# Patient Record
Sex: MALE | Race: BLACK OR AFRICAN AMERICAN | Employment: OTHER | URBAN - METROPOLITAN AREA
[De-identification: names, ages, dates, MRNs, and addresses within clinical notes are randomized per-mention and may not be internally consistent; named-entity substitution may affect disease eponyms.]

---

## 2018-09-27 ENCOUNTER — ED HISTORICAL/CONVERTED ENCOUNTER (OUTPATIENT)
Dept: OTHER | Age: 83
End: 2018-09-27

## 2019-04-15 ENCOUNTER — OP HISTORICAL/CONVERTED ENCOUNTER (OUTPATIENT)
Dept: OTHER | Age: 84
End: 2019-04-15

## 2019-06-17 ENCOUNTER — OP HISTORICAL/CONVERTED ENCOUNTER (OUTPATIENT)
Dept: OTHER | Age: 84
End: 2019-06-17

## 2019-06-17 ENCOUNTER — ED HISTORICAL/CONVERTED ENCOUNTER (OUTPATIENT)
Dept: OTHER | Age: 84
End: 2019-06-17

## 2021-04-28 ENCOUNTER — HOSPITAL ENCOUNTER (INPATIENT)
Age: 86
LOS: 5 days | Discharge: NURSING, REHAB OR LONG-TERM CARE | DRG: 435 | End: 2021-05-03
Attending: EMERGENCY MEDICINE | Admitting: INTERNAL MEDICINE
Payer: MEDICARE

## 2021-04-28 ENCOUNTER — APPOINTMENT (OUTPATIENT)
Dept: CT IMAGING | Age: 86
DRG: 435 | End: 2021-04-28
Attending: EMERGENCY MEDICINE
Payer: MEDICARE

## 2021-04-28 ENCOUNTER — APPOINTMENT (OUTPATIENT)
Dept: GENERAL RADIOLOGY | Age: 86
DRG: 435 | End: 2021-04-28
Attending: EMERGENCY MEDICINE
Payer: MEDICARE

## 2021-04-28 DIAGNOSIS — R29.898 WEAKNESS OF BOTH LOWER EXTREMITIES: Primary | ICD-10-CM

## 2021-04-28 DIAGNOSIS — D72.829 LEUKOCYTOSIS, UNSPECIFIED TYPE: ICD-10-CM

## 2021-04-28 DIAGNOSIS — R73.9 HYPERGLYCEMIA: ICD-10-CM
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
## 2021-04-28 PROBLEM — N17.9 AKI (ACUTE KIDNEY INJURY) (HCC): Status: ACTIVE | Noted: 2021-04-28

## 2021-04-28 PROBLEM — R53.1 WEAKNESS: Status: ACTIVE | Noted: 2021-04-28

## 2021-04-28 LAB
ALBUMIN SERPL-MCNC: 2.7 G/DL (ref 3.5–5)
ALBUMIN/GLOB SERPL: 0.5 {RATIO} (ref 1.1–2.2)
ALP SERPL-CCNC: 679 U/L (ref 45–117)
ALT SERPL-CCNC: 104 U/L (ref 12–78)
AMMONIA PLAS-SCNC: <10 UMOL/L
ANION GAP SERPL CALC-SCNC: 10 MMOL/L (ref 5–15)
APPEARANCE UR: ABNORMAL
AST SERPL W P-5'-P-CCNC: 287 U/L (ref 15–37)
BACTERIA URNS QL MICRO: NEGATIVE /HPF
BASOPHILS # BLD: 0 K/UL (ref 0–0.1)
BASOPHILS NFR BLD: 0 % (ref 0–1)
BILIRUB SERPL-MCNC: 8.6 MG/DL (ref 0.2–1)
BILIRUB UR QL: NEGATIVE
BUN SERPL-MCNC: 51 MG/DL (ref 6–20)
BUN/CREAT SERPL: 21 (ref 12–20)
CA-I BLD-MCNC: 9.4 MG/DL (ref 8.5–10.1)
CHLORIDE SERPL-SCNC: 98 MMOL/L (ref 97–108)
CK SERPL-CCNC: 1554 U/L (ref 39–308)
CO2 SERPL-SCNC: 22 MMOL/L (ref 21–32)
COLOR UR: ABNORMAL
CREAT SERPL-MCNC: 2.4 MG/DL (ref 0.7–1.3)
DIFFERENTIAL METHOD BLD: ABNORMAL
EOSINOPHIL # BLD: 0 K/UL (ref 0–0.4)
EOSINOPHIL NFR BLD: 0 % (ref 0–7)
ERYTHROCYTE [DISTWIDTH] IN BLOOD BY AUTOMATED COUNT: 18 % (ref 11.5–14.5)
GLOBULIN SER CALC-MCNC: 5.5 G/DL (ref 2–4)
GLUCOSE BLD STRIP.AUTO-MCNC: 233 MG/DL (ref 65–100)
GLUCOSE SERPL-MCNC: 321 MG/DL (ref 65–100)
GLUCOSE UR STRIP.AUTO-MCNC: NEGATIVE MG/DL
HCT VFR BLD AUTO: 27.5 % (ref 36.6–50.3)
HGB BLD-MCNC: 9.5 G/DL (ref 12.1–17)
HGB UR QL STRIP: ABNORMAL
HYALINE CASTS URNS QL MICRO: ABNORMAL /LPF (ref 0–5)
IMM GRANULOCYTES # BLD AUTO: 0.1 K/UL (ref 0–0.04)
IMM GRANULOCYTES NFR BLD AUTO: 1 % (ref 0–0.5)
KETONES UR QL STRIP.AUTO: NEGATIVE MG/DL
LACTATE SERPL-SCNC: 1.5 MMOL/L (ref 0.4–2)
LEUKOCYTE ESTERASE UR QL STRIP.AUTO: NEGATIVE
LYMPHOCYTES # BLD: 0.8 K/UL (ref 0.8–3.5)
LYMPHOCYTES NFR BLD: 4 % (ref 12–49)
MCH RBC QN AUTO: 30 PG (ref 26–34)
MCHC RBC AUTO-ENTMCNC: 34.5 G/DL (ref 30–36.5)
MCV RBC AUTO: 86.8 FL (ref 80–99)
MONOCYTES # BLD: 3.3 K/UL (ref 0–1)
MONOCYTES NFR BLD: 17 % (ref 5–13)
MUCOUS THREADS URNS QL MICRO: ABNORMAL /LPF
NEUTS SEG # BLD: 15.1 K/UL (ref 1.8–8)
NEUTS SEG NFR BLD: 78 % (ref 32–75)
NITRITE UR QL STRIP.AUTO: NEGATIVE
PERFORMED BY, TECHID: ABNORMAL
PH UR STRIP: 5 [PH] (ref 5–8)
PLATELET # BLD AUTO: 325 K/UL (ref 150–400)
PMV BLD AUTO: 10.2 FL (ref 8.9–12.9)
POTASSIUM SERPL-SCNC: 4.4 MMOL/L (ref 3.5–5.1)
PROCALCITONIN SERPL-MCNC: 5.98 NG/ML
PROT SERPL-MCNC: 8.2 G/DL (ref 6.4–8.2)
PROT UR STRIP-MCNC: 100 MG/DL
RBC # BLD AUTO: 3.17 M/UL (ref 4.1–5.7)
RBC #/AREA URNS HPF: ABNORMAL /HPF (ref 0–5)
SODIUM SERPL-SCNC: 130 MMOL/L (ref 136–145)
SP GR UR REFRACTOMETRY: 1.01 (ref 1–1.03)
TROPONIN I SERPL-MCNC: <0.05 NG/ML
UA: UC IF INDICATED,UAUC: ABNORMAL
UROBILINOGEN UR QL STRIP.AUTO: 4 EU/DL (ref 0.1–1)
WBC # BLD AUTO: 19.2 K/UL (ref 4.1–11.1)
WBC URNS QL MICRO: ABNORMAL /HPF (ref 0–4)

## 2021-04-28 PROCEDURE — 96374 THER/PROPH/DIAG INJ IV PUSH: CPT

## 2021-04-28 PROCEDURE — 83605 ASSAY OF LACTIC ACID: CPT

## 2021-04-28 PROCEDURE — 65270000029 HC RM PRIVATE

## 2021-04-28 PROCEDURE — 87040 BLOOD CULTURE FOR BACTERIA: CPT

## 2021-04-28 PROCEDURE — 84145 PROCALCITONIN (PCT): CPT

## 2021-04-28 PROCEDURE — 74011250637 HC RX REV CODE- 250/637: Performed by: INTERNAL MEDICINE

## 2021-04-28 PROCEDURE — 93005 ELECTROCARDIOGRAM TRACING: CPT

## 2021-04-28 PROCEDURE — 99218 HC RM OBSERVATION: CPT

## 2021-04-28 PROCEDURE — 84484 ASSAY OF TROPONIN QUANT: CPT

## 2021-04-28 PROCEDURE — 70450 CT HEAD/BRAIN W/O DYE: CPT

## 2021-04-28 PROCEDURE — 99284 EMERGENCY DEPT VISIT MOD MDM: CPT

## 2021-04-28 PROCEDURE — 82962 GLUCOSE BLOOD TEST: CPT

## 2021-04-28 PROCEDURE — 74011636637 HC RX REV CODE- 636/637: Performed by: INTERNAL MEDICINE

## 2021-04-28 PROCEDURE — 36415 COLL VENOUS BLD VENIPUNCTURE: CPT

## 2021-04-28 PROCEDURE — 71045 X-RAY EXAM CHEST 1 VIEW: CPT

## 2021-04-28 PROCEDURE — 81001 URINALYSIS AUTO W/SCOPE: CPT

## 2021-04-28 PROCEDURE — 74011250636 HC RX REV CODE- 250/636: Performed by: EMERGENCY MEDICINE

## 2021-04-28 PROCEDURE — 74011000250 HC RX REV CODE- 250: Performed by: INTERNAL MEDICINE

## 2021-04-28 PROCEDURE — 74011000258 HC RX REV CODE- 258: Performed by: EMERGENCY MEDICINE

## 2021-04-28 PROCEDURE — 80053 COMPREHEN METABOLIC PANEL: CPT

## 2021-04-28 PROCEDURE — 82550 ASSAY OF CK (CPK): CPT

## 2021-04-28 PROCEDURE — 82140 ASSAY OF AMMONIA: CPT

## 2021-04-28 PROCEDURE — 85025 COMPLETE CBC W/AUTO DIFF WBC: CPT

## 2021-04-28 RX ORDER — SIMVASTATIN 10 MG/1
10 TABLET, FILM COATED ORAL
COMMUNITY
End: 2021-05-03

## 2021-04-28 RX ORDER — LATANOPROST 50 UG/ML
1 SOLUTION/ DROPS OPHTHALMIC DAILY
COMMUNITY

## 2021-04-28 RX ORDER — DEXTROSE 50 % IN WATER (D50W) INTRAVENOUS SYRINGE
25-50 AS NEEDED
Status: DISCONTINUED | OUTPATIENT
Start: 2021-04-28 | End: 2021-05-04 | Stop reason: HOSPADM

## 2021-04-28 RX ORDER — INSULIN LISPRO 100 [IU]/ML
INJECTION, SOLUTION INTRAVENOUS; SUBCUTANEOUS
Status: DISCONTINUED | OUTPATIENT
Start: 2021-04-28 | End: 2021-05-04 | Stop reason: HOSPADM

## 2021-04-28 RX ORDER — ONDANSETRON 2 MG/ML
4 INJECTION INTRAMUSCULAR; INTRAVENOUS
Status: DISCONTINUED | OUTPATIENT
Start: 2021-04-28 | End: 2021-05-04 | Stop reason: HOSPADM

## 2021-04-28 RX ORDER — CYANOCOBALAMIN 1000 UG/ML
1000 INJECTION, SOLUTION INTRAMUSCULAR; SUBCUTANEOUS EVERY 2 WEEKS
COMMUNITY

## 2021-04-28 RX ORDER — ENOXAPARIN SODIUM 100 MG/ML
30 INJECTION SUBCUTANEOUS DAILY
Status: DISCONTINUED | OUTPATIENT
Start: 2021-04-29 | End: 2021-05-04 | Stop reason: HOSPADM

## 2021-04-28 RX ORDER — AMLODIPINE BESYLATE 5 MG/1
5 TABLET ORAL DAILY
COMMUNITY
End: 2021-05-03

## 2021-04-28 RX ORDER — ACETAMINOPHEN 650 MG/1
650 SUPPOSITORY RECTAL
Status: DISCONTINUED | OUTPATIENT
Start: 2021-04-28 | End: 2021-05-04 | Stop reason: HOSPADM

## 2021-04-28 RX ORDER — ACETAMINOPHEN 325 MG/1
650 TABLET ORAL
Status: DISCONTINUED | OUTPATIENT
Start: 2021-04-28 | End: 2021-05-04 | Stop reason: HOSPADM

## 2021-04-28 RX ORDER — SODIUM CHLORIDE 9 MG/ML
125 INJECTION, SOLUTION INTRAVENOUS CONTINUOUS
Status: DISCONTINUED | OUTPATIENT
Start: 2021-04-28 | End: 2021-04-28

## 2021-04-28 RX ORDER — MAGNESIUM SULFATE 100 %
4 CRYSTALS MISCELLANEOUS AS NEEDED
Status: DISCONTINUED | OUTPATIENT
Start: 2021-04-28 | End: 2021-05-04 | Stop reason: HOSPADM

## 2021-04-28 RX ORDER — PANTOPRAZOLE SODIUM 40 MG/1
40 TABLET, DELAYED RELEASE ORAL DAILY
COMMUNITY

## 2021-04-28 RX ORDER — PROMETHAZINE HYDROCHLORIDE 25 MG/1
12.5 TABLET ORAL
Status: DISCONTINUED | OUTPATIENT
Start: 2021-04-28 | End: 2021-05-04 | Stop reason: HOSPADM

## 2021-04-28 RX ORDER — LANOLIN ALCOHOL/MO/W.PET/CERES
325 CREAM (GRAM) TOPICAL
COMMUNITY

## 2021-04-28 RX ORDER — SODIUM CHLORIDE 0.9 % (FLUSH) 0.9 %
5-40 SYRINGE (ML) INJECTION EVERY 8 HOURS
Status: DISCONTINUED | OUTPATIENT
Start: 2021-04-28 | End: 2021-04-29

## 2021-04-28 RX ORDER — FUROSEMIDE 40 MG/1
40 TABLET ORAL DAILY
COMMUNITY
End: 2021-05-03

## 2021-04-28 RX ORDER — SODIUM CHLORIDE 0.9 % (FLUSH) 0.9 %
5-40 SYRINGE (ML) INJECTION AS NEEDED
Status: DISCONTINUED | OUTPATIENT
Start: 2021-04-28 | End: 2021-05-04 | Stop reason: HOSPADM

## 2021-04-28 RX ORDER — POLYETHYLENE GLYCOL 3350 17 G/17G
17 POWDER, FOR SOLUTION ORAL DAILY PRN
Status: DISCONTINUED | OUTPATIENT
Start: 2021-04-28 | End: 2021-05-04 | Stop reason: HOSPADM

## 2021-04-28 RX ADMIN — Medication 10 ML: at 22:27

## 2021-04-28 RX ADMIN — SODIUM CHLORIDE 1000 ML: 9 INJECTION, SOLUTION INTRAVENOUS at 17:02

## 2021-04-28 RX ADMIN — SODIUM BICARBONATE: 84 INJECTION, SOLUTION INTRAVENOUS at 19:51

## 2021-04-28 RX ADMIN — INSULIN LISPRO 2 UNITS: 100 INJECTION, SOLUTION INTRAVENOUS; SUBCUTANEOUS at 22:26

## 2021-04-28 RX ADMIN — PIPERACILLIN SODIUM AND TAZOBACTAM SODIUM 4.5 G: 4; .5 INJECTION, POWDER, LYOPHILIZED, FOR SOLUTION INTRAVENOUS at 18:18

## 2021-04-28 RX ADMIN — Medication 10 ML: at 18:00

## 2021-04-28 RX ADMIN — ACETAMINOPHEN 650 MG: 325 TABLET, FILM COATED ORAL at 22:25

## 2021-04-28 NOTE — ED NOTES
Bedside shift change report given to Mauricio Cummings RN (oncoming nurse) by Zack Garcia RN (offgoing nurse). Report included the following information SBAR, Kardex, ED Summary, [place] and Recent Results.

## 2021-04-28 NOTE — H&P
History & Physical    Primary Care Provider: Yakov Paulino MD  Source of Information: Patient/family     History of Presenting Illness:   Kim Oseguera is a 80 y.o. male who presents with generalized weakness and confusion. He was found at home on the bathroom floor this morning after he had fallen down and braced himself against the wall. Patient is normally quite independent and drives to the [facility] for breakfast each morning denies any focal or unilateral weakness. No fever, abdominal pain, cough, shortness of breath or urinary symptoms. When I first walked in the room he told me he fell a couple days ago but it sounds like it was more likely that he fell last night. His son was with him last night at 6 PM and stated he was at baseline. His labs show a creatinine of 2.4, BUN 55. No previous labs for comparison. AST is 287 with an alkaline phosphatase of 679. A CPK was not ordered. His urinalysis shows large blood but microscopic exam shows no significant red cells,  suggesting myoglobinuria    Patient's daughter does tell me that he has had an elevated creatinine in the past but does not see a nephrologist.  He follows with the cancer center for chronic anemia    Patient lives alone, has a woman that checks on him daily     Review of Systems:  A comprehensive review of systems was negative except for that written in the History of Present Illness. Past Medical History:   Diagnosis Date    Arthritis     AVM (arteriovenous malformation) of colon     B12 deficiency     Chronic anemia     Chronic kidney disease     [facility]     Heart failure (HCC)     Remotely in his 46s    Hypertension     PVD (peripheral vascular disease) ([facility]         Past Surgical History:   Procedure Laterality Date    HX HIP REPLACEMENT Bilateral        Prior to Admission medications    Not on File   UNKNOWN    No Known Allergies     History reviewed. No pertinent family history. Social History     Socioeconomic History    Marital status:      Spouse name: Not on file    Number of children: Not on file    Years of education: Not on file    Highest education level: Not on file   Tobacco Use    Smoking status: Never Smoker    Smokeless tobacco: Never Used   Substance and Sexual Activity    Alcohol use: Never     Frequency: Never   Other Topics Concern            CODE STATUS:  DNR    Full x   Other      Objective:     Physical Exam:     Visit Vitals  BP (!) 153/70 (BP 1 Location: Left upper arm, BP Patient Position: At rest)   Pulse 98   Temp 97.7 °F (36.5 °C)   Resp 22   SpO2 97%      O2 Device: None (Room air)    General:  Alert, cooperative, no distress, appears stated age. Head:  Normocephalic, without obvious abnormality, atraumatic. Eyes:  Conjunctivae/corneas clear. PERRL, EOMs intact. Nose: Nares normal. Septum midline. Mucosa normal. No drainage or sinus tenderness. Throat: Lips, mucosa, and tongue are dry. Teeth and gums normal.   Neck: Supple, symmetrical, trachea midline, no adenopathy, thyroid: no enlargement/tenderness/nodules, no carotid bruit and no JVD. Back:   Symmetric, no curvature. ROM normal. No CVA tenderness. Lungs:   Clear to auscultation bilaterally. Chest wall:  No tenderness or deformity. Heart:  Regular rate and rhythm, S1, S2 normal, no murmur, click, rub or gallop. Abdomen:   Soft, non-tender. Bowel sounds normal. No masses,  No organomegaly. Extremities: Extremities normal, atraumatic, no cyanosis both legs have compression wrappings in place   Pulses: 1+ and symmetric all extremities. Skin: Skin color, diminished turgor   Neurologic: CNII-XII intact. No motor or sensory deficits.         24 Hour Results:    Recent Results (from the past 24 hour(s))   CBC WITH AUTOMATED DIFF    Collection Time: 04/28/21  2:45 PM   Result Value Ref Range    WBC 19.2 (H) 4.1 - 11.1 K/uL    RBC 3.17 (L) 4.10 - 5.70 M/uL    HGB 9.5 (L) 12.1 - 17.0 g/dL    HCT 27.5 (L) 36.6 - 50.3 %    MCV 86.8 80.0 - 99.0 FL    MCH 30.0 26.0 - 34.0 PG    MCHC 34.5 30.0 - 36.5 g/dL    RDW 18.0 (H) 11.5 - 14.5 %    PLATELET 771 298 - 920 K/uL    MPV 10.2 8.9 - 12.9 FL    NEUTROPHILS 78 (H) 32 - 75 %    LYMPHOCYTES 4 (L) 12 - 49 %    MONOCYTES 17 (H) 5 - 13 %    EOSINOPHILS 0 0 - 7 %    BASOPHILS 0 0 - 1 %    IMMATURE GRANULOCYTES 1 (H) 0.0 - 0.5 %    ABS. NEUTROPHILS 15.1 (H) 1.8 - 8.0 K/UL    ABS. LYMPHOCYTES 0.8 0.8 - 3.5 K/UL    ABS. MONOCYTES 3.3 (H) 0.0 - 1.0 K/UL    ABS. EOSINOPHILS 0.0 0.0 - 0.4 K/UL    ABS. BASOPHILS 0.0 0.0 - 0.1 K/UL    ABS. IMM. GRANS. 0.1 (H) 0.00 - 0.04 K/UL    DF AUTOMATED     METABOLIC PANEL, COMPREHENSIVE    Collection Time: 04/28/21  2:45 PM   Result Value Ref Range    Sodium 130 (L) 136 - 145 mmol/L    Potassium 4.4 3.5 - 5.1 mmol/L    Chloride 98 97 - 108 mmol/L    CO2 22 21 - 32 mmol/L    Anion gap 10 5 - 15 mmol/L    Glucose 321 (H) 65 - 100 mg/dL    BUN 51 (H) 6 - 20 mg/dL    Creatinine 2.40 (H) 0.70 - 1.30 mg/dL    BUN/Creatinine ratio 21 (H) 12 - 20      GFR est AA 31 (L) >60 ml/min/1.73m2    GFR est non-AA 26 (L) >60 ml/min/1.73m2    Calcium 9.4 8.5 - 10.1 mg/dL    Bilirubin, total 8.6 (H) 0.2 - 1.0 mg/dL    AST (SGOT) 287 (H) 15 - 37 U/L    ALT (SGPT) 104 (H) 12 - 78 U/L    Alk.  phosphatase 679 (H) 45 - 117 U/L    Protein, total 8.2 6.4 - 8.2 g/dL    Albumin 2.7 (L) 3.5 - 5.0 g/dL    Globulin 5.5 (H) 2.0 - 4.0 g/dL    A-G Ratio 0.5 (L) 1.1 - 2.2     TROPONIN I    Collection Time: 04/28/21  2:45 PM   Result Value Ref Range    Troponin-I, Qt. <0.05 <0.05 ng/mL   URINALYSIS W/ REFLEX CULTURE    Collection Time: 04/28/21  3:00 PM    Specimen: Urine   Result Value Ref Range    Color Yaritza      Appearance Turbid (A) Clear      Specific gravity 1.014 1.003 - 1.030      pH (UA) 5.0 5.0 - 8.0      Protein 100 (A) Negative mg/dL    Glucose Negative Negative mg/dL    Ketone Negative Negative mg/dL    Bilirubin Negative Negative      Blood Large (A) Negative      Urobilinogen 4.0 (H) 0.1 - 1.0 EU/dL    Nitrites Negative Negative      Leukocyte Esterase Negative Negative      WBC 0-4 0 - 4 /hpf    RBC 0-5 0 - 5 /hpf    Bacteria Negative Negative /hpf    UA:UC IF INDICATED Culture not indicated by UA result Culture not indicated by UA result      Mucus Trace (A) Negative /lpf    Hyaline cast 2-5 0 - 5 /lpf         Imaging:   CT HEAD WO CONT   Final Result   1. No acute intracranial findings. 2. Atrophy and small vessel ischemic disease. 3. Facial sinus disease. XR CHEST SNGL V   Final Result      Underexpanded lungs, otherwise negative. Assessment:   Generalized weakness with fall and prolonged immobilization    Dehydration    Likely acute kidney injury, superimposed on chronic kidney disease.   Baseline renal function unknown    Probable acute myoglobinuria    Diabetes mellitus type 2    Hypertension    Peripheral vascular disease    Chronic anemia    Leukocytosis, probably leukemoid reaction, cannot exclude  sepsis    Chronic leg wounds    Elevated AST, likely muscle origin    Elevated alkaline phosphatase, etiology unclear      Plan:   Admit to medical telemetry as inpatient  We will hydrate with IV fluids with sodium bicarbonate added  Check CPK and procalcitonin  Continue Zosyn until blood cultures come back  Family to provide home medication list  Consult wound care nurse in a.m., will examine legs with them when wraps are removed  PT and OT evaluations     sliding-scale insulin    Family request full [employer] medications were not available for review    Signed By: Christopher Lowery MD     April 28, 2021

## 2021-04-28 NOTE — ED TRIAGE NOTES
Per EMS, pt coming from home w/ c/o acute confusion and bilateral LE weakness since this AM. Pt felt weak in bathroom at home and slid down to floor, bracing himself against wall, d/t weakness.  en route, no DM history. Pt A&Ox3 in triage - not oriented to year. GCS 15.

## 2021-04-28 NOTE — Clinical Note
Patient Class[de-identified] OBSERVATION [077]   Type of Bed: Medical [8]   Reason for Observation: Generalized weakness   Admitting Diagnosis: Weakness [[id number]]   Admitting Physician: Hira Hernandez   Attending Physician: Zbigniew Sagastume [[id number]]

## 2021-04-28 NOTE — PROGRESS NOTES
4/28/21. Pt informed of MOON notification, verbalized understanding, & signed. Son Venu Pulido @ [phone]) informed & consented. Copy to pt, copy in chart, & original to HIM for scanning into EMR.

## 2021-04-28 NOTE — ED PROVIDER NOTES
EMERGENCY DEPARTMENT HISTORY AND PHYSICAL EXAM      Date: 4/28/2021  Patient Name: Quoc Da Silva    History of Presenting Illness     Chief Complaint   Patient presents with    Extremity Weakness       History Provided By: Patient, Patient's Son and EMS    HPI: Quoc Da Silva, 80 y.o. male presents to the ED with cc of   Chief Complaint   Patient presents with    Extremity Weakness   Addendum     Per EMS, pt coming from home w/ c/o acute confusion and bilateral LE weakness since this AM. Pt felt weak in bathroom at home and slid down to floor, bracing himself against wall, d/t weakness.  en route, no DM history. Pt A&Ox3 in triage - not oriented to year. GCS 15. Patient's son states that the patient was found on the floor this morning as he was feeling weak and not able to walk, patient who lives by himself usually walks and go out to have breakfast every day, patient denies any chest pain fever cough or shortness of breath      There are no other complaints, changes, or physical findings at this time. PCP: Kael Shay MD    No current facility-administered medications on file prior to encounter. No current outpatient medications on file prior to encounter. Past History     Past Medical History:  History reviewed. No pertinent past medical history. Past Surgical History:  No past surgical history on file. Family History:  History reviewed. No pertinent family history. Social History:  Social History     Tobacco Use    Smoking status: Not on file   Substance Use Topics    Alcohol use: Not on file    Drug use: Not on file       Allergies:  No Known Allergies      Review of Systems   Review of Systems   Constitutional: Negative. HENT: Negative for congestion, facial swelling, rhinorrhea and sore throat. Eyes: Negative. Negative for photophobia and pain. Respiratory: Negative for cough, shortness of breath and wheezing. Cardiovascular: Negative.   Negative for chest pain.   Gastrointestinal: Negative for abdominal distention and abdominal pain. Genitourinary: Negative. Musculoskeletal: Negative. Allergic/Immunologic: Negative for immunocompromised state. Neurological: Positive for dizziness, syncope and weakness. Hematological: Negative. Psychiatric/Behavioral: The patient is nervous/anxious. Physical Exam   Physical Exam  Vitals signs and nursing note reviewed. Constitutional:       Appearance: Normal appearance. He is normal weight. HENT:      Head: Normocephalic and atraumatic. Nose: No congestion or rhinorrhea. Eyes:      Extraocular Movements: Extraocular movements intact. Pupils: Pupils are equal, round, and reactive to light. Neck:      Musculoskeletal: Normal range of motion and neck supple. Cardiovascular:      Rate and Rhythm: Normal rate and regular rhythm. Pulmonary:      Effort: Pulmonary effort is normal.      Breath sounds: Normal breath sounds. Abdominal:      General: Abdomen is flat. Bowel sounds are normal. There is no distension. Tenderness: There is no abdominal tenderness. There is no guarding. Musculoskeletal: Normal range of motion. Skin:     General: Skin is warm and dry. Neurological:      General: No focal deficit present. Mental Status: He is alert and oriented to person, place, and time. Motor: Weakness (Lateral lower extremity weakness gait was not tested) present.    Psychiatric:         Mood and Affect: Mood normal.         Diagnostic Study Results     Labs -     Recent Results (from the past 12 hour(s))   CBC WITH AUTOMATED DIFF    Collection Time: 04/28/21  2:45 PM   Result Value Ref Range    WBC 19.2 (H) 4.1 - 11.1 K/uL    RBC 3.17 (L) 4.10 - 5.70 M/uL    HGB 9.5 (L) 12.1 - 17.0 g/dL    HCT 27.5 (L) 36.6 - 50.3 %    MCV 86.8 80.0 - 99.0 FL    MCH 30.0 26.0 - 34.0 PG    MCHC 34.5 30.0 - 36.5 g/dL    RDW 18.0 (H) 11.5 - 14.5 %    PLATELET 994 903 - 730 K/uL    MPV 10.2 8.9 - 12.9 FL    NEUTROPHILS 78 (H) 32 - 75 %    LYMPHOCYTES 4 (L) 12 - 49 %    MONOCYTES 17 (H) 5 - 13 %    EOSINOPHILS 0 0 - 7 %    BASOPHILS 0 0 - 1 %    IMMATURE GRANULOCYTES 1 (H) 0.0 - 0.5 %    ABS. NEUTROPHILS 15.1 (H) 1.8 - 8.0 K/UL    ABS. LYMPHOCYTES 0.8 0.8 - 3.5 K/UL    ABS. MONOCYTES 3.3 (H) 0.0 - 1.0 K/UL    ABS. EOSINOPHILS 0.0 0.0 - 0.4 K/UL    ABS. BASOPHILS 0.0 0.0 - 0.1 K/UL    ABS. IMM. GRANS. 0.1 (H) 0.00 - 0.04 K/UL    DF AUTOMATED     METABOLIC PANEL, COMPREHENSIVE    Collection Time: 04/28/21  2:45 PM   Result Value Ref Range    Sodium 130 (L) 136 - 145 mmol/L    Potassium 4.4 3.5 - 5.1 mmol/L    Chloride 98 97 - 108 mmol/L    CO2 22 21 - 32 mmol/L    Anion gap 10 5 - 15 mmol/L    Glucose 321 (H) 65 - 100 mg/dL    BUN 51 (H) 6 - 20 mg/dL    Creatinine 2.40 (H) 0.70 - 1.30 mg/dL    BUN/Creatinine ratio 21 (H) 12 - 20      GFR est AA 31 (L) >60 ml/min/1.73m2    GFR est non-AA 26 (L) >60 ml/min/1.73m2    Calcium 9.4 8.5 - 10.1 mg/dL    Bilirubin, total 8.6 (H) 0.2 - 1.0 mg/dL    AST (SGOT) 287 (H) 15 - 37 U/L    ALT (SGPT) 104 (H) 12 - 78 U/L    Alk.  phosphatase 679 (H) 45 - 117 U/L    Protein, total 8.2 6.4 - 8.2 g/dL    Albumin 2.7 (L) 3.5 - 5.0 g/dL    Globulin 5.5 (H) 2.0 - 4.0 g/dL    A-G Ratio 0.5 (L) 1.1 - 2.2     TROPONIN I    Collection Time: 04/28/21  2:45 PM   Result Value Ref Range    Troponin-I, Qt. <0.05 <0.05 ng/mL   URINALYSIS W/ REFLEX CULTURE    Collection Time: 04/28/21  3:00 PM    Specimen: Urine   Result Value Ref Range    Color Yaritza      Appearance Turbid (A) Clear      Specific gravity 1.014 1.003 - 1.030      pH (UA) 5.0 5.0 - 8.0      Protein 100 (A) Negative mg/dL    Glucose Negative Negative mg/dL    Ketone Negative Negative mg/dL    Bilirubin Negative Negative      Blood Large (A) Negative      Urobilinogen 4.0 (H) 0.1 - 1.0 EU/dL    Nitrites Negative Negative      Leukocyte Esterase Negative Negative      WBC 0-4 0 - 4 /hpf    RBC 0-5 0 - 5 /hpf    Bacteria Negative Negative /hpf    UA:UC IF INDICATED Culture not indicated by UA result Culture not indicated by UA result      Mucus Trace (A) Negative /lpf    Hyaline cast 2-5 0 - 5 /lpf       Labs reviewed by me    Radiologic Studies -   CT HEAD WO CONT   Final Result   1. No acute intracranial findings. 2. Atrophy and small vessel ischemic disease. 3. Facial sinus disease. XR CHEST SNGL V    (Results Pending)     CT Results  (Last 48 hours)               04/28/21 1638  CT HEAD WO CONT Final result    Impression:  1. No acute intracranial findings. 2. Atrophy and small vessel ischemic disease. 3. Facial sinus disease. Narrative:  Generalized weakness. No comparison. Technique: Axial images head without IV contrast, with multiplanar reformatting. Multiplanar reformatting. Dose reduction: All CT scans at this facility are performed using dose reduction   optimization techniques as appropriate to a performed exam including the   following: Automated exposure control, adjustments of the mA and/or kV according   to patient's size, or use of iterative reconstruction technique. Findings: Bilateral periventricular white matter hypodensity. . No mass effect,   extra-axial fluid collection or hemorrhage. Normal position craniocervical   junction. Atrophy. Mucosal thickening and small air-fluid level left maxillary   sinus. Mucosal thickening and partial opacifications of the left ethmoid air   cells. Bilateral ostiomeatal mucosal thickening. Mastoid air cells are   unopacified. Calvarium intact. CXR Results  (Last 48 hours)    None            Medical Decision Making     I am the first provider for this patient. I reviewed the vital signs, available nursing notes, past medical history, past surgical history, family history and social history. RADIOLOGY report and LABS reviewed by me    Vital Signs-Reviewed the patient's vital signs.   Patient Vitals for the past 12 hrs:   Temp Pulse Resp BP SpO2   04/28/21 1524 -- -- -- -- 97 %   04/28/21 1422 97.7 °F (36.5 °C) 98 22 (!) 153/70 97 %       EKG interpretation: (Preliminary)  EKG done at 1446 shows atrial flutter with ventricular rate of 97 normal axis nonspecific ST not a STEMI          Records Reviewed: Nurse's note. Provider Notes (Medical Decision Making):    Patient presents with DIFF DX : All, CVA, generalized weakness, CVA, UTI, pneumonia        ED Course:   Initial assessment performed. The patients presenting problems have been discussed, and they are in agreement with the care plan formulated and outlined with them. I have encouraged them to ask questions as they arise throughout their visit. TREATMENT RESPONSE -Stable          Kentrell Smith MD      Disposition:  Admitted   Diagnostic tests were reviewed and questions answered. Diagnosis, care plan and treatment options were discussed. The patient understand instructions and will follow up as directed. Condition stable    Admitting Provider:  Avery Govea MD     Consulting Provider:  No ref. provider found       DISCHARGE PLAN:  1. There are no discharge medications for this patient. 2.   Follow-up Information     Follow up With Specialties Details Why Contact Info    Adiel Araujo MD Internal Medicine   [address] [place] [id number] [phone]          3. Return to ED if worse     Diagnosis     Clinical Impression:     ICD-10-CM ICD-9-CM    1. Weakness of both lower extremities  R29.898 729.89    2. Leukocytosis, unspecified type  D72.829 288.60    3. Fall, initial encounter  [address] XXXA E888.9    4. Hyperglycemia  R73.9 790.29         Attestations:    Kentrell Smith MD    Please note that this dictation was completed with [employer], the [employer] voice recognition software. Quite often unanticipated grammatical, syntax, homophones, and other interpretive errors are inadvertently transcribed by the computer software. Please disregard these errors. Please excuse any errors that have escaped final proofreading. Thank you.

## 2021-04-29 LAB
ALBUMIN SERPL-MCNC: 2.1 G/DL (ref 3.5–5)
ALBUMIN/GLOB SERPL: 0.4 {RATIO} (ref 1.1–2.2)
ALP SERPL-CCNC: 546 U/L (ref 45–117)
ALT SERPL-CCNC: 85 U/L (ref 12–78)
ANION GAP SERPL CALC-SCNC: 7 MMOL/L (ref 5–15)
AST SERPL W P-5'-P-CCNC: 260 U/L (ref 15–37)
BILIRUB DIRECT SERPL-MCNC: 7.3 MG/DL (ref 0–0.2)
BILIRUB SERPL-MCNC: 8.4 MG/DL (ref 0.2–1)
BUN SERPL-MCNC: 50 MG/DL (ref 6–20)
BUN/CREAT SERPL: 24 (ref 12–20)
CA-I BLD-MCNC: 9 MG/DL (ref 8.5–10.1)
CHLORIDE SERPL-SCNC: 104 MMOL/L (ref 97–108)
CK SERPL-CCNC: 1427 U/L (ref 39–308)
CO2 SERPL-SCNC: 22 MMOL/L (ref 21–32)
CREAT SERPL-MCNC: 2.1 MG/DL (ref 0.7–1.3)
ERYTHROCYTE [DISTWIDTH] IN BLOOD BY AUTOMATED COUNT: 18.5 % (ref 11.5–14.5)
EST. AVERAGE GLUCOSE BLD GHB EST-MCNC: 131 MG/DL
GLOBULIN SER CALC-MCNC: 4.9 G/DL (ref 2–4)
GLUCOSE BLD STRIP.AUTO-MCNC: 190 MG/DL (ref 65–100)
GLUCOSE BLD STRIP.AUTO-MCNC: 203 MG/DL (ref 65–100)
GLUCOSE BLD STRIP.AUTO-MCNC: 246 MG/DL (ref 65–100)
GLUCOSE BLD STRIP.AUTO-MCNC: 262 MG/DL (ref 65–100)
GLUCOSE BLD STRIP.AUTO-MCNC: 305 MG/DL (ref 65–100)
GLUCOSE SERPL-MCNC: 202 MG/DL (ref 65–100)
HBA1C MFR BLD: 6.2 % (ref 4–5.6)
HCT VFR BLD AUTO: 25.8 % (ref 36.6–50.3)
HGB BLD-MCNC: 8.9 G/DL (ref 12.1–17)
MCH RBC QN AUTO: 29.7 PG (ref 26–34)
MCHC RBC AUTO-ENTMCNC: 34.5 G/DL (ref 30–36.5)
MCV RBC AUTO: 86 FL (ref 80–99)
PERFORMED BY, TECHID: ABNORMAL
PLATELET # BLD AUTO: 280 K/UL (ref 150–400)
PMV BLD AUTO: 10 FL (ref 8.9–12.9)
POTASSIUM SERPL-SCNC: 4.1 MMOL/L (ref 3.5–5.1)
PROT SERPL-MCNC: 7 G/DL (ref 6.4–8.2)
RBC # BLD AUTO: 3 M/UL (ref 4.1–5.7)
SODIUM SERPL-SCNC: 133 MMOL/L (ref 136–145)
WBC # BLD AUTO: 19.1 K/UL (ref 4.1–11.1)

## 2021-04-29 PROCEDURE — 74011000250 HC RX REV CODE- 250: Performed by: INTERNAL MEDICINE

## 2021-04-29 PROCEDURE — 82962 GLUCOSE BLOOD TEST: CPT

## 2021-04-29 PROCEDURE — 85027 COMPLETE CBC AUTOMATED: CPT

## 2021-04-29 PROCEDURE — 74011250637 HC RX REV CODE- 250/637: Performed by: INTERNAL MEDICINE

## 2021-04-29 PROCEDURE — 97165 OT EVAL LOW COMPLEX 30 MIN: CPT

## 2021-04-29 PROCEDURE — 74011250636 HC RX REV CODE- 250/636: Performed by: INTERNAL MEDICINE

## 2021-04-29 PROCEDURE — 65270000029 HC RM PRIVATE

## 2021-04-29 PROCEDURE — 97530 THERAPEUTIC ACTIVITIES: CPT

## 2021-04-29 PROCEDURE — 97161 PT EVAL LOW COMPLEX 20 MIN: CPT

## 2021-04-29 PROCEDURE — 74011000258 HC RX REV CODE- 258: Performed by: INTERNAL MEDICINE

## 2021-04-29 PROCEDURE — 36415 COLL VENOUS BLD VENIPUNCTURE: CPT

## 2021-04-29 PROCEDURE — 80076 HEPATIC FUNCTION PANEL: CPT

## 2021-04-29 PROCEDURE — 74011636637 HC RX REV CODE- 636/637: Performed by: INTERNAL MEDICINE

## 2021-04-29 PROCEDURE — 83036 HEMOGLOBIN GLYCOSYLATED A1C: CPT

## 2021-04-29 PROCEDURE — 82550 ASSAY OF CK (CPK): CPT

## 2021-04-29 PROCEDURE — 80048 BASIC METABOLIC PNL TOTAL CA: CPT

## 2021-04-29 RX ADMIN — SODIUM BICARBONATE: 84 INJECTION, SOLUTION INTRAVENOUS at 07:08

## 2021-04-29 RX ADMIN — PIPERACILLIN AND TAZOBACTAM 3.38 G: 3; .375 INJECTION, POWDER, LYOPHILIZED, FOR SOLUTION INTRAVENOUS at 02:26

## 2021-04-29 RX ADMIN — INSULIN LISPRO 3 UNITS: 100 INJECTION, SOLUTION INTRAVENOUS; SUBCUTANEOUS at 16:30

## 2021-04-29 RX ADMIN — Medication 10 ML: at 15:31

## 2021-04-29 RX ADMIN — ENOXAPARIN SODIUM 30 MG: 30 INJECTION SUBCUTANEOUS at 09:53

## 2021-04-29 RX ADMIN — PIPERACILLIN AND TAZOBACTAM 3.38 G: 3; .375 INJECTION, POWDER, LYOPHILIZED, FOR SOLUTION INTRAVENOUS at 09:53

## 2021-04-29 RX ADMIN — Medication 10 ML: at 05:08

## 2021-04-29 RX ADMIN — INSULIN LISPRO 2 UNITS: 100 INJECTION, SOLUTION INTRAVENOUS; SUBCUTANEOUS at 21:18

## 2021-04-29 RX ADMIN — INSULIN LISPRO 3 UNITS: 100 INJECTION, SOLUTION INTRAVENOUS; SUBCUTANEOUS at 12:02

## 2021-04-29 RX ADMIN — ACETAMINOPHEN 650 MG: 325 TABLET, FILM COATED ORAL at 21:18

## 2021-04-29 RX ADMIN — PIPERACILLIN AND TAZOBACTAM 3.38 G: 3; .375 INJECTION, POWDER, LYOPHILIZED, FOR SOLUTION INTRAVENOUS at 18:51

## 2021-04-29 RX ADMIN — INSULIN LISPRO 3 UNITS: 100 INJECTION, SOLUTION INTRAVENOUS; SUBCUTANEOUS at 09:53

## 2021-04-29 NOTE — PROGRESS NOTES
PT eval order received and acknowledged. PT eval attempted at 0925 however pt attempting to finish breakfast after being set up by OT at 835, pt states he has been busy this am, requested session at later time. Will continue to follow patient and attempt PT eval at a later time. Thank you.

## 2021-04-29 NOTE — PROGRESS NOTES
Reason for Admission:  Weakness, CRISTIAN    RUR Score:  12%                   Plan for utilizing home health:  Plan is for IRF        PCP: First and Last name:  Janet Cotton MD   Name of Practice:    Are you a current patient: Yes/No: yes   Approximate date of last visit: more than a few months   Can you participate in a virtual visit with your PCP: no                    Current Advanced Directive/Advance Care Plan: Full Code      Healthcare Decision Maker:   Primary Healthcare Decision Maker: Avril Ayers (son) [phone]  Click here to complete [address] including selection of the Healthcare Decision Maker Relationship (ie \"Primary\")           Transition of Care Plan:                    CM met with the patient at the bedside to complete assessment. Patient reported he lives at home alone and has been caring for himself. He reported he only using a walker at home to maximize his independence. We discussed his discharge recommendations of IRF. Patient agreeable at this time and would like referral sent to [facility]. Choice obtained and referral has been sent.

## 2021-04-29 NOTE — PROGRESS NOTES
Hospitalist Progress Note               Daily Progress Note: 4/29/2021      Subjective: The patient is seen for follow up. He is doing better overall today. He is sitting up in a chair, feels stronger    Additional labs from yesterday showed a CK of 1500. Labs this morning are still pending. His procalcitonin was 5.98    His leg wraps are off and there is no evidence for any cellulitis or open wounds on his legs    Problem List:  Problem List as of 4/29/2021 Never Reviewed          Codes Class Noted - Resolved    Weakness ICD-10-CM: R53.1  ICD-9-CM: 780.79  4/28/2021 - Present        CRISTIAN (acute kidney injury) [facility]) ICD-10-CM: N17.9  ICD-9-CM: 584.9  4/28/2021 - Present              Medications reviewed  Current Facility-Administered Medications   Medication Dose Route Frequency    sodium chloride (NS) flush 5-40 mL  5-40 mL IntraVENous Q8H    sodium chloride (NS) flush 5-40 mL  5-40 mL IntraVENous PRN    acetaminophen (TYLENOL) tablet 650 mg  650 mg Oral Q6H PRN    Or    acetaminophen (TYLENOL) suppository 650 mg  650 mg Rectal Q6H PRN    polyethylene glycol (MIRALAX) packet 17 g  17 g Oral DAILY PRN    promethazine (PHENERGAN) tablet 12.5 mg  12.5 mg Oral Q6H PRN    Or    ondansetron (ZOFRAN) injection 4 mg  4 mg IntraVENous Q6H PRN    enoxaparin (LOVENOX) injection 30 mg  30 mg SubCUTAneous DAILY    glucose chewable tablet 16 g  4 Tab Oral PRN    dextrose (D50W) injection syrg 12.5-25 g  25-50 mL IntraVENous PRN    glucagon (GLUCAGEN) injection 1 mg  1 mg IntraMUSCular PRN    insulin lispro (HUMALOG) injection   SubCUTAneous AC&HS    piperacillin-tazobactam (ZOSYN) 3.375 g in 0.9% sodium chloride (MBP/ADV) 100 mL MBP  3.375 g IntraVENous Q8H    0.45% sodium chloride 1,000 mL with sodium bicarbonate (8.4%) 50 mEq infusion   IntraVENous CONTINUOUS       Review of Systems:   A comprehensive review of systems was negative except for that written in the HPI.     Objective:   Physical Exam: Visit Vitals  BP (!) 147/91   Pulse 98   Temp 98.2 °F (36.8 °C)   Resp 20   Ht 6' 2\" (1.88 m)   Wt 109.7 kg (241 lb 13.5 oz)   SpO2 96%   BMI 31.05 kg/m²      O2 Device: None (Room air)    Temp (24hrs), Av.9 °F (37.2 °C), Min:97.7 °F (36.5 °C), Max:101.5 °F (38.6 °C)    No intake/output data recorded. No intake/output data recorded. General:   Awake and alert   Lungs:   Clear to auscultation bilaterally. Chest wall:  No tenderness or deformity. Heart:  Regular rate and rhythm, S1, S2 normal, no murmur, click, rub or gallop. Abdomen:   Soft, non-tender. Bowel sounds normal. No masses,  No organomegaly. Extremities: Extremities normal, atraumatic, no cyanosis. In his legs show some hyperpigmented changes suggesting chronic venous insufficiency but no open wounds or erythema         Pulses: 2+ and symmetric all extremities. Skin: Skin color, texture, turgor normal. No rashes or lesions   Neurologic: CNII-XII intact. No gross focal deficits         Data Review:       Recent Days:  Recent Labs     21  1445   WBC 19.2*   HGB 9.5*   HCT 27.5*        Recent Labs     21  1445   *   K 4.4   CL 98   CO2 22   *   BUN 51*   CREA 2.40*   CA 9.4   ALB 2.7*   TBILI 8.6*   *     No results for input(s): PH, PCO2, PO2, HCO3, FIO2 in the last 72 hours.     24 Hour Results:  Recent Results (from the past 24 hour(s))   CBC WITH AUTOMATED DIFF    Collection Time: 21  2:45 PM   Result Value Ref Range    WBC 19.2 (H) 4.1 - 11.1 K/uL    RBC 3.17 (L) 4.10 - 5.70 M/uL    HGB 9.5 (L) 12.1 - 17.0 g/dL    HCT 27.5 (L) 36.6 - 50.3 %    MCV 86.8 80.0 - 99.0 FL    MCH 30.0 26.0 - 34.0 PG    MCHC 34.5 30.0 - 36.5 g/dL    RDW 18.0 (H) 11.5 - 14.5 %    PLATELET 020 144 - 766 K/uL    MPV 10.2 8.9 - 12.9 FL    NEUTROPHILS 78 (H) 32 - 75 %    LYMPHOCYTES 4 (L) 12 - 49 %    MONOCYTES 17 (H) 5 - 13 %    EOSINOPHILS 0 0 - 7 %    BASOPHILS 0 0 - 1 %    IMMATURE GRANULOCYTES 1 (H) 0.0 - 0.5 %    ABS. NEUTROPHILS 15.1 (H) 1.8 - 8.0 K/UL    ABS. LYMPHOCYTES 0.8 0.8 - 3.5 K/UL    ABS. MONOCYTES 3.3 (H) 0.0 - 1.0 K/UL    ABS. EOSINOPHILS 0.0 0.0 - 0.4 K/UL    ABS. BASOPHILS 0.0 0.0 - 0.1 K/UL    ABS. IMM. GRANS. 0.1 (H) 0.00 - 0.04 K/UL    DF AUTOMATED     METABOLIC PANEL, COMPREHENSIVE    Collection Time: 04/28/21  2:45 PM   Result Value Ref Range    Sodium 130 (L) 136 - 145 mmol/L    Potassium 4.4 3.5 - 5.1 mmol/L    Chloride 98 97 - 108 mmol/L    CO2 22 21 - 32 mmol/L    Anion gap 10 5 - 15 mmol/L    Glucose 321 (H) 65 - 100 mg/dL    BUN 51 (H) 6 - 20 mg/dL    Creatinine 2.40 (H) 0.70 - 1.30 mg/dL    BUN/Creatinine ratio 21 (H) 12 - 20      GFR est AA 31 (L) >60 ml/min/1.73m2    GFR est non-AA 26 (L) >60 ml/min/1.73m2    Calcium 9.4 8.5 - 10.1 mg/dL    Bilirubin, total 8.6 (H) 0.2 - 1.0 mg/dL    AST (SGOT) 287 (H) 15 - 37 U/L    ALT (SGPT) 104 (H) 12 - 78 U/L    Alk.  phosphatase 679 (H) 45 - 117 U/L    Protein, total 8.2 6.4 - 8.2 g/dL    Albumin 2.7 (L) 3.5 - 5.0 g/dL    Globulin 5.5 (H) 2.0 - 4.0 g/dL    A-G Ratio 0.5 (L) 1.1 - 2.2     TROPONIN I    Collection Time: 04/28/21  2:45 PM   Result Value Ref Range    Troponin-I, Qt. <0.05 <0.05 ng/mL   CK    Collection Time: 04/28/21  2:45 PM   Result Value Ref Range    CK 1,554 (H) 39 - 308 U/L   PROCALCITONIN    Collection Time: 04/28/21  2:45 PM   Result Value Ref Range    Procalcitonin 5.98 (H) 0 ng/mL   URINALYSIS W/ REFLEX CULTURE    Collection Time: 04/28/21  3:00 PM    Specimen: Urine   Result Value Ref Range    Color Yaritza      Appearance Turbid (A) Clear      Specific gravity 1.014 1.003 - 1.030      pH (UA) 5.0 5.0 - 8.0      Protein 100 (A) Negative mg/dL    Glucose Negative Negative mg/dL    Ketone Negative Negative mg/dL    Bilirubin Negative Negative      Blood Large (A) Negative      Urobilinogen 4.0 (H) 0.1 - 1.0 EU/dL    Nitrites Negative Negative      Leukocyte Esterase Negative Negative      WBC 0-4 0 - 4 /hpf    RBC 0-5 0 - 5 /hpf Bacteria Negative Negative /hpf    UA:UC IF INDICATED Culture not indicated by UA result Culture not indicated by UA result      Mucus Trace (A) Negative /lpf    Hyaline cast 2-5 0 - 5 /lpf   LACTIC ACID    Collection Time: 04/28/21  6:00 PM   Result Value Ref Range    Lactic acid 1.5 0.4 - 2.0 mmol/L   AMMONIA    Collection Time: 04/28/21  7:30 PM   Result Value Ref Range    Ammonia <10 <32 umol/L   GLUCOSE, POC    Collection Time: 04/28/21 10:09 PM   Result Value Ref Range    Glucose (POC) 233 (H) 65 - 100 mg/dL    Performed by Karina Treviño, POC    Collection Time: 04/29/21  5:05 AM   Result Value Ref Range    Glucose (POC) 305 (H) 65 - 100 mg/dL    Performed by Angel Vicente        CT HEAD WO CONT   Final Result   1. No acute intracranial findings. 2. Atrophy and small vessel ischemic disease. 3. Facial sinus disease. XR CHEST SNGL V   Final Result      Underexpanded lungs, otherwise negative. Assessment:    Generalized weakness with fall and prolonged immobilization     Dehydration     Likely acute kidney injury, superimposed on chronic kidney disease. 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 Continue Zosyn empirically for now    Care Plan discussed with: Patient/Family    Total time spent with patient: 30 minutes.     Ricarda Ibarra MD

## 2021-04-29 NOTE — PROGRESS NOTES
Problem: Falls - Risk of  Goal: *Absence of Falls  Description: Document Chava Mccallum Fall Risk and appropriate interventions in the flowsheet.   4/28/2021 2303 by Leroy Lanza  Outcome: Progressing Towards Goal  Note: Fall Risk Interventions:  Mobility Interventions: Bed/chair exit alarm, OT consult for ADLs, Patient to call before getting OOB, PT Consult for mobility concerns, PT Consult for assist device competence, Strengthening exercises (ROM-active/passive), Utilize walker, cane, or other assistive device, Utilize gait belt for transfers/ambulation         Medication Interventions: Bed/chair exit alarm, Assess postural VS orthostatic hypotension, Utilize gait belt for transfers/ambulation, Teach patient to arise slowly, Patient to call before getting OOB    Elimination Interventions: Bed/chair exit alarm, Call light in reach, Toileting schedule/hourly rounds, Patient to call for help with toileting needs           4/28/2021 2303 by Leroy Lanza  Note: Fall Risk Interventions:  Mobility Interventions: Bed/chair exit alarm, OT consult for ADLs, Patient to call before getting OOB, PT Consult for mobility concerns, PT Consult for assist device competence, Strengthening exercises (ROM-active/passive), Utilize walker, cane, or other assistive device, Utilize gait belt for transfers/ambulation         Medication Interventions: Bed/chair exit alarm, Assess postural VS orthostatic hypotension, Utilize gait belt for transfers/ambulation, Teach patient to arise slowly, Patient to call before getting OOB    Elimination Interventions: Bed/chair exit alarm, Call light in reach, Toileting schedule/hourly rounds, Patient to call for help with toileting needs

## 2021-04-29 NOTE — PROGRESS NOTES
PHYSICAL THERAPY EVALUATION  Patient: Nimco Bell (53 y.o. male)  Date: 4/29/2021  Primary Diagnosis: Weakness [R53.1]  [facility] [N17.9]        Precautions: falls       ASSESSMENT  Patient is an 80year old male, who came to the ED with extremity weakness, acute confusion, felt weak in bathroom at home and slid down to floor and admitted for weakness and CRISTIAN on 4/28/2021. PMH includes: arthritis, AVM of colon, B12 deficiency, chronic anemia, CKD, diabetes, heart failure remotely in his 46s, HTN, PVD, benjamin hip replacement.     Based on the objective data described below, the patient presents with generalized deconditioning, decreased strength, mild confusion, decreased standing balance, decreased activity tolerance, increased need for A with amb and functional mobility/transfers. Patient semi supine in bed upon PT arrival and agreeable to working with therapy. Patient A&O x3, disoriented to month and year and per pt report, pt lives alone in a two story home with all main living areas on first floor with 3 LISANDRO and benjamin handrails. Patient reports using a RW for mobility at all time, no other DME reported. Patient reports being independent for all ADLs/IADLs including driving. Patient required SBA with increased time bed mobility, sup -> sit, SBA scooting EOB, mod A sit <> stand and min A bed to chair transfer. Patient with good static standing balance, requiring increased time and cueing to reach more upright position but demonstrates extreme forward flexion, forward head, and rounded shoulders. Patient would benefit from continued skilled PT services to address above deficits and improve safety and independence with amb and functional mobility/transfers. Recommend discharge to IRF, as pt independent PTA, when medically appropriate.     Current Level of Function Impacting Discharge (mobility/balance): SBA to mod A    Other factors to consider for discharge: lives alone      PLAN :  Recommendations and Planned Interventions: bed mobility training, transfer training, gait training, therapeutic exercises, patient and family training/education and therapeutic activities      Frequency/Duration: Patient will be followed by physical therapy:  5 times a week to address goals. Recommendation for discharge: (in order for the patient to meet his/her long term goals)  IRF    This discharge recommendation:  Has been made in collaboration with the attending provider and/or case management    IF patient discharges home will need the following DME: none         SUBJECTIVE:   Patient stated Donna Rad still feel weak.     OBJECTIVE DATA SUMMARY:   HISTORY:    Past Medical History:   Diagnosis Date    Arthritis     AVM (arteriovenous malformation) of colon     B12 deficiency     Chronic anemia     Chronic kidney disease     [facility]     Heart failure (HCC)     Remotely in his 46s    Hypertension     PVD (peripheral vascular disease) ([facility]      Past Surgical History:   Procedure Laterality Date    HX HIP REPLACEMENT Bilateral        Home Situation  Home Environment: Private residence  # Steps to Enter: 3  Rails to Enter: Yes  Hand Rails : Bilateral  One/Two Story Residence: Two story, live on 1st floor  Living Alone: Yes  Support Systems: Child(debra), Friends \ neighbors  Patient Expects to be Discharged to[de-identified] Private residence  Current DME Used/Available at Home: Hassan Litten, rolling    EXAMINATION/PRESENTATION/DECISION MAKING:   Critical Behavior:  Neurologic State: Alert  Orientation Level: Oriented to person, Oriented to place, Oriented to situation, Disoriented to time  Cognition: Follows commands, Appropriate safety awareness, Appropriate for age attention/concentration, Appropriate decision making  Safety/Judgement: Fall prevention, Awareness of environment  Hearing:   Auditory  Auditory Impairment: Hard of hearing, bilateral  Skin:  Intact where visible, bandages on bilateral feet and LE  Edema: none noted   Range Of Motion:  AROM: Generally decreased, functional        Strength:    Strength: Generally decreased, functional           Tone & Sensation:   Tone: Normal                Functional Mobility:  Bed Mobility:  Rolling: Stand-by assistance; Additional time  Supine to Sit: Stand-by assistance; Additional time     Scooting: Stand-by assistance  Transfers:  Sit to Stand: Moderate assistance  Stand to Sit: Moderate assistance        Bed to Chair: Minimum assistance              Balance:   Sitting: Intact; With support  Standing: Impaired; With support  Standing - Static: Fair;Constant support  Standing - Dynamic : Fair;Constant support  Ambulation/Gait Training:  Distance (ft): 40 Feet (ft)  Assistive Device: Gait belt;Walker, rolling  Ambulation - Level of Assistance: Contact guard assistance;Minimal assistance; Additional time     Gait Description (WDL): Exceptions to [facility]           Base of Support: Widened     Speed/Ade: Slow;Shuffled           Therapeutic Exercises:   Not completed this session    Functional Measure:  [facility] AM-PAC 6 Clicks         Basic Mobility Inpatient Short Form  How much difficulty does the patient currently have. .. Unable A Lot A Little None   1. Turning over in bed (including adjusting bedclothes, sheets and blankets)? [] 1   [] 2   [x] 3   [] 4   2. Sitting down on and standing up from a chair with arms ( e.g., wheelchair, bedside commode, etc.)   [] 1   [x] 2   [] 3   [] 4   3. Moving from lying on back to sitting on the side of the bed? [] 1   [x] 2   [] 3   [] 4          How much help from another person does the patient currently need. .. Total A Lot A Little None   4. Moving to and from a bed to a chair (including a wheelchair)? [] 1   [x] 2   [] 3   [] 4   5. Need to walk in hospital room? [] 1   [x] 2   [] 3   [] 4   6. Climbing 3-5 steps with a railing?    [] 1   [x] 2   [] 3   [] 4   © 2007, Trustees of [facility], under license to [employer]. All rights reserved     Score:  Initial:  Most Recent: X (Date: 21 )   Interpretation of Tool:  Represents activities that are increasingly more difficult (i.e. Bed mobility, Transfers, Gait). Score 24 23 22-20 19-15 14-10 9-7 6   Modifier CH CI CJ CK CL CM CN         Physical Therapy Evaluation Charge Determination   History Examination Presentation Decision-Making   HIGH Complexity :3+ comorbidities / personal factors will impact the outcome/ POC  HIGH Complexity : 4+ Standardized tests and measures addressing body structure, function, activity limitation and / or participation in recreation  LOW Complexity : Stable, uncomplicated  Other outcome measures ampac 6  mod      Based on the above components, the patient evaluation is determined to be of the following complexity level: LOW     Pain Ratin/10 reported     Activity Tolerance:   Fair    After treatment patient left in no apparent distress:   Supine in bed, Call bell within reach, Bed / chair alarm activated and Side rails x 3 and nsg updated. GOALS:    Problem: Mobility Impaired (Adult and Pediatric)  Goal: *Acute Goals and Plan of Care (Insert Text)  Description: Pt will be I with LE HEP in 7 days. Pt will perform bed mobility with mod I in 7 days. Pt will perform transfers with mod I in 7 days. Pt will amb  feet with LRAD safely with mod I in 7 days. Outcome: Not Met       COMMUNICATION/EDUCATION:   The patients plan of care was discussed with: Occupational therapist and Registered nurse. Fall prevention education was provided and the patient/caregiver indicated understanding., Patient/family have participated as able in goal setting and plan of care. , and Patient/family agree to work toward stated goals and plan of care.      Thank you for this referral.  Jose Antonio Beach, PT, DPT   Time Calculation: 20 mins

## 2021-04-29 NOTE — ROUTINE PROCESS
Admission skin assessment performed with Aditi Yee RN. Skin is warm and dry. Bilateral upper extremities are dry/flakey. Abdominal fold has moisture associated skin breakdown. Groin and scrotum have moisture associated redness. Bilateral lower extremities are darkened with dry brittle skin. Bilateral feet are dry and peeling, and thick toe nails. 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 Freshly heeled skin located on right upper side of ankle.

## 2021-04-29 NOTE — WOUND CARE
IP WOUND CONSULT    [address] RECORD NUMBER:  [id number]  AGE: 80 y.o. GENDER: male  : 1932  TODAY'S DATE:  2021    GENERAL     [] Follow-up   [x] New Consult    Jak Hankins is a 80 y.o. male referred by:   [x] Physician  [] Nursing  [] Other:         PAST MEDICAL HISTORY    Past Medical History:   Diagnosis Date    Arthritis     AVM (arteriovenous malformation) of colon     B12 deficiency     Chronic anemia     Chronic kidney disease     [facility]     [facility] failure ([facility]     Remotely in his 46s    Hypertension     PVD (peripheral vascular disease) ([facility]         PAST SURGICAL HISTORY    Past Surgical History:   Procedure Laterality Date    HX HIP REPLACEMENT Bilateral        FAMILY HISTORY    History reviewed. No pertinent family history. ALLERGIES    No Known Allergies    MEDICATIONS    No current facility-administered medications on file prior to encounter. Current Outpatient Medications on File Prior to Encounter   Medication Sig Dispense Refill    cyanocobalamin (VITAMIN B12) 1,000 mcg/mL injection 1,000 mcg by IntraMUSCular route Once every 2 weeks.  ferrous sulfate 325 mg (65 mg iron) tablet Take 325 mg by mouth Daily (before breakfast).  furosemide (LASIX) 40 mg tablet Take 40 mg by mouth daily.  amLODIPine (NORVASC) 5 mg tablet Take 5 mg by mouth daily.  latanoprost (XALATAN) 0.005 % ophthalmic solution Administer 1 Drop to both eyes daily.  pantoprazole (PROTONIX) 40 mg tablet Take 40 mg by mouth daily.  simvastatin (ZOCOR) 10 mg tablet Take 10 mg by mouth nightly. [unfilled]  Visit Vitals  BP (!) 147/91   Pulse 98   Temp 98.2 °F (36.8 °C)   Resp 20   Ht 6' 2\" (1.88 m)   Wt 109.7 kg (241 lb 13.5 oz)   SpO2 96%   BMI 31.05 kg/m²       ASSESSMENT     Wound Identification & Type: Healed stasis ulcers to BLEs. No open or draining wounds.    Dressing change: will apply kerlix and ACE wraps for gentle compression  Verbal consent for picture: Yes    Contributing Factors: anticoagulation therapy, diabetes, poor glucose control and decreased mobility            PLAN     Skin Care & Pressure Relief Recommendations  Minimize layers of linen  Turn/reposition approximately every 2 hours  Pillow wedges  Offload heels pillows    [address] 15  Blood Glucose: 305 4/29/21                             Albumin: 2.7 on 4/29/21  WBCs: 19.2 4/29/21    Physician/Provider notified: Dr. Sonal Morrison  Recommendations: [facility] each week. Patient unable to tell me what kind of wraps are used. Will apply kerlix and ACE wraps for gentle compression. Will continue to follow.      Teaching completed with:   [] Patient           [] Family member       [] Caregiver          [] Nursing  [] Other    Patient/Caregiver Teaching:  Level of patient/caregiver understanding able to:   [] Indicates understanding       [] Needs reinforcement  [] Unsuccessful      [] Verbal Understanding  [] Demonstrated understanding       [] No evidence of learning  [] Refused teaching         [] N/A       Electronically signed by Genaro Montejo RN on 4/29/2021 at 9:20 AM

## 2021-04-29 NOTE — ED NOTES
TRANSFER - OUT REPORT:    Verbal report given to Cinthia (name) on Lucky Hammans  being transferred to (unit) for routine progression of care       Report consisted of patients Situation, Background, Assessment and   Recommendations(SBAR). Information from the following report(s) SBAR, ED Summary, MAR, Recent Results and Cardiac Rhythm Sinus Tach was reviewed with the receiving nurse. Lines:   Peripheral IV 04/28/21 Posterior;Proximal;Right Forearm (Active)        Opportunity for questions and clarification was provided.       Patient transported with:   Monitor  Tech

## 2021-04-29 NOTE — PROGRESS NOTES
OCCUPATIONAL THERAPY EVALUATION  Patient: Lucky Hammans (16 y.o. male)  Date: 4/29/2021  Primary Diagnosis: Weakness [R53.1]  [facility] [N17.9]        Precautions: falls    ASSESSMENT  Patient is an 80year old male, who came to the ED with extremity weakness, acute confusion, felt weak in bathroom at home and slid down to floor and admitted for weakness and CRISTIAN on 4/28/2021. PMH includes: arthritis, AVM of colon, B12 deficiency, chronic anemia, CKD, diabetes, heart failure remotely in his 46s, HTN, PVD, benjamin hip replacement. Based on the objective data described below, the patient presents with generalized deconditioning, decreased strength, mild confusion, decreased standing balance, decreased activity tolerance, increased need for A with self care and functional mobility/transfers. Patient semi supine in bed upon OT arrival and agreeable to working with therapy. Patient A&O x3, disoriented to month and year and per pt report, pt lives alone in a two story home with all main living areas on first floor with 3 LISANDRO and benjamin handrails. Patient reports using a RW for mobility at all time, no other DME reported. Patient reports being independent for all ADLs/IADLs including driving. Patient SBA with increased time bed mobility, sup -> sit, SBA scooting EOB, mod A sit <> stand and min A bed to chair transfer. Patient with fair standing balance, difficulty reaching standing requiring bed to be raised due to patient's height. Once sitting in chair patient mod A LE dressing, setup A grooming to wash face and independent feeding. Patient would benefit from continued skilled OT services to address above deficits and improve safety and independence with self care and functional mobility/transfers. Recommend discharge to IRF, as pt independent PTA, when medically appropriate.     Current Level of Function Impacting Discharge (ADLs/self-care): mod A LE dressing, independent feeding, setup A grooming. Other factors to consider for discharge: time since onset, lives alone, severity of deficits        PLAN :  Recommendations and Planned Interventions: self care training, functional mobility training, therapeutic exercise, balance training, therapeutic activities, endurance activities, patient education, home safety training and family training/education    Frequency/Duration: Patient will be followed by occupational therapy 5 times a week to address goals. Recommendation for discharge: (in order for the patient to meet his/her long term goals)  IRF    This discharge recommendation:  Has been made in collaboration with the attending provider and/or case management    IF patient discharges home will need the following DME: shower chair, wheelchair and 24/7 assist       SUBJECTIVE:   Patient stated I have one son that lives here and another that lives in [place].     OBJECTIVE DATA SUMMARY:   HISTORY:   Past Medical History:   Diagnosis Date    Arthritis     AVM (arteriovenous malformation) of colon     B12 deficiency     Chronic anemia     Chronic kidney disease     [facility]     Heart failure (HCC)     Remotely in his 46s    Hypertension     PVD (peripheral vascular disease) ([facility]      Past Surgical History:   Procedure Laterality Date    HX HIP REPLACEMENT Bilateral        Expanded or extensive additional review of patient history:     Home Situation  Home Environment: Private residence  # Steps to Enter: 3  Rails to Enter: Yes  Hand Rails : Bilateral  One/Two Story Residence: Two story, live on 1st floor  Living Alone: Yes  Support Systems: Child(debra), Friends \ neighbors  Patient Expects to be Discharged to[de-identified] Private residence  Current DME Used/Available at Home: Walker, rolling    PLOF: Pt I for ADLS/IADLS, mod I with mobility prior to admission.      EXAMINATION OF PERFORMANCE DEFICITS:  Cognitive/Behavioral Status:  Neurologic State: Alert  Orientation Level: Oriented to person;Oriented to place;Oriented to situation;Disoriented to time  Cognition: Follows commands  Safety/Judgement: Fall prevention; Awareness of environment    Skin: intact where visible    Edema: generalized swelling of BLEs    Hearing: Auditory  Auditory Impairment: Hard of hearing, bilateral    Vision/Perceptual:    No deficits noted at this time    Range of Motion:  AROM: Generally decreased, functional    Strength:  Strength: Generally decreased, functional     RUE Strength  Observation: grossly observed to be 3/5     LUE Strength  Observation: grossly observed to be 3/5    Coordination:  Generally intact    Tone & Sensation:  Tone: Normal    Balance:  Sitting: Intact; With support  Standing: Impaired; With support  Standing - Static: Fair;Constant support  Standing - Dynamic : Fair;Constant support    Functional Mobility and Transfers for ADLs:  Bed Mobility:  Rolling: Stand-by assistance; Additional time  Supine to Sit: Stand-by assistance; Additional time  Scooting: Stand-by assistance    Transfers:  Sit to Stand: Moderate assistance  Stand to Sit: Moderate assistance  Bed to Chair: Minimum assistance  Assistive Device : Gait Belt;Walker, rolling    ADL Assessment:  Feeding: Independent    Oral Facial Hygiene/Grooming: Setup    Lower Body Dressing: Moderate assistance    ADL Intervention and task modifications:  Feeding  Feeding Assistance: Independent  Container Management: Independent  Food to Mouth: Independent  Drink to Mouth: Independent    Grooming  Grooming Assistance: Set-up  Position Performed: Seated in chair  Washing Face: Set-up    Lower Body Dressing Assistance  Dressing Assistance: Moderate assistance  Socks: Moderate assistance  Leg Crossed Method Used: No  Position Performed: Seated in chair    Cognitive Retraining  Safety/Judgement: Fall prevention; Awareness of environment    Therapeutic Exercise:  Patient may benefit from UE HEP, initiate at next session as able     Functional Measure:    [address] [place] [address] AM-PACTM \"6 Clicks\"                                                       Daily Activity Inpatient Short Form  How much help from another person does the patient currently need. .. Total; A Lot A Little None   1. Putting on and taking off regular lower body clothing? []  1 [x]  2 []  3 []  4   2. Bathing (including washing, rinsing, drying)? []  1 []  2 [x]  3 []  4   3. Toileting, which includes using toilet, bedpan or urinal? [] 1 [x]  2 []  3 []  4   4. Putting on and taking off regular upper body clothing? []  1 []  2 [x]  3 []  4   5. Taking care of personal grooming such as brushing teeth? []  1 []  2 [x]  3 []  4   6. Eating meals? []  1 []  2 []  3 [x]  4   © , Trustees of [address] [place] [address], under license to [employer]. All rights reserved     Score:      Interpretation of Tool:  Represents clinically-significant functional categories (i.e. Activities of daily living).   Percentage of Impairment CH    0%   CI    1-19% CJ    20-39% CK    40-59% CL    60-79% CM    80-99% CN     100%   AMPA  Score 6-24 24 23 20-22 15-19 10-14 7-9 6        Occupational Therapy Evaluation Charge Determination   History Examination Decision-Making   LOW Complexity : Brief history review  MEDIUM Complexity : 3-5 performance deficits relating to physical, cognitive , or psychosocial skils that result in activity limitations and / or participation restrictions LOW Complexity : No comorbidities that affect functional and no verbal or physical assistance needed to complete eval tasks       Based on the above components, the patient evaluation is determined to be of the following complexity level: LOW     Pain Ratin/10    Activity Tolerance:   Fair, SpO2 stable on RA and requires rest breaks    After treatment patient left in no apparent distress:    Sitting in chair and Call bell within reach    COMMUNICATION/EDUCATION:   The patients plan of care was discussed with: Physical therapist and Registered nurse. Patient/family have participated as able in goal setting and plan of care. and Patient/family agree to work toward stated goals and plan of care. This patients plan of care is appropriate for delegation to SILVA.     Problem: Self Care Deficits Care Plan (Adult)  Goal: *Acute Goals and Plan of Care (Insert Text)  Description: Pt will be mod I sup <> sit in prep for EOB ADLs  Pt will be mod I grooming sitting EOB  Pt will be mod I LE dressing sitting EOB/long sit  Pt will be mod I sit <>  prep for toileting LRAD  Pt will be mod I toileting/toilet transfer/cloth mgmt LRAD  Pt will be I following UE HEP in prep for self care tasks     Outcome: Not Met     Thank you for this referral.  Bob Cisneros OTR/L  Time Calculation: 26 mins

## 2021-04-30 ENCOUNTER — APPOINTMENT (OUTPATIENT)
Dept: CT IMAGING | Age: 86
DRG: 435 | End: 2021-04-30
Attending: INTERNAL MEDICINE
Payer: MEDICARE

## 2021-04-30 ENCOUNTER — APPOINTMENT (OUTPATIENT)
Dept: ULTRASOUND IMAGING | Age: 86
DRG: 435 | End: 2021-04-30
Attending: INTERNAL MEDICINE
Payer: MEDICARE

## 2021-04-30 LAB
ALBUMIN SERPL-MCNC: 1.9 G/DL (ref 3.5–5)
ALBUMIN SERPL-MCNC: 2 G/DL (ref 3.5–5)
ALBUMIN/GLOB SERPL: 0.4 {RATIO} (ref 1.1–2.2)
ALP SERPL-CCNC: 469 U/L (ref 45–117)
ALT SERPL-CCNC: 78 U/L (ref 12–78)
ANION GAP SERPL CALC-SCNC: 8 MMOL/L (ref 5–15)
AST SERPL W P-5'-P-CCNC: 247 U/L (ref 15–37)
ATRIAL RATE: 100 BPM
BASOPHILS # BLD: 0 K/UL (ref 0–0.1)
BASOPHILS NFR BLD: 0 % (ref 0–1)
BILIRUB DIRECT SERPL-MCNC: 6.9 MG/DL (ref 0–0.2)
BILIRUB SERPL-MCNC: 7.9 MG/DL (ref 0.2–1)
BUN SERPL-MCNC: 57 MG/DL (ref 6–20)
BUN/CREAT SERPL: 26 (ref 12–20)
CA-I BLD-MCNC: 8.7 MG/DL (ref 8.5–10.1)
CALCULATED R AXIS, ECG10: 7 DEGREES
CALCULATED T AXIS, ECG11: 78 DEGREES
CHLORIDE SERPL-SCNC: 105 MMOL/L (ref 97–108)
CK SERPL-CCNC: 853 U/L (ref 39–308)
CO2 SERPL-SCNC: 23 MMOL/L (ref 21–32)
CREAT SERPL-MCNC: 2.16 MG/DL (ref 0.7–1.3)
CREAT UR-MCNC: 101 MG/DL
CREAT UR-MCNC: 103 MG/DL
DIAGNOSIS, 93000: NORMAL
DIFFERENTIAL METHOD BLD: ABNORMAL
EOSINOPHIL # BLD: 0.1 K/UL (ref 0–0.4)
EOSINOPHIL NFR BLD: 0 % (ref 0–7)
ERYTHROCYTE [DISTWIDTH] IN BLOOD BY AUTOMATED COUNT: 18.2 % (ref 11.5–14.5)
GLOBULIN SER CALC-MCNC: 4.6 G/DL (ref 2–4)
GLUCOSE BLD STRIP.AUTO-MCNC: 166 MG/DL (ref 65–100)
GLUCOSE BLD STRIP.AUTO-MCNC: 174 MG/DL (ref 65–100)
GLUCOSE BLD STRIP.AUTO-MCNC: 192 MG/DL (ref 65–100)
GLUCOSE SERPL-MCNC: 158 MG/DL (ref 65–100)
HCT VFR BLD AUTO: 25 % (ref 36.6–50.3)
HGB BLD-MCNC: 8.7 G/DL (ref 12.1–17)
IMM GRANULOCYTES # BLD AUTO: 0.1 K/UL (ref 0–0.04)
IMM GRANULOCYTES NFR BLD AUTO: 0 % (ref 0–0.5)
LYMPHOCYTES # BLD: 1.2 K/UL (ref 0.8–3.5)
LYMPHOCYTES NFR BLD: 7 % (ref 12–49)
MCH RBC QN AUTO: 29.8 PG (ref 26–34)
MCHC RBC AUTO-ENTMCNC: 34.8 G/DL (ref 30–36.5)
MCV RBC AUTO: 85.6 FL (ref 80–99)
MONOCYTES # BLD: 2.4 K/UL (ref 0–1)
MONOCYTES NFR BLD: 14 % (ref 5–13)
NEUTS SEG # BLD: 13.5 K/UL (ref 1.8–8)
NEUTS SEG NFR BLD: 79 % (ref 32–75)
PERFORMED BY, TECHID: ABNORMAL
PHOSPHATE SERPL-MCNC: 4.1 MG/DL (ref 2.6–4.7)
PLATELET # BLD AUTO: 298 K/UL (ref 150–400)
PMV BLD AUTO: 10.2 FL (ref 8.9–12.9)
POTASSIUM SERPL-SCNC: 4 MMOL/L (ref 3.5–5.1)
POTASSIUM UR-SCNC: 40 MMOL/L
PROCALCITONIN SERPL-MCNC: 7.31 NG/ML
PROT SERPL-MCNC: 6.6 G/DL (ref 6.4–8.2)
PROT UR-MCNC: 111 MG/DL (ref 0–11.9)
PROT UR-MCNC: 114 MG/DL (ref 0–11.9)
PROT/CREAT UR-RTO: 1.1
Q-T INTERVAL, ECG07: 336 MS
QRS DURATION, ECG06: 82 MS
QTC CALCULATION (BEZET), ECG08: 426 MS
RBC # BLD AUTO: 2.92 M/UL (ref 4.1–5.7)
SODIUM SERPL-SCNC: 136 MMOL/L (ref 136–145)
SODIUM UR-SCNC: 10 MMOL/L
VENTRICULAR RATE, ECG03: 97 BPM
WBC # BLD AUTO: 17.2 K/UL (ref 4.1–11.1)

## 2021-04-30 PROCEDURE — 97530 THERAPEUTIC ACTIVITIES: CPT

## 2021-04-30 PROCEDURE — 74011250636 HC RX REV CODE- 250/636: Performed by: INTERNAL MEDICINE

## 2021-04-30 PROCEDURE — 84156 ASSAY OF PROTEIN URINE: CPT

## 2021-04-30 PROCEDURE — 80069 RENAL FUNCTION PANEL: CPT

## 2021-04-30 PROCEDURE — 74011000258 HC RX REV CODE- 258: Performed by: INTERNAL MEDICINE

## 2021-04-30 PROCEDURE — 85025 COMPLETE CBC W/AUTO DIFF WBC: CPT

## 2021-04-30 PROCEDURE — 84133 ASSAY OF URINE POTASSIUM: CPT

## 2021-04-30 PROCEDURE — 74011250637 HC RX REV CODE- 250/637: Performed by: INTERNAL MEDICINE

## 2021-04-30 PROCEDURE — 74011000250 HC RX REV CODE- 250: Performed by: INTERNAL MEDICINE

## 2021-04-30 PROCEDURE — 84300 ASSAY OF URINE SODIUM: CPT

## 2021-04-30 PROCEDURE — 82550 ASSAY OF CK (CPK): CPT

## 2021-04-30 PROCEDURE — 76770 US EXAM ABDO BACK WALL COMP: CPT

## 2021-04-30 PROCEDURE — 74011636637 HC RX REV CODE- 636/637: Performed by: INTERNAL MEDICINE

## 2021-04-30 PROCEDURE — 82570 ASSAY OF URINE CREATININE: CPT

## 2021-04-30 PROCEDURE — 36415 COLL VENOUS BLD VENIPUNCTURE: CPT

## 2021-04-30 PROCEDURE — 80076 HEPATIC FUNCTION PANEL: CPT

## 2021-04-30 PROCEDURE — 84145 PROCALCITONIN (PCT): CPT

## 2021-04-30 PROCEDURE — 74176 CT ABD & PELVIS W/O CONTRAST: CPT

## 2021-04-30 PROCEDURE — 65270000029 HC RM PRIVATE

## 2021-04-30 PROCEDURE — 82962 GLUCOSE BLOOD TEST: CPT

## 2021-04-30 PROCEDURE — 76705 ECHO EXAM OF ABDOMEN: CPT

## 2021-04-30 RX ADMIN — ACETAMINOPHEN 650 MG: 325 TABLET, FILM COATED ORAL at 10:28

## 2021-04-30 RX ADMIN — SODIUM BICARBONATE: 84 INJECTION, SOLUTION INTRAVENOUS at 21:19

## 2021-04-30 RX ADMIN — INSULIN LISPRO 2 UNITS: 100 INJECTION, SOLUTION INTRAVENOUS; SUBCUTANEOUS at 16:51

## 2021-04-30 RX ADMIN — PIPERACILLIN AND TAZOBACTAM 3.38 G: 3; .375 INJECTION, POWDER, LYOPHILIZED, FOR SOLUTION INTRAVENOUS at 01:26

## 2021-04-30 RX ADMIN — ACETAMINOPHEN 650 MG: 325 TABLET, FILM COATED ORAL at 21:25

## 2021-04-30 RX ADMIN — ENOXAPARIN SODIUM 30 MG: 30 INJECTION SUBCUTANEOUS at 10:28

## 2021-04-30 RX ADMIN — PIPERACILLIN AND TAZOBACTAM 3.38 G: 3; .375 INJECTION, POWDER, LYOPHILIZED, FOR SOLUTION INTRAVENOUS at 10:28

## 2021-04-30 RX ADMIN — SODIUM BICARBONATE: 84 INJECTION, SOLUTION INTRAVENOUS at 10:28

## 2021-04-30 RX ADMIN — INSULIN LISPRO 2 UNITS: 100 INJECTION, SOLUTION INTRAVENOUS; SUBCUTANEOUS at 12:26

## 2021-04-30 RX ADMIN — PIPERACILLIN AND TAZOBACTAM 3.38 G: 3; .375 INJECTION, POWDER, LYOPHILIZED, FOR SOLUTION INTRAVENOUS at 17:03

## 2021-04-30 NOTE — CONSULTS
[address] [facility] Consult Note      NAME:  Mary Jane Cotton   :   1932   MRN:  [id number]     Requesting Physician Ashu Vivar MD   Reason for Consult:  Acute kidney injury     PCP:  Flower Rosales MD     Date/Time:  2021 8:16 PM          Subjective:     CHIEF COMPLAINT: found down     HISTORY OF PRESENT ILLNESS:     Mr. Emily Blankenship is a 80 y.o.  male who is admitted to the medical Service with change in mental status. We are asked to evaluate for elevated creatinine. Patient was apparently found down at his home. He was brought to the emergency room and noted to have increased serum creatinine. Current CPK is about 1400 but not done on admission. Nonetheless, he had large blood by urine dipstick without any significant red cells which is suggestive of myoglobinuria. Patient seen this morning. He was awake and alert but a bit confused. #8 forthcoming with answers. States he had not had any history of kidney disease but per hospitalist note, his daughter claims that he may have had an elevated serum creatinine in the past. He's not been seen by a nephrologist.  Past Medical History:   Diagnosis Date    Arthritis     AVM (arteriovenous malformation) of colon     B12 deficiency     Chronic anemia     Chronic kidney disease     Diabetes ([address]     Heart failure ([facility]     Remotely in his 46s    Hypertension     PVD (peripheral vascular disease) ([facility]         Past Surgical History:   Procedure Laterality Date    HX HIP REPLACEMENT Bilateral        Social History     Tobacco Use    Smoking status: Never Smoker    Smokeless tobacco: Never Used   Substance Use Topics    Alcohol use: Never     Frequency: Never        History reviewed. No pertinent family history. No Known Allergies     Prior to Admission medications    Medication Sig Start Date End Date Taking?  Authorizing Provider   cyanocobalamin (VITAMIN B12) 1,000 mcg/mL injection 1,000 mcg by IntraMUSCular route Once every 2 weeks. Yes Xiang, MD Fátima   ferrous sulfate 325 mg (65 mg iron) tablet Take 325 mg by mouth Daily (before breakfast). Yes Xiang, MD Fátima   furosemide (LASIX) 40 mg tablet Take 40 mg by mouth daily. Yes Xiang, MD Fátima   amLODIPine (NORVASC) 5 mg tablet Take 5 mg by mouth daily. Yes Xiang, MD Fátima   latanoprost (XALATAN) 0.005 % ophthalmic solution Administer 1 Drop to both eyes daily. Yes Xiang, MD Fátima   pantoprazole (PROTONIX) 40 mg tablet Take 40 mg by mouth daily. Yes Xiang, MD Fátima   simvastatin (ZOCOR) 10 mg tablet Take 10 mg by mouth nightly.    Yes Xiang, MD Fátima         Current Facility-Administered Medications:     sodium chloride (NS) flush 5-40 mL, 5-40 mL, IntraVENous, PRN, Seema Powell MD    acetaminophen (TYLENOL) tablet 650 mg, 650 mg, Oral, Q6H PRN, 650 mg at 04/28/21 2225 **OR** acetaminophen (TYLENOL) suppository 650 mg, 650 mg, Rectal, Q6H PRN, Seema Powell MD    polyethylene glycol (MIRALAX) packet 17 g, 17 g, Oral, DAILY PRN, Seema Powell MD    promethazine (PHENERGAN) tablet 12.5 mg, 12.5 mg, Oral, Q6H PRN **OR** ondansetron (ZOFRAN) injection 4 mg, 4 mg, IntraVENous, Q6H PRN, Seema Powell MD    enoxaparin (LOVENOX) injection 30 mg, 30 mg, SubCUTAneous, DAILY, Seema Powell MD, 30 mg at 04/29/21 0953    glucose chewable tablet 16 g, 4 Tab, Oral, PRN, Seema Powell MD    dextrose (D50W) injection syrg 12.5-25 g, 25-50 mL, IntraVENous, PRN, Seema Powell MD    glucagon (GLUCAGEN) injection 1 mg, 1 mg, IntraMUSCular, PRN, Seema Powell MD    insulin lispro (HUMALOG) injection, , SubCUTAneous, AC&HS, Nimco Nunn MD, 3 Units at 04/29/21 1630    piperacillin-tazobactam (ZOSYN) 3.375 g in 0.9% sodium chloride (MBP/ADV) 100 mL MBP, 3.375 g, IntraVENous, Q8H, Seema Lezama MD, Last Rate: 25 mL/hr at 04/29/21 1851, 3.375 g at 04/29/21 1851    0.45% sodium chloride 1,000 mL with sodium bicarbonate (8.4%) 50 mEq infusion, , IntraVENous, CONTINUOUS, Selwyn Mc MD, Last Rate: 125 mL/hr at 04/29/21 0708, New Bag at 04/29/21 0708      Review of Systems:  Constitutional: positive for weakness, negative for fevers, chills, fatigue, malaise and anorexia  Respiratory: negative  Cardiovascular: negative for chest pain, chest pressure/discomfort, palpitations, irregular heart beats, fatigue, lower extremity edema  Gastrointestinal: negative for nausea, vomiting, change in bowel habits, melena, diarrhea and abdominal pain  Genitourinary:negative for frequency, dysuria and hematuria  Musculoskeletal:negative for myalgias, back pain and muscle weakness  Neurological: negative       Objective:      VITALS:    Vital signs reviewed; most recent are:    Visit Vitals  /63 (BP 1 Location: Left upper arm, BP Patient Position: Sitting)   Pulse 86   Temp 98.1 °F (36.7 °C)   Resp 16   Ht 6' 2\" (1.88 m)   Wt 109.7 kg (241 lb 13.5 oz)   SpO2 91%   BMI 31.05 kg/m²     SpO2 Readings from Last 6 Encounters:   04/29/21 91%        No intake or output data in the 24 hours ending 04/29/21 2016         Exam:   Physical Exam:General appearance: alert, cooperative, no distress, appears stated age  Head: Normocephalic, without obvious abnormality, atraumatic  Neck: supple, symmetrical, trachea midline, no adenopathy, thyroid: not enlarged, symmetric, no tenderness/mass/nodules, no carotid bruit and no JVD  Lungs: clear to auscultation bilaterally  Heart: regular rate and rhythm, no S3 or S4  Abdomen: soft, non-tender.  Bowel sounds normal. No masses,  no organomegaly  Extremities: no edema  Skin: Skin color, texture, turgor normal. No rashes or lesions  Neurologic: Grossly normal      LABS:  Recent Labs     04/29/21  0838 04/28/21  1445   * 130*   K 4.1 4.4    98   CO2 22 22   BUN 50* 51*   CREA 2.10* 2.40*   CA 9.0 9.4   ALB 2.1* 2.7*     Recent Labs     04/29/21  0838 04/28/21  1445   WBC 19.1* 19.2*   HGB 8. 9* 9.5*   HCT 25.8* 27.5*    325     Lab Results   Component Value Date/Time    Glucose (POC) 262 (H) 04/29/2021 04:57 PM    Glucose (POC) 203 (H) 04/29/2021 11:36 AM     No results for input(s): PH, PCO2, PO2, HCO3, FIO2 in the last 72 hours. No results for input(s): INR, INREXT in the last 72 hours. No results for input(s): TONY, KU, CLU, CREAU in the last 72 hours. No lab exists for component: PROU  Chest x-ray April 28, 2021:  AP semiupright portable chest radiograph 1621 hours 4/28/2021. No comparison.     Underexpanded lungs, mildly elevated right hemidiaphragm. No pulmonary infiltrate, pleural effusion or pneumothorax. Normal heart size. TAVR.     IMPRESSION     Underexpanded lungs, otherwise negative  Assessment:   Renal Specific Problems  Probable underlying chronic kidney disease, stage not clear  Acute kidney injury resolving  ?  Resolving rhabdomyolysis        Plan:     Obtain/ Order: labs/cultures/radiology/procedures:  urine lytes and urine creatinine and spot urine protein and creatinine ratio, renal panel and renal ultrasound and CPK        Therapeutic:    IV fluids with bicarbonate    Risk of deterioration: low      Total time spent with patient: 30 Minutes                  Discussed:  Briefly with Truett Litten           ___________________________________________________    Attending Physician: Patricia Barrientos MD

## 2021-04-30 NOTE — PROGRESS NOTES
PHYSICAL THERAPY TREATMENT  Patient: Carmelo Smith (80 y.o. male)  Date: 4/30/2021  Diagnosis: Weakness [R53.1]  [facility] [N17.9] <principal problem not specified>       Precautions:    Chart, physical therapy assessment, plan of care and goals were reviewed. ASSESSMENT  Patient continues with skilled PT services and is progressing towards goals. Patient in bed upon arrival agreeable to PT session. Patient was able to perform bed mobility with min A and maintain static sitting balance well and also performed EOB seated exercises. Patient sat up increased time EOB prior to standing due to needing time to stretch legs out and prepare himself for transfers. Patient complained of aching in the BLEs and declined further gait training except to the chair. Patient stood with mod A from the bed and amb min A to the chair approx 5 ft with RW. Patient demos flexed posture with standing and ambulation. Fatigued post transfer to the chair and nursing provided ice to patient's to try to ease discomfort. Motivated for therapy and pt was IND PLOF so rec IRF at discharge. Will continue to further progress towards goals. Current Level of Function Impacting Discharge (mobility/balance): weakness, LE pain    Other factors to consider for discharge: lives alone, decline functional mobility         PLAN :  Patient continues to benefit from skilled intervention to address the above impairments. Continue treatment per established plan of care. to address goals. Recommendation for discharge: (in order for the patient to meet his/her long term goals)  IRF    This discharge recommendation:  Has been made in collaboration with the attending provider and/or case management    IF patient discharges home will need the following DME: to be determined (TBD)       SUBJECTIVE:   Patient stated I'm just getting old.     OBJECTIVE DATA SUMMARY:   Critical Behavior:  Neurologic State: Alert  Orientation Level: Oriented to person, Oriented to place, Oriented to situation  Cognition: Follows commands, Memory loss, Poor safety awareness  Safety/Judgement: Fall prevention, Awareness of environment  Functional Mobility Training:  Bed Mobility:  Rolling: Minimum assistance  Supine to Sit: Minimum assistance  Scooting: Minimum assistance  Transfers:  Sit to Stand: Moderate assistance  Stand to Sit: Minimum assistance  Balance:  Sitting: Intact; Without support  Standing: With support; Impaired  Standing - Static: Fair;Constant support  Standing - Dynamic : Fair;Constant support  Ambulation/Gait Training:  Distance (ft): 5 Feet (ft)  Assistive Device: Gait belt;Walker, rolling  Ambulation - Level of Assistance: Minimal assistance  Base of Support: Widened  Speed/Ade: Shuffled; Slow    Therapeutic Exercises:   10x LAQ and ankle pumps    Pain Rating: Moderate discomfort \"aches and pain\" in the legs    Activity Tolerance:   Fair and requires rest breaks  Please refer to the flowsheet for vital signs taken during this treatment. After treatment patient left in no apparent distress:   Sitting in chair and Call bell within reach    COMMUNICATION/COLLABORATION:   The patients plan of care was discussed with: Occupational therapy assistant and Registered nurse. Annette Saini   Time Calculation: 40 mins         Problem: Mobility Impaired (Adult and Pediatric)  Goal: *Acute Goals and Plan of Care (Insert Text)  Description: Pt will be I with LE HEP in 7 days. Pt will perform bed mobility with mod I in 7 days. Pt will perform transfers with mod I in 7 days. Pt will amb  feet with LRAD safely with mod I in 7 days.          Outcome: Progressing Towards Goal

## 2021-04-30 NOTE — PROGRESS NOTES
Tx session attempted this morning at 0936, however pt off the floor at this time. Will follow with pt at a later time as time allows. Thank you.

## 2021-04-30 NOTE — PROGRESS NOTES
Hospitalist Progress Note               Daily Progress Note: 4/30/2021      Subjective: The patient is seen for follow up. He is doing better overall. Creatinine has improved to 2.1. His alkaline phosphatase is down to 469, bilirubin 7.9. I have ordered a limited abdominal ultrasound for further evaluation. I had meant to order this on admission but it was overlooked. He will probably need a CT of the abdomen as well    CPK   trending down to 853      Problem List:  Problem List as of 4/30/2021 Never Reviewed          Codes Class Noted - Resolved    Weakness ICD-10-CM: R53.1  ICD-9-CM: 780.79  4/28/2021 - Present        CRISTIAN (acute kidney injury) [facility]) ICD-10-CM: N17.9  ICD-9-CM: 584.9  4/28/2021 - Present              Medications reviewed  Current Facility-Administered Medications   Medication Dose Route Frequency    sodium chloride (NS) flush 5-40 mL  5-40 mL IntraVENous PRN    acetaminophen (TYLENOL) tablet 650 mg  650 mg Oral Q6H PRN    Or    acetaminophen (TYLENOL) suppository 650 mg  650 mg Rectal Q6H PRN    polyethylene glycol (MIRALAX) packet 17 g  17 g Oral DAILY PRN    promethazine (PHENERGAN) tablet 12.5 mg  12.5 mg Oral Q6H PRN    Or    ondansetron (ZOFRAN) injection 4 mg  4 mg IntraVENous Q6H PRN    enoxaparin (LOVENOX) injection 30 mg  30 mg SubCUTAneous DAILY    glucose chewable tablet 16 g  4 Tab Oral PRN    dextrose (D50W) injection syrg 12.5-25 g  25-50 mL IntraVENous PRN    glucagon (GLUCAGEN) injection 1 mg  1 mg IntraMUSCular PRN    insulin lispro (HUMALOG) injection   SubCUTAneous AC&HS    piperacillin-tazobactam (ZOSYN) 3.375 g in 0.9% sodium chloride (MBP/ADV) 100 mL MBP  3.375 g IntraVENous Q8H    0.45% sodium chloride 1,000 mL with sodium bicarbonate (8.4%) 50 mEq infusion   IntraVENous CONTINUOUS       Review of Systems:   A comprehensive review of systems was negative except for that written in the HPI.     Objective:   Physical Exam:     Visit Vitals  BP 124/65 (BP 1 Location: Left upper arm, BP Patient Position: At rest)   Pulse 86   Temp 98.2 °F (36.8 °C)   Resp 16   Ht 6' 2\" (1.88 m)   Wt 109.7 kg (241 lb 13.5 oz)   SpO2 98%   BMI 31.05 kg/m²      O2 Device: None (Room air)    Temp (24hrs), Av.3 °F (36.8 °C), Min:98.1 °F (36.7 °C), Max:98.6 °F (37 °C)    No intake/output data recorded. No intake/output data recorded. General:   Awake and alert. He is jaundiced and was jaundiced on admission   Lungs:   Clear to auscultation bilaterally. Chest wall:  No tenderness or deformity. Heart:  Regular rate and rhythm, S1, S2 normal, no murmur, click, rub or gallop. Abdomen:   Soft, non-tender. Bowel sounds normal. No masses,  No organomegaly. Extremities: Extremities normal, atraumatic, no cyanosis. In his legs show some hyperpigmented changes suggesting chronic venous insufficiency but no open wounds or erythema         Pulses: 2+ and symmetric all extremities. Skin: Skin color, texture, turgor normal. No rashes or lesions   Neurologic: CNII-XII intact. No gross focal deficits         Data Review:       Recent Days:  Recent Labs     21  0615 21  0838 21  1445   WBC 17.2* 19.1* 19.2*   HGB 8.7* 8.9* 9.5*   HCT 25.0* 25.8* 27.5*    280 325     Recent Labs     21  0615 21  0838 21  1445    133* 130*   K 4.0 4.1 4.4    104 98   CO2 23 22 22   * 202* 321*   BUN 57* 50* 51*   CREA 2.16* 2.10* 2.40*   CA 8.7 9.0 9.4   PHOS 4.1  --   --    ALB 2.0*  1.9* 2.1* 2.7*   TBILI 7.9* 8.4* 8.6*   ALT 78 85* 104*     No results for input(s): PH, PCO2, PO2, HCO3, FIO2 in the last 72 hours.     24 Hour Results:  Recent Results (from the past 24 hour(s))   GLUCOSE, POC    Collection Time: 21  8:47 AM   Result Value Ref Range    Glucose (POC) 190 (H) 65 - 100 mg/dL    Performed by Rut Veronica    GLUCOSE, POC    Collection Time: 21 11:36 AM   Result Value Ref Range    Glucose (POC) 203 (H) 65 - 100 mg/dL    Performed by Lisa Multani    HEMOGLOBIN A1C WITH EAG    Collection Time: 04/29/21 12:00 PM   Result Value Ref Range    Hemoglobin A1c 6.2 (H) 4.0 - 5.6 %    Est. average glucose 131 mg/dL   GLUCOSE, POC    Collection Time: 04/29/21  4:57 PM   Result Value Ref Range    Glucose (POC) 262 (H) 65 - 100 mg/dL    Performed by Lisa Multani    GLUCOSE, POC    Collection Time: 04/29/21  8:26 PM   Result Value Ref Range    Glucose (POC) 246 (H) 65 - 100 mg/dL    Performed by Sahra Hensley, RANDOM    Collection Time: 04/30/21  5:30 AM   Result Value Ref Range    Protein, urine random 114 (H) 0.0 - 11.9 mg/dL   POTASSIUM, UR, RANDOM    Collection Time: 04/30/21  5:30 AM   Result Value Ref Range    Potassium urine, random 40 mmol/L   CREATININE, UR, RANDOM    Collection Time: 04/30/21  5:30 AM   Result Value Ref Range    Creatinine, urine 101.00 mg/dL   PROTEIN/CREATININE RATIO, URINE    Collection Time: 04/30/21  5:30 AM   Result Value Ref Range    Protein, urine random 111 (H) 0.0 - 11.9 mg/dL    Creatinine, urine 103.00 mg/dL    Protein/Creat. urine Ratio 1.1     SODIUM, UR, RANDOM    Collection Time: 04/30/21  5:30 AM   Result Value Ref Range    Sodium,urine random 10 mmol/L   CBC WITH AUTOMATED DIFF    Collection Time: 04/30/21  6:15 AM   Result Value Ref Range    WBC 17.2 (H) 4.1 - 11.1 K/uL    RBC 2.92 (L) 4.10 - 5.70 M/uL    HGB 8.7 (L) 12.1 - 17.0 g/dL    HCT 25.0 (L) 36.6 - 50.3 %    MCV 85.6 80.0 - 99.0 FL    MCH 29.8 26.0 - 34.0 PG    MCHC 34.8 30.0 - 36.5 g/dL    RDW 18.2 (H) 11.5 - 14.5 %    PLATELET 741 270 - 874 K/uL    MPV 10.2 8.9 - 12.9 FL    NEUTROPHILS 79 (H) 32 - 75 %    LYMPHOCYTES 7 (L) 12 - 49 %    MONOCYTES 14 (H) 5 - 13 %    EOSINOPHILS 0 0 - 7 %    BASOPHILS 0 0 - 1 %    IMMATURE GRANULOCYTES 0 0.0 - 0.5 %    ABS. NEUTROPHILS 13.5 (H) 1.8 - 8.0 K/UL    ABS. LYMPHOCYTES 1.2 0.8 - 3.5 K/UL    ABS. MONOCYTES 2.4 (H) 0.0 - 1.0 K/UL    ABS.  EOSINOPHILS 0.1 0.0 - 0.4 K/UL ABS. BASOPHILS 0.0 0.0 - 0.1 K/UL    ABS. IMM. GRANS. 0.1 (H) 0.00 - 0.04 K/UL    DF AUTOMATED     RENAL FUNCTION PANEL    Collection Time: 04/30/21  6:15 AM   Result Value Ref Range    Sodium 136 136 - 145 mmol/L    Potassium 4.0 3.5 - 5.1 mmol/L    Chloride 105 97 - 108 mmol/L    CO2 23 21 - 32 mmol/L    Anion gap 8 5 - 15 mmol/L    Glucose 158 (H) 65 - 100 mg/dL    BUN 57 (H) 6 - 20 mg/dL    Creatinine 2.16 (H) 0.70 - 1.30 mg/dL    BUN/Creatinine ratio 26 (H) 12 - 20      GFR est AA 35 (L) >60 ml/min/1.73m2    GFR est non-AA 29 (L) >60 ml/min/1.73m2    Calcium 8.7 8.5 - 10.1 mg/dL    Phosphorus 4.1 2.6 - 4.7 mg/dL    Albumin 1.9 (L) 3.5 - 5.0 g/dL   CK    Collection Time: 04/30/21  6:15 AM   Result Value Ref Range     (H) 39 - 308 U/L   HEPATIC FUNCTION PANEL    Collection Time: 04/30/21  6:15 AM   Result Value Ref Range    Protein, total 6.6 6.4 - 8.2 g/dL    Albumin 2.0 (L) 3.5 - 5.0 g/dL    Globulin 4.6 (H) 2.0 - 4.0 g/dL    A-G Ratio 0.4 (L) 1.1 - 2.2      Bilirubin, total 7.9 (H) 0.2 - 1.0 mg/dL    Bilirubin, direct 6.9 (H) 0.0 - 0.2 mg/dL    Alk. phosphatase 469 (H) 45 - 117 U/L    AST (SGOT) 247 (H) 15 - 37 U/L    ALT (SGPT) 78 12 - 78 U/L       US RETROPERITONEUM COMP   Final Result   1. Focally dilated bilateral kidney upper pole calyces. No hydronephrosis. 2. Bladder debris. CT HEAD WO CONT   Final Result   1. No acute intracranial findings. 2. Atrophy and small vessel ischemic disease. 3. Facial sinus disease. XR CHEST SNGL V   Final Result      Underexpanded lungs, otherwise negative. US LIVER    (Results Pending)        Assessment:    Generalized weakness with fall and prolonged immobilization     Dehydration     Likely acute kidney injury, improved. Likely underlying chronic kidney disease     Acute myoglobinuria. CPK improving, renal function improving    Cholestatic jaundice with elevated alkaline phosphatase/bilirubin, rule out biliary obstruction. 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     Bryn Coleman MD

## 2021-04-30 NOTE — PROGRESS NOTES
OCCUPATIONAL THERAPY TREATMENT  Patient: Leti Godwin (59 y.o. male)  Date: 4/30/2021  Diagnosis: Weakness [R53.1]  [facility] [N17.9] <principal problem not specified>       Precautions:    Chart, occupational therapy assessment, plan of care, and goals were reviewed. ASSESSMENT  Patient continues with skilled OT services and is progressing towards goals. Upon CHRISTENSEN arrival, pt sitting in chair with nursing in room. Pt stating that he needs to have a bowel movement and nursing stating that she could not find a bedside commode. Pt completed sit>stand with ModA on second attempt using RW. Pt able to transfer onto bed with Sergei. Pt completed sit>supine with Sergei. Pt able to roll with SBA and bedpan placed underneath pt. Pt left sitting on bedpan with nursing present. Pt tolerated session well and will continue to progress towards OT goals during stay. Current Level of Function Impacting Discharge (ADLs): ModA sit>stand, SBA rolling    Other factors to consider for discharge: time since onset, lives alone, severity of deficits         PLAN :  Patient continues to benefit from skilled intervention to address the above impairments. Continue treatment per established plan of care. to address goals. Recommend next OT session: self care training, functional mobility training, therapeutic exercise, balance training, therapeutic activities, endurance activities, patient education, home safety training and family training/education    Recommendation for discharge: (in order for the patient to meet his/her long term goals)  Therapy 3 hours per day 5-7 days per week    This discharge recommendation:  Has been made in collaboration with the attending provider and/or case management    IF patient discharges home will need the following DME: TBD       SUBJECTIVE:   Patient stated I need to go to the bathroom.     OBJECTIVE DATA SUMMARY:   Cognitive/Behavioral Status:  Neurologic State: Alert  Orientation Level: Oriented to person;Oriented to place;Oriented to situation  Cognition: Follows commands             Functional Mobility and Transfers for ADLs:  Bed Mobility:  Rolling: Stand-by assistance  Sit to Supine: Minimum assistance  Scooting: Minimum assistance    Transfers:  Sit to Stand: Moderate assistance          Balance:  Sitting: Intact; Without support  Standing: Impaired; With support  Standing - Static: Constant support; Fair  Standing - Dynamic : Constant support; Fair    Pain:  0/10    Activity Tolerance:   Fair and requires rest breaks  Please refer to the flowsheet for vital signs taken during this treatment. After treatment patient left in no apparent distress:   Supine in bed, Call bell within reach, and on bed pan    COMMUNICATION/COLLABORATION:   The patients plan of care was discussed with: Physical therapy assistant and Registered nurse.      AMPARO To  Time Calculation: 14 mins    Problem: Self Care Deficits Care Plan (Adult)  Goal: *Acute Goals and Plan of Care (Insert Text)  Description: Pt will be mod I sup <> sit in prep for EOB ADLs  Pt will be mod I grooming sitting EOB  Pt will be mod I LE dressing sitting EOB/long sit  Pt will be mod I sit <>  prep for toileting LRAD  Pt will be mod I toileting/toilet transfer/cloth mgmt LRAD  Pt will be I following UE HEP in prep for self care tasks     Outcome: Progressing Towards Goal

## 2021-04-30 NOTE — PROGRESS NOTES
CM reviewed chart, [facility] in following patient for admission when medically stable. CM team will continue to follow case.

## 2021-04-30 NOTE — PROGRESS NOTES
Physician Progress Note      PATIENT:               Jessica Montejo  CSN #:                  [id number]  :                       1932  ADMIT DATE:       2021 2:12 PM  [address] [place] DATE:  RESPONDING  PROVIDER #:        Renée Enriquez MD          QUERY TEXT:    Pt admitted with WEAKNESS. Pt noted to have ELEVATED CK, HEMATURIA, & PROLONGED IMMOBILIZATION FROM A FALL. If possible, please document in progress notes and discharge summary if you are evaluating and/or treating any of the following: The medical record reflects the following:  Risk Factors: DM, FALL, ANEMIA, HTN, ?CKD, CRISTIAN, SIRS, DEHYDRATION  Clinical Indicators: U/A; BLOOD-LARGE AMOUNT. CK: 1,554, ED DR: \"Patient's son states that the patient was found on the floor this morning as he was feeling weak and not able to walk. \"  Treatment: NS 1L BOLUS, CT SCAN OF HEAD, NEPHROLOGY CONSULT, PT/OT CONSULTS, LAB MONITORING. Thank you,  JOSEFINA PringleN, RN, CDI Specialist  [email or web address] or [phone]    Per [email or web address]  Traumatic rhabdomyolysis cause examples: crush syndrome, prolonged immobilization  Nontraumatic rhabdomyolysis cause examples:  marked exertion, hyperthermia, metabolic myopathy, drugs or toxins, infections, electrolyte disorders. Options provided:  -- Traumatic rhabdomyolysis  -- Nontraumatic rhabdomyolysis  -- Rhabdomyolysis ruled out  -- Other - I will add my own diagnosis  -- Disagree - Not applicable / Not valid  -- Disagree - Clinically unable to determine / Unknown  -- Refer to Clinical Documentation Reviewer    PROVIDER RESPONSE TEXT:    This patient has traumatic rhabdomyolysis.     Query created by: Efrem Wilcox on 2021 2:04 PM      Electronically signed by:  Renée Enriquez MD 2021 1:40 PM

## 2021-05-01 LAB
ALBUMIN SERPL-MCNC: 1.8 G/DL (ref 3.5–5)
ALBUMIN/GLOB SERPL: 0.4 {RATIO} (ref 1.1–2.2)
ALP SERPL-CCNC: 430 U/L (ref 45–117)
ALT SERPL-CCNC: 72 U/L (ref 12–78)
ANION GAP SERPL CALC-SCNC: 10 MMOL/L (ref 5–15)
AST SERPL W P-5'-P-CCNC: 202 U/L (ref 15–37)
BASOPHILS # BLD: 0 K/UL (ref 0–0.1)
BASOPHILS NFR BLD: 0 % (ref 0–1)
BILIRUB SERPL-MCNC: 7.9 MG/DL (ref 0.2–1)
BUN SERPL-MCNC: 63 MG/DL (ref 6–20)
BUN/CREAT SERPL: 25 (ref 12–20)
CA-I BLD-MCNC: 8.5 MG/DL (ref 8.5–10.1)
CHLORIDE SERPL-SCNC: 101 MMOL/L (ref 97–108)
CO2 SERPL-SCNC: 22 MMOL/L (ref 21–32)
CREAT SERPL-MCNC: 2.52 MG/DL (ref 0.7–1.3)
DIFFERENTIAL METHOD BLD: ABNORMAL
EOSINOPHIL # BLD: 0.2 K/UL (ref 0–0.4)
EOSINOPHIL NFR BLD: 1 % (ref 0–7)
ERYTHROCYTE [DISTWIDTH] IN BLOOD BY AUTOMATED COUNT: 18.4 % (ref 11.5–14.5)
GLOBULIN SER CALC-MCNC: 4.9 G/DL (ref 2–4)
GLUCOSE BLD STRIP.AUTO-MCNC: 177 MG/DL (ref 65–100)
GLUCOSE BLD STRIP.AUTO-MCNC: 245 MG/DL (ref 65–100)
GLUCOSE BLD STRIP.AUTO-MCNC: 251 MG/DL (ref 65–100)
GLUCOSE BLD STRIP.AUTO-MCNC: 258 MG/DL (ref 65–100)
GLUCOSE SERPL-MCNC: 234 MG/DL (ref 65–100)
HCT VFR BLD AUTO: 25.6 % (ref 36.6–50.3)
HGB BLD-MCNC: 8.7 G/DL (ref 12.1–17)
IMM GRANULOCYTES # BLD AUTO: 0.1 K/UL (ref 0–0.04)
IMM GRANULOCYTES NFR BLD AUTO: 1 % (ref 0–0.5)
LYMPHOCYTES # BLD: 1.2 K/UL (ref 0.8–3.5)
LYMPHOCYTES NFR BLD: 8 % (ref 12–49)
MCH RBC QN AUTO: 29.2 PG (ref 26–34)
MCHC RBC AUTO-ENTMCNC: 34 G/DL (ref 30–36.5)
MCV RBC AUTO: 85.9 FL (ref 80–99)
MONOCYTES # BLD: 1.9 K/UL (ref 0–1)
MONOCYTES NFR BLD: 13 % (ref 5–13)
NEUTS SEG # BLD: 11.2 K/UL (ref 1.8–8)
NEUTS SEG NFR BLD: 77 % (ref 32–75)
PERFORMED BY, TECHID: ABNORMAL
PLATELET # BLD AUTO: 304 K/UL (ref 150–400)
PMV BLD AUTO: 10.4 FL (ref 8.9–12.9)
POTASSIUM SERPL-SCNC: 4 MMOL/L (ref 3.5–5.1)
PROT SERPL-MCNC: 6.7 G/DL (ref 6.4–8.2)
RBC # BLD AUTO: 2.98 M/UL (ref 4.1–5.7)
SODIUM SERPL-SCNC: 133 MMOL/L (ref 136–145)
WBC # BLD AUTO: 14.5 K/UL (ref 4.1–11.1)

## 2021-05-01 PROCEDURE — 82962 GLUCOSE BLOOD TEST: CPT

## 2021-05-01 PROCEDURE — 80053 COMPREHEN METABOLIC PANEL: CPT

## 2021-05-01 PROCEDURE — 97530 THERAPEUTIC ACTIVITIES: CPT

## 2021-05-01 PROCEDURE — 36415 COLL VENOUS BLD VENIPUNCTURE: CPT

## 2021-05-01 PROCEDURE — 74011000258 HC RX REV CODE- 258: Performed by: INTERNAL MEDICINE

## 2021-05-01 PROCEDURE — 74011000250 HC RX REV CODE- 250: Performed by: INTERNAL MEDICINE

## 2021-05-01 PROCEDURE — 85025 COMPLETE CBC W/AUTO DIFF WBC: CPT

## 2021-05-01 PROCEDURE — 74011636637 HC RX REV CODE- 636/637: Performed by: INTERNAL MEDICINE

## 2021-05-01 PROCEDURE — 65270000029 HC RM PRIVATE

## 2021-05-01 PROCEDURE — 74011250637 HC RX REV CODE- 250/637: Performed by: INTERNAL MEDICINE

## 2021-05-01 PROCEDURE — 74011250636 HC RX REV CODE- 250/636: Performed by: INTERNAL MEDICINE

## 2021-05-01 RX ADMIN — PIPERACILLIN AND TAZOBACTAM 3.38 G: 3; .375 INJECTION, POWDER, LYOPHILIZED, FOR SOLUTION INTRAVENOUS at 03:26

## 2021-05-01 RX ADMIN — PIPERACILLIN AND TAZOBACTAM 3.38 G: 3; .375 INJECTION, POWDER, LYOPHILIZED, FOR SOLUTION INTRAVENOUS at 09:25

## 2021-05-01 RX ADMIN — PIPERACILLIN AND TAZOBACTAM 3.38 G: 3; .375 INJECTION, POWDER, LYOPHILIZED, FOR SOLUTION INTRAVENOUS at 17:08

## 2021-05-01 RX ADMIN — INSULIN LISPRO 5 UNITS: 100 INJECTION, SOLUTION INTRAVENOUS; SUBCUTANEOUS at 11:27

## 2021-05-01 RX ADMIN — INSULIN LISPRO 3 UNITS: 100 INJECTION, SOLUTION INTRAVENOUS; SUBCUTANEOUS at 09:25

## 2021-05-01 RX ADMIN — SODIUM BICARBONATE: 84 INJECTION, SOLUTION INTRAVENOUS at 13:31

## 2021-05-01 RX ADMIN — ACETAMINOPHEN 650 MG: 325 TABLET, FILM COATED ORAL at 22:58

## 2021-05-01 RX ADMIN — INSULIN LISPRO 5 UNITS: 100 INJECTION, SOLUTION INTRAVENOUS; SUBCUTANEOUS at 17:07

## 2021-05-01 RX ADMIN — ENOXAPARIN SODIUM 30 MG: 30 INJECTION SUBCUTANEOUS at 09:25

## 2021-05-01 RX ADMIN — Medication 20 ML: at 03:26

## 2021-05-01 RX ADMIN — ACETAMINOPHEN 650 MG: 325 TABLET, FILM COATED ORAL at 03:26

## 2021-05-01 NOTE — PROGRESS NOTES
Bedside shift change report given to ABDIAZIZ Humphreys RN (oncoming nurse) by ISAAC Reyes RN (offgoing nurse). Report included the following information SBAR, Kardex, Intake/Output, MAR and Recent Results.

## 2021-05-01 NOTE — PROGRESS NOTES
Problem: Mobility Impaired (Adult and Pediatric)  Goal: *Acute Goals and Plan of Care (Insert Text)  Description: Pt will be I with LE HEP in 7 days. Pt will perform bed mobility with mod I in 7 days. Pt will perform transfers with mod I in 7 days. Pt will amb  feet with LRAD safely with mod I in 7 days. Outcome: Progressing Towards Goal   PHYSICAL THERAPY TREATMENT  Patient: Stephanie Bellamy (11 y.o. male)  Date: 5/1/2021  Diagnosis: Weakness [R53.1]  CRISTIAN (acute kidney injury) ([facility] [N17.9] <principal problem not specified>       Precautions:    Chart, physical therapy assessment, plan of care and goals were reviewed. ASSESSMENT  Patient continues with skilled PT services and is progressing towards goals. Participated with supine and seated therex, reviewed HEP, pt verbalized understanding, rec continued review next tx. Decreased assistance required for bed mobility and transfers this tx. PLAN :  Patient continues to benefit from skilled intervention to address the above impairments. Continue treatment per established plan of care. to address goals. Recommendation for discharge: (in order for the patient to meet his/her long term goals)  Therapy 3 hours per day 5-7 days per week           SUBJECTIVE:   Patient stated i'm making it, I think I'm a little better than yesterday.     OBJECTIVE DATA SUMMARY:   Critical Behavior:  Neurologic State: Alert  Orientation Level: Oriented to person, Oriented to place, Disoriented to situation  Cognition: Appropriate for age attention/concentration  Safety/Judgement: Fall prevention, Awareness of environment  Functional Mobility Training:  Bed Mobility:     Supine to Sit: Stand-by assistance     Scooting: Stand-by assistance        Transfers:  Sit to Stand: Contact guard assistance  Stand to Sit: Contact guard assistance        Bed to Chair: Contact guard assistance                    Balance:  Sitting: Intact  Standing: Intact; With support  Standing - Static: Good  Standing - Dynamic : Fair  Ambulation/Gait Training:  Distance (ft): 5 Feet (ft)  Assistive Device: Gait belt;Walker, rolling  Ambulation - Level of Assistance: Contact guard assistance                 Base of Support: Widened    Therapeutic Exercises:   Supine:   AP x15   Heel slides x15   SLR x15  Seated:   LAQ x10  Pain Ratin/10    Activity Tolerance:   Fair      After treatment patient left in no apparent distress:   Sitting in chair, Call bell within reach, and Caregiver / family present        Inocencio Nissen   Time Calculation: 28 mins

## 2021-05-01 NOTE — PROGRESS NOTES
Hospitalist Progress Note               Daily Progress Note: 5/1/2021      Subjective: The patient is seen for follow up. Ultrasound yesterday showed diffusely heterogeneous hepatic parenchyma suggesting underlying masses. CT of abdomen without contrast yesterday demonstrated a large area of heterogeneous decreased density involving portions of the medial segment of the left hepatic lobe and the right hepatic lobe concerning for neoplasm. There were a few stones in the gallbladder fundus. There was a 12 x 11 mm lytic lesion in the left ilium.     This morning's labs are pending    Problem List:  Problem List as of 5/1/2021 Never Reviewed          Codes Class Noted - Resolved    Weakness ICD-10-CM: R53.1  ICD-9-CM: 780.79  4/28/2021 - Present        [facility] ICD-10-CM: N17.9  ICD-9-CM: 584.9  4/28/2021 - Present              Medications reviewed  Current Facility-Administered Medications   Medication Dose Route Frequency    sodium chloride (NS) flush 5-40 mL  5-40 mL IntraVENous PRN    acetaminophen (TYLENOL) tablet 650 mg  650 mg Oral Q6H PRN    Or    acetaminophen (TYLENOL) suppository 650 mg  650 mg Rectal Q6H PRN    polyethylene glycol (MIRALAX) packet 17 g  17 g Oral DAILY PRN    promethazine (PHENERGAN) tablet 12.5 mg  12.5 mg Oral Q6H PRN    Or    ondansetron (ZOFRAN) injection 4 mg  4 mg IntraVENous Q6H PRN    enoxaparin (LOVENOX) injection 30 mg  30 mg SubCUTAneous DAILY    glucose chewable tablet 16 g  4 Tab Oral PRN    dextrose (D50W) injection syrg 12.5-25 g  25-50 mL IntraVENous PRN    glucagon (GLUCAGEN) injection 1 mg  1 mg IntraMUSCular PRN    insulin lispro (HUMALOG) injection   SubCUTAneous AC&HS    piperacillin-tazobactam (ZOSYN) 3.375 g in 0.9% sodium chloride (MBP/ADV) 100 mL MBP  3.375 g IntraVENous Q8H    0.45% sodium chloride 1,000 mL with sodium bicarbonate (8.4%) 50 mEq infusion   IntraVENous CONTINUOUS       Review of Systems:   A comprehensive review of systems was negative except for that written in the HPI. Objective:   Physical Exam:     Visit Vitals  /63 (BP 1 Location: Right upper arm)   Pulse 89   Temp 98.1 °F (36.7 °C)   Resp 18   Ht 6' 2\" (1.88 m)   Wt 109.7 kg (241 lb 13.5 oz)   SpO2 98%   BMI 31.05 kg/m²      O2 Device: None (Room air)    Temp (24hrs), Av.1 °F (36.7 °C), Min:97.6 °F (36.4 °C), Max:98.6 °F (37 °C)    No intake/output data recorded.  1901 -  0700  In: 4758 [P.O.:200; I.V.:1187]  Out: 776 [Urine:775]    General:   Awake and alert. He is jaundiced and was jaundiced on admission   Lungs:   Clear to auscultation bilaterally. Chest wall:  No tenderness or deformity. Heart:  Regular rate and rhythm, S1, S2 normal, no murmur, click, rub or gallop. Abdomen:   Soft, non-tender. Bowel sounds normal. No masses,  No organomegaly. Extremities: Extremities normal, atraumatic, no cyanosis. In his legs show some hyperpigmented changes suggesting chronic venous insufficiency but no open wounds or erythema         Pulses: 2+ and symmetric all extremities. Skin: Skin color, texture, turgor normal. No rashes or lesions   Neurologic: CNII-XII intact. No gross focal deficits         Data Review:       Recent Days:  Recent Labs     21  0615 21  0838 21  1445   WBC 17.2* 19.1* 19.2*   HGB 8.7* 8.9* 9.5*   HCT 25.0* 25.8* 27.5*    280 325     Recent Labs     21  0615 21  0838 21  1445    133* 130*   K 4.0 4.1 4.4    104 98   CO2 23 22 22   * 202* 321*   BUN 57* 50* 51*   CREA 2.16* 2.10* 2.40*   CA 8.7 9.0 9.4   PHOS 4.1  --   --    ALB 2.0*  1.9* 2.1* 2.7*   TBILI 7.9* 8.4* 8.6*   ALT 78 85* 104*     No results for input(s): PH, PCO2, PO2, HCO3, FIO2 in the last 72 hours.     24 Hour Results:  Recent Results (from the past 24 hour(s))   GLUCOSE, POC    Collection Time: 21 12:06 PM   Result Value Ref Range    Glucose (POC) 166 (H) 65 - 100 mg/dL    Performed by [facility])    GLUCOSE, POC    Collection Time: 04/30/21  4:44 PM   Result Value Ref Range    Glucose (POC) 192 (H) 65 - 100 mg/dL    Performed by [facility])    GLUCOSE, POC    Collection Time: 04/30/21  9:17 PM   Result Value Ref Range    Glucose (POC) 174 (H) 65 - 100 mg/dL    [facility]        CT ABD PELV WO CONT   Final Result   1. Irregular heterogeneous low density mass relative to adjacent hepatic   parenchyma involving the medial segment left hepatic lobe and right hepatic lobe   concerning for neoplasm. Because contrast could not be utilized, an MRI may have   better soft tissue differentiation. . Alternatively, biopsy may be warranted. 2. Calcifications in the gallbladder fundus. 3. Other findings as described. US LIVER   Final Result   Diffusely heterogeneous hepatic parenchyma which could indicate   underlying masses; CT or MRI with and without contrast per hepatic protocol is   recommended to exclude underlying neoplasm. 2.5 cm complex structure within the   gallbladder could represent a ball of sludge or stone and could also be further   characterized via CT or MRI. US RETROPERITONEUM COMP   Final Result   1. Focally dilated bilateral kidney upper pole calyces. No hydronephrosis. 2. Bladder debris. CT HEAD WO CONT   Final Result   1. No acute intracranial findings. 2. Atrophy and small vessel ischemic disease. 3. Facial sinus disease. XR CHEST SNGL V   Final Result      Underexpanded lungs, otherwise negative. Assessment:    Generalized weakness with fall and prolonged immobilization     Dehydration     Likely acute kidney injury, improved. Likely underlying chronic kidney disease     Acute myoglobinuria. CPK improving, renal function improving    Cholestatic jaundice with elevated alkaline phosphatase/bilirubin, rule out biliary obstruction.   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 I did speak with patient's daughter-in-law and reviewed all of the above. 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     Diandra Craven MD

## 2021-05-01 NOTE — PROGRESS NOTES
[facility]     Renal Daily Progress Note:     Admission Date: 2021     Subjective:appears comfortable. No shortness of breath. No pain. Creatinine trending down. CPK trending down. No lower extremity edema. Review of Systems  Pertinent items are noted in HPI.     Objective:     Visit Vitals  BP (!) 149/69 (BP 1 Location: Right upper arm)   Pulse 82   Temp 98.1 °F (36.7 °C)   Resp 17   Ht 6' 2\" (1.88 m)   Wt 109.7 kg (241 lb 13.5 oz)   SpO2 97%   BMI 31.05 kg/m²     Temp (24hrs), Av.1 °F (36.7 °C), Min:97.6 °F (36.4 °C), Max:98.6 °F (37 °C)        Intake/Output Summary (Last 24 hours) at 2021 2588  Last data filed at 2021 1600  Gross per 24 hour   Intake --   Output 326 ml   Net -326 ml     Current Facility-Administered Medications   Medication Dose Route Frequency    sodium chloride (NS) flush 5-40 mL  5-40 mL IntraVENous PRN    acetaminophen (TYLENOL) tablet 650 mg  650 mg Oral Q6H PRN    Or    acetaminophen (TYLENOL) suppository 650 mg  650 mg Rectal Q6H PRN    polyethylene glycol (MIRALAX) packet 17 g  17 g Oral DAILY PRN    promethazine (PHENERGAN) tablet 12.5 mg  12.5 mg Oral Q6H PRN    Or    ondansetron (ZOFRAN) injection 4 mg  4 mg IntraVENous Q6H PRN    enoxaparin (LOVENOX) injection 30 mg  30 mg SubCUTAneous DAILY    glucose chewable tablet 16 g  4 Tab Oral PRN    dextrose (D50W) injection syrg 12.5-25 g  25-50 mL IntraVENous PRN    glucagon (GLUCAGEN) injection 1 mg  1 mg IntraMUSCular PRN    insulin lispro (HUMALOG) injection   SubCUTAneous AC&HS    piperacillin-tazobactam (ZOSYN) 3.375 g in 0.9% sodium chloride (MBP/ADV) 100 mL MBP  3.375 g IntraVENous Q8H    0.45% sodium chloride 1,000 mL with sodium bicarbonate (8.4%) 50 mEq infusion   IntraVENous CONTINUOUS       Physical Exam:General appearance: alert, cooperative, no distress, appears stated age  Lungs: clear to auscultation bilaterally  Heart: regular rate and rhythm, no S3 or S4  Abdomen: soft, non-tender. Bowel sounds normal. No masses,  no organomegaly  Extremities: no edema  Neurologic: Grossly normal    Data Review:     LABS:  Recent Labs     04/30/21  0615 04/29/21  0838 04/28/21  1445    133* 130*   K 4.0 4.1 4.4    104 98   CO2 23 22 22   BUN 57* 50* 51*   CREA 2.16* 2.10* 2.40*   CA 8.7 9.0 9.4   ALB 2.0*  1.9* 2.1* 2.7*   PHOS 4.1  --   --    CPK: 1427-->853  Liver function studies: Total bilirubin 7.9, direct bilirubin 6.9, albumin 2.0, ALT 78,  and alkaline phosphatase 469  Recent Labs     04/30/21  0615 04/29/21  0838 04/28/21  1445   WBC 17.2* 19.1* 19.2*   HGB 8.7* 8.9* 9.5*   HCT 25.0* 25.8* 27.5*    280 325     Recent Labs     04/30/21  0530   TONY 10   KU 40   CREAU 103.00  101.00     Retroperitoneal US 4-30-21  Chronic kidney disease.     FINDINGS: Transabdominal ultrasound.     Right kidney 10.3 x 4.3 x 4.5 cm. 2.0 cm right renal upper pole echopenic lesion  or focally dilated calyx. Left kidney 10.6 x 5.0 x 4.5 cm. Left kidney upper  pole dilated calyx. No hydronephrosis. Otherwise grossly normal renal  echotexture. No cortical thinning.     Proximal IVC, aorta obscured by bowel gas.     The bladder is incompletely distended, contains internal debris.     No ascites.     IMPRESSION  1. Focally dilated bilateral kidney upper pole calyces. No hydronephrosis. 2. Bladder debris. Assessment:   Renal Specific Problems  Probable underlying chronic kidney disease, stage not clear  Acute kidney injury resolving  ?  Resolving rhabdomyolysis  Abnormal liver function tests suggesting cholestatic picture   anemia        Plan:     Obtain/ Order: labs/cultures/radiology/procedures:  renal panel    Iron saturation    Therapeutic:    Decrease IV fluid rate and possibly discontinue in a.m. if creatinine continues to improve        Johnna Zuniga MD    [phone]

## 2021-05-01 NOTE — PROGRESS NOTES
[facility]     Renal Daily Progress Note:     Admission Date: 2021       [address] comfortable. No shortness of breath. No pain. Creatinine trending down. CPK trending down. No lower extremity edema. Fabby---> 2021    No new complaints were offered.         Objective:     Visit Vitals  /66 (BP 1 Location: Right upper arm, BP Patient Position: At rest)   Pulse 72   Temp 98.1 °F (36.7 °C)   Resp 16   Ht 6' 2\" (1.88 m)   Wt 109.7 kg (241 lb 13.5 oz)   SpO2 95%   BMI 31.05 kg/m²     Temp (24hrs), Av.1 °F (36.7 °C), Min:97.9 °F (36.6 °C), Max:98.1 °F (36.7 °C)        Intake/Output Summary (Last 24 hours) at 2021 1729  Last data filed at 2021 0636  Gross per 24 hour   Intake 1387 ml   Output 450 ml   Net 937 ml     Current Facility-Administered Medications   Medication Dose Route Frequency    sodium chloride (NS) flush 5-40 mL  5-40 mL IntraVENous PRN    acetaminophen (TYLENOL) tablet 650 mg  650 mg Oral Q6H PRN    Or    acetaminophen (TYLENOL) suppository 650 mg  650 mg Rectal Q6H PRN    polyethylene glycol (MIRALAX) packet 17 g  17 g Oral DAILY PRN    promethazine (PHENERGAN) tablet 12.5 mg  12.5 mg Oral Q6H PRN    Or    ondansetron (ZOFRAN) injection 4 mg  4 mg IntraVENous Q6H PRN    enoxaparin (LOVENOX) injection 30 mg  30 mg SubCUTAneous DAILY    glucose chewable tablet 16 g  4 Tab Oral PRN    dextrose (D50W) injection syrg 12.5-25 g  25-50 mL IntraVENous PRN    glucagon (GLUCAGEN) injection 1 mg  1 mg IntraMUSCular PRN    insulin lispro (HUMALOG) injection   SubCUTAneous AC&HS    piperacillin-tazobactam (ZOSYN) 3.375 g in 0.9% sodium chloride (MBP/ADV) 100 mL MBP  3.375 g IntraVENous Q8H    0.45% sodium chloride 1,000 mL with sodium bicarbonate (8.4%) 50 mEq infusion   IntraVENous CONTINUOUS       Physical Exam:      General appearance: alert, cooperative, no distress, appears stated age    Lungs: clear to auscultation , no wheezes, no rales    Heart:  No S3 gallop , No pericardial rub    Abdomen:  Not distended      Neurologic:  Responding appropriately      Data Review:     LABS:  Recent Labs     05/01/21  0655 04/30/21  0615 04/29/21  0838   * 136 133*   K 4.0 4.0 4.1    105 104   CO2 22 23 22   BUN 63* 57* 50*   CREA 2.52* 2.16* 2.10*   CA 8.5 8.7 9.0   ALB 1.8* 2.0*  1.9* 2.1*   PHOS  --  4.1  --    CPK: 1427-->853  Liver function studies: Total bilirubin 7.9, direct bilirubin 6.9, albumin 2.0, ALT 78,  and alkaline phosphatase 469  Recent Labs     05/01/21  0655 04/30/21  0615 04/29/21  0838   WBC 14.5* 17.2* 19.1*   HGB 8.7* 8.7* 8.9*   HCT 25.6* 25.0* 25.8*    298 280     Recent Labs     04/30/21  0530   TONY 10   KU 40   CREAU 103.00  101.00     Retroperitoneal US 4-30-21  Chronic kidney disease.     FINDINGS: Transabdominal ultrasound.     Right kidney 10.3 x 4.3 x 4.5 cm. 2.0 cm right renal upper pole echopenic lesion  or focally dilated calyx. Left kidney 10.6 x 5.0 x 4.5 cm. Left kidney upper  pole dilated calyx. No hydronephrosis. Otherwise grossly normal renal  echotexture. No cortical thinning.     Proximal IVC, aorta obscured by bowel gas.     The bladder is incompletely distended, contains internal debris.     No ascites.     IMPRESSION  1. Focally dilated bilateral kidney upper pole calyces. No hydronephrosis. 2. Bladder debris. Assessment:   Renal Specific Problems  Probable underlying chronic kidney disease, stage not clear  Acute kidney injury  - worsening --> may be a component of ATN  ? Resolving rhabdomyolysis  Abnormal liver function tests suggesting cholestatic picture   anemia        Plan:     Obtain/ Order: labs/cultures/radiology/procedures:  renal panel    Iron saturation    Therapeutic:        Continue IV fluids for now. There may be a component of ATN. Hopefully, he plateaus soon.     Avoid NSAIDs + IV contrast    Check lytes in the am (5/2)    Madelyne Holter , MD  [phone]

## 2021-05-02 LAB
ALBUMIN SERPL-MCNC: 1.7 G/DL (ref 3.5–5)
ANION GAP SERPL CALC-SCNC: 9 MMOL/L (ref 5–15)
BUN SERPL-MCNC: 69 MG/DL (ref 6–20)
BUN/CREAT SERPL: 25 (ref 12–20)
CA-I BLD-MCNC: 8.5 MG/DL (ref 8.5–10.1)
CEA SERPL-MCNC: 2.1 NG/ML
CHLORIDE SERPL-SCNC: 101 MMOL/L (ref 97–108)
CO2 SERPL-SCNC: 22 MMOL/L (ref 21–32)
CREAT SERPL-MCNC: 2.78 MG/DL (ref 0.7–1.3)
GLUCOSE BLD STRIP.AUTO-MCNC: 148 MG/DL (ref 65–100)
GLUCOSE BLD STRIP.AUTO-MCNC: 165 MG/DL (ref 65–100)
GLUCOSE BLD STRIP.AUTO-MCNC: 212 MG/DL (ref 65–100)
GLUCOSE BLD STRIP.AUTO-MCNC: 230 MG/DL (ref 65–100)
GLUCOSE SERPL-MCNC: 150 MG/DL (ref 65–100)
PERFORMED BY, TECHID: ABNORMAL
PHOSPHATE SERPL-MCNC: 4.9 MG/DL (ref 2.6–4.7)
POTASSIUM SERPL-SCNC: 4 MMOL/L (ref 3.5–5.1)
SODIUM SERPL-SCNC: 132 MMOL/L (ref 136–145)

## 2021-05-02 PROCEDURE — 86301 IMMUNOASSAY TUMOR CA 19-9: CPT

## 2021-05-02 PROCEDURE — 74011000258 HC RX REV CODE- 258: Performed by: INTERNAL MEDICINE

## 2021-05-02 PROCEDURE — 74011250636 HC RX REV CODE- 250/636: Performed by: INTERNAL MEDICINE

## 2021-05-02 PROCEDURE — 82378 CARCINOEMBRYONIC ANTIGEN: CPT

## 2021-05-02 PROCEDURE — 74011250637 HC RX REV CODE- 250/637: Performed by: INTERNAL MEDICINE

## 2021-05-02 PROCEDURE — 36415 COLL VENOUS BLD VENIPUNCTURE: CPT

## 2021-05-02 PROCEDURE — 74011636637 HC RX REV CODE- 636/637: Performed by: INTERNAL MEDICINE

## 2021-05-02 PROCEDURE — 65270000029 HC RM PRIVATE

## 2021-05-02 PROCEDURE — 80069 RENAL FUNCTION PANEL: CPT

## 2021-05-02 PROCEDURE — 74011000250 HC RX REV CODE- 250: Performed by: INTERNAL MEDICINE

## 2021-05-02 PROCEDURE — 84153 ASSAY OF PSA TOTAL: CPT

## 2021-05-02 PROCEDURE — 97530 THERAPEUTIC ACTIVITIES: CPT

## 2021-05-02 PROCEDURE — 82105 ALPHA-FETOPROTEIN SERUM: CPT

## 2021-05-02 PROCEDURE — 82962 GLUCOSE BLOOD TEST: CPT

## 2021-05-02 RX ORDER — LATANOPROST 50 UG/ML
1 SOLUTION/ DROPS OPHTHALMIC DAILY
Status: DISCONTINUED | OUTPATIENT
Start: 2021-05-03 | End: 2021-05-04 | Stop reason: HOSPADM

## 2021-05-02 RX ORDER — TRAMADOL HYDROCHLORIDE 50 MG/1
50 TABLET ORAL
Status: DISCONTINUED | OUTPATIENT
Start: 2021-05-02 | End: 2021-05-04 | Stop reason: HOSPADM

## 2021-05-02 RX ADMIN — ACETAMINOPHEN 650 MG: 325 TABLET, FILM COATED ORAL at 21:19

## 2021-05-02 RX ADMIN — PIPERACILLIN AND TAZOBACTAM 3.38 G: 3; .375 INJECTION, POWDER, LYOPHILIZED, FOR SOLUTION INTRAVENOUS at 10:35

## 2021-05-02 RX ADMIN — INSULIN LISPRO 2 UNITS: 100 INJECTION, SOLUTION INTRAVENOUS; SUBCUTANEOUS at 12:37

## 2021-05-02 RX ADMIN — PIPERACILLIN AND TAZOBACTAM 3.38 G: 3; .375 INJECTION, POWDER, LYOPHILIZED, FOR SOLUTION INTRAVENOUS at 02:21

## 2021-05-02 RX ADMIN — PIPERACILLIN AND TAZOBACTAM 3.38 G: 3; .375 INJECTION, POWDER, LYOPHILIZED, FOR SOLUTION INTRAVENOUS at 18:18

## 2021-05-02 RX ADMIN — INSULIN LISPRO 3 UNITS: 100 INJECTION, SOLUTION INTRAVENOUS; SUBCUTANEOUS at 16:42

## 2021-05-02 RX ADMIN — INSULIN LISPRO 2 UNITS: 100 INJECTION, SOLUTION INTRAVENOUS; SUBCUTANEOUS at 08:45

## 2021-05-02 RX ADMIN — ENOXAPARIN SODIUM 30 MG: 30 INJECTION SUBCUTANEOUS at 08:42

## 2021-05-02 RX ADMIN — SODIUM BICARBONATE: 84 INJECTION, SOLUTION INTRAVENOUS at 08:42

## 2021-05-02 NOTE — PROGRESS NOTES
[facility]     Renal Daily Progress Note:     Admission Date: 2021       [address] [facility]. No shortness of breath. No pain. Creatinine trending down. CPK trending down. No lower extremity edema. Fabby---> 2021    No new complaints were offered. Fabby -> 21  --> F/U CRISTIAN    No new complaints.       Objective:     Visit Vitals  /65 (BP 1 Location: Right upper arm, BP Patient Position: Sitting)   Pulse 86   Temp 98.5 °F (36.9 °C)   Resp 18   Ht 6' 2\" (1.88 m)   Wt 109.7 kg (241 lb 13.5 oz)   SpO2 96%   BMI 31.05 kg/m²     Temp (24hrs), Av.3 °F (36.8 °C), Min:98.1 °F (36.7 °C), Max:98.5 °F (36.9 °C)        Intake/Output Summary (Last 24 hours) at 2021 1747  Last data filed at 2021 1653  Gross per 24 hour   Intake --   Output 600 ml   Net -600 ml     Current Facility-Administered Medications   Medication Dose Route Frequency    traMADoL (ULTRAM) tablet 50 mg  50 mg Oral Q6H PRN    [START ON 5/3/2021] latanoprost (XALATAN) 0.005 % ophthalmic solution 1 Drop  1 Drop Both Eyes DAILY    pantothenic ac-min oil-pet,hyd (AQUAPHOR) 41 % ointment   Topical PRN    sodium chloride (NS) flush 5-40 mL  5-40 mL IntraVENous PRN    acetaminophen (TYLENOL) tablet 650 mg  650 mg Oral Q6H PRN    Or    acetaminophen (TYLENOL) suppository 650 mg  650 mg Rectal Q6H PRN    polyethylene glycol (MIRALAX) packet 17 g  17 g Oral DAILY PRN    promethazine (PHENERGAN) tablet 12.5 mg  12.5 mg Oral Q6H PRN    Or    ondansetron (ZOFRAN) injection 4 mg  4 mg IntraVENous Q6H PRN    enoxaparin (LOVENOX) injection 30 mg  30 mg SubCUTAneous DAILY    glucose chewable tablet 16 g  4 Tab Oral PRN    dextrose (D50W) injection syrg 12.5-25 g  25-50 mL IntraVENous PRN    glucagon (GLUCAGEN) injection 1 mg  1 mg IntraMUSCular PRN    insulin lispro (HUMALOG) injection   SubCUTAneous AC&HS    piperacillin-tazobactam (ZOSYN) 3.375 g in 0.9% sodium chloride (MBP/ADV) 100 mL MBP 3.375 g IntraVENous Q8H    0.45% sodium chloride 1,000 mL with sodium bicarbonate (8.4%) 50 mEq infusion   IntraVENous CONTINUOUS       Physical Exam:      Sitting in the chair comfortably. Mr. Deb Zaragoza was having a meal.    General appearance: alert, cooperative, no distress, appears stated age    Lungs: clear to auscultation , no wheezes, no rales    Heart:  No S3 gallop , No pericardial rub    Abdomen:  Not distended    Legs : Mild oedema    Neurologic:  Responding appropriately      Data Review:     LABS:  Recent Labs     05/02/21  0604 05/01/21  0655 04/30/21  0615   * 133* 136   K 4.0 4.0 4.0    101 105   CO2 22 22 23   BUN 69* 63* 57*   CREA 2.78* 2.52* 2.16*   CA 8.5 8.5 8.7   ALB 1.7* 1.8* 2.0*  1.9*   PHOS 4.9*  --  4.1   CPK: 1427-->853  Liver function studies: Total bilirubin 7.9, direct bilirubin 6.9, albumin 2.0, ALT 78,  and alkaline phosphatase 469  Recent Labs     05/01/21  0655 04/30/21  0615   WBC 14.5* 17.2*   HGB 8.7* 8.7*   HCT 25.6* 25.0*    298     Recent Labs     04/30/21  0530   TONY 10   KU 40   CREAU 103.00  101.00     Retroperitoneal US 4-30-21  Chronic kidney disease.     FINDINGS: Transabdominal ultrasound.     Right kidney 10.3 x 4.3 x 4.5 cm. 2.0 cm right renal upper pole echopenic lesion  or focally dilated calyx. Left kidney 10.6 x 5.0 x 4.5 cm. Left kidney upper  pole dilated calyx. No hydronephrosis. Otherwise grossly normal renal  echotexture. No cortical thinning.     Proximal IVC, aorta obscured by bowel gas.     The bladder is incompletely distended, contains internal debris.     No ascites.     IMPRESSION  1. Focally dilated bilateral kidney upper pole calyces. No hydronephrosis. 2. Bladder debris. Assessment:   Renal Specific Problems  Probable underlying chronic kidney disease, stage not clear  Acute kidney injury  - worsening --> may be a component of ATN  ?  Resolving rhabdomyolysis  Abnormal liver function tests suggesting cholestatic picture   anemia        Plan:     Obtain/ Order: labs/cultures/radiology/procedures:  renal panel    Iron saturation    Therapeutic:        Continue IV fluids for now. There may be a component of ATN. Hopefully, he plateaus soon. Avoid NSAIDs + IV contrast    Dr. Julia Marino will see tomorrow (5/3).     Thea Long MD  [phone]

## 2021-05-02 NOTE — CONSULTS
Consult    Subjective:     Usha Macias is a 80 y.o.  male who is being seen for liver mass    Past Medical History:   Diagnosis Date    Arthritis     AVM (arteriovenous malformation) of colon     B12 deficiency     Chronic anemia     Chronic kidney disease     Diabetes ([facility] [address]     Heart failure ([facility] [address]     Remotely in his 46s    Hypertension     PVD (peripheral vascular disease) ([facility] [address]       Past Surgical History:   Procedure Laterality Date    HX HIP REPLACEMENT Bilateral      History reviewed. No pertinent family history.    Social History     Tobacco Use    Smoking status: Never Smoker    Smokeless tobacco: Never Used   Substance Use Topics    Alcohol use: Never     Frequency: Never       Current Facility-Administered Medications   Medication Dose Route Frequency Provider Last Rate Last Admin    traMADoL (ULTRAM) tablet 50 mg  50 mg Oral Q6H PRN Excelsior MD Gino        sodium chloride (NS) flush 5-40 mL  5-40 mL IntraVENous PRN Ninoska Christian MD   20 mL at 05/01/21 0326    acetaminophen (TYLENOL) tablet 650 mg  650 mg Oral Q6H PRN Ninoska Christian MD   650 mg at 05/01/21 2258    Or    acetaminophen (TYLENOL) suppository 650 mg  650 mg Rectal Q6H PRN Excelsior MD Gino        polyethylene glycol (MIRALAX) packet 17 g  17 g Oral DAILY PRN Ninoska Christian MD        promethazine (PHENERGAN) tablet 12.5 mg  12.5 mg Oral Q6H PRN Ninoska Christian MD        Or    [facility] injection 4 mg  4 mg IntraVENous Q6H PRN Ninoska Christian MD        enoxaparin (LOVENOX) injection 30 mg  30 mg SubCUTAneous DAILY Ninoska Christian MD   30 mg at 05/02/21 0842    glucose chewable tablet 16 g  4 Tab Oral PRN Ninoska MD Gino        dextrose (D50W) injection syrg 12.5-25 g  25-50 mL IntraVENous PRN Ninoska Christian MD        glucagon (GLUCAGEN) injection 1 mg  1 mg IntraMUSCular PRN Ninoska Christian MD        insulin lispro (HUMALOG) injection   SubCUTAneous AC&HS Ninoska Christian MD   2 Units at 05/02/21 1237    piperacillin-tazobactam (ZOSYN) 3.375 g in 0.9% sodium chloride (MBP/ADV) 100 mL MBP  3.375 g IntraVENous Q8H Karley Huber MD 25 mL/hr at 05/02/21 1035 3.375 g at 05/02/21 1035    0.45% sodium chloride 1,000 mL with sodium bicarbonate (8.4%) 50 mEq infusion   IntraVENous CONTINUOUS Lorna Everett MD 75 mL/hr at 05/02/21 0842 New Bag at 05/02/21 3202        No Known Allergies     Review of Systems:       Objective: Intake and Output:    No intake/output data recorded. 04/30 1901 - 05/02 0700  In: 3424 [P.O.:200; I.V.:1187]  Out: 450 [Urine:450]  Blood pressure 118/62, pulse 87, temperature 98.3 °F (36.8 °C), resp. rate 16, height 6' 2\" (1.88 m), weight 109.7 kg (241 lb 13.5 oz), SpO2 97 %. Physical Exam:   General:  Alert, cooperative, no distress, appears stated age. Lungs:   Clear to auscultation bilaterally. Chest wall:  No tenderness or deformity. Heart:  Regular rate and rhythm, S1, S2 normal, no murmur, click, rub or gallop. Abdomen:   Soft, non-tender. Bowel sounds normal. No masses,  No organomegaly. Extremities: Extremities normal, atraumatic, no cyanosis or edema. Pulses: 2+ and symmetric all extremities. Skin: Skin color, texture, turgor normal. No rashes or lesions   Neurologic: CNII-XII intact. No gross sensory or motor deficits       Images:   CT ABD PELV WO CONT   Final Result   1. Irregular heterogeneous low density mass relative to adjacent hepatic   parenchyma involving the medial segment left hepatic lobe and right hepatic lobe   concerning for neoplasm. Because contrast could not be utilized, an MRI may have   better soft tissue differentiation. . Alternatively, biopsy may be warranted. 2. Calcifications in the gallbladder fundus. 3. Other findings as described.       US LIVER   Final Result   Diffusely heterogeneous hepatic parenchyma which could indicate   underlying masses; CT or MRI with and without contrast per hepatic protocol is   recommended to exclude underlying neoplasm. 2.5 cm complex structure within the   gallbladder could represent a ball of sludge or stone and could also be further   characterized via CT or MRI. US RETROPERITONEUM COMP   Final Result   1. Focally dilated bilateral kidney upper pole calyces. No hydronephrosis. 2. Bladder debris. CT HEAD WO CONT   Final Result   1. No acute intracranial findings. 2. Atrophy and small vessel ischemic disease. 3. Facial sinus disease. XR CHEST SNGL V   Final Result      Underexpanded lungs, otherwise negative.                Data Review:   Recent Results (from the past 24 hour(s))   GLUCOSE, POC    Collection Time: 05/01/21  5:02 PM   Result Value Ref Range    Glucose (POC) 258 (H) 65 - 100 mg/dL    Performed by [facility])    GLUCOSE, POC    Collection Time: 05/01/21 10:48 PM   Result Value Ref Range    Glucose (POC) 177 (H) 65 - 100 mg/dL    Performed by MELISSA YEIMI    RENAL FUNCTION PANEL    Collection Time: 05/02/21  6:04 AM   Result Value Ref Range    Sodium 132 (L) 136 - 145 mmol/L    Potassium 4.0 3.5 - 5.1 mmol/L    Chloride 101 97 - 108 mmol/L    CO2 22 21 - 32 mmol/L    Anion gap 9 5 - 15 mmol/L    Glucose 150 (H) 65 - 100 mg/dL    BUN 69 (H) 6 - 20 mg/dL    Creatinine 2.78 (H) 0.70 - 1.30 mg/dL    BUN/Creatinine ratio 25 (H) 12 - 20      GFR est AA 26 (L) >60 ml/min/1.73m2    GFR est non-AA 22 (L) >60 ml/min/1.73m2    Calcium 8.5 8.5 - 10.1 mg/dL    Phosphorus 4.9 (H) 2.6 - 4.7 mg/dL    Albumin 1.7 (L) 3.5 - 5.0 g/dL   GLUCOSE, POC    Collection Time: 05/02/21  8:41 AM   Result Value Ref Range    Glucose (POC) 148 (H) 65 - 100 mg/dL    Performed by [facility])    GLUCOSE, POC    Collection Time: 05/02/21 12:34 PM   Result Value Ref Range    Glucose (POC) 165 (H) 65 - 100 mg/dL    Performed by [facility])         Assessment:     Active Problems:    Weakness (4/28/2021)      CRISTIAN (acute kidney injury) ([facility]) (4/28/2021)        Plan:

## 2021-05-02 NOTE — CONSULTS
Consult    Subjective:     Josseline Tyler is a 80 y.o.  male who is being seen for liver mass. CT abdomen,pelvid showed  Irregular heterogeneous low density mass involving the medial segment left hepatic lobe and right hepatic lobe concerning for neoplasm. Patient seen by  for anemia due to b12 deff and getting B12 shots. Pt admitted with generalized weakness . S/p fall. Pt losing weight. Denies bleeding. Appetite low. Denies abdominal pain. Nausea better. last colonoscopy 4-5 yrs ago by  according to pt son. Past Medical History:   Diagnosis Date    Arthritis     AVM (arteriovenous malformation) of colon     B12 deficiency     Chronic anemia     Chronic kidney disease     Diabetes ([facility] [facility]     Heart failure (HCC)     Remotely in his 46s    Hypertension     PVD (peripheral vascular disease) ([facility] [facility]       Past Surgical History:   Procedure Laterality Date    HX HIP REPLACEMENT Bilateral      History reviewed. No pertinent family history.    Social History     Tobacco Use    Smoking status: Never Smoker    Smokeless tobacco: Never Used   Substance Use Topics    Alcohol use: Never     Frequency: Never       Current Facility-Administered Medications   Medication Dose Route Frequency Provider Last Rate Last Admin    traMADoL (ULTRAM) tablet 50 mg  50 mg Oral Q6H PRN Erwin Pérez MD        [START ON 5/3/2021] latanoprost (XALATAN) 0.005 % ophthalmic solution 1 Drop  1 Drop Both Eyes DAILY Shelly Lezama MD        pantothenic ac-min oil-pet,hyd (AQUAPHOR) 41 % ointment   Topical PRN Erwin Pérez MD        sodium chloride (NS) flush 5-40 mL  5-40 mL IntraVENous PRN Erwin Pérez MD   20 mL at 05/01/21 0326    acetaminophen (TYLENOL) tablet 650 mg  650 mg Oral Q6H PRN Erwin Pérez MD   650 mg at 05/01/21 8040    Or    acetaminophen (TYLENOL) suppository 650 mg  650 mg Rectal Q6H PRN Erwin Pérez MD        polyethylene glycol (MIRALAX) packet 17 g  17 g Oral DAILY PRN Ashu Vivar MD        promethazine [facility]) tablet 12.5 mg  12.5 mg Oral Q6H PRN Ashu Vivar MD        Or    ondansetron [facility]) injection 4 mg  4 mg IntraVENous Q6H PRN Ashu Vivar MD        enoxaparin (LOVENOX) injection 30 mg  30 mg SubCUTAneous DAILY Ashu Vivar MD   30 mg at 05/02/21 5833    glucose chewable tablet 16 g  4 Tab Oral PRN Ashu Vivar MD        dextrose (D50W) injection syrg 12.5-25 g  25-50 mL IntraVENous PRN Ashu Vivar MD        glucagon (GLUCAGEN) injection 1 mg  1 mg IntraMUSCular PRN Ashu Vivar MD        insulin lispro (HUMALOG) injection   SubCUTAneous AC&HS Ashu Vivar MD   3 Units at 05/02/21 1642    piperacillin-tazobactam (ZOSYN) 3.375 g in 0.9% sodium chloride (MBP/ADV) 100 mL MBP  3.375 g IntraVENous Q8H Ashu Vivar MD 25 mL/hr at 05/02/21 1818 3.375 g at 05/02/21 1818    0.45% sodium chloride 1,000 mL with sodium bicarbonate (8.4%) 50 mEq infusion   IntraVENous CONTINUOUS Bong Samson MD 75 mL/hr at 05/02/21 0842 New Bag at 05/02/21 5758        No Known Allergies     Review of Systems:  Other than mentioned in HPI 12 point review of systems negative. Objective: Intake and Output:    No intake/output data recorded. 05/01 0701 - 05/02 1900  In: -   Out: 600 [Urine:600]  Blood pressure 112/65, pulse 86, temperature 98.5 °F (36.9 °C), resp. rate 18, height 6' 2\" (1.88 m), weight 109.7 kg (241 lb 13.5 oz), SpO2 96 %. Physical Exam:   General:  Alert, cooperative, no distress, appears stated age. Lungs:   Clear to auscultation bilaterally. Chest wall:  No tenderness or deformity. Heart:  Regular rate and rhythm, S1, S2 normal.   Abdomen:   Soft, non-tender. Bowel sounds normal. No masses,  No organomegaly. Extremities: Extremities normal, atraumatic, no cyanosis or edema. Pulses: 2+ and symmetric all extremities. Skin: Jaundice noted. Neurologic: CNII-XII intact.  No gross sensory or motor deficits       Images:   CT ABD PELV WO CONT   Final Result   1. Irregular heterogeneous low density mass relative to adjacent hepatic   parenchyma involving the medial segment left hepatic lobe and right hepatic lobe   concerning for neoplasm. Because contrast could not be utilized, an MRI may have   better soft tissue differentiation. . Alternatively, biopsy may be warranted. 2. Calcifications in the gallbladder fundus. 3. Other findings as described. US LIVER   Final Result   Diffusely heterogeneous hepatic parenchyma which could indicate   underlying masses; CT or MRI with and without contrast per hepatic protocol is   recommended to exclude underlying neoplasm. 2.5 cm complex structure within the   gallbladder could represent a ball of sludge or stone and could also be further   characterized via CT or MRI. US RETROPERITONEUM COMP   Final Result   1. Focally dilated bilateral kidney upper pole calyces. No hydronephrosis. 2. Bladder debris. CT HEAD WO CONT   Final Result   1. No acute intracranial findings. 2. Atrophy and small vessel ischemic disease. 3. Facial sinus disease. XR CHEST SNGL V   Final Result      Underexpanded lungs, otherwise negative.                Data Review:   Recent Results (from the past 24 hour(s))   GLUCOSE, POC    Collection Time: 05/01/21 10:48 PM   Result Value Ref Range    Glucose (POC) 177 (H) 65 - 100 mg/dL    Performed by [facility]    RENAL FUNCTION PANEL    Collection Time: 05/02/21  6:04 AM   Result Value Ref Range    Sodium 132 (L) 136 - 145 mmol/L    Potassium 4.0 3.5 - 5.1 mmol/L    Chloride 101 97 - 108 mmol/L    CO2 22 21 - 32 mmol/L    Anion gap 9 5 - 15 mmol/L    Glucose 150 (H) 65 - 100 mg/dL    BUN 69 (H) 6 - 20 mg/dL    Creatinine 2.78 (H) 0.70 - 1.30 mg/dL    BUN/Creatinine ratio 25 (H) 12 - 20      GFR est AA 26 (L) >60 ml/min/1.73m2    GFR est non-AA 22 (L) >60 ml/min/1.73m2    Calcium 8.5 8.5 - 10.1 mg/dL    Phosphorus 4.9 (H) 2.6 - 4.7 mg/dL    Albumin 1.7 (L) 3.5 - 5.0 g/dL   CEA    Collection Time: 05/02/21  6:04 AM   Result Value Ref Range    CEA 2.1 ng/mL   GLUCOSE, POC    Collection Time: 05/02/21  8:41 AM   Result Value Ref Range    Glucose (POC) 148 (H) 65 - 100 mg/dL    Performed by [facility])    GLUCOSE, POC    Collection Time: 05/02/21 12:34 PM   Result Value Ref Range    Glucose (POC) 165 (H) 65 - 100 mg/dL    Performed by [facility])    GLUCOSE, POC    Collection Time: 05/02/21  4:08 PM   Result Value Ref Range    Glucose (POC) 230 (H) 65 - 100 mg/dL    Performed by [facility]         Assessment:     Liver mass involving left and right hepatic lobe: Concerning for malignancy. Obstructive jaundice  Normocytic hypochromic anemia:H/o B12 deff anemia  Leukocytosis with granulocytosis  CRISTIAN  Weight loss    Plan:   CT abdomen and pelvis findings suspicious for malignancy. Primary liver cancer versus metastatic disease. D/w pt,pt son Antelmo Spine and pt Daughter- in-law Ms. Harrison . They do not want biopsy or aggressive treatment. Due to pt general condition pt not a good candidate for chemotherapy. Pt had hip surgery. Pt not able to get MRI of abdomen. Will review CT abdomen,pelvis with radiologist.  Pt , pt son and daughter-in-law want to wait for GI consult or ERCP. Discussed about palliative care. Check tumor markers. Check iron studies,b12,folate. Thank you for the consult.

## 2021-05-02 NOTE — PROGRESS NOTES
Hospitalist Progress Note               Daily Progress Note: 5/2/2021      Subjective: The patient is seen for follow up. He voices no complaints today. Feels well overall. Denies abdominal pain. His creatinine has continued to worsen since admission. It was 2.5 yesterday, 2.78 today. Problem List:  Problem List as of 5/2/2021 Never Reviewed          Codes Class Noted - Resolved    Weakness ICD-10-CM: R53.1  ICD-9-CM: 780.79  4/28/2021 - Present        CRISTAIN (acute kidney injury) [facility]) ICD-10-CM: N17.9  ICD-9-CM: 584.9  4/28/2021 - Present              Medications reviewed  Current Facility-Administered Medications   Medication Dose Route Frequency    sodium chloride (NS) flush 5-40 mL  5-40 mL IntraVENous PRN    acetaminophen (TYLENOL) tablet 650 mg  650 mg Oral Q6H PRN    Or    acetaminophen (TYLENOL) suppository 650 mg  650 mg Rectal Q6H PRN    polyethylene glycol (MIRALAX) packet 17 g  17 g Oral DAILY PRN    promethazine (PHENERGAN) tablet 12.5 mg  12.5 mg Oral Q6H PRN    Or    ondansetron (ZOFRAN) injection 4 mg  4 mg IntraVENous Q6H PRN    enoxaparin (LOVENOX) injection 30 mg  30 mg SubCUTAneous DAILY    glucose chewable tablet 16 g  4 Tab Oral PRN    dextrose (D50W) injection syrg 12.5-25 g  25-50 mL IntraVENous PRN    glucagon (GLUCAGEN) injection 1 mg  1 mg IntraMUSCular PRN    insulin lispro (HUMALOG) injection   SubCUTAneous AC&HS    piperacillin-tazobactam (ZOSYN) 3.375 g in 0.9% sodium chloride (MBP/ADV) 100 mL MBP  3.375 g IntraVENous Q8H    0.45% sodium chloride 1,000 mL with sodium bicarbonate (8.4%) 50 mEq infusion   IntraVENous CONTINUOUS       Review of Systems:   A comprehensive review of systems was negative except for that written in the HPI.     Objective:   Physical Exam:     Visit Vitals  /68 (BP 1 Location: Right upper arm)   Pulse 85   Temp 98.1 °F (36.7 °C)   Resp 18   Ht 6' 2\" (1.88 m)   Wt 109.7 kg (241 lb 13.5 oz)   SpO2 97%   BMI 31.05 kg/m² O2 Device: None (Room air)    Temp (24hrs), Av °F (36.7 °C), Min:97.9 °F (36.6 °C), Max:98.1 °F (36.7 °C)    No intake/output data recorded.  190 -  0700  In: 5083 [P.O.:200; I.V.:1187]  Out: 450 [Urine:450]    General:   Awake and alert. He is jaundiced and was jaundiced on admission   Lungs:   Clear to auscultation bilaterally. Chest wall:  No tenderness or deformity. Heart:  Regular rate and rhythm, S1, S2 normal, no murmur, click, rub or gallop. Abdomen:   Soft, non-tender. Bowel sounds normal. No masses,  No organomegaly. Extremities: Extremities normal, atraumatic, no cyanosis. In his legs show some hyperpigmented changes suggesting chronic venous insufficiency but no open wounds or erythema         Pulses: 2+ and symmetric all extremities. Skin: Skin color, texture, turgor normal. No rashes or lesions   Neurologic: CNII-XII intact. No gross focal deficits         Data Review:       Recent Days:  Recent Labs     21  0655 21  0615 21  0838   WBC 14.5* 17.2* 19.1*   HGB 8.7* 8.7* 8.9*   HCT 25.6* 25.0* 25.8*    298 280     Recent Labs     21  0604 21  0655 21  0615 21  0838   * 133* 136 133*   K 4.0 4.0 4.0 4.1    101 105 104   CO2 22 22 23 22   * 234* 158* 202*   BUN 69* 63* 57* 50*   CREA 2.78* 2.52* 2.16* 2.10*   CA 8.5 8.5 8.7 9.0   PHOS 4.9*  --  4.1  --    ALB 1.7* 1.8* 2.0*  1.9* 2.1*   TBILI  --  7.9* 7.9* 8.4*   ALT  --  72 78 85*     No results for input(s): PH, PCO2, PO2, HCO3, FIO2 in the last 72 hours.     24 Hour Results:  Recent Results (from the past 24 hour(s))   GLUCOSE, POC    Collection Time: 21  9:18 AM   Result Value Ref Range    Glucose (POC) 245 (H) 65 - 100 mg/dL    Performed by [facility] [place])    GLUCOSE, POC    Collection Time: 21 11:24 AM   Result Value Ref Range    Glucose (POC) 251 (H) 65 - 100 mg/dL    Performed by Limmie Salts ([facility]    GLUCOSE, POC Collection Time: 05/01/21  5:02 PM   Result Value Ref Range    Glucose (POC) 258 (H) 65 - 100 mg/dL    Performed by Isabel [facility]IST[facility] [place])    GLUCOSE, POC    Collection Time: 05/01/21 10:48 PM   Result Value Ref Range    Glucose (POC) 177 (H) 65 - 100 mg/dL    Performed by [facility]    RENAL FUNCTION PANEL    Collection Time: 05/02/21  6:04 AM   Result Value Ref Range    Sodium 132 (L) 136 - 145 mmol/L    Potassium 4.0 3.5 - 5.1 mmol/L    Chloride 101 97 - 108 mmol/L    CO2 22 21 - 32 mmol/L    Anion gap 9 5 - 15 mmol/L    Glucose 150 (H) 65 - 100 mg/dL    BUN 69 (H) 6 - 20 mg/dL    Creatinine 2.78 (H) 0.70 - 1.30 mg/dL    BUN/Creatinine ratio 25 (H) 12 - 20      GFR est AA 26 (L) >60 ml/min/1.73m2    GFR est non-AA 22 (L) >60 ml/min/1.73m2    Calcium 8.5 8.5 - 10.1 mg/dL    Phosphorus 4.9 (H) 2.6 - 4.7 mg/dL    Albumin 1.7 (L) 3.5 - 5.0 g/dL       CT ABD PELV WO CONT   Final Result   1. Irregular heterogeneous low density mass relative to adjacent hepatic   parenchyma involving the medial segment left hepatic lobe and right hepatic lobe   concerning for neoplasm. Because contrast could not be utilized, an MRI may have   better soft tissue differentiation. . Alternatively, biopsy may be warranted. 2. Calcifications in the gallbladder fundus. 3. Other findings as described. US LIVER   Final Result   Diffusely heterogeneous hepatic parenchyma which could indicate   underlying masses; CT or MRI with and without contrast per hepatic protocol is   recommended to exclude underlying neoplasm. 2.5 cm complex structure within the   gallbladder could represent a ball of sludge or stone and could also be further   characterized via CT or MRI. US RETROPERITONEUM COMP   Final Result   1. Focally dilated bilateral kidney upper pole calyces. No hydronephrosis. 2. Bladder debris. CT HEAD WO CONT   Final Result   1. No acute intracranial findings. 2. Atrophy and small vessel ischemic disease.    3. Facial sinus disease. XR CHEST SNGL V   Final Result      Underexpanded lungs, otherwise negative. Assessment:    Generalized weakness with fall and prolonged immobilization     Dehydration     Acute kidney injury. Initially improving, now with worsening creatinine     Acute myoglobinuria. Cholestatic jaundice with elevated alkaline phosphatase/bilirubin. 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 per noncontrast CT     Cholelithiasis    Diabetes mellitus type 2     Hypertension     Peripheral vascular disease     Chronic anemia     Systemic inflammatory response syndrome with leukocytosis and elevated procalcitonin, no obvious source although potentially GI    Chronic leg wounds, stable         Plan:  Continue IV Zosyn and IV fluids    I tried to contact patient's son Rodrigo Witt again this morning but he did not answer. I did leave a voicemail    We are tentatively planning on hospice referral and likely discharge tomorrow      Care Plan discussed with: Patient/Family    Total time spent with patient: 30 minutes.     Bryn Coleman MD

## 2021-05-02 NOTE — PROGRESS NOTES
Problem: Mobility Impaired (Adult and Pediatric)  Goal: *Acute Goals and Plan of Care (Insert Text)  Description: Pt will be I with LE HEP in 7 days. Pt will perform bed mobility with mod I in 7 days. Pt will perform transfers with mod I in 7 days. Pt will amb  feet with LRAD safely with mod I in 7 days. Outcome: Progressing Towards Goal   PHYSICAL THERAPY TREATMENT  Patient: Ysabel Zheng (68 y.o. male)  Date: 5/2/2021  Diagnosis: Weakness [R53.1]  CRISTIAN (acute kidney injury) ([facility] [address] [N17.9] <principal problem not specified>       Precautions:    Chart, physical therapy assessment, plan of care and goals were reviewed. ASSESSMENT  Patient continues with skilled PT services and is progressing towards goals. Performed bed mobility with SBA. Increased assistance required to stand from EOB this tx as bed was in lowest position. Performed chair transfer with CGA. STS from recliner min A. Maintained static stand ~2min, while educating pt on posture. Pt also performed standing marches in place ~6steps before requesting to return to seated position. Pt participated with seated therex, reviewed HEP, verbalized understanding. Pt required increased time for all mobility and several seated rest breaks t/o tx. PLAN :  Patient continues to benefit from skilled intervention to address the above impairments. Continue treatment per established plan of care. to address goals. Recommendation for discharge: (in order for the patient to meet his/her long term goals)  Therapy 3 hours per day 5-7 days per week         SUBJECTIVE:   Patient stated I feel good today, I stayed in the chair for 4 hours yesterday, but needed a lot of help to get out of it.     OBJECTIVE DATA SUMMARY:   Critical Behavior:  Neurologic State: Alert  Orientation Level: Oriented to situation, Oriented to place, Oriented to person  Cognition: Appropriate for age attention/concentration  Safety/Judgement: Fall prevention, Awareness of environment  Functional Mobility Training:  Bed Mobility:     Supine to Sit: Stand-by assistance     Scooting: Stand-by assistance        Transfers:  Sit to Stand: Minimum assistance  Stand to Sit: Contact guard assistance        Bed to Chair: Contact guard assistance                    Balance:  Sitting: Intact  Sitting - Static: Good (unsupported)  Sitting - Dynamic: Good (unsupported)  Standing: Intact; With support  Standing - Static: Good  Standing - Dynamic : Fair  Ambulation/Gait Training:  Distance (ft): 6 Feet (ft)(6steps in place +5ft to chair)  Assistive Device: Gait belt;Walker, rolling  Ambulation - Level of Assistance: Contact guard assistance                 Base of Support: Widened     Speed/Ade: Slow      Therapeutic Exercises:   LAQx10  Seated marches x10  Hip abd/add x10  Pain Ratin/10    Activity Tolerance:   Fair      After treatment patient left in no apparent distress:   Sitting in chair, Call bell within reach, and Caregiver / family present    Theodore Imus   Time Calculation: 39 mins

## 2021-05-03 ENCOUNTER — HOSPICE ADMISSION (OUTPATIENT)
Dept: HOSPICE | Facility: HOSPICE | Age: 86
End: 2021-05-03

## 2021-05-03 VITALS
HEART RATE: 68 BPM | DIASTOLIC BLOOD PRESSURE: 68 MMHG | TEMPERATURE: 98.4 F | HEIGHT: 74 IN | SYSTOLIC BLOOD PRESSURE: 118 MMHG | OXYGEN SATURATION: 97 % | RESPIRATION RATE: 18 BRPM | BODY MASS INDEX: 31.04 KG/M2 | WEIGHT: 241.84 LBS

## 2021-05-03 LAB
ALBUMIN SERPL-MCNC: 1.6 G/DL (ref 3.5–5)
ANION GAP SERPL CALC-SCNC: 9 MMOL/L (ref 5–15)
BASOPHILS # BLD: 0 K/UL (ref 0–0.1)
BASOPHILS NFR BLD: 0 % (ref 0–1)
BUN SERPL-MCNC: 79 MG/DL (ref 6–20)
BUN/CREAT SERPL: 27 (ref 12–20)
CA-I BLD-MCNC: 8.3 MG/DL (ref 8.5–10.1)
CHLORIDE SERPL-SCNC: 99 MMOL/L (ref 97–108)
CO2 SERPL-SCNC: 23 MMOL/L (ref 21–32)
CREAT SERPL-MCNC: 2.97 MG/DL (ref 0.7–1.3)
DIFFERENTIAL METHOD BLD: ABNORMAL
EOSINOPHIL # BLD: 0.8 K/UL (ref 0–0.4)
EOSINOPHIL NFR BLD: 5 % (ref 0–7)
ERYTHROCYTE [DISTWIDTH] IN BLOOD BY AUTOMATED COUNT: 18.3 % (ref 11.5–14.5)
GLUCOSE BLD STRIP.AUTO-MCNC: 158 MG/DL (ref 65–100)
GLUCOSE BLD STRIP.AUTO-MCNC: 162 MG/DL (ref 65–100)
GLUCOSE BLD STRIP.AUTO-MCNC: 191 MG/DL (ref 65–100)
GLUCOSE SERPL-MCNC: 147 MG/DL (ref 65–100)
HCT VFR BLD AUTO: 23.7 % (ref 36.6–50.3)
HGB BLD-MCNC: 8.2 G/DL (ref 12.1–17)
IMM GRANULOCYTES # BLD AUTO: 0.3 K/UL (ref 0–0.04)
IMM GRANULOCYTES NFR BLD AUTO: 2 % (ref 0–0.5)
LYMPHOCYTES # BLD: 1.3 K/UL (ref 0.8–3.5)
LYMPHOCYTES NFR BLD: 8 % (ref 12–49)
MCH RBC QN AUTO: 29 PG (ref 26–34)
MCHC RBC AUTO-ENTMCNC: 34.6 G/DL (ref 30–36.5)
MCV RBC AUTO: 83.7 FL (ref 80–99)
MONOCYTES # BLD: 2.1 K/UL (ref 0–1)
MONOCYTES NFR BLD: 13 % (ref 5–13)
NEUTS SEG # BLD: 11.9 K/UL (ref 1.8–8)
NEUTS SEG NFR BLD: 72 % (ref 32–75)
NRBC # BLD: 0 K/UL (ref 0–0.01)
NRBC BLD-RTO: 0 PER 100 WBC
PERFORMED BY, TECHID: ABNORMAL
PHOSPHATE SERPL-MCNC: 5 MG/DL (ref 2.6–4.7)
PLATELET # BLD AUTO: 335 K/UL (ref 150–400)
PMV BLD AUTO: 10.4 FL (ref 8.9–12.9)
POTASSIUM SERPL-SCNC: 3.9 MMOL/L (ref 3.5–5.1)
PSA SERPL-MCNC: 5.9 NG/ML (ref 0.01–4)
RBC # BLD AUTO: 2.83 M/UL (ref 4.1–5.7)
SODIUM SERPL-SCNC: 131 MMOL/L (ref 136–145)
WBC # BLD AUTO: 16.2 K/UL (ref 4.1–11.1)

## 2021-05-03 PROCEDURE — 80069 RENAL FUNCTION PANEL: CPT

## 2021-05-03 PROCEDURE — 74011000250 HC RX REV CODE- 250: Performed by: INTERNAL MEDICINE

## 2021-05-03 PROCEDURE — 86301 IMMUNOASSAY TUMOR CA 19-9: CPT

## 2021-05-03 PROCEDURE — 36415 COLL VENOUS BLD VENIPUNCTURE: CPT

## 2021-05-03 PROCEDURE — 74011250637 HC RX REV CODE- 250/637: Performed by: INTERNAL MEDICINE

## 2021-05-03 PROCEDURE — 74011250636 HC RX REV CODE- 250/636: Performed by: INTERNAL MEDICINE

## 2021-05-03 PROCEDURE — 97530 THERAPEUTIC ACTIVITIES: CPT

## 2021-05-03 PROCEDURE — 74011636637 HC RX REV CODE- 636/637: Performed by: INTERNAL MEDICINE

## 2021-05-03 PROCEDURE — 85025 COMPLETE CBC W/AUTO DIFF WBC: CPT

## 2021-05-03 PROCEDURE — 82962 GLUCOSE BLOOD TEST: CPT

## 2021-05-03 PROCEDURE — 74011000258 HC RX REV CODE- 258: Performed by: INTERNAL MEDICINE

## 2021-05-03 RX ORDER — SODIUM CHLORIDE 9 MG/ML
100 INJECTION, SOLUTION INTRAVENOUS CONTINUOUS
Status: DISCONTINUED | OUTPATIENT
Start: 2021-05-03 | End: 2021-05-04 | Stop reason: HOSPADM

## 2021-05-03 RX ORDER — ACETAMINOPHEN 325 MG/1
650 TABLET ORAL
Qty: 30 TAB | Refills: 0 | Status: SHIPPED
Start: 2021-05-03

## 2021-05-03 RX ADMIN — ENOXAPARIN SODIUM 30 MG: 30 INJECTION SUBCUTANEOUS at 11:54

## 2021-05-03 RX ADMIN — PIPERACILLIN AND TAZOBACTAM 3.38 G: 3; .375 INJECTION, POWDER, LYOPHILIZED, FOR SOLUTION INTRAVENOUS at 02:57

## 2021-05-03 RX ADMIN — PIPERACILLIN AND TAZOBACTAM 3.38 G: 3; .375 INJECTION, POWDER, LYOPHILIZED, FOR SOLUTION INTRAVENOUS at 11:53

## 2021-05-03 RX ADMIN — TRAMADOL HYDROCHLORIDE 50 MG: 50 TABLET, FILM COATED ORAL at 11:53

## 2021-05-03 RX ADMIN — INSULIN LISPRO 2 UNITS: 100 INJECTION, SOLUTION INTRAVENOUS; SUBCUTANEOUS at 11:54

## 2021-05-03 RX ADMIN — SODIUM BICARBONATE: 84 INJECTION, SOLUTION INTRAVENOUS at 00:47

## 2021-05-03 RX ADMIN — SODIUM CHLORIDE 100 ML/HR: 9 INJECTION, SOLUTION INTRAVENOUS at 11:53

## 2021-05-03 RX ADMIN — LATANOPROST 1 DROP: 50 SOLUTION OPHTHALMIC at 09:00

## 2021-05-03 NOTE — PROGRESS NOTES
Hematology/Oncology   Progress Note    Patient: Magali Do MRN: [id number]     YOB: 1932  Age: 80 y.o. Sex: male      Admit Date: 4/28/2021    LOS: 5 days     Chief Complaint: Admitted with a fall    Subjective:     Feels well. Sitting out in a chair. Constitutional No fevers, chills, night sweats, excessive fatigue or weight loss. Allergic/Immunologic No recent allergic reactions   Eyes No significant visual difficulties. No diplopia. ENMT No problems with hearing, no sore throat, no sinus drainage. Endocrine No hot flashes or night sweats. No cold intolerance, polyuria, or polydipsia   Hematologic/Lymphatic No easy bruising or bleeding. The patient denies any tender or palpable lymph nodes   Breasts No abnormal masses of breast, nipple discharge or pain. Respiratory No dyspnea on exertion, orthopnea, chest pain, cough or hemoptysis. Cardiovascular No anginal chest pain, irregular heart beat, tachycardia, palpitations or orthopnea. Gastrointestinal No nausea, vomiting, diarrhea, constipation, cramping, dysphagia, reflux, heartburn, GI bleeding, or early satiety. No change in bowel habits. Genitourinary (M) No hematuria, dysuria, increased frequency, urgency, hesitancy or incontinence. Musculoskeletal No joint pain, swelling or redness. No decreased range of motion. Integumentary No chronic rashes, inflammation, ulcerations, pruritus, petechiae, purpura, ecchymoses, or skin changes. Neurologic No headache, blurred vision, and no areas of focal weakness or numbness. Normal gait. No sensory problems. Psychiatric No insomnia, depression, bryce or mood swings. No psychotropic drugs.         Current Facility-Administered Medications:     0.9% sodium chloride infusion, 100 mL/hr, IntraVENous, CONTINUOUS, Dinh Langley, Gerrianne Kayser, MD, Last Rate: 100 mL/hr at 05/03/21 1153, 100 mL/hr at 05/03/21 1153    traMADoL (ULTRAM) tablet 50 mg, 50 mg, Oral, Q6H PRN, Jennifer Marsh MD, 50 mg at 05/03/21 1153    latanoprost (XALATAN) 0.005 % ophthalmic solution 1 Drop, 1 Drop, Both Eyes, DAILY, FreParviz vu MD, 1 Drop at 05/03/21 0900    pantothenic ac-min oil-pet,hyd (AQUAPHOR) 41 % ointment, , Topical, PRN, Ania Birmingham, Parviz Barajas MD    sodium chloride (NS) flush 5-40 mL, 5-40 mL, IntraVENous, PRN, Pebbles Patel MD, 20 mL at 05/01/21 0326    acetaminophen (TYLENOL) tablet 650 mg, 650 mg, Oral, Q6H PRN, 650 mg at 05/02/21 2119 **OR** acetaminophen (TYLENOL) suppository 650 mg, 650 mg, Rectal, Q6H PRN, Parviz Arredondo MD    polyethylene glycol (MIRALAX) packet 17 g, 17 g, Oral, DAILY PRN, Parviz Arredondo MD    promethazine (PHENERGAN) tablet 12.5 mg, 12.5 mg, Oral, Q6H PRN **OR** ondansetron (ZOFRAN) injection 4 mg, 4 mg, IntraVENous, Q6H PRN, Parviz Arredondo MD    enoxaparin (LOVENOX) injection 30 mg, 30 mg, SubCUTAneous, DAILY, Ania Birmingham, Parviz Barajas MD, 30 mg at 05/03/21 1154    glucose chewable tablet 16 g, 4 Tab, Oral, PRN, Parviz Arredondo MD    dextrose (D50W) injection syrg 12.5-25 g, 25-50 mL, IntraVENous, PRN, Parviz Arredondo MD    glucagon [facility]) injection 1 mg, 1 mg, IntraMUSCular, PRN, Pebbles Patel MD    insulin lispro (HUMALOG) injection, , SubCUTAneous, AC&HS, Pebbles Patel MD, 2 Units at 05/03/21 1154    piperacillin-tazobactam (ZOSYN) 3.375 g in 0.9% sodium chloride (MBP/ADV) 100 mL MBP, 3.375 g, IntraVENous, Q8H, Parviz Lezama MD, Last Rate: 25 mL/hr at 05/03/21 1153, 3.375 g at 05/03/21 1153     Objective:     Vitals:    05/02/21 1648 05/02/21 2011 05/03/21 0759 05/03/21 1513   BP: 112/65 124/65 (!) 104/50 (!) 91/38   Pulse: 86 94 (!) 106 60   Resp: 18 18     Temp: 98.5 °F (36.9 °C) 97.5 °F (36.4 °C) 98.5 °F (36.9 °C) 98.2 °F (36.8 °C)   SpO2: 96% 94% 100% 95%   Weight:       Height:              Physical Exam:   Constitutional Alert, cooperative, oriented. Mood and affect appropriate. Appears close to chronological age.  Well nourished. Well developed. Head Normocephalic; no scars   Eyes Conjunctivae and sclerae are clear and without icterus. Pupils are reactive and equal.   ENMT Sinuses are nontender. No oral exudates, ulcers, masses, thrush or mucositis. Oropharynx clear. Tongue normal.   Neck Supple without masses or thyromegaly. No jugular venous distension. Hematologic/Lymphatic No petechiae or purpura. No tender or palpable lymph nodes in the cervical, supraclavicular, axillary or inguinal area. Respiratory Lungs are clear to auscultation without rhonchi or wheezing. Cardiovascular Regular rate and rhythm of heart without murmurs, gallops or rubs. Chest / Line Site Chest is symmetric with no chest wall deformities. Abdomen Non-tender, non-distended, no masses, ascites or hepatosplenomegaly. Good bowel sounds. No guarding or rebound tenderness. No pulsatile masses. Musculoskeletal No tenderness or swelling, normal range of motion without obvious weakness. Extremities No visible deformities, no cyanosis, clubbing or edema. Skin No rashes, scars, or lesions suggestive of malignancy. No petechiae, purpura, or ecchymoses. No excoriations. Neurologic No sensory or motor deficits, normal cerebellar function, normal gait, cranial nerves intact. Psychiatric Alert and oriented times three. Coherent speech. Verbalizes understanding of our discussions today. Lab/Data Review:  Recent Labs     05/03/21  0530 05/01/21  0655   WBC 16.2* 14.5*   HGB 8.2* 8.7*   HCT 23.7* 25.6*    304     Recent Labs     05/03/21  0530 05/02/21  0604 05/01/21  0655   * 132* 133*   K 3.9 4.0 4.0   CL 99 101 101   CO2 23 22 22   * 150* 234*   BUN 79* 69* 63*   CREA 2.97* 2.78* 2.52*   CA 8.3* 8.5 8.5   PHOS 5.0* 4.9*  --    ALB 1.6* 1.7* 1.8*   TBILI  --   --  7.9*   ALT  --   --  72     No results for input(s): PH, PCO2, PO2, HCO3, FIO2 in the last 72 hours.   Recent Results (from the past 24 hour(s))   GLUCOSE, POC Collection Time: 05/02/21  8:10 PM   Result Value Ref Range    Glucose (POC) 212 (H) 65 - 100 mg/dL    Performed by Dariusz Angeles    RENAL FUNCTION PANEL    Collection Time: 05/03/21  5:30 AM   Result Value Ref Range    Sodium 131 (L) 136 - 145 mmol/L    Potassium 3.9 3.5 - 5.1 mmol/L    Chloride 99 97 - 108 mmol/L    CO2 23 21 - 32 mmol/L    Anion gap 9 5 - 15 mmol/L    Glucose 147 (H) 65 - 100 mg/dL    BUN 79 (H) 6 - 20 mg/dL    Creatinine 2.97 (H) 0.70 - 1.30 mg/dL    BUN/Creatinine ratio 27 (H) 12 - 20      GFR est AA 24 (L) >60 ml/min/1.73m2    GFR est non-AA 20 (L) >60 ml/min/1.73m2    Calcium 8.3 (L) 8.5 - 10.1 mg/dL    Phosphorus 5.0 (H) 2.6 - 4.7 mg/dL    Albumin 1.6 (L) 3.5 - 5.0 g/dL   CBC WITH AUTOMATED DIFF    Collection Time: 05/03/21  5:30 AM   Result Value Ref Range    WBC 16.2 (H) 4.1 - 11.1 K/uL    RBC 2.83 (L) 4.10 - 5.70 M/uL    HGB 8.2 (L) 12.1 - 17.0 g/dL    HCT 23.7 (L) 36.6 - 50.3 %    MCV 83.7 80.0 - 99.0 FL    MCH 29.0 26.0 - 34.0 PG    MCHC 34.6 30.0 - 36.5 g/dL    RDW 18.3 (H) 11.5 - 14.5 %    PLATELET 406 625 - 989 K/uL    MPV 10.4 8.9 - 12.9 FL    NRBC 0.0 0  WBC    ABSOLUTE NRBC 0.00 0.00 - 0.01 K/uL    NEUTROPHILS 72 32 - 75 %    LYMPHOCYTES 8 (L) 12 - 49 %    MONOCYTES 13 5 - 13 %    EOSINOPHILS 5 0 - 7 %    BASOPHILS 0 0 - 1 %    IMMATURE GRANULOCYTES 2 (H) 0.0 - 0.5 %    ABS. NEUTROPHILS 11.9 (H) 1.8 - 8.0 K/UL    ABS. LYMPHOCYTES 1.3 0.8 - 3.5 K/UL    ABS. MONOCYTES 2.1 (H) 0.0 - 1.0 K/UL    ABS. EOSINOPHILS 0.8 (H) 0.0 - 0.4 K/UL    ABS. BASOPHILS 0.0 0.0 - 0.1 K/UL    ABS. IMM.  GRANS. 0.3 (H) 0.00 - 0.04 K/UL    DF AUTOMATED     GLUCOSE, POC    Collection Time: 05/03/21  9:28 AM   Result Value Ref Range    Glucose (POC) 158 (H) 65 - 100 mg/dL    Performed by Joshua Alves (PCT)    GLUCOSE, POC    Collection Time: 05/03/21 11:16 AM   Result Value Ref Range    Glucose (POC) 162 (H) 65 - 100 mg/dL    Performed by Joshua Alves (PCT)    GLUCOSE, POC Collection Time: 05/03/21  3:18 PM   Result Value Ref Range    Glucose (POC) 191 (H) 65 - 100 mg/dL    Performed by [facility] (PCT)         Radiology:   CT Results  (Last 48 hours)    None           Assessment and Plan: Active Problems:    Weakness (4/28/2021)      CRISTIAN (acute kidney injury) ([address] (4/28/2021)    Liver Mass:  - CT reports a heterogenous mass measuring 12.7 cm x 12 cm involving the left and right hepatic lobes. - Also noted is a lytic lesion in the left ilium.  - Given abnormal LFTs likely represents primary [facility] however metastatic disease to liver is not ruled out. - The patient's family reportedly declined biopsy. - Per Dr. Becky Caballero note, the family decided on hospice care. - However, due to lack of 24 hr care, he is being discharged to SNF and then transistion to hospice care.         Signed By: Dk Morgan MD     May 3, 2021

## 2021-05-03 NOTE — DISCHARGE SUMMARY
Physician Discharge Summary     Patient ID:    Ysabel Zheng  [id number]  23 y.o.  1/12/1932    Admit date: 4/28/2021    Discharge date : 5/3/2021    Chronic Diagnoses:    Problem List as of 5/3/2021 Never Reviewed          Codes Class Noted - Resolved    Weakness ICD-10-CM: R53.1  ICD-9-CM: 780.79  4/28/2021 - Present        CRISTIAN (acute kidney injury) [facility]) ICD-10-CM: N17.9  ICD-9-CM: 584.9  4/28/2021 - Present          22    Final Diagnoses:   Weakness [R53.1]  CRISTIAN (acute kidney injury) ([facility] [N17.9]  Generalized weakness with fall and prolonged immobilization     Dehydration     Acute kidney injury. Initially improving, now with worsening creatinine     Acute myoglobinuria.        Cholestatic jaundice with elevated alkaline phosphatase/bilirubin. Asymptomatic              -Likely advanced malignancy per noncontrast CT      Cholelithiasis     Diabetes mellitus type 2     Hypertension     Peripheral vascular disease     Chronic anemia     Systemic inflammatory response syndrome with leukocytosis and elevated procalcitonin, no obvious source although potentially GI     Chronic leg wounds, stable       Reason for Hospitalization:  Ysabel Zheng is a 80 y.o. male who presents with generalized weakness and confusion. He was found at home on the bathroom floor this morning after he had fallen down and braced himself against the wall. Patient is normally quite independent and drives to the [facility] for breakfast each morning denies any focal or unilateral weakness. No fever, abdominal pain, cough, shortness of breath or urinary symptoms. When I first walked in the room he told me he fell a couple days ago but it sounds like it was more likely that he fell last night. His son was with him last night at 6 PM and stated he was at baseline.     His labs show a creatinine of 2.4, BUN 55. No previous labs for comparison. AST is 287 with an alkaline phosphatase of 679. A CPK was not ordered.   His urinalysis shows large blood but microscopic exam shows no significant red cells,  suggesting myoglobinuria    His liver chemistry showed a bilirubin of around 7 and an alkaline phosphatase of greater than 600 consistent with cholestatic jaundice. Patient has denied any abdominal pain     Patient's daughter does tell me that he has had an elevated creatinine in the past but does not see a nephrologist.  He follows with the cancer center for chronic anemia     Patient lives alone, has a woman that checks on him daily      Hospital Course:   Patient was admitted to medical floor. He was started on Zosyn empirically    He was given IV fluids    His renal function initially improved but then started worsening    Limited abdominal ultrasound was obtained which showed diffuse heterogeneous masses consistent with malignancy. Noncontrast abdominal and pelvic CT was obtained which confirmed the above findings. Cholelithiasis was also noted    Suspicion was for likely primary hepatocellular carcinoma    Given patient's age and comorbidities, he was not felt to be a candidate for palliative chemotherapy. Discussion was held with family and patient and they did not wish to pursue diagnosis    Family is amenable to hospice but at this time they do not have a 24-hour caregiver in patient's house to do this    For the time being patient will be going to a skilled nursing facility to see if he can regain any strength and return home, with plans for hospice following that    At this time focus is primarily on palliative care    CODE STATUS is DNR          Discharge Medications:   Current Discharge Medication List      START taking these medications    Details   acetaminophen (TYLENOL) 325 mg tablet Take 2 Tabs by mouth every six (6) hours as needed for Pain.   Qty: 30 Tab, Refills: 0         CONTINUE these medications which have NOT CHANGED    Details   cyanocobalamin (VITAMIN B12) 1,000 mcg/mL injection 1,000 mcg by IntraMUSCular route Once every 2 weeks. ferrous sulfate 325 mg (65 mg iron) tablet Take 325 mg by mouth Daily (before breakfast). latanoprost (XALATAN) 0.005 % ophthalmic solution Administer 1 Drop to both eyes daily. pantoprazole (PROTONIX) 40 mg tablet Take 40 mg by mouth daily. STOP taking these medications       furosemide (LASIX) 40 mg tablet Comments:   Reason for Stopping:         amLODIPine (NORVASC) 5 mg tablet Comments:   Reason for Stopping:         simvastatin (ZOCOR) 10 mg tablet Comments:   Reason for Stopping: Follow up Care:    1. Lurdes Gomez MD in 1-2 weeks. Please call to set up an appointment shortly after discharge. Diet:  Regular Diet    Disposition:  Home and SNF. Advanced Directive:   FULL    DNR      Discharge Exam:  General:  Alert, cooperative, no distress, appears stated age. Lungs:   Clear to auscultation bilaterally. Chest wall:  No tenderness or deformity. Heart:  Regular rate and rhythm, S1, S2 normal, no murmur, click, rub or gallop. Abdomen:   Soft, non-tender. Bowel sounds normal. No masses,  No organomegaly. Extremities: Extremities normal, atraumatic, no cyanosis or edema. Pulses: 2+ and symmetric all extremities. Skin: Skin color, texture, turgor normal. No rashes or lesions   Neurologic: CNII-XII intact. No gross sensory or motor deficits        CONSULTATIONS: Hematology/Oncology    Significant Diagnostic Studies:   4/28/2021: BUN 51 mg/dL* (Ref range: 6 - 20 mg/dL); Calcium 9.4 mg/dL (Ref range: 8.5 - 10.1 mg/dL); CO2 22 mmol/L (Ref range: 21 - 32 mmol/L); Creatinine 2.40 mg/dL* (Ref range: 0.70 - 1.30 mg/dL); Glucose 321 mg/dL* (Ref range: 65 - 100 mg/dL); HCT 27.5 %* (Ref range: 36.6 - 50.3 %); HGB 9.5 g/dL* (Ref range: 12.1 - 17.0 g/dL); Potassium 4.4 mmol/L (Ref range: 3.5 - 5.1 mmol/L); Sodium 130 mmol/L* (Ref range: 136 - 145 mmol/L)  4/29/2021: BUN 50 mg/dL* (Ref range: 6 - 20 mg/dL);  Calcium 9.0 mg/dL (Ref range: 8.5 - 10.1 mg/dL); CO2 22 mmol/L (Ref range: 21 - 32 mmol/L); Creatinine 2.10 mg/dL* (Ref range: 0.70 - 1.30 mg/dL); Glucose 202 mg/dL* (Ref range: 65 - 100 mg/dL); HCT 25.8 %* (Ref range: 36.6 - 50.3 %); HGB 8.9 g/dL* (Ref range: 12.1 - 17.0 g/dL); Potassium 4.1 mmol/L (Ref range: 3.5 - 5.1 mmol/L); Sodium 133 mmol/L* (Ref range: 136 - 145 mmol/L)  Recent Labs     05/03/21 0530 05/01/21  0655   WBC 16.2* 14.5*   HGB 8.2* 8.7*   HCT 23.7* 25.6*    304     Recent Labs     05/03/21 0530 05/02/21 0604 05/01/21  0655   * 132* 133*   K 3.9 4.0 4.0   CL 99 101 101   CO2 23 22 22   BUN 79* 69* 63*   CREA 2.97* 2.78* 2.52*   * 150* 234*   CA 8.3* 8.5 8.5   PHOS 5.0* 4.9*  --      Recent Labs     05/03/21 0530 05/02/21 0604 05/01/21  0655   ALT  --   --  72   AP  --   --  430*   TBILI  --   --  7.9*   TP  --   --  6.7   ALB 1.6* 1.7* 1.8*   GLOB  --   --  4.9*     No results for input(s): INR, PTP, APTT, INREXT in the last 72 hours. No results for input(s): FE, TIBC, PSAT, FERR in the last 72 hours. No results for input(s): PH, PCO2, PO2 in the last 72 hours. No results for input(s): CPK, CKMB in the last 72 hours.     No lab exists for component: TROPONINI  Lab Results   Component Value Date/Time    Glucose (POC) 191 (H) 05/03/2021 03:18 PM    Glucose (POC) 162 (H) 05/03/2021 11:16 AM    Glucose (POC) 158 (H) 05/03/2021 09:28 AM    Glucose (POC) 212 (H) 05/02/2021 08:10 PM    Glucose (POC) 230 (H) 05/02/2021 04:08 PM       Discharge time spent 35 minutes    Signed:  Francoise Gilliam MD  5/3/2021  4:00 PM

## 2021-05-03 NOTE — PROGRESS NOTES
PHYSICAL THERAPY TREATMENT  Patient: Yun Hampton (27 y.o. male)  Date: 5/3/2021  Diagnosis: Weakness [R53.1]  CRISTIAN (acute [facility]) [facility] [N17.9] <principal problem not specified>       Precautions:    Chart, physical therapy assessment, plan of care and goals were reviewed. ASSESSMENT  Patient continues with skilled PT services and is progressing towards goals. Pt semi supine in bed upon arrival and agreeable to session. Required increased assistance for bed mobility this session, min A sup>sit. Pt sat EOB ~10 min post sup>sit transfer and performed therex, see details below. Pt continually stating he would be ready to  a few minutes and required max encouragement to perform. STS required min A and RW for balance upon standing. Able to side step to chair with RW and CGA, no LOB noted. Pt set up with breakfast while in recliner. Educated to perform therex throughout the day, pt verbalized understanding. Recommending d/c to IRF once medically appropriate. .     Current Level of Function Impacting Discharge (mobility/balance): assistance required for all mobility     Other factors to consider for discharge: PLOF, severity of deficits, time since onset, family assist.         PLAN :  Patient continues to benefit from skilled intervention to address the above impairments. Continue treatment per established plan of care. to address goals. Recommendation for discharge: (in order for the patient to meet his/her long term goals)  Therapy 3 hours per day 5-7 days per week    This discharge recommendation:  Has been made in collaboration with the attending provider and/or case management    IF patient discharges home will need the following DME: to be determined (TBD)       SUBJECTIVE:   Patient stated im not feeling worth a hoot.     OBJECTIVE DATA SUMMARY:   Critical Behavior:  Neurologic State: Alert  Orientation Level: Oriented to person, Oriented to situation, Oriented to place  Cognition: Appropriate decision making, Appropriate for age attention/concentration  Safety/Judgement: Fall prevention, Awareness of environment    Functional Mobility Training:  Bed Mobility:  Supine to Sit: Minimum assistance  Scooting: Stand-by assistance    Transfers:  Sit to Stand: Minimum assistance  Stand to Sit: Minimum assistance  Bed to Chair: Contact guard assistance    Balance:  Sitting: Intact  Sitting - Static: Good (unsupported)  Sitting - Dynamic: Good (unsupported)  Standing: Intact; With support  Standing - Static: Constant support;Good  Standing - Dynamic : Constant support;Good    Ambulation/Gait Training:  Distance (ft): 3 Feet (ft)  Assistive Device: Gait belt;Walker, rolling  Ambulation - Level of Assistance: Contact guard assistance  Base of Support: Widened  Speed/Ade: Slow      Therapeutic Exercises:   1 x 10 AP  1 x 10 LAQ    Pain Rating:  Reporting his whole body is hurting    Activity Tolerance:   Fair and requires rest breaks  Please refer to the flowsheet for vital signs taken during this treatment. After treatment patient left in no apparent distress:   Sitting in chair and Call bell within reach    COMMUNICATION/COLLABORATION:   The patients plan of care was discussed with: Registered nurse. Problem: Mobility Impaired (Adult and Pediatric)  Goal: *Acute Goals and Plan of Care (Insert Text)  Description: Pt will be I with LE HEP in 7 days. Pt will perform bed mobility with mod I in 7 days. Pt will perform transfers with mod I in 7 days. Pt will amb  feet with LRAD safely with mod I in 7 days.    Outcome: Progressing Towards Goal       Norm Leys   Time Calculation: 30 mins

## 2021-05-03 NOTE — PROGRESS NOTES
[phone] CM gave her the patients options, patient does not have a LTC payor source. Patient will need 24/7 care at home to go home per family. CM described all dispositions in detail to Ms. Trina Salmeron. Current transition plan will be SNF at this time. Choice obtained, referrals sent, pending acceptance. CM discussed this with attending.

## 2021-05-03 NOTE — PROGRESS NOTES
Hospitalist Progress Note               Daily Progress Note: 5/3/2021      Subjective: The patient is seen for follow up. He voices no complaints today. Denies any pain. Renal function continues to worsen with a creatinine of 2.9. Patient was evaluated by oncology yesterday. Tumor markers were ordered    I spoke with his daughter-in-law Angela Rossi today and she indicates family is agreeable to proceed with hospice.   The only problem is they do not have 24-hour caregivers in place to do hospice at home    Problem List:  Problem List as of 5/3/2021 Never Reviewed          Codes Class Noted - Resolved    Weakness ICD-10-CM: R53.1  ICD-9-CM: 780.79  4/28/2021 - Present        CRISTIAN (acute kidney injury) [facility]) ICD-10-CM: N17.9  ICD-9-CM: 584.9  4/28/2021 - Present              Medications reviewed  Current Facility-Administered Medications   Medication Dose Route Frequency    traMADoL (ULTRAM) tablet 50 mg  50 mg Oral Q6H PRN    latanoprost (XALATAN) 0.005 % ophthalmic solution 1 Drop  1 Drop Both Eyes DAILY    pantothenic ac-min oil-pet,hyd (AQUAPHOR) 41 % ointment   Topical PRN    sodium chloride (NS) flush 5-40 mL  5-40 mL IntraVENous PRN    acetaminophen (TYLENOL) tablet 650 mg  650 mg Oral Q6H PRN    Or    acetaminophen (TYLENOL) suppository 650 mg  650 mg Rectal Q6H PRN    polyethylene glycol (MIRALAX) packet 17 g  17 g Oral DAILY PRN    promethazine (PHENERGAN) tablet 12.5 mg  12.5 mg Oral Q6H PRN    Or    ondansetron (ZOFRAN) injection 4 mg  4 mg IntraVENous Q6H PRN    enoxaparin (LOVENOX) injection 30 mg  30 mg SubCUTAneous DAILY    glucose chewable tablet 16 g  4 Tab Oral PRN    dextrose (D50W) injection syrg 12.5-25 g  25-50 mL IntraVENous PRN    glucagon (GLUCAGEN) injection 1 mg  1 mg IntraMUSCular PRN    insulin lispro (HUMALOG) injection   SubCUTAneous AC&HS    piperacillin-tazobactam (ZOSYN) 3.375 g in 0.9% sodium chloride (MBP/ADV) 100 mL MBP  3.375 g IntraVENous Q8H    0.45% sodium chloride 1,000 mL with sodium bicarbonate (8.4%) 50 mEq infusion   IntraVENous CONTINUOUS       Review of Systems:   A comprehensive review of systems was negative except for that written in the HPI. Objective:   Physical Exam:     Visit Vitals  BP (!) 104/50 (BP 1 Location: Right upper arm, BP Patient Position: At rest)   Pulse (!) 106   Temp 98.5 °F (36.9 °C)   Resp 18   Ht 6' 2\" (1.88 m)   Wt 109.7 kg (241 lb 13.5 oz)   SpO2 100%   BMI 31.05 kg/m²      O2 Device: None (Room air)    Temp (24hrs), Av.2 °F (36.8 °C), Min:97.5 °F (36.4 °C), Max:98.5 °F (36.9 °C)    No intake/output data recorded.  1901 -  0700  In: 467 [P.O.:120; I.V.:347]  Out: 875 [Urine:875]    General:   Awake and alert. He is jaundiced and was jaundiced on admission   Lungs:   Clear to auscultation bilaterally. Chest wall:  No tenderness or deformity. Heart:  Regular rate and rhythm, S1, S2 normal, no murmur, click, rub or gallop. Abdomen:   Soft, non-tender. Bowel sounds normal. No masses,  No organomegaly. Extremities: Extremities normal, atraumatic, no cyanosis. In his legs show some hyperpigmented changes suggesting chronic venous insufficiency but no open wounds or erythema         Pulses: 2+ and symmetric all extremities. Skin: Skin color, texture, turgor normal. No rashes or lesions   Neurologic: CNII-XII intact. No gross focal deficits         Data Review:       Recent Days:  Recent Labs     21  0530 21  0655   WBC 16.2* 14.5*   HGB 8.2* 8.7*   HCT 23.7* 25.6*    304     Recent Labs     21  0530 21  0604 21  0655   * 132* 133*   K 3.9 4.0 4.0   CL 99 101 101   CO2 23 22 22   * 150* 234*   BUN 79* 69* 63*   CREA 2.97* 2.78* 2.52*   CA 8.3* 8.5 8.5   PHOS 5.0* 4.9*  --    ALB 1.6* 1.7* 1.8*   TBILI  --   --  7.9*   ALT  --   --  72     No results for input(s): PH, PCO2, PO2, HCO3, FIO2 in the last 72 hours.     24 Hour Results:  Recent Results (from the past 24 hour(s))   GLUCOSE, POC    Collection Time: 05/02/21 12:34 PM   Result Value Ref Range    Glucose (POC) 165 (H) 65 - 100 mg/dL    Performed by Brianna Bennett [facility])    GLUCOSE, POC    Collection Time: 05/02/21  4:08 PM   Result Value Ref Range    Glucose (POC) 230 (H) 65 - 100 mg/dL    Performed by Jarek Petty 61, POC    Collection Time: 05/02/21  8:10 PM   Result Value Ref Range    Glucose (POC) 212 (H) 65 - 100 mg/dL    Performed by Dariusz Angeles    RENAL FUNCTION PANEL    Collection Time: 05/03/21  5:30 AM   Result Value Ref Range    Sodium 131 (L) 136 - 145 mmol/L    Potassium 3.9 3.5 - 5.1 mmol/L    Chloride 99 97 - 108 mmol/L    CO2 23 21 - 32 mmol/L    Anion gap 9 5 - 15 mmol/L    Glucose 147 (H) 65 - 100 mg/dL    BUN 79 (H) 6 - 20 mg/dL    Creatinine 2.97 (H) 0.70 - 1.30 mg/dL    BUN/Creatinine ratio 27 (H) 12 - 20      GFR est AA 24 (L) >60 ml/min/1.73m2    GFR est non-AA 20 (L) >60 ml/min/1.73m2    Calcium 8.3 (L) 8.5 - 10.1 mg/dL    Phosphorus 5.0 (H) 2.6 - 4.7 mg/dL    Albumin 1.6 (L) 3.5 - 5.0 g/dL   CBC WITH AUTOMATED DIFF    Collection Time: 05/03/21  5:30 AM   Result Value Ref Range    WBC 16.2 (H) 4.1 - 11.1 K/uL    RBC 2.83 (L) 4.10 - 5.70 M/uL    HGB 8.2 (L) 12.1 - 17.0 g/dL    HCT 23.7 (L) 36.6 - 50.3 %    MCV 83.7 80.0 - 99.0 FL    MCH 29.0 26.0 - 34.0 PG    MCHC 34.6 30.0 - 36.5 g/dL    RDW 18.3 (H) 11.5 - 14.5 %    PLATELET 974 227 - 679 K/uL    MPV 10.4 8.9 - 12.9 FL    NRBC 0.0 0  WBC    ABSOLUTE NRBC 0.00 0.00 - 0.01 K/uL    NEUTROPHILS 72 32 - 75 %    LYMPHOCYTES 8 (L) 12 - 49 %    MONOCYTES 13 5 - 13 %    EOSINOPHILS 5 0 - 7 %    BASOPHILS 0 0 - 1 %    IMMATURE GRANULOCYTES 2 (H) 0.0 - 0.5 %    ABS. NEUTROPHILS 11.9 (H) 1.8 - 8.0 K/UL    ABS. LYMPHOCYTES 1.3 0.8 - 3.5 K/UL    ABS. MONOCYTES 2.1 (H) 0.0 - 1.0 K/UL    ABS. EOSINOPHILS 0.8 (H) 0.0 - 0.4 K/UL    ABS. BASOPHILS 0.0 0.0 - 0.1 K/UL    ABS. IMM.  GRANS. 0.3 (H) 0.00 - 0.04 K/UL DF AUTOMATED     GLUCOSE, POC    Collection Time: 05/03/21  9:28 AM   Result Value Ref Range    Glucose (POC) 158 (H) 65 - 100 mg/dL    Performed by Casey Briscoe (PCT)        CT ABD PELV WO CONT   Final Result   1. Irregular heterogeneous low density mass relative to adjacent hepatic   parenchyma involving the medial segment left hepatic lobe and right hepatic lobe   concerning for neoplasm. Because contrast could not be utilized, an MRI may have   better soft tissue differentiation. . Alternatively, biopsy may be warranted. 2. Calcifications in the gallbladder fundus. 3. Other findings as described. US LIVER   Final Result   Diffusely heterogeneous hepatic parenchyma which could indicate   underlying masses; CT or MRI with and without contrast per hepatic protocol is   recommended to exclude underlying neoplasm. 2.5 cm complex structure within the   gallbladder could represent a ball of sludge or stone and could also be further   characterized via CT or MRI. US RETROPERITONEUM COMP   Final Result   1. Focally dilated bilateral kidney upper pole calyces. No hydronephrosis. 2. Bladder debris. CT HEAD WO CONT   Final Result   1. No acute intracranial findings. 2. Atrophy and small vessel ischemic disease. 3. Facial sinus disease. XR CHEST SNGL V   Final Result      Underexpanded lungs, otherwise negative. Assessment:    Generalized weakness with fall and prolonged immobilization     Dehydration     Acute kidney injury. Initially improving, now with worsening creatinine     Acute myoglobinuria. Cholestatic jaundice with elevated alkaline phosphatase/bilirubin.   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 per noncontrast CT     Cholelithiasis    Diabetes mellitus type 2     Hypertension     Peripheral vascular disease     Chronic anemia     Systemic inflammatory response syndrome with leukocytosis and elevated procalcitonin, no obvious source although potentially GI    Chronic leg wounds, stable         Plan:  Continue IV Zosyn until patient is discharged  Continue IV fluids, stop bicarbonate    Case management to speak with family regarding hospice options/disposition    Patient can be discharged once arrangements are made      Care Plan discussed with: Patient/Family    Total time spent with patient: 30 minutes.     Christopher Lowery MD

## 2021-05-03 NOTE — PROGRESS NOTES
Bedside shift change report given to DAWIT Arzola RN (oncoming nurse) by ISAAC Reyes RN (offgoing nurse). Report included the following information SBAR, Kardex, Intake/Output, MAR and Recent Results.

## 2021-05-03 NOTE — HOSPICE
[facility]  [phone]     Patient Name: Magali Do  YOB: 1932  Age: 80 y.o. [facility] RN Note:  Hospice consult received, reviewing chart. Will follow up with Unit Nurse and Care Manager to discuss plan of care, patient status and discharge disposition. Please advise if I can assist with hospice care at SNF. Thank you for the opportunity to be of service to Mr. Rosa and his family.     Amy Ballard RN  Hospice Clinical Liaison  [phone]

## 2021-05-03 NOTE — PROGRESS NOTES
CM spoke with daughter in law Sada Evans [phone]. The family has decided on [facility]. Patient has been accepted and can admit to their facility into room 240. Nurse to call report [phone]. Discharge plan of care/case management plan validated with provider discharge order. CM and primary nurse have completed discharge checklist.   Transportation set up for 1930.

## 2021-05-03 NOTE — PROGRESS NOTES
Report given to receiving nurse Vern Merchant RN at facility, reached at [phone]. Report included SBAR and recent results.

## 2021-05-04 LAB
AFP-TM SERPL-MCNC: <0.9 NG/ML (ref 0–8.3)
CANCER AG19-9 SERPL-ACNC: 102 U/ML (ref 0–35)
CANCER AG19-9 SERPL-ACNC: 114 U/ML (ref 0–35)

## 2021-05-04 NOTE — PROGRESS NOTES
Renal    Transition to hospice care noted. He looked pretty well. Seems comfortable. Creatinine fairly stable. We'll sign off, available if situation changes.     MD Ismael

## 2021-05-04 NOTE — PROGRESS NOTES
[facility] here and transporting patient to [facility] and Rehab. Patient alert and denies pain,no distress noted. Patient's belongings sent with him. All IV removed by pervious nurse.

## 2021-05-05 LAB
BACTERIA SPEC CULT: NORMAL
SPECIAL REQUESTS,SREQ: NORMAL

## 2021-06-15 ENCOUNTER — HOSPITAL ENCOUNTER (INPATIENT)
Age: 86
LOS: 12 days | Discharge: NURSING, REHAB OR LONG-TERM CARE | DRG: 981 | End: 2021-06-27
Attending: EMERGENCY MEDICINE | Admitting: FAMILY MEDICINE
Payer: MEDICARE

## 2021-06-15 ENCOUNTER — APPOINTMENT (OUTPATIENT)
Dept: GENERAL RADIOLOGY | Age: 86
DRG: 981 | End: 2021-06-15
Attending: EMERGENCY MEDICINE
Payer: MEDICARE

## 2021-06-15 DIAGNOSIS — L98.429 SKIN ULCER OF SACRUM, UNSPECIFIED ULCER STAGE (HCC): ICD-10-CM

## 2021-06-15 DIAGNOSIS — R53.1 WEAKNESS: ICD-10-CM

## 2021-06-15 DIAGNOSIS — A49.02 MRSA INFECTION: ICD-10-CM

## 2021-06-15 DIAGNOSIS — N17.9 AKI (ACUTE KIDNEY INJURY) (HCC): ICD-10-CM

## 2021-06-15 DIAGNOSIS — L97.919 ULCERS OF BOTH LOWER LEGS (HCC): Primary | ICD-10-CM

## 2021-06-15 DIAGNOSIS — L97.429 ULCER OF LEFT HEEL AND MIDFOOT, UNSPECIFIED ULCER STAGE (HCC): ICD-10-CM

## 2021-06-15 DIAGNOSIS — N17.9 ACUTE KIDNEY INJURY (HCC): ICD-10-CM

## 2021-06-15 DIAGNOSIS — L97.929 ULCERS OF BOTH LOWER LEGS (HCC): Primary | ICD-10-CM

## 2021-06-15 LAB
ABO + RH BLD: NORMAL
ALBUMIN SERPL-MCNC: 1.6 G/DL (ref 3.5–5)
ALBUMIN/GLOB SERPL: 0.3 {RATIO} (ref 1.1–2.2)
ALP SERPL-CCNC: 579 U/L (ref 45–117)
ALT SERPL-CCNC: 24 U/L (ref 12–78)
ANION GAP SERPL CALC-SCNC: 11 MMOL/L (ref 5–15)
AST SERPL W P-5'-P-CCNC: 76 U/L (ref 15–37)
BASOPHILS # BLD: 0 K/UL (ref 0–0.1)
BASOPHILS NFR BLD: 0 % (ref 0–1)
BILIRUB SERPL-MCNC: 3.9 MG/DL (ref 0.2–1)
BLASTS NFR BLD MANUAL: 1 %
BLOOD BANK CMNT PATIENT-IMP: NORMAL
BLOOD GROUP ANTIBODIES SERPL: NEGATIVE
BUN SERPL-MCNC: 73 MG/DL (ref 6–20)
BUN/CREAT SERPL: 17 (ref 12–20)
CA-I BLD-MCNC: 7.8 MG/DL (ref 8.5–10.1)
CHLORIDE SERPL-SCNC: 100 MMOL/L (ref 97–108)
CO2 SERPL-SCNC: 23 MMOL/L (ref 21–32)
CREAT SERPL-MCNC: 4.32 MG/DL (ref 0.7–1.3)
DIFFERENTIAL METHOD BLD: ABNORMAL
EOSINOPHIL # BLD: 0.4 K/UL (ref 0–0.4)
EOSINOPHIL NFR BLD: 3 % (ref 0–7)
ERYTHROCYTE [DISTWIDTH] IN BLOOD BY AUTOMATED COUNT: 17.6 % (ref 11.5–14.5)
GLOBULIN SER CALC-MCNC: 5.8 G/DL (ref 2–4)
GLUCOSE SERPL-MCNC: 155 MG/DL (ref 65–100)
HCT VFR BLD AUTO: 25.7 % (ref 36.6–50.3)
HGB BLD-MCNC: 8.1 G/DL (ref 12.1–17)
IMM GRANULOCYTES # BLD AUTO: 0 K/UL (ref 0–0.04)
IMM GRANULOCYTES NFR BLD AUTO: 0 % (ref 0–0.5)
INR PPP: 1.2 (ref 0.9–1.1)
LYMPHOCYTES # BLD: 4.4 K/UL (ref 0.8–3.5)
LYMPHOCYTES NFR BLD: 31 % (ref 12–49)
MCH RBC QN AUTO: 31 PG (ref 26–34)
MCHC RBC AUTO-ENTMCNC: 31.5 G/DL (ref 30–36.5)
MCV RBC AUTO: 98.5 FL (ref 80–99)
METAMYELOCYTES NFR BLD MANUAL: 2 %
MONOCYTES # BLD: 1.3 K/UL (ref 0–1)
MONOCYTES NFR BLD: 9 % (ref 5–13)
MYELOCYTES NFR BLD MANUAL: 2 %
NEUTS SEG # BLD: 7.4 K/UL (ref 1.8–8)
NEUTS SEG NFR BLD: 52 % (ref 32–75)
NRBC # BLD: 0.03 K/UL (ref 0–0.01)
NRBC BLD-RTO: 0.2 PER 100 WBC
PLATELET # BLD AUTO: 209 K/UL (ref 150–400)
PMV BLD AUTO: 9.8 FL (ref 8.9–12.9)
POTASSIUM SERPL-SCNC: 3.6 MMOL/L (ref 3.5–5.1)
PROT SERPL-MCNC: 7.4 G/DL (ref 6.4–8.2)
PROTHROMBIN TIME: 14.7 SEC (ref 11.9–14.7)
RBC # BLD AUTO: 2.61 M/UL (ref 4.1–5.7)
RBC MORPH BLD: ABNORMAL
SODIUM SERPL-SCNC: 134 MMOL/L (ref 136–145)
SPECIMEN EXP DATE BLD: NORMAL
TROPONIN I SERPL-MCNC: <0.05 NG/ML
WBC # BLD AUTO: 14.3 K/UL (ref 4.1–11.1)

## 2021-06-15 PROCEDURE — 85610 PROTHROMBIN TIME: CPT

## 2021-06-15 PROCEDURE — 96361 HYDRATE IV INFUSION ADD-ON: CPT

## 2021-06-15 PROCEDURE — 36415 COLL VENOUS BLD VENIPUNCTURE: CPT

## 2021-06-15 PROCEDURE — 86901 BLOOD TYPING SEROLOGIC RH(D): CPT

## 2021-06-15 PROCEDURE — 65270000029 HC RM PRIVATE

## 2021-06-15 PROCEDURE — 71045 X-RAY EXAM CHEST 1 VIEW: CPT

## 2021-06-15 PROCEDURE — 80053 COMPREHEN METABOLIC PANEL: CPT

## 2021-06-15 PROCEDURE — 99285 EMERGENCY DEPT VISIT HI MDM: CPT

## 2021-06-15 PROCEDURE — 74011250636 HC RX REV CODE- 250/636: Performed by: EMERGENCY MEDICINE

## 2021-06-15 PROCEDURE — 84484 ASSAY OF TROPONIN QUANT: CPT

## 2021-06-15 PROCEDURE — 96360 HYDRATION IV INFUSION INIT: CPT

## 2021-06-15 PROCEDURE — 85025 COMPLETE CBC W/AUTO DIFF WBC: CPT

## 2021-06-15 PROCEDURE — 93005 ELECTROCARDIOGRAM TRACING: CPT

## 2021-06-15 RX ADMIN — SODIUM CHLORIDE 1000 ML: 9 INJECTION, SOLUTION INTRAVENOUS at 21:53

## 2021-06-16 ENCOUNTER — APPOINTMENT (OUTPATIENT)
Dept: ULTRASOUND IMAGING | Age: 86
DRG: 981 | End: 2021-06-16
Attending: FAMILY MEDICINE
Payer: MEDICARE

## 2021-06-16 LAB
ANION GAP SERPL CALC-SCNC: 11 MMOL/L (ref 5–15)
ATRIAL RATE: 85 BPM
BASOPHILS # BLD: 0 K/UL (ref 0–0.1)
BASOPHILS NFR BLD: 0 % (ref 0–1)
BUN SERPL-MCNC: 72 MG/DL (ref 6–20)
BUN/CREAT SERPL: 17 (ref 12–20)
CA-I BLD-MCNC: 7.9 MG/DL (ref 8.5–10.1)
CALCULATED P AXIS, ECG09: 100 DEGREES
CALCULATED R AXIS, ECG10: -3 DEGREES
CALCULATED T AXIS, ECG11: 102 DEGREES
CHLORIDE SERPL-SCNC: 102 MMOL/L (ref 97–108)
CO2 SERPL-SCNC: 24 MMOL/L (ref 21–32)
COVID-19 RAPID TEST, COVR: ABNORMAL
COVID-19 RAPID TEST, COVR: NOT DETECTED
CREAT SERPL-MCNC: 4.24 MG/DL (ref 0.7–1.3)
DIAGNOSIS, 93000: NORMAL
DIFFERENTIAL METHOD BLD: ABNORMAL
EOSINOPHIL # BLD: 0.3 K/UL (ref 0–0.4)
EOSINOPHIL NFR BLD: 2 % (ref 0–7)
ERYTHROCYTE [DISTWIDTH] IN BLOOD BY AUTOMATED COUNT: 17.5 % (ref 11.5–14.5)
GLUCOSE BLD STRIP.AUTO-MCNC: 114 MG/DL (ref 65–117)
GLUCOSE SERPL-MCNC: 114 MG/DL (ref 65–100)
HCT VFR BLD AUTO: 24.2 % (ref 36.6–50.3)
HGB BLD-MCNC: 7.4 G/DL (ref 12.1–17)
IMM GRANULOCYTES # BLD AUTO: 0 K/UL
IMM GRANULOCYTES NFR BLD AUTO: 0 %
LYMPHOCYTES # BLD: 2.4 K/UL (ref 0.8–3.5)
LYMPHOCYTES NFR BLD: 18 % (ref 12–49)
MCH RBC QN AUTO: 30.5 PG (ref 26–34)
MCHC RBC AUTO-ENTMCNC: 30.6 G/DL (ref 30–36.5)
MCV RBC AUTO: 99.6 FL (ref 80–99)
MONOCYTES # BLD: 1.1 K/UL (ref 0–1)
MONOCYTES NFR BLD: 8 % (ref 5–13)
NEUTS SEG # BLD: 9.4 K/UL (ref 1.8–8)
NEUTS SEG NFR BLD: 72 % (ref 32–75)
NRBC # BLD: 0.03 K/UL (ref 0–0.01)
NRBC BLD-RTO: 0.2 PER 100 WBC
P-R INTERVAL, ECG05: 208 MS
PERFORMED BY, TECHID: NORMAL
PLATELET # BLD AUTO: 200 K/UL (ref 150–400)
PMV BLD AUTO: 10.3 FL (ref 8.9–12.9)
POTASSIUM SERPL-SCNC: 3.5 MMOL/L (ref 3.5–5.1)
Q-T INTERVAL, ECG07: 394 MS
QRS DURATION, ECG06: 84 MS
QTC CALCULATION (BEZET), ECG08: 468 MS
RBC # BLD AUTO: 2.43 M/UL (ref 4.1–5.7)
RBC MORPH BLD: ABNORMAL
RBC MORPH BLD: ABNORMAL
SODIUM SERPL-SCNC: 137 MMOL/L (ref 136–145)
SPECIMEN SOURCE: ABNORMAL
SPECIMEN SOURCE: NORMAL
VENTRICULAR RATE, ECG03: 85 BPM
WBC # BLD AUTO: 13.2 K/UL (ref 4.1–11.1)

## 2021-06-16 PROCEDURE — 94640 AIRWAY INHALATION TREATMENT: CPT

## 2021-06-16 PROCEDURE — 76770 US EXAM ABDO BACK WALL COMP: CPT

## 2021-06-16 PROCEDURE — 87077 CULTURE AEROBIC IDENTIFY: CPT

## 2021-06-16 PROCEDURE — 87205 SMEAR GRAM STAIN: CPT

## 2021-06-16 PROCEDURE — 85025 COMPLETE CBC W/AUTO DIFF WBC: CPT

## 2021-06-16 PROCEDURE — 65270000032 HC RM SEMIPRIVATE

## 2021-06-16 PROCEDURE — 87147 CULTURE TYPE IMMUNOLOGIC: CPT

## 2021-06-16 PROCEDURE — 74011250636 HC RX REV CODE- 250/636: Performed by: INTERNAL MEDICINE

## 2021-06-16 PROCEDURE — 99222 1ST HOSP IP/OBS MODERATE 55: CPT | Performed by: INTERNAL MEDICINE

## 2021-06-16 PROCEDURE — 87186 SC STD MICRODIL/AGAR DIL: CPT

## 2021-06-16 PROCEDURE — 82962 GLUCOSE BLOOD TEST: CPT

## 2021-06-16 PROCEDURE — 87635 SARS-COV-2 COVID-19 AMP PRB: CPT

## 2021-06-16 PROCEDURE — 74011250637 HC RX REV CODE- 250/637: Performed by: FAMILY MEDICINE

## 2021-06-16 PROCEDURE — 74011250636 HC RX REV CODE- 250/636: Performed by: FAMILY MEDICINE

## 2021-06-16 PROCEDURE — 80048 BASIC METABOLIC PNL TOTAL CA: CPT

## 2021-06-16 PROCEDURE — 74011000258 HC RX REV CODE- 258: Performed by: INTERNAL MEDICINE

## 2021-06-16 RX ORDER — MAGNESIUM SULFATE 100 %
4 CRYSTALS MISCELLANEOUS AS NEEDED
Status: DISCONTINUED | OUTPATIENT
Start: 2021-06-16 | End: 2021-06-27 | Stop reason: HOSPADM

## 2021-06-16 RX ORDER — CYANOCOBALAMIN 1000 UG/ML
1000 INJECTION, SOLUTION INTRAMUSCULAR; SUBCUTANEOUS EVERY 2 WEEKS
Status: DISCONTINUED | OUTPATIENT
Start: 2021-06-16 | End: 2021-06-27 | Stop reason: HOSPADM

## 2021-06-16 RX ORDER — ACETAMINOPHEN 500 MG
1000 TABLET ORAL
Status: DISCONTINUED | OUTPATIENT
Start: 2021-06-16 | End: 2021-06-24 | Stop reason: SDUPTHER

## 2021-06-16 RX ORDER — LATANOPROST 50 UG/ML
1 SOLUTION/ DROPS OPHTHALMIC DAILY
Status: DISCONTINUED | OUTPATIENT
Start: 2021-06-17 | End: 2021-06-16 | Stop reason: SDUPTHER

## 2021-06-16 RX ORDER — HEPARIN SODIUM 5000 [USP'U]/ML
5000 INJECTION, SOLUTION INTRAVENOUS; SUBCUTANEOUS EVERY 8 HOURS
Status: DISCONTINUED | OUTPATIENT
Start: 2021-06-16 | End: 2021-06-27 | Stop reason: HOSPADM

## 2021-06-16 RX ORDER — LOPERAMIDE HYDROCHLORIDE 2 MG/1
2 CAPSULE ORAL
Status: DISCONTINUED | OUTPATIENT
Start: 2021-06-16 | End: 2021-06-27 | Stop reason: HOSPADM

## 2021-06-16 RX ORDER — PANTOPRAZOLE SODIUM 40 MG/1
40 TABLET, DELAYED RELEASE ORAL DAILY
Status: DISCONTINUED | OUTPATIENT
Start: 2021-06-16 | End: 2021-06-27 | Stop reason: HOSPADM

## 2021-06-16 RX ORDER — DEXTROSE 50 % IN WATER (D50W) INTRAVENOUS SYRINGE
25-50 AS NEEDED
Status: DISCONTINUED | OUTPATIENT
Start: 2021-06-16 | End: 2021-06-27 | Stop reason: HOSPADM

## 2021-06-16 RX ORDER — LANOLIN ALCOHOL/MO/W.PET/CERES
1 CREAM (GRAM) TOPICAL
Status: DISCONTINUED | OUTPATIENT
Start: 2021-06-16 | End: 2021-06-24 | Stop reason: SDUPTHER

## 2021-06-16 RX ORDER — ONDANSETRON 4 MG/1
4 TABLET, ORALLY DISINTEGRATING ORAL
Status: DISCONTINUED | OUTPATIENT
Start: 2021-06-16 | End: 2021-06-27 | Stop reason: HOSPADM

## 2021-06-16 RX ORDER — POLYETHYLENE GLYCOL 3350 17 G/17G
17 POWDER, FOR SOLUTION ORAL DAILY PRN
Status: DISCONTINUED | OUTPATIENT
Start: 2021-06-16 | End: 2021-06-27 | Stop reason: HOSPADM

## 2021-06-16 RX ORDER — GABAPENTIN 100 MG/1
100 CAPSULE ORAL 3 TIMES DAILY
Status: DISCONTINUED | OUTPATIENT
Start: 2021-06-16 | End: 2021-06-27 | Stop reason: HOSPADM

## 2021-06-16 RX ORDER — ONDANSETRON 2 MG/ML
4 INJECTION INTRAMUSCULAR; INTRAVENOUS
Status: DISCONTINUED | OUTPATIENT
Start: 2021-06-16 | End: 2021-06-27 | Stop reason: HOSPADM

## 2021-06-16 RX ORDER — SODIUM CHLORIDE 9 MG/ML
75 INJECTION, SOLUTION INTRAVENOUS CONTINUOUS
Status: DISCONTINUED | OUTPATIENT
Start: 2021-06-16 | End: 2021-06-25

## 2021-06-16 RX ORDER — TRAMADOL HYDROCHLORIDE 50 MG/1
50 TABLET ORAL
Status: DISCONTINUED | OUTPATIENT
Start: 2021-06-16 | End: 2021-06-27 | Stop reason: HOSPADM

## 2021-06-16 RX ORDER — ACETAMINOPHEN 325 MG/1
650 TABLET ORAL
Status: DISCONTINUED | OUTPATIENT
Start: 2021-06-16 | End: 2021-06-27 | Stop reason: HOSPADM

## 2021-06-16 RX ORDER — LANOLIN ALCOHOL/MO/W.PET/CERES
325 CREAM (GRAM) TOPICAL
Status: DISCONTINUED | OUTPATIENT
Start: 2021-06-17 | End: 2021-06-25

## 2021-06-16 RX ORDER — AMLODIPINE BESYLATE 5 MG/1
5 TABLET ORAL DAILY
Status: DISCONTINUED | OUTPATIENT
Start: 2021-06-16 | End: 2021-06-25

## 2021-06-16 RX ORDER — LATANOPROST 50 UG/ML
1 SOLUTION/ DROPS OPHTHALMIC EVERY EVENING
Status: DISCONTINUED | OUTPATIENT
Start: 2021-06-16 | End: 2021-06-27 | Stop reason: HOSPADM

## 2021-06-16 RX ORDER — ACETAMINOPHEN 500 MG
500 TABLET ORAL
Status: DISCONTINUED | OUTPATIENT
Start: 2021-06-16 | End: 2021-06-24 | Stop reason: SDUPTHER

## 2021-06-16 RX ORDER — SIMVASTATIN 10 MG/1
10 TABLET, FILM COATED ORAL
Status: DISCONTINUED | OUTPATIENT
Start: 2021-06-16 | End: 2021-06-27 | Stop reason: HOSPADM

## 2021-06-16 RX ORDER — ONDANSETRON 4 MG/1
4 TABLET, FILM COATED ORAL
Status: DISCONTINUED | OUTPATIENT
Start: 2021-06-16 | End: 2021-06-24 | Stop reason: SDUPTHER

## 2021-06-16 RX ORDER — ALBUTEROL SULFATE 90 UG/1
2 AEROSOL, METERED RESPIRATORY (INHALATION)
Status: DISCONTINUED | OUTPATIENT
Start: 2021-06-16 | End: 2021-06-22

## 2021-06-16 RX ORDER — INSULIN LISPRO 100 [IU]/ML
INJECTION, SOLUTION INTRAVENOUS; SUBCUTANEOUS
Status: DISCONTINUED | OUTPATIENT
Start: 2021-06-16 | End: 2021-06-27 | Stop reason: HOSPADM

## 2021-06-16 RX ORDER — ACETAMINOPHEN 650 MG/1
650 SUPPOSITORY RECTAL
Status: DISCONTINUED | OUTPATIENT
Start: 2021-06-16 | End: 2021-06-27 | Stop reason: HOSPADM

## 2021-06-16 RX ORDER — FUROSEMIDE 40 MG/1
40 TABLET ORAL DAILY
Status: DISCONTINUED | OUTPATIENT
Start: 2021-06-16 | End: 2021-06-27 | Stop reason: HOSPADM

## 2021-06-16 RX ORDER — PANTOPRAZOLE SODIUM 40 MG/1
40 TABLET, DELAYED RELEASE ORAL
Status: DISCONTINUED | OUTPATIENT
Start: 2021-06-16 | End: 2021-06-16

## 2021-06-16 RX ADMIN — ALBUTEROL SULFATE 2 PUFF: 108 AEROSOL, METERED RESPIRATORY (INHALATION) at 19:42

## 2021-06-16 RX ADMIN — CYANOCOBALAMIN 1000 MCG: 1000 INJECTION, SOLUTION INTRAMUSCULAR at 17:31

## 2021-06-16 RX ADMIN — FUROSEMIDE 40 MG: 40 TABLET ORAL at 11:07

## 2021-06-16 RX ADMIN — FERROUS SULFATE TAB 325 MG (65 MG ELEMENTAL FE) 325 MG: 325 (65 FE) TAB at 11:07

## 2021-06-16 RX ADMIN — PIPERACILLIN AND TAZOBACTAM 3.38 G: 3; .375 INJECTION, POWDER, LYOPHILIZED, FOR SOLUTION INTRAVENOUS at 19:34

## 2021-06-16 RX ADMIN — ALBUTEROL SULFATE 2 PUFF: 108 AEROSOL, METERED RESPIRATORY (INHALATION) at 13:34

## 2021-06-16 RX ADMIN — HEPARIN SODIUM 5000 UNITS: 5000 INJECTION INTRAVENOUS; SUBCUTANEOUS at 15:00

## 2021-06-16 RX ADMIN — HEPARIN SODIUM 5000 UNITS: 5000 INJECTION INTRAVENOUS; SUBCUTANEOUS at 22:31

## 2021-06-16 RX ADMIN — PANTOPRAZOLE SODIUM 40 MG: 40 TABLET, DELAYED RELEASE ORAL at 11:07

## 2021-06-16 RX ADMIN — WHITE PETROLATUM,ZINC OXIDE: 57; 17 PASTE TOPICAL at 22:00

## 2021-06-16 RX ADMIN — WHITE PETROLATUM,ZINC OXIDE: 57; 17 PASTE TOPICAL at 19:33

## 2021-06-16 RX ADMIN — SODIUM CHLORIDE 75 ML/HR: 9 INJECTION, SOLUTION INTRAVENOUS at 15:00

## 2021-06-16 RX ADMIN — GABAPENTIN 100 MG: 100 CAPSULE ORAL at 11:07

## 2021-06-16 RX ADMIN — FERROUS SULFATE TAB 325 MG (65 MG ELEMENTAL FE) 325 MG: 325 (65 FE) TAB at 16:55

## 2021-06-16 RX ADMIN — GABAPENTIN 100 MG: 100 CAPSULE ORAL at 16:55

## 2021-06-16 RX ADMIN — SIMVASTATIN 10 MG: 10 TABLET, FILM COATED ORAL at 22:31

## 2021-06-16 RX ADMIN — PANTOPRAZOLE SODIUM 40 MG: 40 TABLET, DELAYED RELEASE ORAL at 17:26

## 2021-06-16 RX ADMIN — GABAPENTIN 100 MG: 100 CAPSULE ORAL at 22:31

## 2021-06-16 RX ADMIN — AMLODIPINE BESYLATE 5 MG: 5 TABLET ORAL at 11:07

## 2021-06-16 NOTE — CONSULTS
Infectious Disease Consult Note    Reason for Consult:  B/l leg ulcers   Date of Consultation: June 16, 2021  Date of Admission: 6/15/2021  Referring Physician: Dr Mary Almaraz     HPI: Pleasant 89-y.o BM long term SNF pt, admitted today w c/o abnormal labs and CRISTIAN, ID consulted for mgt of his chronic leg ulcers. His medical history is significant for CAD, CKD, liver mass, GERD, CHF, chronic b/l leg ulcers, unstageable sacral decubitus ulcer, chronic bedbound status, left heel ulcer, PAD, and HTN. He is a limited historian, unsure why he is in the hospital. Pt has been afebrile and hemodynamically stable since admission. Today's labs show a leukocytosis of 13.2, down from 14.3, and Cr 4.24. GPC and GNR was isolated from Cx of his leg. His admission CXR is neg for focal infiltrates, COVID Ag test was neg on 06/15. He does not appear to be on any antibiotics. Staff deny acute events today. Review of Systems: Non-contributory, confused     Past Medical History:  Past Medical History:   Diagnosis Date    Arthritis     AVM (arteriovenous malformation) of colon     B12 deficiency     CAD (coronary artery disease)     Chronic anemia     Chronic kidney disease     Diabetes ([facility] [address]     GERD (gastroesophageal reflux disease)     Heart failure (HCC)     Remotely in his 46s    Hypertension     Ill-defined condition     chronic pressure and statis ulcers     PVD [facility]peripheral vascular disease) ([facility])        Past surgical history   Past Surgical History:   Procedure Laterality Date    HX HIP REPLACEMENT Bilateral         Social History   Social History     Tobacco Use    Smoking status: Never Smoker    Smokeless tobacco: Never Used   Substance Use Topics    Alcohol use: Never    Drug use: Not on file        Family history   History reviewed. No pertinent family history. Allergies:  No Known Allergies      Medications:  No current facility-administered medications on file prior to encounter.      Current Outpatient Medications on File Prior to Encounter   Medication Sig Dispense Refill    acetaminophen (TYLENOL) 325 mg tablet Take 2 Tabs by mouth every six (6) hours as needed for Pain. 30 Tab 0    cyanocobalamin (VITAMIN B12) 1,000 mcg/mL injection 1,000 mcg by IntraMUSCular route Once every 2 weeks.  ferrous sulfate 325 mg (65 mg iron) tablet Take 325 mg by mouth Daily (before breakfast).  latanoprost (XALATAN) 0.005 % ophthalmic solution Administer 1 Drop to both eyes daily.  pantoprazole (PROTONIX) 40 mg tablet Take 40 mg by mouth daily. Physical Exam:    Vitals:   Patient Vitals for the past 24 hrs:   Temp Pulse Resp BP SpO2   06/16/21 1521 97.7 °F (36.5 °C) 84 16 97/65 92 %   06/16/21 1336     93 %   06/16/21 0828 98.2 °F (36.8 °C) 66 16 (!) 114/46 93 %   06/16/21 0643 97.8 °F (36.6 °C) 86 18 114/62 92 %   06/16/21 0400  68 14 113/64 94 %   06/16/21 0215  73 14 (!) 116/52 98 %   06/16/21 0053     96 %   06/16/21 0030  82 18 (!) 111/52 96 %   06/15/21 2138  86 20 131/62 96 %   ·   · GEN: NAD, Confused, alert   · HEENT: NCAT, PERRLA  · HEART: S1, S2+, RRR, No murmur   · Lungs: CTA B/l, no wheeze/rhonchi   · Abdomen: soft, ND, NT, +BS   · Genitourinary: no genital discharge, no skaggs   · Extremities: B/l leg dressing in place, not examined, images taken by [facility] reviewed   · Skin: Sacral ulcer, unstageable but appears superficial, no exposed bone     Labs:   Recent Labs     06/16/21  0949 06/15/21  2145   WBC 13.2* 14.3*   HGB 7.4* 8.1*   HCT 24.2* 25.7*    209     Recent Labs     06/16/21  0949 06/15/21  2145   BUN 72* 73*   CREA 4.24* 4.32*     Microbiology Data:  - Right leg ulcer 06/15: GNR and GPC   - Left leg ulcer 06/15: GNR     Imaging:   CXR 06/15: No evidence of an acute cardiopulmonary process. Assessment / Plan:     1.  Chronic b/l anterior leg ulcers, suspect underlying PVD (mixed arterial and venous disease)       Superficial ulcerations w malodorous drainage and necrotic tissue      GNR and GPC isolated from wound Cxs      Afebrile, WBC trending down on todays labs       Start on renally dosed Zosyn, continue routine wound care      CBC w AM labs. Will likely benefit from debridement of ulcers     2. Sacral ulcer, unstageable, no exposed bone, sig skin maceration on exam       Frequent turns to prevent worsening      3. Left heel ulcer: Gangrenous skin changes, likely from underlying PAD     4. Acute on chronic renal failure: Nephrology consulted     5.  Acute on chronic anemia: Stable Hgb         Katie Sandoval MD     6/16/2021

## 2021-06-16 NOTE — ED NOTES
Bedside and Verbal shift change report given to Wojciech Douglass RN (oncoming nurse) by Clarisa Paredes RN (offgoing nurse). Report included the following information SBAR, ED Summary, MAR and Recent Results.

## 2021-06-16 NOTE — WOUND CARE
IP WOUND CONSULT Chauncey Epperson MEDICAL RECORD NUMBER:  [id number] AGE: 80 y.o. GENDER: male  : 1932 TODAY'S DATE:  2021 GENERAL  
 
[] Follow-up [x] New Consult Chauncey Epperson is a 80 y.o. male referred by:  
[] Physician 
[x] Nursing 
[] Other: PAST MEDICAL HISTORY Past Medical History:  
Diagnosis Date  Arthritis  AVM (arteriovenous malformation) of colon  B12 deficiency  CAD (coronary artery disease)  Chronic anemia  Chronic kidney disease  Diabetes ([facility] [address]  GERD (gastroesophageal reflux disease)  Heart failure ([facility] [address] Remotely in his 46s  Hypertension  Ill-defined condition   
 chronic pressure and statis ulcers  PVD (peripheral vascular disease) ([facility] [address] PAST SURGICAL HISTORY Past Surgical History:  
Procedure Laterality Date  HX HIP REPLACEMENT Bilateral   
 
 
FAMILY HISTORY History reviewed. No pertinent family history. SOCIAL HISTORY Social History Tobacco Use  Smoking status: Never Smoker  Smokeless tobacco: Never Used Substance Use Topics  Alcohol use: Never  Drug use: Not on file ALLERGIES No Known Allergies MEDICATIONS No current facility-administered medications on file prior to encounter. Current Outpatient Medications on File Prior to Encounter Medication Sig Dispense Refill  acetaminophen (TYLENOL) 325 mg tablet Take 2 Tabs by mouth every six (6) hours as needed for Pain. 30 Tab 0  
 cyanocobalamin (VITAMIN B12) 1,000 mcg/mL injection 1,000 mcg by IntraMUSCular route Once every 2 weeks.  ferrous sulfate 325 mg (65 mg iron) tablet Take 325 mg by mouth Daily (before breakfast).  latanoprost (XALATAN) 0.005 % ophthalmic solution Administer 1 Drop to both eyes daily.  pantoprazole (PROTONIX) 40 mg tablet Take 40 mg by mouth daily. Wt Readings from Last 3 Encounters:  
21 110.2 kg (243 lb) 21 109.7 kg (241 lb 13.5 oz) [unfilled] Visit Vitals BP 97/65 (BP 1 Location: Left upper arm, BP Patient Position: At rest) Pulse 84 Temp 97.7 °F (36.5 °C) Resp 16 Ht 6' 2\" (1.88 m) Wt 110.2 kg (243 lb) SpO2 92% BMI 31.20 kg/m² ASSESSMENT Patient awake and receptive to evaluation. Attempts to assist nurse with lifting legs and turning side to side. See flow sheet for wound evaluation and treatment. Pt has primo fit attached to suction, slight leaking noted. On gel bed. BLE with multiple open ulcers, draining thick purulent yellow, gray drainage. Putrid odor noted. Wounds have been cultured. Media Information Document Information Mobile Media Capture  
left lower leg  
06/17/2021 09:25 Attached To: Hospital Encounter on 6/15/21 Source Information Garry Santamaria RN  [facility] Media Information Document Information Mobile Media Capture  
right lower leg  
06/17/2021 09:24 Attached To: Hospital Encounter on 6/15/21 Source Information Garry Santamaria RN  [facility] Media Information Document Information Mobile Media Capture  
right posterior lateral lower leg  
06/17/2021 09:24 Attached To: Hospital Encounter on 6/15/21 Source Information Garry Santamaria RN  [facility] Chente Score: 12 
 
Current Preventative Measures: Activity Level/PT: 
 
Ulcer Identification: 
Right buttock stage 3 present on admission Left buttock/Sacrum with severe MASD with openings, anticipate one large wound when skin sloughs. Will monitor. Left heel unstagable, present on admission Multiple BLE ulcerations. See photos Contributing Factors: venous stasis, chronic pressure, shear force, incontinence of stool and incontinence of urine, decreased mobility. Wound Pretibial Right yellow, green, red, pale. multi wound bed (Active) Number of days: 0 Wound Calf Right (Active) Wound Etiology Venous 06/16/21 1631 Dressing Status New dressing applied 06/16/21 1631 Cleansed Irrigated with saline 06/16/21 1631 Dressing/Treatment Alginate with Ag 06/16/21 1631 Wound Length (cm) 3 cm 06/16/21 1631 Wound Width (cm) 2 cm 06/16/21 1631 Wound Surface Area (cm^2) 6 cm^2 06/16/21 1631 Wound Assessment Devitalized tissue;Pale granulation tissue 06/16/21 1631 Drainage Amount Moderate 06/16/21 1631 Drainage Description Yellow 06/16/21 1631 Wound Odor Moderate 06/16/21 1631 Paula-Wound/Incision Assessment Fragile; Maceration; Hyperpigmented 06/16/21 1631 Edges Flat/open edges; Undefined edges 06/16/21 1631 Wound Thickness Description Full thickness 06/16/21 1631 Number of days: 0 Wound Pretibial Left red, yellow, rust color, pale very pale pink (Active) Number of days: 0 Wound Heel Left (Active) Wound Image   06/16/21 1633 Wound Etiology Pressure Unstageable 06/16/21 1633 Dressing/Treatment ABD pad;Roll gauze 06/16/21 1633 Wound Length (cm) 6 cm 06/16/21 1633 Wound Width (cm) 6 cm 06/16/21 1633 Wound Surface Area (cm^2) 36 cm^2 06/16/21 1633 Wound Assessment Eschar dry 06/16/21 1633 Drainage Amount None 06/16/21 1633 Wound Odor None 06/16/21 1633 Edges Unattached edges; Attached edges 06/16/21 1633 Number of days: 0 Wound Buttocks Left Stage II (Active) Number of days: 0 Wound Buttocks Right StageII (Active) Wound Image   06/16/21 1638 Wound Etiology Pressure Stage 3 06/16/21 1638 Cleansed Irrigated with saline 06/16/21 1638 Dressing/Treatment Zinc paste 06/16/21 1638 Wound Length (cm) 2 cm 06/16/21 1638 Wound Width (cm) 3 cm 06/16/21 1638 Wound Surface Area (cm^2) 6 cm^2 06/16/21 1638 Wound Assessment Devitalized tissue; Subcutaneous 06/16/21 1638 Drainage Amount Scant 06/16/21 1638 Drainage Description Serosanguinous 06/16/21 1638 Wound Odor None 06/16/21 1638 Paula-Wound/Incision Assessment Fragile; Maceration 06/16/21 1638 Edges Attached edges 06/16/21 1638 Wound Thickness Description Full thickness 06/16/21 1638 Number of days: 0 Wound Gluteal fold/cleft Stage II (Active) Wound Image   06/16/21 1641 Cleansed Irrigated with saline 06/16/21 1641 Dressing/Treatment Zinc paste 06/16/21 1641 Wound Assessment Devitalized tissue 06/16/21 1641 Drainage Amount Scant 06/16/21 1641 Drainage Description Serosanguinous 06/16/21 1641 Wound Odor None 06/16/21 1641 Paula-Wound/Incision Assessment Maceration 06/16/21 1641 Edges Undefined edges 06/16/21 1641 Wound Thickness Description Full thickness 06/16/21 1641 Number of days: 0 Permission to photo obtained:  Yes 
 
Care/Treatment given/Plan: Wounds evaluated and care orders obtained. Discussed wound and prevention care with Tracie Johnson RN. Recommended adding quick change to assist with catching leaking Primo fit. Zinc for sacrum and buttocks until area can be dried up and then can focus on healing open areas. Moisture management is key. WCN will reevaluate patient Friday. TC Bariatric with air confirmation [id number] PLAN Skin Care & Pressure Relief Recommendations Speciality bed Total Care Bariatric ordered by Wound care Minimize layers of linen Pads under patient to optimize support surface Turn/reposition approximately every 2 hours Pillow wedges Manage incontinence Please use in bed position system Promote continence; Skin Protective lotion/cream to buttocks and sacrum daily and as needed with incontinence care Offload heels pillows Other: Keep legs elevated Wedge for side turning. Nutrition Consult: Nutrition following Discharge Plan/Equipment needs: 
 
Physician/Provider notified:  
Dr. Vazquez Diss order for Infectious Disease and Vascular obtained. Dr. Cy Hudson and [address] [place] [address] notified. Teaching completed with:  
[] Patient          
[] Family member      
[] Caregiver [x] Nursing 
[] Other Patient/Caregiver Teaching: 
Level of patient/caregiver understanding able to:  
[] Indicates understanding       [] Needs reinforcement 
[] Unsuccessful      [] Verbal Understanding 
[] Demonstrated understanding       [] No evidence of learning 
[] Refused teaching         [x] N/A Electronically signed by Shelby Wagoner RN on 6/16/2021 at 4:52 PM

## 2021-06-16 NOTE — ED PROVIDER NOTES
EMERGENCY DEPARTMENT HISTORY AND PHYSICAL EXAM      Date: 6/15/2021  Patient Name: Chiquita Cheung      History of Presenting Illness     Chief Complaint   Patient presents with    Abnormal Lab Results       History Provided By: EMS and Nursing Home/SNF/Rehab Center    HPI: Chiquita Cheung, 80 y.o. male with a past medical history significant diabetes, hypertension, hyperlipidemia, renal insufficiency and Anemia presents to the ED with cc of abnormal labs. Blood test drawn at the nursing home yesterday. Patient was found to have worsening of his kidney function. And also found to have possible anemia. Patient did have history of renal failure with dialysis. The dialysis is stopped couple years ago. Patient denies any chest pain or shortness of breath. Patient has any headache. Patient has any other complaints at this time. There are no other complaints, changes, or physical findings at this time. PCP: Vickie Heredia MD    Current Outpatient Medications   Medication Sig Dispense Refill    acetaminophen (TYLENOL) 325 mg tablet Take 2 Tabs by mouth every six (6) hours as needed for Pain. 30 Tab 0    cyanocobalamin (VITAMIN B12) 1,000 mcg/mL injection 1,000 mcg by IntraMUSCular route Once every 2 weeks.  ferrous sulfate 325 mg (65 mg iron) tablet Take 325 mg by mouth Daily (before breakfast).  latanoprost (XALATAN) 0.005 % ophthalmic solution Administer 1 Drop to both eyes daily.  pantoprazole (PROTONIX) 40 mg tablet Take 40 mg by mouth daily.          Past History     Past Medical History:  Past Medical History:   Diagnosis Date    Arthritis     AVM (arteriovenous malformation) of colon     B12 deficiency     Chronic anemia     Chronic kidney disease     [facility] [address]     Heart failure (HCC)     Remotely in his 46s    Hypertension     PVD (peripheral vascular disease) ([facility] [address]        Past Surgical History:  Past Surgical History:   Procedure Laterality Date    HX HIP REPLACEMENT Bilateral        Family History:  No family history on file. Social History:  Social History     Tobacco Use    Smoking status: Never Smoker    Smokeless tobacco: Never Used   Substance Use Topics    Alcohol use: Never    Drug use: Not on file       Allergies:  No Known Allergies      Review of Systems     Review of Systems   Constitutional: Negative. HENT: Negative. Eyes: Negative. Respiratory: Negative. Cardiovascular: Negative. Gastrointestinal: Negative. Endocrine: Negative. Genitourinary: Negative. Musculoskeletal: Negative. Skin: Negative. Allergic/Immunologic: Negative. Neurological: Positive for weakness. Hematological: Negative. Psychiatric/Behavioral: Negative. All other systems reviewed and are negative. Physical Exam     Physical Exam  Vitals and nursing note reviewed. Constitutional:       General: He is not in acute distress. Appearance: Normal appearance. He is normal weight. He is not ill-appearing, toxic-appearing or diaphoretic. HENT:      Head: Normocephalic and atraumatic. Nose: Nose normal. No congestion. Mouth/Throat:      Mouth: Mucous membranes are dry. Pharynx: Oropharynx is clear. No posterior oropharyngeal erythema. Eyes:      General: No scleral icterus. Right eye: No discharge. Left eye: No discharge. Extraocular Movements: Extraocular movements intact. Conjunctiva/sclera: Conjunctivae normal.      Pupils: Pupils are equal, round, and reactive to light. Cardiovascular:      Rate and Rhythm: Normal rate and regular rhythm. Pulses: Normal pulses. Heart sounds: Normal heart sounds. Pulmonary:      Effort: Pulmonary effort is normal. No respiratory distress. Breath sounds: Normal breath sounds. No stridor. No wheezing, rhonchi or rales. Chest:      Chest wall: No tenderness. Abdominal:      General: Abdomen is flat. Bowel sounds are normal. There is no distension. Palpations: Abdomen is soft. Tenderness: There is no abdominal tenderness. There is no right CVA tenderness, left CVA tenderness, guarding or rebound. Musculoskeletal:         General: No swelling, tenderness, deformity or signs of injury. Normal range of motion. Cervical back: Normal range of motion and neck supple. No rigidity or tenderness. Right lower leg: No edema. Left lower leg: No edema. Skin:     General: Skin is warm and dry. Coloration: Skin is not jaundiced or pale. Findings: No bruising, erythema, lesion or rash. Neurological:      General: No focal deficit present. Mental Status: He is alert. Mental status is at baseline. Cranial Nerves: No cranial nerve deficit. Sensory: No sensory deficit. Comments: Patient is confused due to his chronic dementia. No acute neuro deficit. Psychiatric:         Mood and Affect: Mood normal.         Behavior: Behavior normal.         Lab and Diagnostic Study Results     Labs -     Recent Results (from the past 12 hour(s))   CBC WITH AUTOMATED DIFF    Collection Time: 06/15/21  9:45 PM   Result Value Ref Range    WBC 14.3 (H) 4.1 - 11.1 K/uL    RBC 2.61 (L) 4.10 - 5.70 M/uL    HGB 8.1 (L) 12.1 - 17.0 g/dL    HCT 25.7 (L) 36.6 - 50.3 %    MCV 98.5 80.0 - 99.0 FL    MCH 31.0 26.0 - 34.0 PG    MCHC 31.5 30.0 - 36.5 g/dL    RDW 17.6 (H) 11.5 - 14.5 %    PLATELET 475 044 - 066 K/uL    MPV 9.8 8.9 - 12.9 FL    NRBC 0.2 (H) 0.0  WBC    ABSOLUTE NRBC 0.03 (H) 0.00 - 0.01 K/uL    NEUTROPHILS 52 32 - 75 %    LYMPHOCYTES 31 12 - 49 %    MONOCYTES 9 5 - 13 %    EOSINOPHILS 3 0 - 7 %    BASOPHILS 0 0 - 1 %    IMMATURE GRANULOCYTES 0 0 - 0.5 %    ABS. NEUTROPHILS 7.4 1.8 - 8.0 K/UL    ABS. LYMPHOCYTES 4.4 (H) 0.8 - 3.5 K/UL    ABS. MONOCYTES 1.3 (H) 0.0 - 1.0 K/UL    ABS. EOSINOPHILS 0.4 0.0 - 0.4 K/UL    ABS. BASOPHILS 0.0 0.0 - 0.1 K/UL    ABS. IMM.  GRANS. 0.0 0.00 - 0.04 K/UL    DF AUTOMATED      METAMYELOCYTES 2 % MYELOCYTES 2 %    BLASTS 1 %    RBC COMMENTS Hypochromia  1+       METABOLIC PANEL, COMPREHENSIVE    Collection Time: 06/15/21  9:45 PM   Result Value Ref Range    Sodium 134 (L) 136 - 145 mmol/L    Potassium 3.6 3.5 - 5.1 mmol/L    Chloride 100 97 - 108 mmol/L    CO2 23 21 - 32 mmol/L    Anion gap 11 5 - 15 mmol/L    Glucose 155 (H) 65 - 100 mg/dL    BUN 73 (H) 6 - 20 mg/dL    Creatinine 4.32 (H) 0.70 - 1.30 mg/dL    BUN/Creatinine ratio 17 12 - 20      GFR est AA 16 (L) >60 ml/min/1.73m2    GFR est non-AA 13 (L) >60 ml/min/1.73m2    Calcium 7.8 (L) 8.5 - 10.1 mg/dL    Bilirubin, total 3.9 (H) 0.2 - 1.0 mg/dL    AST (SGOT) 76 (H) 15 - 37 U/L    ALT (SGPT) 24 12 - 78 U/L    Alk. phosphatase 579 (H) 45 - 117 U/L    Protein, total 7.4 6.4 - 8.2 g/dL    Albumin 1.6 (L) 3.5 - 5.0 g/dL    Globulin 5.8 (H) 2.0 - 4.0 g/dL    A-G Ratio 0.3 (L) 1.1 - 2.2     PROTHROMBIN TIME + INR    Collection Time: 06/15/21  9:45 PM   Result Value Ref Range    Prothrombin time 14.7 11.9 - 14.7 sec    INR 1.2 (H) 0.9 - 1.1     TROPONIN I    Collection Time: 06/15/21  9:45 PM   Result Value Ref Range    Troponin-I, Qt. <0.05 <0.05 ng/mL   TYPE & SCREEN    Collection Time: 06/15/21  9:50 PM   Result Value Ref Range    Crossmatch Expiration 06/18/2021,2359     ABO/Rh(D) Valetta Borne Positive     Antibody screen Negative     Comment CERNER HX:  O POS        Radiologic Studies -   [unfilled]  CT Results  (Last 48 hours)    None        CXR Results  (Last 48 hours)               06/15/21 2153  XR CHEST PORT Final result    Impression:  No evidence of an acute cardiopulmonary process. Narrative:  XR CHEST PORT       Comparison:  4/28/2021. Single view:  Stable right diaphragmatic elevation. Negative for focal   consolidation. No pneumothorax or pleural effusion apparent. Transcatheter   aortic valve placement noted. The heart size is stable. There is no evidence of   acute cardiac decompensation.                  Medical Decision Making and ED Course   - I am the first and primary provider for this patient AND AM THE PRIMARY PROVIDER OF RECORD. - I reviewed the vital signs, available nursing notes, past medical history, past surgical history, family history and social history. - Initial assessment performed. The patients presenting problems have been discussed, and the staff are in agreement with the care plan formulated and outlined with them. I have encouraged them to ask questions as they arise throughout their visit. Vital Signs-Reviewed the patient's vital signs. Patient Vitals for the past 12 hrs:   Pulse Resp BP SpO2   06/15/21 2138 86 20 131/62 96 %       EKG interpretation: (Preliminary): EKG was done at 10:08 PM.  Normal sinus rhythm. Rate of 85. Normal axis. No acute ST-T elevation. No STEMI. No old EKG available for comparison at this point. Records Reviewed: Nursing Notes and Ambulance Run Sheet        ED Course:              Provider Notes (Medical Decision Making):     MDM  Number of Diagnoses or Management Options  Acute kidney injury [facility]): new, needed workup  Diagnosis management comments: Differential diagnosis include acute kidney injury, renal failure, electrolyte abnormality, anemia,       Amount and/or Complexity of Data Reviewed  Clinical lab tests: ordered and reviewed  Tests in the radiology section of CPT®: ordered and reviewed  Tests in the medicine section of CPT®: reviewed and ordered  Discuss the patient with other providers: yes (Case discussed with . Will admit to his service.)  Independent visualization of images, tracings, or specimens: yes (EKG was done at 10:08 PM.  Normal sinus rhythm. Rate of 85. Normal axis. No acute ST-T elevation. No STEMI.   No old EKG available for comparison at this point.)    Risk of Complications, Morbidity, and/or Mortality  Presenting problems: high  Diagnostic procedures: high  Management options: high  General comments: Patient remained stable throughout the course of treatment. Labs were essentially unremarkable and it was compared to the previous lab except for elevation of BUN/creatinine. I believe patient has acute kidney injury. Will give patient small amount of fluid and will admit patient to hospital.  Case discussed with admitting physician. Will admit to service. Consultations:       Consultations:         Procedures and Critical Care         NOTES   :  I have spent critical care time involved in lab review, consultations with specialist, family decision- making, bedside attention and documentation. This time excludes time spent in any separate billed procedures. During this entire length of time I was immediately available to the patient . Francis Mattson DO        Disposition     Disposition: Condition stable    Admitted        Diagnosis     Clinical Impression:   1. Acute kidney injury [facility])        Attestations:    Francis Mattson DO    Please note that this dictation was completed with [email or web address]@, the computer voice recognition software. Quite often unanticipated grammatical, syntax, homophones, and other interpretive errors are inadvertently transcribed by the computer software. Please disregard these errors. Please excuse any errors that have escaped final proofreading. Thank you.

## 2021-06-16 NOTE — H&P
History and Physical    NAME: Garen Olszewski   :  1932   MRN:  [id number]     Date/Time:  2021 11:40 AM    Patient PCP: Marie John MD  ______________________________________________________________________             Subjective:     CHIEF COMPLAINT:   Abnormal labs       HISTORY OF PRESENT ILLNESS:       Patient is a 80y.o. year old male with a past medical history of diabetes, hypertension, hyperlipidemia, renal insufficiency, and anemia who presented to the ER 6/15 due to abnormal labs drawn at the nursing home yesterday. He was found to have worsening kidney function. He was on dialysis previously, but had reportedly stopped dialysis several years ago. He denied chest pain and SOB. CXR showed no acute cardiopulmonary processes. He was admitted for further evaluation and treatment. Today, the patient is alert, oriented, and in no acute distress. He complains of pain in his legs, but reports that this pain has been present for several years. He denies chest pain or SOB. Past Medical History:   Diagnosis Date    Arthritis     AVM (arteriovenous malformation) of colon     B12 deficiency     CAD (coronary artery disease)     Chronic anemia     Chronic kidney disease     Diabetes ([address]     GERD (gastroesophageal reflux disease)     Heart failure (HCC)     Remotely in his 46s    Hypertension     Ill-defined condition     chronic pressure and statis ulcers     PVD [facility]peripheral vascular disease) ([facility]         Past Surgical History:   Procedure Laterality Date    HX HIP REPLACEMENT Bilateral        Social History     Tobacco Use    Smoking status: Never Smoker    Smokeless tobacco: Never Used   Substance Use Topics    Alcohol use: Never        History reviewed. No pertinent family history. No Known Allergies     Prior to Admission medications    Medication Sig Start Date End Date Taking?  Authorizing Provider   acetaminophen (TYLENOL) 325 mg tablet Take 2 Tabs by mouth every six (6) hours as needed for Pain. 5/3/21   Preston Lezama MD   cyanocobalamin (VITAMIN B12) 1,000 mcg/mL injection 1,000 mcg by IntraMUSCular route Once every 2 weeks. Fátima Otoole MD   ferrous sulfate 325 mg (65 mg iron) tablet Take 325 mg by mouth Daily (before breakfast). Fátima Otoole MD   latanoprost (XALATAN) 0.005 % ophthalmic solution Administer 1 Drop to both eyes daily. Fátima Otoole MD   pantoprazole (PROTONIX) 40 mg tablet Take 40 mg by mouth daily.     Fátima Otoole MD         Current Facility-Administered Medications:     acetaminophen (TYLENOL) tablet 500 mg, 500 mg, Oral, Q4H PRN, Dinora Ramirez MD    acetaminophen (TYLENOL) tablet 1,000 mg, 1,000 mg, Oral, Q6H PRN, Dinora Ramirez MD    albuterol (PROVENTIL HFA, VENTOLIN HFA, PROAIR HFA) inhaler 2 Puff, 2 Puff, Inhalation, Q6H RT, Dinora Ramirez MD    amLODIPine (NORVASC) tablet 5 mg, 5 mg, Oral, DAILY, Dinora Ramirez MD, 5 mg at 06/16/21 1107    ferrous sulfate tablet 325 mg, 1 Tablet, Oral, TID WITH MEALS, Dinora Ramirez MD, 325 mg at 06/16/21 1107    gabapentin (NEURONTIN) capsule 100 mg, 100 mg, Oral, TID, Dinora Ramirez MD, 100 mg at 06/16/21 1107    furosemide (LASIX) tablet 40 mg, 40 mg, Oral, DAILY, Dinora Ramirez MD, 40 mg at 06/16/21 1107    latanoprost (XALATAN) 0.005 % ophthalmic solution 1 Drop, 1 Drop, Both Eyes, QPM, Dinora Ramirez MD    loperamide (IMODIUM) capsule 2 mg, 2 mg, Oral, Q4H PRN, Dinoar Ramirez MD    pantoprazole (PROTONIX) tablet 40 mg, 40 mg, Oral, ACB, Dinora Ramirez MD, 40 mg at 06/16/21 1107    simvastatin (ZOCOR) tablet 10 mg, 10 mg, Oral, QHS, Dinora Ramirez MD    traMADoL (ULTRAM) tablet 50 mg, 50 mg, Oral, Q6H PRN, Dinora Ramirez MD    ondansetron hcl (ZOFRAN) tablet 4 mg, 4 mg, Oral, Q4H PRN, Dinora Ramirez MD    LAB DATA REVIEWED:    Recent Results (from the past 24 hour(s))   CBC WITH AUTOMATED DIFF    Collection Time: 06/15/21  9:45 PM   Result Value Ref Range    WBC 14.3 (H) 4.1 - 11.1 K/uL    RBC 2.61 (L) 4.10 - 5.70 M/uL    HGB 8.1 (L) 12.1 - 17.0 g/dL    HCT 25.7 (L) 36.6 - 50.3 %    MCV 98.5 80.0 - 99.0 FL    MCH 31.0 26.0 - 34.0 PG    MCHC 31.5 30.0 - 36.5 g/dL    RDW 17.6 (H) 11.5 - 14.5 %    PLATELET 906 007 - 857 K/uL    MPV 9.8 8.9 - 12.9 FL    NRBC 0.2 (H) 0.0  WBC    ABSOLUTE NRBC 0.03 (H) 0.00 - 0.01 K/uL    NEUTROPHILS 52 32 - 75 %    LYMPHOCYTES 31 12 - 49 %    MONOCYTES 9 5 - 13 %    EOSINOPHILS 3 0 - 7 %    BASOPHILS 0 0 - 1 %    IMMATURE GRANULOCYTES 0 0 - 0.5 %    ABS. NEUTROPHILS 7.4 1.8 - 8.0 K/UL    ABS. LYMPHOCYTES 4.4 (H) 0.8 - 3.5 K/UL    ABS. MONOCYTES 1.3 (H) 0.0 - 1.0 K/UL    ABS. EOSINOPHILS 0.4 0.0 - 0.4 K/UL    ABS. BASOPHILS 0.0 0.0 - 0.1 K/UL    ABS. IMM. GRANS. 0.0 0.00 - 0.04 K/UL    DF AUTOMATED      METAMYELOCYTES 2 %    MYELOCYTES 2 %    BLASTS 1 %    RBC COMMENTS Hypochromia  1+       METABOLIC PANEL, COMPREHENSIVE    Collection Time: 06/15/21  9:45 PM   Result Value Ref Range    Sodium 134 (L) 136 - 145 mmol/L    Potassium 3.6 3.5 - 5.1 mmol/L    Chloride 100 97 - 108 mmol/L    CO2 23 21 - 32 mmol/L    Anion gap 11 5 - 15 mmol/L    Glucose 155 (H) 65 - 100 mg/dL    BUN 73 (H) 6 - 20 mg/dL    Creatinine 4.32 (H) 0.70 - 1.30 mg/dL    BUN/Creatinine ratio 17 12 - 20      GFR est AA 16 (L) >60 ml/min/1.73m2    GFR est non-AA 13 (L) >60 ml/min/1.73m2    Calcium 7.8 (L) 8.5 - 10.1 mg/dL    Bilirubin, total 3.9 (H) 0.2 - 1.0 mg/dL    AST (SGOT) 76 (H) 15 - 37 U/L    ALT (SGPT) 24 12 - 78 U/L    Alk.  phosphatase 579 (H) 45 - 117 U/L    Protein, total 7.4 6.4 - 8.2 g/dL    Albumin 1.6 (L) 3.5 - 5.0 g/dL    Globulin 5.8 (H) 2.0 - 4.0 g/dL    A-G Ratio 0.3 (L) 1.1 - 2.2     PROTHROMBIN TIME + INR    Collection Time: 06/15/21  9:45 PM   Result Value Ref Range    Prothrombin time 14.7 11.9 - 14.7 sec    INR 1.2 (H) 0.9 - 1.1     TROPONIN I    Collection Time: 06/15/21  9:45 PM   Result Value Ref Range    Troponin-I, Qt. <0.05 <0.05 ng/mL   TYPE & SCREEN    Collection Time: 06/15/21  9:50 PM   Result Value Ref Range    Crossmatch Expiration 06/18/2021,2359     ABO/Rh(D) O Positive     Antibody screen Negative     Comment JENNI HX:  O POS    EKG, 12 LEAD, INITIAL    Collection Time: 06/15/21 10:08 PM   Result Value Ref Range    Ventricular Rate 85 BPM    Atrial Rate 85 BPM    P-R Interval 208 ms    QRS Duration 84 ms    Q-T Interval 394 ms    QTC Calculation (Bezet) 468 ms    Calculated P Axis 100 degrees    Calculated R Axis -3 degrees    Calculated T Axis 102 degrees    Diagnosis       Sinus rhythm with Premature atrial complexes  Anterior infarct /poor R wave progression  Abnormal ECG  When compared with ECG of 15-APR-2019 00:33,  CA interval has decreased  QT has lengthened  Confirmed by Maddy Dumont MD, Daryl Cisse ([id number]) on 6/16/2021 6:59:00 AM     COVID-19 RAPID TEST    Collection Time: 06/16/21 12:23 AM   Result Value Ref Range    Specimen source Nasopharyngeal      COVID-19 rapid test Indeterminate (A) Not Detected     COVID-19 RAPID TEST    Collection Time: 06/16/21  2:16 AM   Result Value Ref Range    Specimen source Nasopharyngeal      COVID-19 rapid test Not Detected Not Detected     CBC WITH AUTOMATED DIFF    Collection Time: 06/16/21  9:49 AM   Result Value Ref Range    WBC 13.2 (H) 4.1 - 11.1 K/uL    RBC 2.43 (L) 4.10 - 5.70 M/uL    HGB 7.4 (L) 12.1 - 17.0 g/dL    HCT 24.2 (L) 36.6 - 50.3 %    MCV 99.6 (H) 80.0 - 99.0 FL    MCH 30.5 26.0 - 34.0 PG    MCHC 30.6 30.0 - 36.5 g/dL    RDW 17.5 (H) 11.5 - 14.5 %    PLATELET 093 594 - 586 K/uL    MPV 10.3 8.9 - 12.9 FL    NRBC 0.2 (H) 0.0  WBC    ABSOLUTE NRBC 0.03 (H) 0.00 - 0.01 K/uL    NEUTROPHILS PENDING %    LYMPHOCYTES PENDING %    MONOCYTES PENDING %    EOSINOPHILS PENDING %    BASOPHILS PENDING %    IMMATURE GRANULOCYTES PENDING %    ABS. NEUTROPHILS PENDING K/UL    ABS. LYMPHOCYTES PENDING K/UL    ABS. MONOCYTES PENDING K/UL    ABS. EOSINOPHILS PENDING K/UL    ABS.  BASOPHILS PENDING K/UL    ABS. IMM. GRANS. PENDING K/UL    DF PENDING    METABOLIC PANEL, BASIC    Collection Time: 06/16/21  9:49 AM   Result Value Ref Range    Sodium 137 136 - 145 mmol/L    Potassium 3.5 3.5 - 5.1 mmol/L    Chloride 102 97 - 108 mmol/L    CO2 24 21 - 32 mmol/L    Anion gap 11 5 - 15 mmol/L    Glucose 114 (H) 65 - 100 mg/dL    BUN 72 (H) 6 - 20 mg/dL    Creatinine 4.24 (H) 0.70 - 1.30 mg/dL    BUN/Creatinine ratio 17 12 - 20      GFR est AA 16 (L) >60 ml/min/1.73m2    GFR est non-AA 13 (L) >60 ml/min/1.73m2    Calcium 7.9 (L) 8.5 - 10.1 mg/dL       XR Results (most recent):  Results from Hospital Encounter encounter on 06/15/21    XR CHEST PORT    Narrative  XR CHEST PORT    Comparison:  4/28/2021. Single view:  Stable right diaphragmatic elevation. Negative for focal  consolidation. No pneumothorax or pleural effusion apparent. Transcatheter  aortic valve placement noted. The heart size is stable. There is no evidence of  acute cardiac decompensation. Impression  No evidence of an acute cardiopulmonary process. XR CHEST PORT   Final Result   No evidence of an acute cardiopulmonary process. Review of Systems:  Constitutional: Negative for chills and fever. HENT: Negative. Eyes: Negative. Respiratory: Negative. Cardiovascular: Negative. Gastrointestinal: Negative for abdominal pain and nausea. Skin: Negative. Neurological: Negative. Extremities: leg pain    Objective:   VITALS:    Visit Vitals  BP (!) 114/46 (BP 1 Location: Right lower arm, BP Patient Position: At rest)   Pulse 66   Temp 98.2 °F (36.8 °C)   Resp 16   Ht 6' 2\" (1.88 m)   Wt 110.2 kg (243 lb)   SpO2 93%   BMI 31.20 kg/m²       Physical Exam:   Constitutional: pt is oriented to person, place, and time. HENT:   Head: Normocephalic and atraumatic. Eyes: Pupils are equal, round, and reactive to light. EOM are normal.   Cardiovascular: Normal rate, regular rhythm and normal heart sounds. Pulmonary/Chest: Breath sounds normal. No wheezes. No rales. Exhibits no tenderness. Abdominal: Soft. Bowel sounds are normal. There is no abdominal tenderness. There is no rebound and no guarding. Musculoskeletal: Normal range of motion. Neurological: pt is alert and oriented to person, place, and time. Alert. Normal strength. No cranial nerve deficit or sensory deficit. Displays a negative Romberg sign.     Extremities: bilateral legs wrapped in dressings      ASSESSMENT & PLAN:    Acute on chronic kidney failure-creatinine 4.24 and GFR 16  Diabetes  Hypertension   Hyperlipidemia  Anemia-7.4  Leukocytosis  Leg wounds     Recheck labs in AM   Consult nephrology for worsening kidney function   SSI   Leg wound cultures pending         Current Facility-Administered Medications:     acetaminophen (TYLENOL) tablet 500 mg, 500 mg, Oral, Q4H PRN, Dinora Ramirez MD    acetaminophen (TYLENOL) tablet 1,000 mg, 1,000 mg, Oral, Q6H PRN, Dinora Ramirez MD    albuterol (PROVENTIL HFA, VENTOLIN HFA, PROAIR HFA) inhaler 2 Puff, 2 Puff, Inhalation, Q6H RT, Dinora Ramirez MD    amLODIPine (NORVASC) tablet 5 mg, 5 mg, Oral, DAILY, Dinora Ramirez MD, 5 mg at 06/16/21 1107    ferrous sulfate tablet 325 mg, 1 Tablet, Oral, TID WITH MEALS, Dinora Ramirez MD, 325 mg at 06/16/21 1107    gabapentin (NEURONTIN) capsule 100 mg, 100 mg, Oral, TID, Dinora Ramirez MD, 100 mg at 06/16/21 1107    furosemide (LASIX) tablet 40 mg, 40 mg, Oral, DAILY, Dinora Ramirez MD, 40 mg at 06/16/21 1107    latanoprost (XALATAN) 0.005 % ophthalmic solution 1 Drop, 1 Drop, Both Eyes, QPM, Dinora Ramirez MD    loperamide (IMODIUM) capsule 2 mg, 2 mg, Oral, Q4H PRN, Dinora Ramirez MD    pantoprazole (PROTONIX) tablet 40 mg, 40 mg, Oral, ACB, Dinora Ramirez MD, 40 mg at 06/16/21 1107    simvastatin (ZOCOR) tablet 10 mg, 10 mg, Oral, QHS, Dinora Ramirez MD    traMADoL (ULTRAM) tablet 50 mg, 50 mg, Oral, Q6H PRN, Denver Macadam, MD    ondansetron hcl [facility] tablet 4 mg, 4 mg, Oral, Q4H PRN, Shira Ramirez MD      ________________________________________________________________________    Signed: Jeni Dixon

## 2021-06-16 NOTE — ED NOTES
Assumed care of patient. Patient awake and alert; confused. No acute distress; no respiratory distress. Patient states his legs are in pain; otherwise, denies any other complaints at this time.

## 2021-06-16 NOTE — H&P
History and Physical    NAME: Brynn Bergeron   :  1932   MRN:  [id number]     Date/Time:  2021 11:40 AM    Patient PCP: Saravanan Hayden MD  ______________________________________________________________________             Subjective:     CHIEF COMPLAINT:   Abnormal labs   Acute kidney injury      HISTORY OF PRESENT ILLNESS:       Patient is a 80y.o. year old male with a past medical history of diabetes, hypertension, hyperlipidemia, renal insufficiency, and anemia history of liver mass who presented to the ER 6/15 due to abnormal labs drawn at the nursing home yesterday. He was found to have worsening kidney function. He was on dialysis previously, but had reportedly stopped dialysis several years ago. He denied chest pain and SOB. CXR showed no acute cardiopulmonary processes. He was admitted for further evaluation and treatment. Patient have a liver mass diagnosis and last admission as per note no further diagnosis procedure treatment was planned with the family by the attending    Today, the patient is alert, oriented, and in no acute distress. He complains of pain in his legs, but reports that this pain has been present for several years. He denies chest pain or SOB. Past Medical History:   Diagnosis Date    Arthritis     AVM (arteriovenous malformation) of colon     B12 deficiency     CAD (coronary artery disease)     Chronic anemia     Chronic kidney disease     Diabetes ([address]     GERD (gastroesophageal reflux disease)     Heart failure (HCC)     Remotely in his 46s    Hypertension     Ill-defined condition     chronic pressure and statis ulcers     PVD (peripheral vascular disease) ([facility])         Past Surgical History:   Procedure Laterality Date    HX HIP REPLACEMENT Bilateral        Social History     Tobacco Use    Smoking status: Never Smoker    Smokeless tobacco: Never Used   Substance Use Topics    Alcohol use: Never        History reviewed.  No pertinent family history. No Known Allergies     Prior to Admission medications    Medication Sig Start Date End Date Taking? Authorizing Provider   acetaminophen (TYLENOL) 325 mg tablet Take 2 Tabs by mouth every six (6) hours as needed for Pain. 5/3/21   Fartun Lezama MD   cyanocobalamin (VITAMIN B12) 1,000 mcg/mL injection 1,000 mcg by IntraMUSCular route Once every 2 weeks. Fátima Otoole MD   ferrous sulfate 325 mg (65 mg iron) tablet Take 325 mg by mouth Daily (before breakfast). Fátima Otoole MD   latanoprost (XALATAN) 0.005 % ophthalmic solution Administer 1 Drop to both eyes daily. Fátima Otoole MD   pantoprazole (PROTONIX) 40 mg tablet Take 40 mg by mouth daily.     Fátima Otoole MD         Current Facility-Administered Medications:     acetaminophen (TYLENOL) tablet 500 mg, 500 mg, Oral, Q4H PRN, Dinora Ramirez MD    acetaminophen (TYLENOL) tablet 1,000 mg, 1,000 mg, Oral, Q6H PRN, Dinora Ramirez MD    albuterol (PROVENTIL HFA, VENTOLIN HFA, PROAIR HFA) inhaler 2 Puff, 2 Puff, Inhalation, Q6H RT, Dinora Ramirez MD    amLODIPine (NORVASC) tablet 5 mg, 5 mg, Oral, DAILY, Dinora Ramirez MD, 5 mg at 06/16/21 1107    ferrous sulfate tablet 325 mg, 1 Tablet, Oral, TID WITH MEALS, Dinora Ramirez MD, 325 mg at 06/16/21 1107    gabapentin (NEURONTIN) capsule 100 mg, 100 mg, Oral, TID, Dinora Ramirez MD, 100 mg at 06/16/21 1107    [Held by provider] furosemide (LASIX) tablet 40 mg, 40 mg, Oral, DAILY, Dinora Ramirez MD, 40 mg at 06/16/21 1107    latanoprost (XALATAN) 0.005 % ophthalmic solution 1 Drop, 1 Drop, Both Eyes, QPM, Dinora Ramirez MD    loperamide (IMODIUM) capsule 2 mg, 2 mg, Oral, Q4H PRN, Dinora Ramirez MD    pantoprazole (PROTONIX) tablet 40 mg, 40 mg, Oral, ACB, Dinora Ramirez MD, 40 mg at 06/16/21 1107    simvastatin (ZOCOR) tablet 10 mg, 10 mg, Oral, QHS, Dinora Ramirez MD    traMADoL (ULTRAM) tablet 50 mg, 50 mg, Oral, Q6H PRN, MD Lorena Zhang Sit ondansetron hcl (ZOFRAN) tablet 4 mg, 4 mg, Oral, Q4H PRN, Christy Ramirez MD    cyanocobalamin (VITAMIN B12) injection 1,000 mcg, 1,000 mcg, IntraMUSCular, EVERY 2 WEEKS, Christy Ramirez MD    [START ON 6/17/2021] ferrous sulfate tablet 325 mg, 325 mg, Oral, ACB, Christy Ramirez MD  Monty Chris  [START ON 6/17/2021] latanoprost (XALATAN) 0.005 % ophthalmic solution 1 Drop, 1 Drop, Both Eyes, DAILY, Christy Ramirez MD    [START ON 6/17/2021] pantoprazole (PROTONIX) tablet 40 mg, 40 mg, Oral, DAILY, Christy Ramirez MD    insulin lispro (HUMALOG) injection, , SubCUTAneous, AC&HS, Christy Ramirez MD    glucose chewable tablet 16 g, 4 Tablet, Oral, PRN, Dinora Ramirez MD    dextrose (D50W) injection syrg 12.5-25 g, 25-50 mL, IntraVENous, PRN, Christy Ramirez MD    glucagon (GLUCAGEN) injection 1 mg, 1 mg, IntraMUSCular, PRN, Christy Ramirez MD    0.9% sodium chloride infusion, 75 mL/hr, IntraVENous, CONTINUOUS, Christy Ramirez MD    acetaminophen (TYLENOL) tablet 650 mg, 650 mg, Oral, Q6H PRN **OR** acetaminophen (TYLENOL) suppository 650 mg, 650 mg, Rectal, Q6H PRN, Christy Ramirez MD    polyethylene glycol (MIRALAX) packet 17 g, 17 g, Oral, DAILY PRN, Christy Ramirez MD    ondansetron (ZOFRAN ODT) tablet 4 mg, 4 mg, Oral, Q8H PRN **OR** ondansetron (ZOFRAN) injection 4 mg, 4 mg, IntraVENous, Q6H PRN, Dinora Ramirez MD    heparin (porcine) injection 5,000 Units, 5,000 Units, SubCUTAneous, Q8H, Dinora Ramirez MD    LAB DATA REVIEWED:    Recent Results (from the past 24 hour(s))   CBC WITH AUTOMATED DIFF    Collection Time: 06/15/21  9:45 PM   Result Value Ref Range    WBC 14.3 (H) 4.1 - 11.1 K/uL    RBC 2.61 (L) 4.10 - 5.70 M/uL    HGB 8.1 (L) 12.1 - 17.0 g/dL    HCT 25.7 (L) 36.6 - 50.3 %    MCV 98.5 80.0 - 99.0 FL    MCH 31.0 26.0 - 34.0 PG    MCHC 31.5 30.0 - 36.5 g/dL    RDW 17.6 (H) 11.5 - 14.5 %    PLATELET 837 140 - 099 K/uL    MPV 9.8 8.9 - 12.9 FL    NRBC 0.2 (H) 0.0  WBC ABSOLUTE NRBC 0.03 (H) 0.00 - 0.01 K/uL    NEUTROPHILS 52 32 - 75 %    LYMPHOCYTES 31 12 - 49 %    MONOCYTES 9 5 - 13 %    EOSINOPHILS 3 0 - 7 %    BASOPHILS 0 0 - 1 %    IMMATURE GRANULOCYTES 0 0 - 0.5 %    ABS. NEUTROPHILS 7.4 1.8 - 8.0 K/UL    ABS. LYMPHOCYTES 4.4 (H) 0.8 - 3.5 K/UL    ABS. MONOCYTES 1.3 (H) 0.0 - 1.0 K/UL    ABS. EOSINOPHILS 0.4 0.0 - 0.4 K/UL    ABS. BASOPHILS 0.0 0.0 - 0.1 K/UL    ABS. IMM. GRANS. 0.0 0.00 - 0.04 K/UL    DF AUTOMATED      METAMYELOCYTES 2 %    MYELOCYTES 2 %    BLASTS 1 %    RBC COMMENTS Hypochromia  1+       METABOLIC PANEL, COMPREHENSIVE    Collection Time: 06/15/21  9:45 PM   Result Value Ref Range    Sodium 134 (L) 136 - 145 mmol/L    Potassium 3.6 3.5 - 5.1 mmol/L    Chloride 100 97 - 108 mmol/L    CO2 23 21 - 32 mmol/L    Anion gap 11 5 - 15 mmol/L    Glucose 155 (H) 65 - 100 mg/dL    BUN 73 (H) 6 - 20 mg/dL    Creatinine 4.32 (H) 0.70 - 1.30 mg/dL    BUN/Creatinine ratio 17 12 - 20      GFR est AA 16 (L) >60 ml/min/1.73m2    GFR est non-AA 13 (L) >60 ml/min/1.73m2    Calcium 7.8 (L) 8.5 - 10.1 mg/dL    Bilirubin, total 3.9 (H) 0.2 - 1.0 mg/dL    AST (SGOT) 76 (H) 15 - 37 U/L    ALT (SGPT) 24 12 - 78 U/L    Alk.  phosphatase 579 (H) 45 - 117 U/L    Protein, total 7.4 6.4 - 8.2 g/dL    Albumin 1.6 (L) 3.5 - 5.0 g/dL    Globulin 5.8 (H) 2.0 - 4.0 g/dL    A-G Ratio 0.3 (L) 1.1 - 2.2     PROTHROMBIN TIME + INR    Collection Time: 06/15/21  9:45 PM   Result Value Ref Range    Prothrombin time 14.7 11.9 - 14.7 sec    INR 1.2 (H) 0.9 - 1.1     TROPONIN I    Collection Time: 06/15/21  9:45 PM   Result Value Ref Range    Troponin-I, Qt. <0.05 <0.05 ng/mL   TYPE & SCREEN    Collection Time: 06/15/21  9:50 PM   Result Value Ref Range    Crossmatch Expiration 06/18/2021,2359     ABO/Rh(D) O Positive     Antibody screen Negative     Comment JENNI HX:  O POS    EKG, 12 LEAD, INITIAL    Collection Time: 06/15/21 10:08 PM   Result Value Ref Range    Ventricular Rate 85 BPM    Atrial Rate 85 BPM    P-R Interval 208 ms    QRS Duration 84 ms    Q-T Interval 394 ms    QTC Calculation (Bezet) 468 ms    Calculated P Axis 100 degrees    Calculated R Axis -3 degrees    Calculated T Axis 102 degrees    Diagnosis       Sinus rhythm with Premature atrial complexes  Anterior infarct /poor R wave progression  Abnormal ECG  When compared with ECG of 15-APR-2019 00:33,  MO interval has decreased  QT has lengthened  Confirmed by Kylah Aguilar MD, Kwabena Novoa ([id number]) on 6/16/2021 6:59:00 AM     COVID-19 RAPID TEST    Collection Time: 06/16/21 12:23 AM   Result Value Ref Range    Specimen source Nasopharyngeal      COVID-19 rapid test Indeterminate (A) Not Detected     COVID-19 RAPID TEST    Collection Time: 06/16/21  2:16 AM   Result Value Ref Range    Specimen source Nasopharyngeal      COVID-19 rapid test Not Detected Not Detected     CBC WITH AUTOMATED DIFF    Collection Time: 06/16/21  9:49 AM   Result Value Ref Range    WBC 13.2 (H) 4.1 - 11.1 K/uL    RBC 2.43 (L) 4.10 - 5.70 M/uL    HGB 7.4 (L) 12.1 - 17.0 g/dL    HCT 24.2 (L) 36.6 - 50.3 %    MCV 99.6 (H) 80.0 - 99.0 FL    MCH 30.5 26.0 - 34.0 PG    MCHC 30.6 30.0 - 36.5 g/dL    RDW 17.5 (H) 11.5 - 14.5 %    PLATELET 518 473 - 257 K/uL    MPV 10.3 8.9 - 12.9 FL    NRBC 0.2 (H) 0.0  WBC    ABSOLUTE NRBC 0.03 (H) 0.00 - 0.01 K/uL    NEUTROPHILS PENDING %    LYMPHOCYTES PENDING %    MONOCYTES PENDING %    EOSINOPHILS PENDING %    BASOPHILS PENDING %    IMMATURE GRANULOCYTES PENDING %    ABS. NEUTROPHILS PENDING K/UL    ABS. LYMPHOCYTES PENDING K/UL    ABS. MONOCYTES PENDING K/UL    ABS. EOSINOPHILS PENDING K/UL    ABS. BASOPHILS PENDING K/UL    ABS. IMM. GRANS.  PENDING K/UL    DF PENDING    METABOLIC PANEL, BASIC    Collection Time: 06/16/21  9:49 AM   Result Value Ref Range    Sodium 137 136 - 145 mmol/L    Potassium 3.5 3.5 - 5.1 mmol/L    Chloride 102 97 - 108 mmol/L    CO2 24 21 - 32 mmol/L    Anion gap 11 5 - 15 mmol/L    Glucose 114 (H) 65 - 100 mg/dL BUN 72 (H) 6 - 20 mg/dL    Creatinine 4.24 (H) 0.70 - 1.30 mg/dL    BUN/Creatinine ratio 17 12 - 20      GFR est AA 16 (L) >60 ml/min/1.73m2    GFR est non-AA 13 (L) >60 ml/min/1.73m2    Calcium 7.9 (L) 8.5 - 10.1 mg/dL       XR Results (most recent):  Results from Hospital Encounter encounter on 06/15/21    XR CHEST PORT    Narrative  XR CHEST PORT    Comparison:  4/28/2021. Single view:  Stable right diaphragmatic elevation. Negative for focal  consolidation. No pneumothorax or pleural effusion apparent. Transcatheter  aortic valve placement noted. The heart size is stable. There is no evidence of  acute cardiac decompensation. Impression  No evidence of an acute cardiopulmonary process. XR CHEST PORT   Final Result   No evidence of an acute cardiopulmonary process. Review of Systems:  Constitutional: Negative for chills and fever. HENT: Negative. Eyes: Negative. Respiratory: Negative. Cardiovascular: Negative. Gastrointestinal: Negative for abdominal pain and nausea. Skin: Negative. Neurological: Negative. Extremities: leg pain    Objective:   VITALS:    Visit Vitals  BP (!) 114/46 (BP 1 Location: Right lower arm, BP Patient Position: At rest)   Pulse 66   Temp 98.2 °F (36.8 °C)   Resp 16   Ht 6' 2\" (1.88 m)   Wt 110.2 kg (243 lb)   SpO2 93%   BMI 31.20 kg/m²       Physical Exam:   Constitutional: Alert awake. HENT:   Head: Normocephalic and atraumatic. Eyes: Pupils are equal, round, and reactive to light. EOM are normal.   Cardiovascular: Normal rate, regular rhythm and normal heart sounds. Pulmonary/Chest: Breath sounds normal. No wheezes. No rales. Exhibits no tenderness. Abdominal: Soft. Bowel sounds are normal. There is no abdominal tenderness. There is no rebound and no guarding. Musculoskeletal: Normal range of motion. Neurological: Alert awake x1 t. Normal strength. No cranial nerve deficit or sensory deficit. Displays a negative Romberg sign. Extremities: bilateral legs wrapped in dressings      ASSESSMENT & PLAN:    Acute on chronic kidney failure-creatinine 4.24 and GFR 16  Diabetes  Hypertension   Hyperlipidemia  Anemia-7.4  Leukocytosis  Leg wounds   Liver mass    Recheck labs in AM   Consult nephrology for worsening kidney function   SSI   Wound care nurse consult      Discontinue Lasix and continue IV fluids  Continue other home medications  And repeat the labs in the morning    Current Facility-Administered Medications:     acetaminophen (TYLENOL) tablet 500 mg, 500 mg, Oral, Q4H PRN, Dinora Ramirez MD    acetaminophen (TYLENOL) tablet 1,000 mg, 1,000 mg, Oral, Q6H PRN, Dinora Ramirez MD    albuterol (PROVENTIL HFA, VENTOLIN HFA, PROAIR HFA) inhaler 2 Puff, 2 Puff, Inhalation, Q6H RT, Dinora Ramirez MD    amLODIPine (NORVASC) tablet 5 mg, 5 mg, Oral, DAILY, Dinora Ramirez MD, 5 mg at 06/16/21 1107    ferrous sulfate tablet 325 mg, 1 Tablet, Oral, TID WITH MEALS, Dinora Ramirez MD, 325 mg at 06/16/21 1107    gabapentin (NEURONTIN) capsule 100 mg, 100 mg, Oral, TID, Dinora Ramirez MD, 100 mg at 06/16/21 1107    [Held by provider] furosemide (LASIX) tablet 40 mg, 40 mg, Oral, DAILY, Dinora Ramirez MD, 40 mg at 06/16/21 1107    latanoprost (XALATAN) 0.005 % ophthalmic solution 1 Drop, 1 Drop, Both Eyes, QPM, Dinora Ramirez MD    loperamide (IMODIUM) capsule 2 mg, 2 mg, Oral, Q4H PRN, Dinora Ramirez MD    pantoprazole (PROTONIX) tablet 40 mg, 40 mg, Oral, ACB, Dinora Ramirez MD, 40 mg at 06/16/21 1107    simvastatin (ZOCOR) tablet 10 mg, 10 mg, Oral, QHS, Dinora Ramirez MD    traMADoL (ULTRAM) tablet 50 mg, 50 mg, Oral, Q6H PRN, Dinora Ramirez MD    ondansetron hcl (ZOFRAN) tablet 4 mg, 4 mg, Oral, Q4H PRN, Dinora Ramirez MD    cyanocobalamin (VITAMIN B12) injection 1,000 mcg, 1,000 mcg, IntraMUSCular, EVERY 2 WEEKS, Dinora Ramirez MD    [START ON 6/17/2021] ferrous sulfate tablet 325 mg, 325 mg, Oral, ACB, Merlene Ramirez MD    [START ON 6/17/2021] latanoprost (XALATAN) 0.005 % ophthalmic solution 1 Drop, 1 Drop, Both Eyes, DAILY, Merlene Ramirez MD    [START ON 6/17/2021] pantoprazole (PROTONIX) tablet 40 mg, 40 mg, Oral, DAILY, Merlene Ramirez MD    insulin lispro (HUMALOG) injection, , SubCUTAneous, AC&HS, Merlene Ramirez MD    glucose chewable tablet 16 g, 4 Tablet, Oral, PRN, Merlene Ramirez MD    dextrose (D50W) injection syrg 12.5-25 g, 25-50 mL, IntraVENous, PRN, Merlene Ramirez MD    glucagon (GLUCAGEN) injection 1 mg, 1 mg, IntraMUSCular, PRN, Merlene Ramirez MD    0.9% sodium chloride infusion, 75 mL/hr, IntraVENous, CONTINUOUS, Merlene Ramirez MD    acetaminophen (TYLENOL) tablet 650 mg, 650 mg, Oral, Q6H PRN **OR** acetaminophen (TYLENOL) suppository 650 mg, 650 mg, Rectal, Q6H PRN, Merlene Ramirez MD    polyethylene glycol (MIRALAX) packet 17 g, 17 g, Oral, DAILY PRN, Dinora Ramirez MD    ondansetron (ZOFRAN ODT) tablet 4 mg, 4 mg, Oral, Q8H PRN **OR** ondansetron (ZOFRAN) injection 4 mg, 4 mg, IntraVENous, Q6H PRN, Dinora Ramirez MD    heparin (porcine) injection 5,000 Units, 5,000 Units, SubCUTAneous, Q8H, Dinora Ramirez MD      ________________________________________________________________________    Signed: Dedra Pearl MD

## 2021-06-16 NOTE — ED NOTES
...TRANSFER - OUT REPORT:    Verbal report given to Shelly Tony RN on Kellie Mullen  being transferred to [facility] for routine progression of care       Report consisted of patients Situation, Background, Assessment and   Recommendations(SBAR). Information from the following report(s) SBAR, ED Summary and MAR was reviewed with the receiving nurse. Lines:   Peripheral IV 06/15/21 Distal;Left Cephalic (Active)        Opportunity for questions and clarification was provided.       Patient transported with:   Kopo Kopo

## 2021-06-16 NOTE — CONSULTS
Comprehensive Nutrition Assessment    Type and Reason for Visit: Consult, Wound    Nutrition Recommendations/Plan:   Add ensure HP TIB (320kcal, 32g pro)  Add Jesus Alberto TIB    Nephrology to assess need for HD    Nutrition Assessment:  Admitted for abnormal lab results. Pt with worsening kidney function- stopped HD years ago. Pt provided diet hx, but questions his reliability. Pt was eating 3 meals/day PTA but unable to say how much of those meals he was eating. RD say B at bedside- ~25% consumed. RD will order ONS for wounds. Labs: H/H 7.4/24.2, Na 134, BUN 73, Creat 4.32,Glu 155, Ca 7.8, AST 76, Alk Phos 579, Bili 3.9. Meds: amlodipine, FeSu, furosemide, gabapentin, PPI. Malnutrition Assessment:  Malnutrition Status:  No malnutrition    Context:  Acute illness     Findings of the 6 clinical characteristics of malnutrition:   Energy Intake:  Mild decrease in energy intake (specify) (<50% of EEN x1 day)  Weight Loss:  No significant weight loss     Body Fat Loss:  No significant body fat loss,     Muscle Mass Loss:  No significant muscle mass loss,    Fluid Accumulation:  No significant fluid accumulation,      Estimated Daily Nutrient Needs:  Energy (kcal): 2300kcal (25kcal/kg); Weight Used for Energy Requirements: Adjusted (92.1kg)  Protein (g): 120g (1.3g/kg); Weight Used for Protein Requirements: Adjusted (92.1kg)  Fluid (ml/day): 1500ml; Method Used for Fluid Requirements: Other (comment) (adult minimum)    Nutrition Related Findings:  NFPE not performed- appears nourished. No BM since admit. Denies c/s issues, good dentition. No N/V/D/C. No edema.       Wounds:    Stage II (buttocks, gluteal cleft)       Current Nutrition Therapies:  ADULT DIET Regular    Anthropometric Measures:  · Height:  6' 2\" (188 cm)  · Current Body Wt:  110.2 kg (243 lb)   · Usual Body Wt:   (TREVOR)     · Ideal Body Wt:  190 lbs:  127.9 %   · Adjusted Body Weight:   ; Weight Adjustment for:  (92.1kg)   · BMI Category:  Obese class 1 (BMI 30.0-34. 9)       Nutrition Diagnosis:   No nutrition diagnosis at this time   *too early to assess cause of decreased intake    Nutrition Interventions:   Food and/or Nutrient Delivery: Continue current diet, Start oral nutrition supplement  Nutrition Education and Counseling: No recommendations at this time  Coordination of Nutrition Care: Continue to monitor while inpatient    Goals:  Pt to meet >75% of EEN within 7 days. Nutrition Monitoring and Evaluation:   Behavioral-Environmental Outcomes: None identified  Food/Nutrient Intake Outcomes: Food and nutrient intake, Supplement intake  Physical Signs/Symptoms Outcomes: Biochemical data, Meal time behavior, Fluid status or edema    Discharge Planning:     Too soon to determine     Electronically signed by Aniya Antony RD on 6/16/2021 at 11:21 AM    Contact: [phone]

## 2021-06-16 NOTE — PROGRESS NOTES
[de-identified]SKIN ASSESSMENT:::    Patient skin assessment completed with Natalya Yousif RN. Left shin covered in venous stasis ulcers, left heel pressure ulcer, RIGHT shin and right calf venous stasis ulcers, Right buttocks and left buttocks stage 2 PU, and gluteal cleft is stage 2 vs shear/tear     Cultures taken. Wounds washed and redressed. Wound care consulted.

## 2021-06-17 LAB
ALBUMIN SERPL-MCNC: 1.4 G/DL (ref 3.5–5)
ALBUMIN/GLOB SERPL: 0.3 {RATIO} (ref 1.1–2.2)
ALP SERPL-CCNC: 539 U/L (ref 45–117)
ALT SERPL-CCNC: 19 U/L (ref 12–78)
ANION GAP SERPL CALC-SCNC: 11 MMOL/L (ref 5–15)
AST SERPL W P-5'-P-CCNC: 81 U/L (ref 15–37)
BASOPHILS # BLD: 0 K/UL (ref 0–0.1)
BASOPHILS NFR BLD: 0 % (ref 0–1)
BILIRUB SERPL-MCNC: 3.3 MG/DL (ref 0.2–1)
BUN SERPL-MCNC: 75 MG/DL (ref 6–20)
BUN/CREAT SERPL: 18 (ref 12–20)
CA-I BLD-MCNC: 7.6 MG/DL (ref 8.5–10.1)
CHLORIDE SERPL-SCNC: 105 MMOL/L (ref 97–108)
CO2 SERPL-SCNC: 23 MMOL/L (ref 21–32)
CREAT SERPL-MCNC: 4.14 MG/DL (ref 0.7–1.3)
CREAT UR-MCNC: 111 MG/DL
CREAT UR-MCNC: 114 MG/DL
DIFFERENTIAL METHOD BLD: ABNORMAL
EOSINOPHIL # BLD: 0.6 K/UL (ref 0–0.4)
EOSINOPHIL NFR BLD: 4 % (ref 0–7)
ERYTHROCYTE [DISTWIDTH] IN BLOOD BY AUTOMATED COUNT: 17.8 % (ref 11.5–14.5)
GLOBULIN SER CALC-MCNC: 4.9 G/DL (ref 2–4)
GLUCOSE BLD STRIP.AUTO-MCNC: 81 MG/DL (ref 65–117)
GLUCOSE BLD STRIP.AUTO-MCNC: 92 MG/DL (ref 65–117)
GLUCOSE BLD STRIP.AUTO-MCNC: 97 MG/DL (ref 65–117)
GLUCOSE BLD STRIP.AUTO-MCNC: 99 MG/DL (ref 65–117)
GLUCOSE SERPL-MCNC: 74 MG/DL (ref 65–100)
HCT VFR BLD AUTO: 22.7 % (ref 36.6–50.3)
HGB BLD-MCNC: 7 G/DL (ref 12.1–17)
IMM GRANULOCYTES # BLD AUTO: 0 K/UL
IMM GRANULOCYTES NFR BLD AUTO: 0 %
LYMPHOCYTES # BLD: 2.5 K/UL (ref 0.8–3.5)
LYMPHOCYTES NFR BLD: 17 % (ref 12–49)
MCH RBC QN AUTO: 30.2 PG (ref 26–34)
MCHC RBC AUTO-ENTMCNC: 30.8 G/DL (ref 30–36.5)
MCV RBC AUTO: 97.8 FL (ref 80–99)
MONOCYTES # BLD: 0.7 K/UL (ref 0–1)
MONOCYTES NFR BLD: 5 % (ref 5–13)
MYELOCYTES NFR BLD MANUAL: 1 %
NEUTS SEG # BLD: 10.6 K/UL (ref 1.8–8)
NEUTS SEG NFR BLD: 73 % (ref 32–75)
NRBC # BLD: 0.06 K/UL (ref 0–0.01)
NRBC BLD-RTO: 0.4 PER 100 WBC
PERFORMED BY, TECHID: NORMAL
PLATELET # BLD AUTO: 197 K/UL (ref 150–400)
PMV BLD AUTO: 9.9 FL (ref 8.9–12.9)
POTASSIUM SERPL-SCNC: 3.4 MMOL/L (ref 3.5–5.1)
POTASSIUM UR-SCNC: 34 MMOL/L
PROT SERPL-MCNC: 6.3 G/DL (ref 6.4–8.2)
PROT UR-MCNC: 41 MG/DL (ref 0–11.9)
PROT UR-MCNC: 42 MG/DL (ref 0–11.9)
PROT/CREAT UR-RTO: 0.4
RBC # BLD AUTO: 2.32 M/UL (ref 4.1–5.7)
RBC MORPH BLD: ABNORMAL
SODIUM SERPL-SCNC: 139 MMOL/L (ref 136–145)
SODIUM UR-SCNC: 22 MMOL/L
WBC # BLD AUTO: 14.5 K/UL (ref 4.1–11.1)

## 2021-06-17 PROCEDURE — 74011250637 HC RX REV CODE- 250/637: Performed by: FAMILY MEDICINE

## 2021-06-17 PROCEDURE — 74011250636 HC RX REV CODE- 250/636: Performed by: INTERNAL MEDICINE

## 2021-06-17 PROCEDURE — 84156 ASSAY OF PROTEIN URINE: CPT

## 2021-06-17 PROCEDURE — 94640 AIRWAY INHALATION TREATMENT: CPT

## 2021-06-17 PROCEDURE — 74011000250 HC RX REV CODE- 250: Performed by: FAMILY MEDICINE

## 2021-06-17 PROCEDURE — 80053 COMPREHEN METABOLIC PANEL: CPT

## 2021-06-17 PROCEDURE — 74011250636 HC RX REV CODE- 250/636: Performed by: FAMILY MEDICINE

## 2021-06-17 PROCEDURE — 74011000258 HC RX REV CODE- 258: Performed by: INTERNAL MEDICINE

## 2021-06-17 PROCEDURE — 36415 COLL VENOUS BLD VENIPUNCTURE: CPT

## 2021-06-17 PROCEDURE — 84133 ASSAY OF URINE POTASSIUM: CPT

## 2021-06-17 PROCEDURE — 82570 ASSAY OF URINE CREATININE: CPT

## 2021-06-17 PROCEDURE — 84300 ASSAY OF URINE SODIUM: CPT

## 2021-06-17 PROCEDURE — 82962 GLUCOSE BLOOD TEST: CPT

## 2021-06-17 PROCEDURE — 94760 N-INVAS EAR/PLS OXIMETRY 1: CPT

## 2021-06-17 PROCEDURE — 99232 SBSQ HOSP IP/OBS MODERATE 35: CPT | Performed by: INTERNAL MEDICINE

## 2021-06-17 PROCEDURE — 65270000032 HC RM SEMIPRIVATE

## 2021-06-17 PROCEDURE — 85025 COMPLETE CBC W/AUTO DIFF WBC: CPT

## 2021-06-17 RX ADMIN — HEPARIN SODIUM 5000 UNITS: 5000 INJECTION INTRAVENOUS; SUBCUTANEOUS at 22:27

## 2021-06-17 RX ADMIN — TRAMADOL HYDROCHLORIDE 50 MG: 50 TABLET, FILM COATED ORAL at 11:35

## 2021-06-17 RX ADMIN — HEPARIN SODIUM 5000 UNITS: 5000 INJECTION INTRAVENOUS; SUBCUTANEOUS at 11:22

## 2021-06-17 RX ADMIN — ALBUTEROL SULFATE 2 PUFF: 108 AEROSOL, METERED RESPIRATORY (INHALATION) at 22:41

## 2021-06-17 RX ADMIN — WHITE PETROLATUM,ZINC OXIDE: 57; 17 PASTE TOPICAL at 11:31

## 2021-06-17 RX ADMIN — ALBUTEROL SULFATE 2 PUFF: 108 AEROSOL, METERED RESPIRATORY (INHALATION) at 01:36

## 2021-06-17 RX ADMIN — PIPERACILLIN AND TAZOBACTAM 3.38 G: 3; .375 INJECTION, POWDER, LYOPHILIZED, FOR SOLUTION INTRAVENOUS at 06:30

## 2021-06-17 RX ADMIN — GABAPENTIN 100 MG: 100 CAPSULE ORAL at 22:27

## 2021-06-17 RX ADMIN — GABAPENTIN 100 MG: 100 CAPSULE ORAL at 11:23

## 2021-06-17 RX ADMIN — LATANOPROST 1 DROP: 50 SOLUTION OPHTHALMIC at 22:26

## 2021-06-17 RX ADMIN — AMLODIPINE BESYLATE 5 MG: 5 TABLET ORAL at 11:23

## 2021-06-17 RX ADMIN — FERROUS SULFATE TAB 325 MG (65 MG ELEMENTAL FE) 325 MG: 325 (65 FE) TAB at 18:05

## 2021-06-17 RX ADMIN — GABAPENTIN 100 MG: 100 CAPSULE ORAL at 18:05

## 2021-06-17 RX ADMIN — FERROUS SULFATE TAB 325 MG (65 MG ELEMENTAL FE) 325 MG: 325 (65 FE) TAB at 11:23

## 2021-06-17 RX ADMIN — ALBUTEROL SULFATE 2 PUFF: 108 AEROSOL, METERED RESPIRATORY (INHALATION) at 14:41

## 2021-06-17 RX ADMIN — ALBUTEROL SULFATE 2 PUFF: 108 AEROSOL, METERED RESPIRATORY (INHALATION) at 08:44

## 2021-06-17 RX ADMIN — WHITE PETROLATUM,ZINC OXIDE: 57; 17 PASTE TOPICAL at 18:08

## 2021-06-17 RX ADMIN — WHITE PETROLATUM,ZINC OXIDE: 57; 17 PASTE TOPICAL at 22:27

## 2021-06-17 RX ADMIN — PIPERACILLIN AND TAZOBACTAM 3.38 G: 3; .375 INJECTION, POWDER, LYOPHILIZED, FOR SOLUTION INTRAVENOUS at 18:06

## 2021-06-17 RX ADMIN — SIMVASTATIN 10 MG: 10 TABLET, FILM COATED ORAL at 22:26

## 2021-06-17 RX ADMIN — PANTOPRAZOLE SODIUM 40 MG: 40 TABLET, DELAYED RELEASE ORAL at 11:23

## 2021-06-17 NOTE — PROGRESS NOTES
General Daily Progress Note          Patient Name:   Deni Lui       YOB: 1932       Age:  80 y.o. Admit Date: 6/15/2021      Subjective:     Patient is a 80y.o. year old male with a past medical history of diabetes, hypertension, hyperlipidemia, renal insufficiency, and anemia history of liver mass who presented to the ER 6/15 due to abnormal labs drawn at the nursing home yesterday. He was found to have worsening kidney function. He was on dialysis previously, but had reportedly stopped dialysis several years ago. He denied chest pain and SOB. CXR showed no acute cardiopulmonary processes. He was admitted for further evaluation and treatment.      Patient have a liver mass diagnosis and last admission as per note no further diagnosis procedure treatment was planned with the family by the attending     Today, the patient awake and in no acute distress. He complains of pain in his legs, but reports that this pain has been present for several years. He denies chest pain or SOB. He is not eating his food.  /May need feeding             Objective:     Visit Vitals  BP (!) 100/44   Pulse (!) 104   Temp 98.2 °F (36.8 °C)   Resp 18   Ht 6' 2\" (1.88 m)   Wt 110.2 kg (243 lb)   SpO2 94%   BMI 31.20 kg/m²        Recent Results (from the past 24 hour(s))   GLUCOSE, POC    Collection Time: 06/16/21  3:19 PM   Result Value Ref Range    Glucose (POC) 114 65 - 117 mg/dL    Performed by Ashley Christian([facility])    METABOLIC PANEL, COMPREHENSIVE    Collection Time: 06/17/21 10:15 AM   Result Value Ref Range    Sodium 139 136 - 145 mmol/L    Potassium 3.4 (L) 3.5 - 5.1 mmol/L    Chloride 105 97 - 108 mmol/L    CO2 23 21 - 32 mmol/L    Anion gap 11 5 - 15 mmol/L    Glucose 74 65 - 100 mg/dL    BUN 75 (H) 6 - 20 mg/dL    Creatinine 4.14 (H) 0.70 - 1.30 mg/dL    BUN/Creatinine ratio 18 12 - 20      GFR est AA 17 (L) >60 ml/min/1.73m2    GFR est non-AA 14 (L) >60 ml/min/1.73m2    Calcium 7.6 (L) 8.5 - 10.1 mg/dL Bilirubin, total 3.3 (H) 0.2 - 1.0 mg/dL    AST (SGOT) 81 (H) 15 - 37 U/L    ALT (SGPT) 19 12 - 78 U/L    Alk. phosphatase 539 (H) 45 - 117 U/L    Protein, total 6.3 (L) 6.4 - 8.2 g/dL    Albumin 1.4 (L) 3.5 - 5.0 g/dL    Globulin 4.9 (H) 2.0 - 4.0 g/dL    A-G Ratio 0.3 (L) 1.1 - 2.2     CBC WITH AUTOMATED DIFF    Collection Time: 06/17/21 10:15 AM   Result Value Ref Range    WBC 14.5 (H) 4.1 - 11.1 K/uL    RBC 2.32 (L) 4.10 - 5.70 M/uL    HGB 7.0 (L) 12.1 - 17.0 g/dL    HCT 22.7 (L) 36.6 - 50.3 %    MCV 97.8 80.0 - 99.0 FL    MCH 30.2 26.0 - 34.0 PG    MCHC 30.8 30.0 - 36.5 g/dL    RDW 17.8 (H) 11.5 - 14.5 %    PLATELET 776 398 - 689 K/uL    MPV 9.9 8.9 - 12.9 FL    NRBC 0.4 (H) 0.0  WBC    ABSOLUTE NRBC 0.06 (H) 0.00 - 0.01 K/uL    NEUTROPHILS PENDING %    LYMPHOCYTES PENDING %    MONOCYTES PENDING %    EOSINOPHILS PENDING %    BASOPHILS PENDING %    IMMATURE GRANULOCYTES PENDING %    ABS. NEUTROPHILS PENDING K/UL    ABS. LYMPHOCYTES PENDING K/UL    ABS. MONOCYTES PENDING K/UL    ABS. EOSINOPHILS PENDING K/UL    ABS. BASOPHILS PENDING K/UL    ABS. IMM. GRANS. PENDING K/UL    DF PENDING    GLUCOSE, POC    Collection Time: 06/17/21 11:20 AM   Result Value Ref Range    Glucose (POC) 81 65 - 117 mg/dL    Performed by Jacques Christian([facility])      [unfilled]      Review of Systems    Constitutional: Negative for chills and fever. HENT: Negative. Eyes: Negative. Respiratory: Negative. Cardiovascular: Negative. Gastrointestinal: Negative for abdominal pain and nausea. Skin: Negative. Neurological: Negative. Physical Exam:      Constitutional: alert, awake  HENT:   Head: Normocephalic and atraumatic. Eyes: Pupils are equal, round, and reactive to light. EOM are normal.   Cardiovascular: Normal rate, regular rhythm and normal heart sounds. Pulmonary/Chest: Breath sounds normal. No wheezes. No rales. Exhibits no tenderness. Abdominal: Soft.  Bowel sounds are normal. There is no abdominal tenderness. There is no rebound and no guarding. Musculoskeletal: Normal range of motion. Neurological:alert, awake  Extremities: bilateral legs wrapped in dressings      US RETROPERITONEUM COMP   Final Result   Borderline right-sided hydronephrosis. Ascites. XR CHEST PORT   Final Result   No evidence of an acute cardiopulmonary process. Recent Results (from the past 24 hour(s))   GLUCOSE, POC    Collection Time: 06/16/21  3:19 PM   Result Value Ref Range    Glucose (POC) 114 65 - 117 mg/dL    Performed by Angel Christian([facility])    METABOLIC PANEL, COMPREHENSIVE    Collection Time: 06/17/21 10:15 AM   Result Value Ref Range    Sodium 139 136 - 145 mmol/L    Potassium 3.4 (L) 3.5 - 5.1 mmol/L    Chloride 105 97 - 108 mmol/L    CO2 23 21 - 32 mmol/L    Anion gap 11 5 - 15 mmol/L    Glucose 74 65 - 100 mg/dL    BUN 75 (H) 6 - 20 mg/dL    Creatinine 4.14 (H) 0.70 - 1.30 mg/dL    BUN/Creatinine ratio 18 12 - 20      GFR est AA 17 (L) >60 ml/min/1.73m2    GFR est non-AA 14 (L) >60 ml/min/1.73m2    Calcium 7.6 (L) 8.5 - 10.1 mg/dL    Bilirubin, total 3.3 (H) 0.2 - 1.0 mg/dL    AST (SGOT) 81 (H) 15 - 37 U/L    ALT (SGPT) 19 12 - 78 U/L    Alk.  phosphatase 539 (H) 45 - 117 U/L    Protein, total 6.3 (L) 6.4 - 8.2 g/dL    Albumin 1.4 (L) 3.5 - 5.0 g/dL    Globulin 4.9 (H) 2.0 - 4.0 g/dL    A-G Ratio 0.3 (L) 1.1 - 2.2     CBC WITH AUTOMATED DIFF    Collection Time: 06/17/21 10:15 AM   Result Value Ref Range    WBC 14.5 (H) 4.1 - 11.1 K/uL    RBC 2.32 (L) 4.10 - 5.70 M/uL    HGB 7.0 (L) 12.1 - 17.0 g/dL    HCT 22.7 (L) 36.6 - 50.3 %    MCV 97.8 80.0 - 99.0 FL    MCH 30.2 26.0 - 34.0 PG    MCHC 30.8 30.0 - 36.5 g/dL    RDW 17.8 (H) 11.5 - 14.5 %    PLATELET 089 203 - 035 K/uL    MPV 9.9 8.9 - 12.9 FL    NRBC 0.4 (H) 0.0  WBC    ABSOLUTE NRBC 0.06 (H) 0.00 - 0.01 K/uL    NEUTROPHILS PENDING %    LYMPHOCYTES PENDING %    MONOCYTES PENDING %    EOSINOPHILS PENDING %    BASOPHILS PENDING %    IMMATURE GRANULOCYTES PENDING %    ABS. NEUTROPHILS PENDING K/UL    ABS. LYMPHOCYTES PENDING K/UL    ABS. MONOCYTES PENDING K/UL    ABS. EOSINOPHILS PENDING K/UL    ABS. BASOPHILS PENDING K/UL    ABS. IMM. GRANS. PENDING K/UL    DF PENDING    GLUCOSE, POC    Collection Time: 06/17/21 11:20 AM   Result Value Ref Range    Glucose (POC) 81 65 - 117 mg/dL    Performed by Zhao Christian([facility])        Results     Procedure Component Value Units Date/Time    CULTURE, Adry Jamir STAIN [[id number]] Collected: 06/16/21 0615    Order Status: Completed Specimen: Wound Updated: 06/17/21 1052     Special Requests: No Special Requests        GRAM STAIN No wbc's seen         2+ Gram Positive Cocci         2+ Gram Positive Rods         Few Gram Negative Rods        Culture result: Heavy Gram Negative Rods               Heavy Probable Staphylococcus aureus          CULTURE, Adry Jamir STAIN [[id number]] Collected: 06/16/21 0615    Order Status: Completed Specimen: Wound Updated: 06/17/21 1043     Special Requests: No Special Requests        GRAM STAIN 1+ WBCs seen         4+ Gram Negative Rods               4+ Gram Positive Cocci in pairs                  4+ apparent Gram Positive Rods           Culture result: Heavy Gram Negative Rods               Heavy Probable Staphylococcus aureus          COVID-19 RAPID TEST [[id number]] Collected: 06/16/21 0216    Order Status: Completed Specimen: Nasopharyngeal Updated: 06/16/21 0408     Specimen source Nasopharyngeal        COVID-19 rapid test Not Detected        Comment: Rapid Abbott ID Now   Rapid NAAT:  The specimen is NEGATIVE for SARS-CoV-2, the novel coronavirus associated with COVID-19. Negative results should be treated as presumptive and, if inconsistent with clinical signs and symptoms or necessary for patient management, should be tested with an alternative molecular assay.  Negative results do not preclude SARS-CoV-2 infection and should not be used as the sole basis for patient management decisions. This test has been authorized by the FDA under   an Emergency Use Authorization (EUA) for use by authorized laboratories. Fact sheet for Healthcare Providers: [email or web address] Fact sheet for Patients: [employer]   Methodology: Isothermal Nucleic Acid Amplification         COVID-19 RAPID TEST [[id number]]  (Abnormal) Collected: 06/16/21 0023    Order Status: Completed Specimen: Nasopharyngeal Updated: 06/16/21 0208     Specimen source Nasopharyngeal        COVID-19 rapid test Indeterminate        Comment: Rapid Abbott ID Now   The presence or absence of COVID-19 Viral RNAs cannot be determined. If clinically indicated, please collect a new specimen. This test has been authorized by the FDA under an Emergency Use Authorization (EUA) for use by authorized laboratories. Fact sheet for Healthcare Providers: [email or web address] Fact sheet for Patients: [employer]   Methodology: Isothermal Nucleic Acid Amplification SPOKE TO KEVYN MENDOZA,   SUGGESTED RECOLLECT                 Labs:     Recent Labs     06/17/21  1015 06/16/21  0949   WBC 14.5* 13.2*   HGB 7.0* 7.4*   HCT 22.7* 24.2*    200     Recent Labs     06/17/21  1015 06/16/21  0949 06/15/21  2145    137 134*   K 3.4* 3.5 3.6    102 100   CO2 23 24 23   BUN 75* 72* 73*   CREA 4.14* 4.24* 4.32*   GLU 74 114* 155*   CA 7.6* 7.9* 7.8*     Recent Labs     06/17/21  1015 06/15/21  2145   ALT 19 24   * 579*   TBILI 3.3* 3.9*   TP 6.3* 7.4   ALB 1.4* 1.6*   GLOB 4.9* 5.8*     Recent Labs     06/15/21  2145   INR 1.2*   PTP 14.7      No results for input(s): FE, TIBC, PSAT, FERR in the last 72 hours. No results found for: FOL, RBCF   No results for input(s): PH, PCO2, PO2 in the last 72 hours.   Recent Labs     06/15/21  2145   TROIQ <0.05     No results found for: CHOL, CHOLX, CHLST, CHOLV, HDL, HDLP, LDL, LDLC, DLDLP, TGLX, [facility], [facility]  Lab Results   Component Value Date/Time    Glucose (POC) 81 06/17/2021 11:20 AM    Glucose (POC) 114 06/16/2021 03:19 PM    Glucose (POC) 191 (H) 05/03/2021 03:18 PM    Glucose (POC) 162 (H) 05/03/2021 11:16 AM    Glucose (POC) 158 (H) 05/03/2021 09:28 AM     Lab Results   Component Value Date/Time    Color Yaritza 04/28/2021 03:00 PM    Appearance Turbid (A) 04/28/2021 03:00 PM    Specific gravity 1.014 04/28/2021 03:00 PM    pH (UA) 5.0 04/28/2021 03:00 PM    Protein 100 (A) 04/28/2021 03:00 PM    Glucose Negative 04/28/2021 03:00 PM    Ketone Negative 04/28/2021 03:00 PM    Bilirubin Negative 04/28/2021 03:00 PM    Urobilinogen 4.0 (H) 04/28/2021 03:00 PM    Nitrites Negative 04/28/2021 03:00 PM    Leukocyte Esterase Negative 04/28/2021 03:00 PM    Bacteria Negative 04/28/2021 03:00 PM    WBC 0-4 04/28/2021 03:00 PM    RBC 0-5 04/28/2021 03:00 PM         Assessment:     Acute on chronic kidney failure-creatinine 4.24 and GFR 16  Diabetes  Hypertension   Hyperlipidemia  Anemia-7.0  Leukocytosis  Chronic leg wounds   Liver mass  Hypotension-100/44  Hypokalemia  Right moderate hydronephrosis        Plan:     Recheck labs in AM   Nephrology consulted-labs ordered   ID consulted   Start on renally dosed Zosyn   SSI   Wound care nurse consulted-routine wound care  Discontinue Lasix and continue IV fluids  Continue other home medications    Vascular surgical consult pending    Repeat the labs in the morning        Current Facility-Administered Medications:     acetaminophen (TYLENOL) tablet 500 mg, 500 mg, Oral, Q4H PRN, Dinora Ramirez MD    acetaminophen (TYLENOL) tablet 1,000 mg, 1,000 mg, Oral, Q6H PRN, Leni Ramirez MD    albuterol (PROVENTIL HFA, VENTOLIN HFA, PROAIR HFA) inhaler 2 Puff, 2 Puff, Inhalation, Q6H RT, Dinora Ramirez MD, 2 Puff at 06/17/21 0844    amLODIPine (NORVASC) tablet 5 mg, 5 mg, Oral, DAILY, Dinora Ramirez MD, 5 mg at 06/17/21 1123    ferrous sulfate tablet 325 mg, 1 Tablet, Oral, TID WITH MEALS, Kamilah Ramirez MD, 325 mg at 06/17/21 1123    gabapentin (NEURONTIN) capsule 100 mg, 100 mg, Oral, TID, Kamilah Ramirez MD, 100 mg at 06/17/21 1123    [Held by provider] furosemide (LASIX) tablet 40 mg, 40 mg, Oral, DAILY, Yue Georges MD, 40 mg at 06/16/21 1107    latanoprost (XALATAN) 0.005 % ophthalmic solution 1 Drop, 1 Drop, Both Eyes, QPM, Dinora Ramirez MD    loperamide (IMODIUM) capsule 2 mg, 2 mg, Oral, Q4H PRN, Kamilah Ramirez MD    simvastatin (ZOCOR) tablet 10 mg, 10 mg, Oral, QHS, Dinora Ramirez MD, 10 mg at 06/16/21 2231    traMADoL (ULTRAM) tablet 50 mg, 50 mg, Oral, Q6H PRN, Yue Georges MD, 50 mg at 06/17/21 1135    ondansetron hcl (ZOFRAN) tablet 4 mg, 4 mg, Oral, Q4H PRN, Kamilah Ramirez MD    cyanocobalamin (VITAMIN B12) injection 1,000 mcg, 1,000 mcg, IntraMUSCular, EVERY 2 WEEKS, Yue Georges MD, 1,000 mcg at 06/16/21 1731    ferrous sulfate tablet 325 mg, 325 mg, Oral, ACB, Kamilah Ramirez MD    pantoprazole (PROTONIX) tablet 40 mg, 40 mg, Oral, DAILY, Yue Georges MD, 40 mg at 06/17/21 1123    insulin lispro (HUMALOG) injection, , SubCUTAneous, AC&HS, Kamilah Ramirez MD    glucose chewable tablet 16 g, 4 Tablet, Oral, PRN, Kamilah Ramirez MD    dextrose (D50W) injection syrg 12.5-25 g, 25-50 mL, IntraVENous, PRN, Kamilah Ramirez MD    glucagon (GLUCAGEN) injection 1 mg, 1 mg, IntraMUSCular, PRN, Yue Georges MD    0.9% sodium chloride infusion, 75 mL/hr, IntraVENous, CONTINUOUS, Dinora Ramirez MD, Last Rate: 75 mL/hr at 06/16/21 1500, 75 mL/hr at 06/16/21 1500    acetaminophen (TYLENOL) tablet 650 mg, 650 mg, Oral, Q6H PRN **OR** acetaminophen (TYLENOL) suppository 650 mg, 650 mg, Rectal, Q6H PRN, Dinora Ramirez MD    polyethylene glycol (MIRALAX) packet 17 g, 17 g, Oral, DAILY PRN, Dinora Ramirez MD    ondansetron (ZOFRAN ODT) tablet 4 mg, 4 mg, Oral, Q8H PRN **OR** ondansetron (ZOFRAN) injection 4 mg, 4 mg, IntraVENous, Q6H PRN, Dinora Ramirez MD    heparin (porcine) injection 5,000 Units, 5,000 Units, SubCUTAneous, Q8H, Dinora Ramirez MD, 5,000 Units at 06/17/21 1122    zinc oxide-white petrolatum 17-57 % topical paste, , Topical, TID, Dinora Ramirez MD, Given at 06/17/21 1131    piperacillin-tazobactam (ZOSYN) 3.375 g in 0.9% sodium chloride (MBP/ADV) 100 mL MBP, 3.375 g, IntraVENous, Q12H, Sascha Araiza MD, Last Rate: 200 mL/hr at 06/17/21 0630, 3.375 g at 06/17/21 0630

## 2021-06-17 NOTE — PROGRESS NOTES
[facility]     Renal Daily Progress Note:     Admission Date: 6/15/2021     Subjective: awake and alert but confused  Seems comfortable, offers no complaints    Review of Systems  Pertinent items are noted in HPI.     Objective:     Visit Vitals  BP (!) 95/42 (BP 1 Location: Left upper arm, BP Patient Position: At rest)   Pulse 85   Temp 98.2 °F (36.8 °C)   Resp 18   Ht 6' 2\" (1.88 m)   Wt 110.2 kg (243 lb)   SpO2 91%   BMI 31.20 kg/m²     Temp (24hrs), Av.5 °F (36.9 °C), Min:98.2 °F (36.8 °C), Max:99.4 °F (37.4 °C)      No intake or output data in the 24 hours ending 21 1607  Current Facility-Administered Medications   Medication Dose Route Frequency    acetaminophen (TYLENOL) tablet 500 mg  500 mg Oral Q4H PRN    acetaminophen (TYLENOL) tablet 1,000 mg  1,000 mg Oral Q6H PRN    albuterol (PROVENTIL HFA, VENTOLIN HFA, PROAIR HFA) inhaler 2 Puff  2 Puff Inhalation Q6H RT    [Held by provider] amLODIPine (NORVASC) tablet 5 mg  5 mg Oral DAILY    ferrous sulfate tablet 325 mg  1 Tablet Oral TID WITH MEALS    gabapentin (NEURONTIN) capsule 100 mg  100 mg Oral TID    [Held by provider] furosemide (LASIX) tablet 40 mg  40 mg Oral DAILY    latanoprost (XALATAN) 0.005 % ophthalmic solution 1 Drop  1 Drop Both Eyes QPM    loperamide (IMODIUM) capsule 2 mg  2 mg Oral Q4H PRN    simvastatin (ZOCOR) tablet 10 mg  10 mg Oral QHS    traMADoL (ULTRAM) tablet 50 mg  50 mg Oral Q6H PRN    ondansetron hcl (ZOFRAN) tablet 4 mg  4 mg Oral Q4H PRN    cyanocobalamin (VITAMIN B12) injection 1,000 mcg  1,000 mcg IntraMUSCular EVERY 2 WEEKS    ferrous sulfate tablet 325 mg  325 mg Oral ACB    pantoprazole (PROTONIX) tablet 40 mg  40 mg Oral DAILY    insulin lispro (HUMALOG) injection   SubCUTAneous AC&HS    glucose chewable tablet 16 g  4 Tablet Oral PRN    dextrose (D50W) injection syrg 12.5-25 g  25-50 mL IntraVENous PRN    glucagon (GLUCAGEN) injection 1 mg  1 mg IntraMUSCular PRN    0.9% sodium chloride infusion  75 mL/hr IntraVENous CONTINUOUS    acetaminophen (TYLENOL) tablet 650 mg  650 mg Oral Q6H PRN    Or    acetaminophen (TYLENOL) suppository 650 mg  650 mg Rectal Q6H PRN    polyethylene glycol (MIRALAX) packet 17 g  17 g Oral DAILY PRN    ondansetron (ZOFRAN ODT) tablet 4 mg  4 mg Oral Q8H PRN    Or    ondansetron (ZOFRAN) injection 4 mg  4 mg IntraVENous Q6H PRN    heparin (porcine) injection 5,000 Units  5,000 Units SubCUTAneous Q8H    zinc oxide-white petrolatum 17-57 % topical paste   Topical TID    piperacillin-tazobactam (ZOSYN) 3.375 g in 0.9% sodium chloride (MBP/ADV) 100 mL MBP  3.375 g IntraVENous Q12H       Physical Exam:General appearance: alert, cooperative, no distress, appears stated age  Lungs: clear to auscultation bilaterally  Heart: regular rate and rhythm  Abdomen: soft, non-tender. Bowel sounds normal. No masses,  no organomegaly  Extremities: no edema but legs wrapped  Neurologic: grossly normal except for dementia    Data Review:     LABS:  Recent Labs     06/17/21  1015 06/16/21  0949 06/15/21  2145    137 134*   K 3.4* 3.5 3.6    102 100   CO2 23 24 23   BUN 75* 72* 73*   CREA 4.14* 4.24* 4.32*   CA 7.6* 7.9* 7.8*   ALB 1.4*  --  1.6*     Recent Labs     06/17/21  1015 06/16/21  0949 06/15/21  2145   WBC 14.5* 13.2* 14.3*   HGB 7.0* 7.4* 8.1*   HCT 22.7* 24.2* 25.7*    200 209     No results for input(s): TONY, KU, CLU, CREAU in the last 72 hours. No lab exists for component: PROU    Assessment:   Renal Specific Problems  CKd 4 at baseline  CRISTIAN likely a combination of volume depletion and acute inflammatory state  Anemia from chronic disease exacerbated by leg infections  Hypoalbumin  ? Liver neoplasm          Plan:     Obtain/ Order: labs/cultures/radiology/procedures:        Therapeutic:    Cont current IVF and antibiotics  Transfuse if Hgb < 7?       Arpita Barrett MD    [phone]

## 2021-06-17 NOTE — PROGRESS NOTES
General Daily Progress Note          Patient Name:   Deni Lui       YOB: 1932       Age:  80 y.o. Admit Date: 6/15/2021      Subjective:     Patient is a 80y.o. year old male with a past medical history of diabetes, hypertension, hyperlipidemia, renal insufficiency, and anemia history of liver mass who presented to the ER 6/15 due to abnormal labs drawn at the nursing home yesterday. He was found to have worsening kidney function. He was on dialysis previously, but had reportedly stopped dialysis several years ago. He denied chest pain and SOB. CXR showed no acute cardiopulmonary processes. He was admitted for further evaluation and treatment.      Patient have a liver mass diagnosis and last admission as per note no further diagnosis procedure treatment was planned with the family by the attending     Today, the patient awake and in no acute distress. He complains of pain in his legs, but reports that this pain has been present for several years. He denies chest pain or SOB. He is not eating his food. Objective:     Visit Vitals  BP (!) 100/44   Pulse (!) 104   Temp 98.2 °F (36.8 °C)   Resp 18   Ht 6' 2\" (1.88 m)   Wt 110.2 kg (243 lb)   SpO2 94%   BMI 31.20 kg/m²        Recent Results (from the past 24 hour(s))   GLUCOSE, POC    Collection Time: 06/16/21  3:19 PM   Result Value Ref Range    Glucose (POC) 114 65 - 117 mg/dL    Performed by Ashley Christian(PCT)      [unfilled]      Review of Systems    Constitutional: Negative for chills and fever. HENT: Negative. Eyes: Negative. Respiratory: Negative. Cardiovascular: Negative. Gastrointestinal: Negative for abdominal pain and nausea. Skin: Negative. Neurological: Negative. Physical Exam:      Constitutional: alert, awake  HENT:   Head: Normocephalic and atraumatic. Eyes: Pupils are equal, round, and reactive to light.  EOM are normal.   Cardiovascular: Normal rate, regular rhythm and normal heart sounds. Pulmonary/Chest: Breath sounds normal. No wheezes. No rales. Exhibits no tenderness. Abdominal: Soft. Bowel sounds are normal. There is no abdominal tenderness. There is no rebound and no guarding. Musculoskeletal: Normal range of motion. Neurological:alert, awake  Extremities: bilateral legs wrapped in dressings      US RETROPERITONEUM COMP   Final Result   Borderline right-sided hydronephrosis. Ascites. XR CHEST PORT   Final Result   No evidence of an acute cardiopulmonary process. Recent Results (from the past 24 hour(s))   GLUCOSE, POC    Collection Time: 06/16/21  3:19 PM   Result Value Ref Range    Glucose (POC) 114 65 - 117 mg/dL    Performed by Ashley Christian(PCT)        Results     Procedure Component Value Units Date/Time    CULTURE, Katmichaelle Birmingham STAIN [[id number]] Collected: 06/16/21 0615    Order Status: Completed Specimen: Wound Updated: 06/16/21 1325     Special Requests: No Special Requests        GRAM STAIN No wbc's seen         2+ Gram Positive Cocci         2+ Gram Positive Rods         Few Gram Negative Rods        Culture result: PENDING    CULTURE, Gonzalo Birmingham STAIN [[id number]] Collected: 06/16/21 0615    Order Status: Completed Specimen: Wound Updated: 06/16/21 1315     Special Requests: No Special Requests        GRAM STAIN 1+ WBCs seen         4+ Gram Negative Rods               4+ Gram Positive Cocci in pairs                  4+ apparent Gram Positive Rods           Culture result: PENDING    COVID-19 RAPID TEST [[id number]] Collected: 06/16/21 0216    Order Status: Completed Specimen: Nasopharyngeal Updated: 06/16/21 0408     Specimen source Nasopharyngeal        COVID-19 rapid test Not Detected        Comment: Rapid Abbott ID Now   Rapid NAAT:  The specimen is NEGATIVE for SARS-CoV-2, the novel coronavirus associated with COVID-19.    Negative results should be treated as presumptive and, if inconsistent with clinical signs and symptoms or necessary for patient management, should be tested with an alternative molecular assay. Negative results do not preclude SARS-CoV-2 infection and should not be used as the sole basis for patient management decisions. This test has been authorized by the FDA under   an Emergency Use Authorization (EUA) for use by authorized laboratories. Fact sheet for Healthcare Providers: kstattoo.com Fact sheet for Patients: kstattoo.com   Methodology: Isothermal Nucleic Acid Amplification         COVID-19 RAPID TEST [[id number]]  (Abnormal) Collected: 06/16/21 0023    Order Status: Completed Specimen: Nasopharyngeal Updated: 06/16/21 0208     Specimen source Nasopharyngeal        COVID-19 rapid test Indeterminate        Comment: Rapid Abbott ID Now   The presence or absence of COVID-19 Viral RNAs cannot be determined. If clinically indicated, please collect a new specimen. This test has been authorized by the FDA under an Emergency Use Authorization (EUA) for use by authorized laboratories. Fact sheet for Healthcare Providers: kstattoo.com Fact sheet for Patients: kstattoo.com   Methodology: Isothermal Nucleic Acid Amplification SPOKE TO KEVYN MENDOZA,   SUGGESTED RECOLLECT                 Labs:     Recent Labs     06/16/21 0949 06/15/21  2145   WBC 13.2* 14.3*   HGB 7.4* 8.1*   HCT 24.2* 25.7*    209     Recent Labs     06/16/21  0949 06/15/21  2145    134*   K 3.5 3.6    100   CO2 24 23   BUN 72* 73*   CREA 4.24* 4.32*   * 155*   CA 7.9* 7.8*     Recent Labs     06/15/21  2145   ALT 24   *   TBILI 3.9*   TP 7.4   ALB 1.6*   GLOB 5.8*     Recent Labs     06/15/21  2145   INR 1.2*   PTP 14.7      No results for input(s): FE, TIBC, PSAT, FERR in the last 72 hours. No results found for: FOL, RBCF   No results for input(s): PH, PCO2, PO2 in the last 72 hours.   Recent Labs     06/15/21  2145 TROIQ <0.05     No results found for: CHOL, CHOLX, CHLST, CHOLV, HDL, HDLP, LDL, LDLC, DLDLP, TGLX, TRIGL, TRIGP, CHHD, CHHDX  Lab Results   Component Value Date/Time    Glucose (POC) 114 06/16/2021 03:19 PM    Glucose (POC) 191 (H) 05/03/2021 03:18 PM    Glucose (POC) 162 (H) 05/03/2021 11:16 AM    Glucose (POC) 158 (H) 05/03/2021 09:28 AM    Glucose (POC) 212 (H) 05/02/2021 08:10 PM     Lab Results   Component Value Date/Time    Color Yaritza 04/28/2021 03:00 PM    Appearance Turbid (A) 04/28/2021 03:00 PM    Specific gravity 1.014 04/28/2021 03:00 PM    pH (UA) 5.0 04/28/2021 03:00 PM    Protein 100 (A) 04/28/2021 03:00 PM    Glucose Negative 04/28/2021 03:00 PM    Ketone Negative 04/28/2021 03:00 PM    Bilirubin Negative 04/28/2021 03:00 PM    Urobilinogen 4.0 (H) 04/28/2021 03:00 PM    Nitrites Negative 04/28/2021 03:00 PM    Leukocyte Esterase Negative 04/28/2021 03:00 PM    Bacteria Negative 04/28/2021 03:00 PM    WBC 0-4 04/28/2021 03:00 PM    RBC 0-5 04/28/2021 03:00 PM         Assessment:     Acute on chronic kidney failure-creatinine 4.24 and GFR 16  Diabetes  Hypertension   Hyperlipidemia  Anemia-7.4  Leukocytosis  Chronic leg wounds   Liver mass  Hypotension-100/44      Plan:     Recheck labs in AM   Nephrology consulted-labs ordered   ID consulted   Start on renally dosed Zosyn   SSI   Wound care nurse consulted-routine wound care  Discontinue Lasix and continue IV fluids  Continue other home medications        Current Facility-Administered Medications:     acetaminophen (TYLENOL) tablet 500 mg, 500 mg, Oral, Q4H PRN, Dinora Ramirez MD    acetaminophen (TYLENOL) tablet 1,000 mg, 1,000 mg, Oral, Q6H PRN, Ar Ramirez MD    albuterol (PROVENTIL HFA, VENTOLIN HFA, PROAIR HFA) inhaler 2 Puff, 2 Puff, Inhalation, Q6H RT, Dinora Ramirez MD, 2 Puff at 06/17/21 0844    amLODIPine (NORVASC) tablet 5 mg, 5 mg, Oral, DAILY, Dinora Ramirez MD, 5 mg at 06/16/21 1107    ferrous sulfate tablet 325 mg, 1 Tablet, Oral, TID WITH MEALS, Gordo Ramirez MD, 325 mg at 06/16/21 1655    gabapentin (NEURONTIN) capsule 100 mg, 100 mg, Oral, TID, Gordo Ramirez MD, 100 mg at 06/16/21 2231    [Held by provider] furosemide (LASIX) tablet 40 mg, 40 mg, Oral, DAILY, Palak Jackson MD, 40 mg at 06/16/21 1107    latanoprost (XALATAN) 0.005 % ophthalmic solution 1 Drop, 1 Drop, Both Eyes, QPM, Dinora Ramirez MD    loperamide (IMODIUM) capsule 2 mg, 2 mg, Oral, Q4H PRN, Gordo Ramirez MD    simvastatin (ZOCOR) tablet 10 mg, 10 mg, Oral, QHS, Dinora Ramirez MD, 10 mg at 06/16/21 2231    traMADoL (ULTRAM) tablet 50 mg, 50 mg, Oral, Q6H PRN, Gordo Ramirez MD    ondansetron hcl (ZOFRAN) tablet 4 mg, 4 mg, Oral, Q4H PRN, Gordo Ramirez MD    cyanocobalamin (VITAMIN B12) injection 1,000 mcg, 1,000 mcg, IntraMUSCular, EVERY 2 WEEKS, Palak Jackson MD, 1,000 mcg at 06/16/21 1731    ferrous sulfate tablet 325 mg, 325 mg, Oral, ACB, Gordo Ramirez MD    pantoprazole (PROTONIX) tablet 40 mg, 40 mg, Oral, DAILY, Palak Jackson MD, 40 mg at 06/16/21 1726    insulin lispro (HUMALOG) injection, , SubCUTAneous, AC&HS, Gordo Ramirez MD    glucose chewable tablet 16 g, 4 Tablet, Oral, PRN, Gordo Ramirez MD    dextrose (D50W) injection syrg 12.5-25 g, 25-50 mL, IntraVENous, PRN, Gordo Ramirez MD    glucagon (GLUCAGEN) injection 1 mg, 1 mg, IntraMUSCular, PRN, Palak Jackson MD    0.9% sodium chloride infusion, 75 mL/hr, IntraVENous, CONTINUOUS, Dinora Ramirez MD, Last Rate: 75 mL/hr at 06/16/21 1500, 75 mL/hr at 06/16/21 1500    acetaminophen (TYLENOL) tablet 650 mg, 650 mg, Oral, Q6H PRN **OR** acetaminophen (TYLENOL) suppository 650 mg, 650 mg, Rectal, Q6H PRN, Dinora Ramirez MD    polyethylene glycol (MIRALAX) packet 17 g, 17 g, Oral, DAILY PRN, Dinora Ramirez MD    ondansetron (ZOFRAN ODT) tablet 4 mg, 4 mg, Oral, Q8H PRN **OR** ondansetron (ZOFRAN) injection 4 mg, 4 mg, IntraVENous, Q6H PRN, Dinora Ramirez MD    heparin (porcine) injection 5,000 Units, 5,000 Units, SubCUTAneous, Q8H, Dinora Ramirez MD, 5,000 Units at 06/16/21 2231    zinc oxide-white petrolatum 17-57 % topical paste, , Topical, TID, Dinora Ramirez MD, Given at 06/16/21 2200    piperacillin-tazobactam (ZOSYN) 3.375 g in 0.9% sodium chloride (MBP/ADV) 100 mL MBP, 3.375 g, IntraVENous, Q12H, Sascha Araiza MD, Last Rate: 200 mL/hr at 06/17/21 0630, 3.375 g at 06/17/21 0630

## 2021-06-17 NOTE — ACP (ADVANCE CARE PLANNING)
FULL CODE. Patient son is legal next of kin will need to call him and discuss ADV.     [address] [facility]  [phone]
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
## 2021-06-17 NOTE — PROGRESS NOTES
Infectious Disease Progress Note           Subjective:   Pt seen and examined at bedside. Doing well, denies new complaints. No acute events since last seen. Oral intake is very poor   Objective:   Physical Exam:     Visit Vitals  BP (!) 100/44   Pulse (!) 104   Temp 98.2 °F (36.8 °C)   Resp 18   Ht 6' 2\" (1.88 m)   Wt 243 lb (110.2 kg)   SpO2 94%   BMI 31.20 kg/m²      O2 Device: None (Room air)    Temp (24hrs), Av.4 °F (36.9 °C), Min:97.7 °F (36.5 °C), Max:99.4 °F (37.4 °C)    No intake/output data recorded.    06/15 1901 -  0700  In: 1000 [I.V.:1000]  Out: -     General: NAD, AAO x 4  HEENT: JEAN, Moist mucosa   Lungs: CTA b/l, no wheeze/rhonchi   Heart: S1S2+, RRR, no murmur  Abdo: Soft, NT, ND, +BS   Exts: Leg dressing in place, malodorous smell from wounds   Skin: No wounds, No rashes or lesions    Data Review:       Recent Days:  Recent Labs     21  1015 21  0949 06/15/21  2145   WBC 14.5* 13.2* 14.3*   HGB 7.0* 7.4* 8.1*   HCT 22.7* 24.2* 25.7*    200 209     Recent Labs     21  1015 21  0949 06/15/21  2145   BUN 75* 72* 73*   CREA 4.14* 4.24* 4.32*       No results found for: CRPQN, CRP, CRPM       Microbiology     Results     Procedure Component Value Units Date/Time    CULTURE, Portia Leal STAIN [[id number]] Collected: 21    Order Status: Completed Specimen: Wound Updated: 21 1052     Special Requests: No Special Requests        GRAM STAIN No wbc's seen         2+ Gram Positive Cocci         2+ Gram Positive Rods         Few Gram Negative Rods        Culture result: Heavy Gram Negative Rods               Heavy Probable Staphylococcus aureus          CULTURE, Portia Leal STAIN [[id number]] Collected: 21    Order Status: Completed Specimen: Wound Updated: 21 1043     Special Requests: No Special Requests        GRAM STAIN 1+ WBCs seen         4+ Gram Negative Rods               4+ Gram Positive Cocci in pairs 4+ apparent Gram Positive Rods           Culture result: Heavy Gram Negative Rods               Heavy Probable Staphylococcus aureus          COVID-19 RAPID TEST [[id number]] Collected: 06/16/21 0216    Order Status: Completed Specimen: Nasopharyngeal Updated: 06/16/21 0408     Specimen source Nasopharyngeal        COVID-19 rapid test Not Detected        Comment: Rapid Abbott ID Now   Rapid NAAT:  The specimen is NEGATIVE for SARS-CoV-2, the novel coronavirus associated with COVID-19. Negative results should be treated as presumptive and, if inconsistent with clinical signs and symptoms or necessary for patient management, should be tested with an alternative molecular assay. Negative results do not preclude SARS-CoV-2 infection and should not be used as the sole basis for patient management decisions. This test has been authorized by the FDA under   an Emergency Use Authorization (EUA) for use by authorized laboratories. Fact sheet for Healthcare Providers: [employer] Fact sheet for Patients: [employer]nz   Methodology: Isothermal Nucleic Acid Amplification         COVID-19 RAPID TEST [[id number]]  (Abnormal) Collected: 06/16/21 0023    Order Status: Completed Specimen: Nasopharyngeal Updated: 06/16/21 0208     Specimen source Nasopharyngeal        COVID-19 rapid test Indeterminate        Comment: Rapid Abbott ID Now   The presence or absence of COVID-19 Viral RNAs cannot be determined. If clinically indicated, please collect a new specimen. This test has been authorized by the FDA under an Emergency Use Authorization (EUA) for use by authorized laboratories.    Fact sheet for Healthcare Providers: [employer]ments[employer]nz Fact sheet for Patients: [employer]   Methodology: Isothermal Nucleic Acid Amplification SPOKE TO KEVYN MENDOZA,   SUGGESTED RECOLLECT                   Diagnostics   CXR Results  (Last 48 hours)               06/15/21 2153  XR CHEST PORT Final result    Impression:  No evidence of an acute cardiopulmonary process. Narrative:  XR CHEST PORT       Comparison:  4/28/2021. Single view:  Stable right diaphragmatic elevation. Negative for focal   consolidation. No pneumothorax or pleural effusion apparent. Transcatheter   aortic valve placement noted. The heart size is stable. There is no evidence of   acute cardiac decompensation. Assessment/Plan     1. 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 CBC w AM labs       2. Sacral ulcer, unstageable, no exposed bone, sig skin maceration on exam       Ulcer likely to worsen w bed bound status and poor nutrition     3. Left heel ulcer: Dry gangrenous changes noted on exam, malodorous drainage      4. Acute on chronic renal failure: Pre-renal from poor oral intake      5. Poor oral intake, needs nutrition optimization to help w wound healing if hospice not pursued by [facility]     Per records pt was discharged on hospice during last admit, which I think is best option for pt.          Erika Rawls MD    6/17/2021

## 2021-06-17 NOTE — PROGRESS NOTES
KASH:  1. Discharge SNF vs inpatient hospice  2. Discuss with family ADV  CODE status  3, 2nd IMM notification needs sign        Reason for Admission:   Abnormal Labs results and CRISTIAN                    RUR Score:     21%             PCP: First and Last name:   Janet Cotton MD     Name of Practice:    Are you a current patient: Yes/No:    Approximate date of last visit:    Can you participate in a virtual visit if needed:     Do you (patient/family) have any concerns for transition/discharge? Plan for utilizing home health:   None at this time. However patient was discharge in May with Hospice will contact     Current Advanced Directive/Advance Care Plan:  Full Code. CM to call son and discuss ADV. Healthcare Decision Maker:   Click here to complete [address] including selection of the Healthcare Decision Maker Relationship (ie \"Primary\")              Transition of Care Plan:      Core Measure patient. CM opened case for assessment of D/C planning needs, CM reviewed chart. Per patient chart he was discharge in May with Hospice and [facility]. Patient is FULL CODE. CM unable to talk to patient he is confuse CM will have to call patient son to discuss discharge planning and patient ADV. 1630 CM attempted to call son left VM to return call to discuss discharge planning. Joseph 121 called Young Major the Liaison with [facility] [phone] looking for information patient was discharge under their services in May states she will call CM back with with information. [facility] called [address] [facility] [phone] spoke with Kim Perdue in admission she stated they are willing to take patient back after discharge and please send updated discharge notes through [facility]. 1700 CM receive call from Young Major she stated that patient was accepted under Hospice but family decide to place patient.          Samuel [employer] CM  [phone]

## 2021-06-18 ENCOUNTER — APPOINTMENT (OUTPATIENT)
Dept: CT IMAGING | Age: 86
DRG: 981 | End: 2021-06-18
Attending: SURGERY
Payer: MEDICARE

## 2021-06-18 LAB
ALBUMIN SERPL-MCNC: 1.3 G/DL (ref 3.5–5)
ALBUMIN/GLOB SERPL: 0.3 {RATIO} (ref 1.1–2.2)
ALP SERPL-CCNC: 522 U/L (ref 45–117)
ALT SERPL-CCNC: 18 U/L (ref 12–78)
ANION GAP SERPL CALC-SCNC: 11 MMOL/L (ref 5–15)
AST SERPL W P-5'-P-CCNC: 82 U/L (ref 15–37)
BASOPHILS # BLD: 0 K/UL (ref 0–0.1)
BASOPHILS NFR BLD: 0 % (ref 0–1)
BILIRUB SERPL-MCNC: 3.3 MG/DL (ref 0.2–1)
BUN SERPL-MCNC: 76 MG/DL (ref 6–20)
BUN/CREAT SERPL: 21 (ref 12–20)
CA-I BLD-MCNC: 7.8 MG/DL (ref 8.5–10.1)
CHLORIDE SERPL-SCNC: 107 MMOL/L (ref 97–108)
CO2 SERPL-SCNC: 22 MMOL/L (ref 21–32)
CREAT SERPL-MCNC: 3.64 MG/DL (ref 0.7–1.3)
DIFFERENTIAL METHOD BLD: ABNORMAL
EOSINOPHIL # BLD: 0.6 K/UL (ref 0–0.4)
EOSINOPHIL NFR BLD: 4 % (ref 0–7)
ERYTHROCYTE [DISTWIDTH] IN BLOOD BY AUTOMATED COUNT: 17.8 % (ref 11.5–14.5)
GLOBULIN SER CALC-MCNC: 5.2 G/DL (ref 2–4)
GLUCOSE BLD STRIP.AUTO-MCNC: 178 MG/DL (ref 65–117)
GLUCOSE BLD STRIP.AUTO-MCNC: 205 MG/DL (ref 65–117)
GLUCOSE BLD STRIP.AUTO-MCNC: 86 MG/DL (ref 65–117)
GLUCOSE BLD STRIP.AUTO-MCNC: 98 MG/DL (ref 65–117)
GLUCOSE SERPL-MCNC: 73 MG/DL (ref 65–100)
HCT VFR BLD AUTO: 24.9 % (ref 36.6–50.3)
HGB BLD-MCNC: 7.7 G/DL (ref 12.1–17)
IMM GRANULOCYTES # BLD AUTO: 0 K/UL
IMM GRANULOCYTES NFR BLD AUTO: 0 %
LYMPHOCYTES # BLD: 3.1 K/UL (ref 0.8–3.5)
LYMPHOCYTES NFR BLD: 22 % (ref 12–49)
MCH RBC QN AUTO: 30.1 PG (ref 26–34)
MCHC RBC AUTO-ENTMCNC: 30.9 G/DL (ref 30–36.5)
MCV RBC AUTO: 97.3 FL (ref 80–99)
METAMYELOCYTES NFR BLD MANUAL: 1 %
MONOCYTES # BLD: 1.4 K/UL (ref 0–1)
MONOCYTES NFR BLD: 10 % (ref 5–13)
MYELOCYTES NFR BLD MANUAL: 1 %
NEUTS SEG # BLD: 8.7 K/UL (ref 1.8–8)
NEUTS SEG NFR BLD: 62 % (ref 32–75)
NRBC # BLD: 0.03 K/UL (ref 0–0.01)
NRBC BLD-RTO: 0.2 PER 100 WBC
PERFORMED BY, TECHID: ABNORMAL
PERFORMED BY, TECHID: ABNORMAL
PERFORMED BY, TECHID: NORMAL
PERFORMED BY, TECHID: NORMAL
PLATELET # BLD AUTO: 181 K/UL (ref 150–400)
PMV BLD AUTO: 9.3 FL (ref 8.9–12.9)
POTASSIUM SERPL-SCNC: 3.4 MMOL/L (ref 3.5–5.1)
PROT SERPL-MCNC: 6.5 G/DL (ref 6.4–8.2)
RBC # BLD AUTO: 2.56 M/UL (ref 4.1–5.7)
RBC MORPH BLD: ABNORMAL
SODIUM SERPL-SCNC: 140 MMOL/L (ref 136–145)
WBC # BLD AUTO: 14.1 K/UL (ref 4.1–11.1)

## 2021-06-18 PROCEDURE — 74011000250 HC RX REV CODE- 250: Performed by: INTERNAL MEDICINE

## 2021-06-18 PROCEDURE — 94640 AIRWAY INHALATION TREATMENT: CPT

## 2021-06-18 PROCEDURE — 65270000032 HC RM SEMIPRIVATE

## 2021-06-18 PROCEDURE — 74011250636 HC RX REV CODE- 250/636: Performed by: INTERNAL MEDICINE

## 2021-06-18 PROCEDURE — 74011636637 HC RX REV CODE- 636/637: Performed by: FAMILY MEDICINE

## 2021-06-18 PROCEDURE — 74011250637 HC RX REV CODE- 250/637: Performed by: FAMILY MEDICINE

## 2021-06-18 PROCEDURE — 99222 1ST HOSP IP/OBS MODERATE 55: CPT | Performed by: SURGERY

## 2021-06-18 PROCEDURE — 36415 COLL VENOUS BLD VENIPUNCTURE: CPT

## 2021-06-18 PROCEDURE — 85025 COMPLETE CBC W/AUTO DIFF WBC: CPT

## 2021-06-18 PROCEDURE — 82962 GLUCOSE BLOOD TEST: CPT

## 2021-06-18 PROCEDURE — 74011000258 HC RX REV CODE- 258: Performed by: INTERNAL MEDICINE

## 2021-06-18 PROCEDURE — 80053 COMPREHEN METABOLIC PANEL: CPT

## 2021-06-18 PROCEDURE — 74011250636 HC RX REV CODE- 250/636: Performed by: FAMILY MEDICINE

## 2021-06-18 PROCEDURE — 94760 N-INVAS EAR/PLS OXIMETRY 1: CPT

## 2021-06-18 PROCEDURE — 99232 SBSQ HOSP IP/OBS MODERATE 35: CPT | Performed by: INTERNAL MEDICINE

## 2021-06-18 RX ORDER — AMOXICILLIN AND CLAVULANATE POTASSIUM 875; 125 MG/1; MG/1
1 TABLET, FILM COATED ORAL 2 TIMES DAILY
Qty: 20 TABLET | Refills: 0 | Status: SHIPPED | OUTPATIENT
Start: 2021-06-18 | End: 2021-06-24

## 2021-06-18 RX ORDER — GABAPENTIN 100 MG/1
100 CAPSULE ORAL 3 TIMES DAILY
Qty: 30 CAPSULE | Refills: 0 | Status: SHIPPED | OUTPATIENT
Start: 2021-06-18

## 2021-06-18 RX ORDER — SODIUM CHLORIDE 9 MG/ML
75 INJECTION, SOLUTION INTRAVENOUS CONTINUOUS
Qty: 1000 ML | Refills: 1 | Status: SHIPPED | OUTPATIENT
Start: 2021-06-18

## 2021-06-18 RX ORDER — SIMVASTATIN 10 MG/1
10 TABLET, FILM COATED ORAL
Qty: 30 TABLET | Refills: 0 | Status: SHIPPED | OUTPATIENT
Start: 2021-06-18

## 2021-06-18 RX ORDER — ALBUTEROL SULFATE 90 UG/1
2 AEROSOL, METERED RESPIRATORY (INHALATION)
Qty: 1 INHALER | Refills: 0 | Status: SHIPPED | OUTPATIENT
Start: 2021-06-18

## 2021-06-18 RX ORDER — POTASSIUM CHLORIDE 750 MG/1
40 TABLET, FILM COATED, EXTENDED RELEASE ORAL
Status: COMPLETED | OUTPATIENT
Start: 2021-06-18 | End: 2021-06-18

## 2021-06-18 RX ADMIN — WHITE PETROLATUM,ZINC OXIDE: 57; 17 PASTE TOPICAL at 20:31

## 2021-06-18 RX ADMIN — PIPERACILLIN AND TAZOBACTAM 3.38 G: 3; .375 INJECTION, POWDER, LYOPHILIZED, FOR SOLUTION INTRAVENOUS at 06:12

## 2021-06-18 RX ADMIN — FERROUS SULFATE TAB 325 MG (65 MG ELEMENTAL FE) 325 MG: 325 (65 FE) TAB at 08:47

## 2021-06-18 RX ADMIN — FERROUS SULFATE TAB 325 MG (65 MG ELEMENTAL FE) 325 MG: 325 (65 FE) TAB at 18:04

## 2021-06-18 RX ADMIN — SODIUM CHLORIDE 75 ML/HR: 9 INJECTION, SOLUTION INTRAVENOUS at 19:04

## 2021-06-18 RX ADMIN — ALBUTEROL SULFATE 2 PUFF: 108 AEROSOL, METERED RESPIRATORY (INHALATION) at 19:11

## 2021-06-18 RX ADMIN — HEPARIN SODIUM 5000 UNITS: 5000 INJECTION INTRAVENOUS; SUBCUTANEOUS at 12:58

## 2021-06-18 RX ADMIN — ALBUTEROL SULFATE 2 PUFF: 108 AEROSOL, METERED RESPIRATORY (INHALATION) at 02:55

## 2021-06-18 RX ADMIN — TRAMADOL HYDROCHLORIDE 50 MG: 50 TABLET, FILM COATED ORAL at 20:28

## 2021-06-18 RX ADMIN — MEROPENEM 1 G: 1 INJECTION, POWDER, FOR SOLUTION INTRAVENOUS at 18:05

## 2021-06-18 RX ADMIN — GABAPENTIN 100 MG: 100 CAPSULE ORAL at 08:47

## 2021-06-18 RX ADMIN — INSULIN LISPRO 3 UNITS: 100 INJECTION, SOLUTION INTRAVENOUS; SUBCUTANEOUS at 18:04

## 2021-06-18 RX ADMIN — GABAPENTIN 100 MG: 100 CAPSULE ORAL at 18:04

## 2021-06-18 RX ADMIN — INSULIN LISPRO 2 UNITS: 100 INJECTION, SOLUTION INTRAVENOUS; SUBCUTANEOUS at 22:33

## 2021-06-18 RX ADMIN — ALBUTEROL SULFATE 2 PUFF: 108 AEROSOL, METERED RESPIRATORY (INHALATION) at 09:31

## 2021-06-18 RX ADMIN — LATANOPROST 1 DROP: 50 SOLUTION OPHTHALMIC at 18:09

## 2021-06-18 RX ADMIN — HEPARIN SODIUM 5000 UNITS: 5000 INJECTION INTRAVENOUS; SUBCUTANEOUS at 20:28

## 2021-06-18 RX ADMIN — ALBUTEROL SULFATE 2 PUFF: 108 AEROSOL, METERED RESPIRATORY (INHALATION) at 14:34

## 2021-06-18 RX ADMIN — FERROUS SULFATE TAB 325 MG (65 MG ELEMENTAL FE) 325 MG: 325 (65 FE) TAB at 12:58

## 2021-06-18 RX ADMIN — POTASSIUM CHLORIDE 40 MEQ: 750 TABLET, EXTENDED RELEASE ORAL at 12:58

## 2021-06-18 RX ADMIN — WHITE PETROLATUM,ZINC OXIDE: 57; 17 PASTE TOPICAL at 18:10

## 2021-06-18 RX ADMIN — WHITE PETROLATUM,ZINC OXIDE: 57; 17 PASTE TOPICAL at 08:48

## 2021-06-18 RX ADMIN — SIMVASTATIN 10 MG: 10 TABLET, FILM COATED ORAL at 20:28

## 2021-06-18 RX ADMIN — HEPARIN SODIUM 5000 UNITS: 5000 INJECTION INTRAVENOUS; SUBCUTANEOUS at 06:13

## 2021-06-18 RX ADMIN — PANTOPRAZOLE SODIUM 40 MG: 40 TABLET, DELAYED RELEASE ORAL at 09:00

## 2021-06-18 RX ADMIN — TRAMADOL HYDROCHLORIDE 50 MG: 50 TABLET, FILM COATED ORAL at 13:04

## 2021-06-18 NOTE — WOUND CARE
IP WOUND CONSULT    [address] RECORD NUMBER:  [id number]  AGE: 80 y.o. GENDER: male  : 1932  TODAY'S DATE:  2021    GENERAL     [x] Follow-up   [] New Consult    Adan Rod is a 80 y.o. male referred by:   [x] Physician  [] Nursing  [] Other:         PAST MEDICAL HISTORY    Past Medical History:   Diagnosis Date    Arthritis     AVM (arteriovenous malformation) of colon     B12 deficiency     CAD (coronary artery disease)     Chronic anemia     Chronic kidney disease     Diabetes [facility]     GERD (gastroesophageal reflux disease)     Heart failure ([facility]     Remotely in his 46s    Hypertension     Ill-defined condition     chronic pressure and statis ulcers     PVD (peripheral vascular disease) ([facility]         PAST SURGICAL HISTORY    Past Surgical History:   Procedure Laterality Date    HX HIP REPLACEMENT Bilateral        FAMILY HISTORY    History reviewed. No pertinent family history. ALLERGIES    No Known Allergies    MEDICATIONS    No current facility-administered medications on file prior to encounter. Current Outpatient Medications on File Prior to Encounter   Medication Sig Dispense Refill    acetaminophen (TYLENOL) 325 mg tablet Take 2 Tabs by mouth every six (6) hours as needed for Pain. 30 Tab 0    cyanocobalamin (VITAMIN B12) 1,000 mcg/mL injection 1,000 mcg by IntraMUSCular route Once every 2 weeks.  ferrous sulfate 325 mg (65 mg iron) tablet Take 325 mg by mouth Daily (before breakfast).  latanoprost (XALATAN) 0.005 % ophthalmic solution Administer 1 Drop to both eyes daily.  pantoprazole (PROTONIX) 40 mg tablet Take 40 mg by mouth daily.            [unfilled]  Visit Vitals  /61 (BP 1 Location: Right upper arm, BP Patient Position: At rest)   Pulse 83   Temp 97.9 °F (36.6 °C)   Resp 18   Ht 6' 2\" (1.88 m)   Wt 119.3 kg (263 lb 0.1 oz)   SpO2 98%   BMI 33.77 kg/m²       ASSESSMENT     Wound Identification & Type: Scattered venous stasis ulcers to BLEs anterior; Diabetic Ulcer/Unstageable PI to left heel with dry eschar; Stage 3 PI to both buttocks and coccyx with MASD. Dressing change: Yes, see flowchart  Verbal consent for picture: Yes    Contributing Factors: anticoagulation therapy, edema, venous stasis, diabetes, chronic pressure, decreased mobility, shear force, incontinence of stool, incontinence of urine, obesity and malnutrition    Wound Pretibial Right yellow, green, red, pale. multi wound bed (Active)   Wound Image   06/18/21 1352   Wound Etiology Venous 06/18/21 1352   Dressing Status New dressing applied 06/18/21 1352   Cleansed Irrigated with saline 06/18/21 1352   Dressing/Treatment ABD pad;Xeroform;Roll gauze; Other (Comment) 06/18/21 1352   Dressing Change Due 06/19/21 06/18/21 1352   Wound Assessment Devitalized tissue;Eschar dry;Slough;Pink/red;Subcutaneous 06/18/21 1352   Drainage Amount Moderate 06/18/21 1352   Drainage Description Chambers;Serosanguinous 06/18/21 1352   Wound Odor Moderate 06/18/21 1352   Paula-Wound/Incision Assessment Dry/flaky;Fragile 06/18/21 1352   Edges Unattached edges 06/18/21 1352   Wound Thickness Description Full thickness 06/18/21 1352   Number of days: 2       Wound Calf Right (Active)   Wound Etiology Venous 06/17/21 0435   Dressing Status Clean; Intact;Dry 06/17/21 0435   Cleansed Irrigated with saline 06/16/21 1631   Dressing/Treatment Alginate with Ag 06/16/21 1631   Wound Length (cm) 3 cm 06/16/21 1631   Wound Width (cm) 2 cm 06/16/21 1631   Wound Surface Area (cm^2) 6 cm^2 06/16/21 1631   Wound Assessment Devitalized tissue;Pale granulation tissue 06/16/21 1631   Drainage Amount Moderate 06/16/21 1631   Drainage Description Yellow 06/16/21 1631   Wound Odor Moderate 06/16/21 1631   Paula-Wound/Incision Assessment Fragile; Maceration; Hyperpigmented 06/16/21 1631   Edges Flat/open edges; Undefined edges 06/16/21 1631   Wound Thickness Description Full thickness 06/17/21 0435   Number of days: 2       Wound Pretibial Left red, yellow, rust color, pale very pale pink (Active)   Wound Image   06/18/21 1357   Wound Etiology Venous 06/18/21 1357   Dressing Status New dressing applied 06/18/21 1357   Cleansed Irrigated with saline 06/18/21 1357   Dressing/Treatment ABD pad;Xeroform;Tape/Soft cloth adhesive tape;Roll gauze; Other (Comment) 06/18/21 1357   Dressing Change Due 06/19/21 06/18/21 1357   Wound Assessment Devitalized tissue;Eschar dry;Slough;Pink/red 06/18/21 1357   Drainage Amount Moderate 06/18/21 1357   Drainage Description Chambers;Serosanguinous 06/18/21 1357   Wound Odor Moderate 06/18/21 1357   Paula-Wound/Incision Assessment Dry/flaky;Fragile 06/18/21 1357   Edges Undefined edges 06/18/21 1357   Wound Thickness Description Full thickness 06/18/21 1357   Number of days: 2       Wound Heel Left (Active)   Wound Image   06/18/21 1347   Wound Etiology Pressure Unstageable 06/18/21 1347   Dressing Status New dressing applied 06/18/21 1347   Cleansed Cleansed with saline 06/18/21 1347   Dressing/Treatment ABD pad;Tape/Soft cloth adhesive tape;Roll gauze 06/18/21 1347   Offloading for Diabetic Foot Ulcers Offloading boot 06/18/21 1347   Dressing Change Due 06/19/21 06/18/21 1347   Wound Length (cm) 6 cm 06/16/21 1633   Wound Width (cm) 6 cm 06/16/21 1633   Wound Surface Area (cm^2) 36 cm^2 06/16/21 1633   Wound Assessment Dry;Eschar dry;Pink/red 06/18/21 1347   Drainage Amount None 06/18/21 1347   Wound Odor None 06/18/21 1347   Paula-Wound/Incision Assessment Dry/flaky 06/18/21 1347   Edges Undefined edges 06/18/21 1347   Wound Thickness Description Full thickness 06/18/21 1347   Number of days: 2       Wound Buttocks Left Stage II (Active)   Wound Image   06/18/21 1402   Wound Etiology Pressure Stage 3 06/18/21 1402   Dressing Status Clean;Dry; Intact 06/17/21 0433   Cleansed Other (Comment) 06/18/21 1402   Dressing/Treatment Zinc paste 06/18/21 1402   Wound Assessment Pink/red;Subcutaneous 06/18/21 1402   Drainage Amount Scant 06/18/21 1402   Drainage Description Serous 06/18/21 1402   Wound Odor None 06/18/21 1402   Paula-Wound/Incision Assessment Fragile 06/18/21 1402   Edges Unattached edges 06/18/21 1402   Wound Thickness Description Full thickness 06/18/21 1402   Number of days: 2       Wound Buttocks Right StageII (Active)   Wound Image   06/18/21 1400   Wound Etiology Pressure Stage 3 06/18/21 1400   Cleansed Other (Comment) 06/18/21 1400   Dressing/Treatment Zinc paste 06/18/21 1400   Wound Length (cm) 1.8 cm 06/18/21 1400   Wound Width (cm) 3 cm 06/18/21 1400   Wound Depth (cm) 0.2 cm 06/18/21 1400   Wound Surface Area (cm^2) 5.4 cm^2 06/18/21 1400   Change in Wound Size % 10 06/18/21 1400   Wound Volume (cm^3) 1.08 cm^3 06/18/21 1400   Wound Assessment Pink/red;Subcutaneous 06/18/21 1400   Drainage Amount Scant 06/18/21 1400   Drainage Description Serous 06/18/21 1400   Wound Odor None 06/18/21 1400   Paula-Wound/Incision Assessment Dry/flaky;Fragile 06/18/21 1400   Edges Unattached edges 06/18/21 1400   Wound Thickness Description Full thickness 06/18/21 1400   Number of days: 2       Wound Gluteal fold/cleft Stage II (Active)   Wound Image   06/16/21 1641   Cleansed Irrigated with saline 06/16/21 1641   Dressing/Treatment Zinc paste 06/18/21 0316   Wound Assessment Devitalized tissue 06/16/21 1641   Drainage Amount Scant 06/16/21 1641   Drainage Description Serosanguinous 06/16/21 1641   Wound Odor None 06/16/21 1641   Paula-Wound/Incision Assessment Maceration 06/16/21 1641   Edges Undefined edges 06/16/21 1641   Wound Thickness Description Full thickness 06/16/21 1641   Number of days: 2          PLAN     Skin Care & Pressure Relief Recommendations  Speciality bed Total Care Bariatric bed w/air  Minimize layers of linen  Turn/reposition approximately every 2 hours  Pillow wedges  Manage incontinence   Promote continence; Skin Protective lotion/cream to buttocks and sacrum daily and as needed with incontinence care  Offloading boots    Chente 12  Blood Glucose: 98 on 6/18/21                             Albumin: 1.3 on 6/18/21  WBCs: 14.1 on 6/18/21    Support Surface: Total Care Bariatric bed w/air     Physician/Provider notified: Dr. Viridiana Craig and Dr. Haylie Mejia  Recommendations: Re-evaluated BLEs wounds. Wounds are malodorous and deterioration is noted to some of the ulcerations. Bilateral dorsalis pedis pulses are weak. Unable to palpate posterior tibial pulses. Extremities are warm. Per discussion with Dr. Viridiana Craig, discharge is to be held until Dr. Haylie Mejia assesses wounds to BLEs. I spoke with Dr. Haylie Mejia via telephone and he will re-evaluate the patient. I left Alessandro Coker Unit Manager a voicemail that discharge is to be held at this time as the attending RN is off unit. Patient needs to have Primo[employer] Male Urinary Incontinence Management system reapplied to manage  incontinence. I informed Kaveh Elliott Rn. Heel boots are to be maintained on feet while patient is in bed. The Opticell removal was extremely painful for the patient despite copious irrigation with NS. I applied Xerform over ulcers for this dressing change and discussed with Dr. Haylie Mejia. Continue to current wound care orders until re-evaluated by Dr. Haylie Mejia. Continue to turn q2h at 30 degrees using wedge. I set up patient's food tray before leaving the room. Sandie Weinberg, patient's son, was feeding patient when I left. Patient was in upright position. Will continue to follow. Teaching completed with: Sandie Weinberg (Son)  [x] Patient           [x] Family member       [] Caregiver          [] Nursing  [] Other    Patient/Caregiver Teaching: Turn q2h at 30 degree angle to offload sacrum and buttocks and allow for tissue perfusion. Maintain HOB at 30 degrees or less if not contraindicated to reduce pressure to sacrum and buttocks. Heel boots maintain offloading of heels to reduce pressure. Wound to left heel must be offloaded to promote healing. Level of patient/caregiver understanding able to:   [] Indicates understanding       [] Needs reinforcement  [] Unsuccessful      [x] Verbal Understanding  [] Demonstrated understanding       [] No evidence of learning  [] Refused teaching         [] N/A       Electronically signed by Vamshi Pandya RN on 6/18/2021 at 2:06 PM

## 2021-06-18 NOTE — DISCHARGE SUMMARY
Discharge Summary       PATIENT ID: Kia Foster  MRN: [id number]   YOB: 1932    DATE OF ADMISSION: 6/15/2021  9:37 PM    DATE OF DISCHARGE:   PRIMARY CARE PROVIDER: Yun Nelson MD     ATTENDING PHYSICIAN: Gordo Ramirez  DISCHARGING PROVIDER: Gordo Ramirez      CONSULTATIONS: IP CONSULT TO NEPHROLOGY  IP CONSULT TO INFECTIOUS DISEASES  IP CONSULT TO VASCULAR SURGERY    PROCEDURES/SURGERIES: * No surgery found *    ADMITTING DIAGNOSES:    Patient Active Problem List    Diagnosis Date Noted    Acute kidney injury ([facility] 06/15/2021    Weakness 04/28/2021    CRISTIAN (acute kidney injury) ([facility] [address] [facility] 04/28/2021       DISCHARGE DIAGNOSES / PLAN:         Acute on chronic kidney failure-improved from yesterday-creatinine 3.6 and GFR 19  Diabetes  Hypertension   Hyperlipidemia  Anemia-improved from yesterday to 7.7  Leukocytosis  Chronic leg wounds   Liver mass  Hypotension-improved   Hypokalemia  Right moderate hydronephrosis    Discharge back to nursing home           DISCHARGE MEDICATIONS:  Current Discharge Medication List      START taking these medications    Details   albuterol (PROVENTIL HFA, VENTOLIN HFA, PROAIR HFA) 90 mcg/actuation inhaler Take 2 Puffs by inhalation every six (6) hours as needed for Wheezing. Qty: 1 Inhaler, Refills: 0  Start date: 6/18/2021      gabapentin (NEURONTIN) 100 mg capsule Take 1 Capsule by mouth three (3) times daily. Max Daily Amount: 300 mg. Qty: 30 Capsule, Refills: 0  Start date: 6/18/2021    Associated Diagnoses: Ulcers of both lower legs (HCC)      simvastatin (ZOCOR) 10 mg tablet Take 1 Tablet by mouth nightly. Qty: 30 Tablet, Refills: 0  Start date: 6/18/2021      zinc oxide-white petrolatum 17-57 % topical paste Apply  to affected area three (3) times daily. Qty: 60 g, Refills: 0  Start date: 6/18/2021      amoxicillin-clavulanate (Augmentin) 875-125 mg per tablet Take 1 Tablet by mouth two (2) times a day.   Qty: 20 Tablet, Refills: 0  Start date: 6/18/2021      0.9% sodium chloride solution 75 mL/hr by IntraVENous route continuous. Qty: 1000 mL, Refills: 1  Start date: 6/18/2021         CONTINUE these medications which have NOT CHANGED    Details   acetaminophen (TYLENOL) 325 mg tablet Take 2 Tabs by mouth every six (6) hours as needed for Pain. Qty: 30 Tab, Refills: 0      cyanocobalamin (VITAMIN B12) 1,000 mcg/mL injection 1,000 mcg by IntraMUSCular route Once every 2 weeks. ferrous sulfate 325 mg (65 mg iron) tablet Take 325 mg by mouth Daily (before breakfast). latanoprost (XALATAN) 0.005 % ophthalmic solution Administer 1 Drop to both eyes daily. pantoprazole (PROTONIX) 40 mg tablet Take 40 mg by mouth daily. NOTIFY YOUR PHYSICIAN FOR ANY OF THE FOLLOWING:   Fever over 101 degrees for 24 hours. Chest pain, shortness of breath, fever, chills, nausea, vomiting, diarrhea, change in mentation, falling, weakness, bleeding. Severe pain or pain not relieved by medications. Or, any other signs or symptoms that you may have questions about. DISPOSITION:  x  Home With:   OT  PT  HH  RN       Long term SNF/Inpatient Rehab    Independent/assisted living    Hospice    Other:       PATIENT CONDITION AT DISCHARGE: Stable      PHYSICAL EXAMINATION AT DISCHARGE:  General:          Alert, cooperative, no distress, appears stated age. HEENT:           Atraumatic, anicteric sclerae, pink conjunctivae                          No oral ulcers, mucosa moist, throat clear, dentition fair  Neck:               Supple, symmetrical  Lungs:             Clear to auscultation bilaterally. No Wheezing or Rhonchi. No rales. Chest wall:      No tenderness  No Accessory muscle use. Heart:              Regular  rhythm,  No  murmur   No edema  Abdomen:        Soft, non-tender. Not distended. Bowel sounds normal  Extremities:     No cyanosis.   No clubbing,                            Skin turgor normal, Capillary refill normal  Skin: Not pale. Not Jaundiced  No rashes   Psych:             Not anxious or agitated. Neurologic:      Alert, moves all extremities, answers questions appropriately and responds to commands     US RETROPERITONEUM COMP   Final Result   Borderline right-sided hydronephrosis. Ascites. XR CHEST PORT   Final Result   No evidence of an acute cardiopulmonary process. Recent Results (from the past 24 hour(s))   GLUCOSE, POC    Collection Time: 06/17/21 12:40 PM   Result Value Ref Range    Glucose (POC) 92 65 - 117 mg/dL    Performed by [email or web address]    GLUCOSE, POC    Collection Time: 06/17/21  2:49 PM   Result Value Ref Range    Glucose (POC) 97 65 - 117 mg/dL    Performed by [email or web address])    GLUCOSE, POC    Collection Time: 06/17/21  9:45 PM   Result Value Ref Range    Glucose (POC) 99 65 - 117 mg/dL    Performed by [address] [place] [address] Street, RANDOM    Collection Time: 06/17/21 10:00 PM   Result Value Ref Range    Protein, urine random 42 (H) 0.0 - 11.9 mg/dL   POTASSIUM, UR, RANDOM    Collection Time: 06/17/21 10:00 PM   Result Value Ref Range    Potassium urine, random 34 mmol/L   PROTEIN/CREATININE RATIO, URINE    Collection Time: 06/17/21 10:00 PM   Result Value Ref Range    Protein, urine random 41 (H) 0.0 - 11.9 mg/dL    Creatinine, urine 111.00 mg/dL    Protein/Creat.  urine Ratio 0.4     CREATININE, UR, RANDOM    Collection Time: 06/17/21 10:00 PM   Result Value Ref Range    Creatinine, urine 114.00 mg/dL   SODIUM, UR, RANDOM    Collection Time: 06/17/21 10:00 PM   Result Value Ref Range    Sodium,urine random 22 mmol/L   GLUCOSE, POC    Collection Time: 06/18/21  8:35 AM   Result Value Ref Range    Glucose (POC) 86 65 - 117 mg/dL    Performed by [email or web address]    CBC WITH AUTOMATED DIFF    Collection Time: 06/18/21  9:24 AM   Result Value Ref Range    WBC 14.1 (H) 4.1 - 11.1 K/uL    RBC 2.56 (L) 4.10 - 5.70 M/uL    HGB 7.7 (L) 12.1 - 17.0 g/dL    HCT 24.9 (L) 36.6 - 50.3 %    MCV 97.3 80.0 - 99.0 FL    MCH 30.1 26.0 - 34.0 PG    MCHC 30.9 30.0 - 36.5 g/dL    RDW 17.8 (H) 11.5 - 14.5 %    PLATELET 808 109 - 642 K/uL    MPV 9.3 8.9 - 12.9 FL    NRBC 0.2 (H) 0.0  WBC    ABSOLUTE NRBC 0.03 (H) 0.00 - 0.01 K/uL    NEUTROPHILS PENDING %    LYMPHOCYTES PENDING %    MONOCYTES PENDING %    EOSINOPHILS PENDING %    BASOPHILS PENDING %    IMMATURE GRANULOCYTES PENDING %    ABS. NEUTROPHILS PENDING K/UL    ABS. LYMPHOCYTES PENDING K/UL    ABS. MONOCYTES PENDING K/UL    ABS. EOSINOPHILS PENDING K/UL    ABS. BASOPHILS PENDING K/UL    ABS. IMM. GRANS. PENDING K/UL    DF PENDING    METABOLIC PANEL, COMPREHENSIVE    Collection Time: 06/18/21  9:24 AM   Result Value Ref Range    Sodium 140 136 - 145 mmol/L    Potassium 3.4 (L) 3.5 - 5.1 mmol/L    Chloride 107 97 - 108 mmol/L    CO2 22 21 - 32 mmol/L    Anion gap 11 5 - 15 mmol/L    Glucose 73 65 - 100 mg/dL    BUN 76 (H) 6 - 20 mg/dL    Creatinine 3.64 (H) 0.70 - 1.30 mg/dL    BUN/Creatinine ratio 21 (H) 12 - 20      GFR est AA 19 (L) >60 ml/min/1.73m2    GFR est non-AA 16 (L) >60 ml/min/1.73m2    Calcium 7.8 (L) 8.5 - 10.1 mg/dL    Bilirubin, total 3.3 (H) 0.2 - 1.0 mg/dL    AST (SGOT) 82 (H) 15 - 37 U/L    ALT (SGPT) 18 12 - 78 U/L    Alk. phosphatase 522 (H) 45 - 117 U/L    Protein, total 6.5 6.4 - 8.2 g/dL    Albumin 1.3 (L) 3.5 - 5.0 g/dL    Globulin 5.2 (H) 2.0 - 4.0 g/dL    A-G Ratio 0.3 (L) 1.1 - 2.2     GLUCOSE, POC    Collection Time: 06/18/21 11:04 AM   Result Value Ref Range    Glucose (POC) 98 65 - 117 mg/dL    Performed by Jose Beasley 33:  Patient is Kima y.o. year old male with a past medical history of diabetes, hypertension, hyperlipidemia, renal insufficiency, and anemia history of liver mass who presented to the ER 6/15 due to abnormal labs drawn at the nursing home yesterday. He was found to have worsening kidney function. He was on dialysis previously, but had reportedly stopped dialysis several years ago. He denied chest pain and SOB. CXR showed no acute cardiopulmonary processes. He was admitted for further evaluation and treatment. Patient had a liver mass diagnosis and last admission as per note no further diagnosis procedure treatment was planned with the family by the attending. He had chronic leg wounds for which wound care was consulted. Wound cultures showed GNR and GPC. ID consulted and patient started on renally-dosed Zosyn. Vascular surgery consulted. Nephrology was consulted for worsening renal function. Lasix was discontinued and started on IV fluids. Renal ultrasound showed right moderate hydronephrosis. Discharge back to nursing facility, if cleared by nephrology.      Discussed with the vascular surgeon he recommend to follow-up with him in 2 weeks  Patient follow-up with the wound care nurses regarding chronic venous stasis superficial wound on the legs and sacral ulcer patient may need to follow-up with urologist as an outpatient    Patient need to follow-up with urology regarding right moderate hydronephrosis  Patient have chronic anemia  From renal disease monitor H&H transfuse if hemoglobin less than 7    Signed:   Reyes Cough, MD  6/18/2021  11:21 AM

## 2021-06-18 NOTE — PROGRESS NOTES
Contacted Dr. Carmen Kwong about urine specimen orders since patient is incontinent. Order received for one time straight cath.

## 2021-06-18 NOTE — PROGRESS NOTES
OT consult received and chart reviewed. Noted discharge orders on patient. Patient to return to LTC facility where PTA he was on hospice. Spoke with CM who was unsure if patient was returning to LTC on hospice at this time--will attempt to clarify. Will defer evaluation at this time, attempt if patient is not discharged/has skilled therapy needs. Thank you for the consult.

## 2021-06-18 NOTE — DISCHARGE SUMMARY
Discharge Summary       PATIENT ID: Chauncey Epperson  MRN: [id number]   YOB: 1932    DATE OF ADMISSION: 6/15/2021  9:37 PM    DATE OF DISCHARGE:   PRIMARY CARE PROVIDER: Joshua Christopher MD     ATTENDING PHYSICIAN: Vaneas Ramirez  DISCHARGING PROVIDER: Vanesa Ramirez      CONSULTATIONS: IP CONSULT TO NEPHROLOGY  IP CONSULT TO INFECTIOUS DISEASES  IP CONSULT TO VASCULAR SURGERY    PROCEDURES/SURGERIES: * No surgery found *    ADMITTING DIAGNOSES:    Patient Active Problem List    Diagnosis Date Noted    Acute kidney injury ([address] 06/15/2021    Weakness 04/28/2021    CRISTIAN (acute kidney injury) ([address] 04/28/2021       DISCHARGE DIAGNOSES / PLAN:         Acute on chronic kidney failure-improved from yesterday-creatinine 3.6 and GFR 19  Diabetes  Hypertension   Hyperlipidemia  Anemia-improved from yesterday to 7.7  Leukocytosis  Chronic leg wounds   Liver mass  Hypotension-improved   Hypokalemia  Right moderate hydronephrosis    Discharge back to nursing home           DISCHARGE MEDICATIONS:  Current Discharge Medication List            NOTIFY YOUR PHYSICIAN FOR ANY OF THE FOLLOWING:   Fever over 101 degrees for 24 hours. Chest pain, shortness of breath, fever, chills, nausea, vomiting, diarrhea, change in mentation, falling, weakness, bleeding. Severe pain or pain not relieved by medications. Or, any other signs or symptoms that you may have questions about. DISPOSITION:  x  Home With:   OT  PT  HH  RN       Long term SNF/Inpatient Rehab    Independent/assisted living    Hospice    Other:       PATIENT CONDITION AT DISCHARGE: Stable      PHYSICAL EXAMINATION AT DISCHARGE:  General:          Alert, cooperative, no distress, appears stated age. HEENT:           Atraumatic, anicteric sclerae, pink conjunctivae                          No oral ulcers, mucosa moist, throat clear, dentition fair  Neck:               Supple, symmetrical  Lungs:             Clear to auscultation bilaterally.   No Wheezing or Rhonchi. No rales. Chest wall:      No tenderness  No Accessory muscle use. Heart:              Regular  rhythm,  No  murmur   No edema  Abdomen:        Soft, non-tender. Not distended. Bowel sounds normal  Extremities:     No cyanosis. No clubbing,                            Skin turgor normal, Capillary refill normal  Skin:                Not pale. Not Jaundiced  No rashes   Psych:             Not anxious or agitated. Neurologic:      Alert, moves all extremities, answers questions appropriately and responds to commands     US RETROPERITONEUM COMP   Final Result   Borderline right-sided hydronephrosis. Ascites. XR CHEST PORT   Final Result   No evidence of an acute cardiopulmonary process. Recent Results (from the past 24 hour(s))   GLUCOSE, POC    Collection Time: 06/17/21 12:40 PM   Result Value Ref Range    Glucose (POC) 92 65 - 117 mg/dL    Performed by Ashley Christian([facility])    GLUCOSE, POC    Collection Time: 06/17/21  2:49 PM   Result Value Ref Range    Glucose (POC) 97 65 - 117 mg/dL    Performed by Ashley Christian([facility])    GLUCOSE, POC    Collection Time: 06/17/21  9:45 PM   Result Value Ref Range    Glucose (POC) 99 65 - 117 mg/dL    Performed by [address] [place] [address], RANDOM    Collection Time: 06/17/21 10:00 PM   Result Value Ref Range    Protein, urine random 42 (H) 0.0 - 11.9 mg/dL   POTASSIUM, UR, RANDOM    Collection Time: 06/17/21 10:00 PM   Result Value Ref Range    Potassium urine, random 34 mmol/L   PROTEIN/CREATININE RATIO, URINE    Collection Time: 06/17/21 10:00 PM   Result Value Ref Range    Protein, urine random 41 (H) 0.0 - 11.9 mg/dL    Creatinine, urine 111.00 mg/dL    Protein/Creat.  urine Ratio 0.4     CREATININE, UR, RANDOM    Collection Time: 06/17/21 10:00 PM   Result Value Ref Range    Creatinine, urine 114.00 mg/dL   SODIUM, UR, RANDOM    Collection Time: 06/17/21 10:00 PM   Result Value Ref Range    Sodium,urine random 22 mmol/L   GLUCOSE, POC    Collection Time: 06/18/21  8:35 AM   Result Value Ref Range    Glucose (POC) 86 65 - 117 mg/dL    Performed by [employer]    CBC WITH AUTOMATED DIFF    Collection Time: 06/18/21  9:24 AM   Result Value Ref Range    WBC 14.1 (H) 4.1 - 11.1 K/uL    RBC 2.56 (L) 4.10 - 5.70 M/uL    HGB 7.7 (L) 12.1 - 17.0 g/dL    HCT 24.9 (L) 36.6 - 50.3 %    MCV 97.3 80.0 - 99.0 FL    MCH 30.1 26.0 - 34.0 PG    MCHC 30.9 30.0 - 36.5 g/dL    RDW 17.8 (H) 11.5 - 14.5 %    PLATELET 378 193 - 532 K/uL    MPV 9.3 8.9 - 12.9 FL    NRBC 0.2 (H) 0.0  WBC    ABSOLUTE NRBC 0.03 (H) 0.00 - 0.01 K/uL    NEUTROPHILS PENDING %    LYMPHOCYTES PENDING %    MONOCYTES PENDING %    EOSINOPHILS PENDING %    BASOPHILS PENDING %    IMMATURE GRANULOCYTES PENDING %    ABS. NEUTROPHILS PENDING K/UL    ABS. LYMPHOCYTES PENDING K/UL    ABS. MONOCYTES PENDING K/UL    ABS. EOSINOPHILS PENDING K/UL    ABS. BASOPHILS PENDING K/UL    ABS. IMM. GRANS. PENDING K/UL    DF PENDING    METABOLIC PANEL, COMPREHENSIVE    Collection Time: 06/18/21  9:24 AM   Result Value Ref Range    Sodium 140 136 - 145 mmol/L    Potassium 3.4 (L) 3.5 - 5.1 mmol/L    Chloride 107 97 - 108 mmol/L    CO2 22 21 - 32 mmol/L    Anion gap 11 5 - 15 mmol/L    Glucose 73 65 - 100 mg/dL    BUN 76 (H) 6 - 20 mg/dL    Creatinine 3.64 (H) 0.70 - 1.30 mg/dL    BUN/Creatinine ratio 21 (H) 12 - 20      GFR est AA 19 (L) >60 ml/min/1.73m2    GFR est non-AA 16 (L) >60 ml/min/1.73m2    Calcium 7.8 (L) 8.5 - 10.1 mg/dL    Bilirubin, total 3.3 (H) 0.2 - 1.0 mg/dL    AST (SGOT) 82 (H) 15 - 37 U/L    ALT (SGPT) 18 12 - 78 U/L    Alk.  phosphatase 522 (H) 45 - 117 U/L    Protein, total 6.5 6.4 - 8.2 g/dL    Albumin 1.3 (L) 3.5 - 5.0 g/dL    Globulin 5.2 (H) 2.0 - 4.0 g/dL    A-G Ratio 0.3 (L) 1.1 - 2.2     GLUCOSE, POC    Collection Time: 06/18/21 11:04 AM   Result Value Ref Range    Glucose (POC) 98 65 - 117 mg/dL    Performed by [address]:  Patient is S 39 y.o. year old Alesha Pérez a past medical history of diabetes, hypertension, hyperlipidemia, renal insufficiency, and anemia history of liver mass who presented to the ER 6/15 due to abnormal labs drawn at the nursing home yesterday. He was found to have worsening kidney function. He was on dialysis previously, but had reportedly stopped dialysis several years ago. He denied chest pain and SOB. CXR showed no acute cardiopulmonary processes. He was admitted for further evaluation and treatment. Patient had a liver mass diagnosis and last admission as per note no further diagnosis procedure treatment was planned with the family by the attending. He had chronic leg wounds for which wound care was consulted. Wound cultures showed GNR and GPC. ID consulted and patient started on renally-dosed Zosyn. Vascular surgery consulted. Nephrology was consulted for worsening renal function. Lasix was discontinued and started on IV fluids. Renal ultrasound showed right moderate hydronephrosis. Discharge back to nursing facility, if cleared by nephrology.      Signed:   Olive Marie  6/18/2021  11:21 AM

## 2021-06-18 NOTE — PROGRESS NOTES
Infectious Disease Progress Note           Subjective:   Stable, denies new complaints. More alert and following commands, but some what confused   Objective:   Physical Exam:     Visit Vitals  /61 (BP 1 Location: Right upper arm, BP Patient Position: At rest)   Pulse 83   Temp 97.9 °F (36.6 °C)   Resp 18   Ht 6' 2\" (1.88 m)   Wt 263 lb 0.1 oz (119.3 kg)   SpO2 97%   BMI 33.77 kg/m²      O2 Device: None (Room air)    Temp (24hrs), Av.4 °F (36.9 °C), Min:97.9 °F (36.6 °C), Max:99 °F (37.2 °C)    No intake/output data recorded.  1901 -  0700  In: 350 [P.O.:350]  Out: 79 [Urine:70]    General: NAD, alert and following commands   HEENT: JEAN, Moist mucosa   Lungs: CTA b/l, no wheeze/rhonchi   Heart: S1S2+, RRR, no murmur  Abdo: Soft, NT, ND, +BS   Exts: Leg dressing in place, malodorous smell from wounds   Skin: No wounds, No rashes or lesions    Data Review:       Recent Days:  Recent Labs     21  0924 21  1015 21  0949   WBC 14.1* 14.5* 13.2*   HGB 7.7* 7.0* 7.4*   HCT 24.9* 22.7* 24.2*    197 200     Recent Labs     21  0924 21  1015 21  0949   BUN 76* 75* 72*   CREA 3.64* 4.14* 4.24*       No results found for: CRPQN, CRP, CRPM       Microbiology     Results     Procedure Component Value Units Date/Time    CULTURE, Ragena Emiliano STAIN [[id number]] Collected: 21    Order Status: Completed Specimen: Wound Updated: 21 1557     Special Requests: No Special Requests        GRAM STAIN No wbc's seen         2+ Gram Positive Cocci         2+ Gram Positive Rods         Few Gram Negative Rods        Culture result:       Heavy Gram Negative Rods Idenfitication and susceptibility to follow                  Heavy  Preliminary report of Methicillin Resistant Staphylococcus aureus by antigen detection. Confirmation and Sensitivities to follow.   REFER TO [id number] FOR FURTHER UPDATE        Few Diphtheroids       CULTURE, WOUND W Raj Nielsen STAIN [[id number]]  (Susceptibility) Collected: 06/16/21 0615    Order Status: Completed Specimen: Wound Updated: 06/18/21 0455     Special Requests: No Special Requests        GRAM STAIN 1+ WBCs seen         4+ Gram Negative Rods               4+ Gram Positive Cocci in pairs                  4+ apparent Gram Positive Rods           Culture result:       Heavy Enterobacter cloacae complex                  Heavy  Preliminary report of Methicillin Resistant Staphylococcus aureus by antigen detection. Confirmation and Sensitivities to follow. Light Diphtheroids       Susceptibility      Enterobacter cloacae complex      CATINA (Preliminary)      Amikacin ($) Susceptible      Cefazolin ($) Resistant      Cefepime ($$) Susceptible      Cefoxitin Resistant      Ceftazidime ($) Resistant      Ceftriaxone ($) Resistant      Ciprofloxacin ($) Resistant      Gentamicin ($) Susceptible      Levofloxacin ($) Resistant      Meropenem ($$) Susceptible      Piperacillin/Tazobac ($) Resistant      Tobramycin ($) Susceptible      Trimeth/Sulfa Resistant               Linear View                   COVID-19 RAPID TEST [[id number]] Collected: 06/16/21 0216    Order Status: Completed Specimen: Nasopharyngeal Updated: 06/16/21 0408     Specimen source Nasopharyngeal        COVID-19 rapid test Not Detected        Comment: Rapid Abbott ID Now   Rapid NAAT:  The specimen is NEGATIVE for SARS-CoV-2, the novel coronavirus associated with COVID-19. Negative results should be treated as presumptive and, if inconsistent with clinical signs and symptoms or necessary for patient management, should be tested with an alternative molecular assay. Negative results do not preclude SARS-CoV-2 infection and should not be used as the sole basis for patient management decisions. This test has been authorized by the FDA under   an Emergency Use Authorization (EUA) for use by authorized laboratories.  Fact sheet for Healthcare Providers: [employer] Fact sheet for Patients: [employer]   Methodology: Isothermal Nucleic Acid Amplification         COVID-19 RAPID TEST [[id number]]  (Abnormal) Collected: 06/16/21 0023    Order Status: Completed Specimen: Nasopharyngeal Updated: 06/16/21 0208     Specimen source Nasopharyngeal        COVID-19 rapid test Indeterminate        Comment: Rapid Abbott ID Now   The presence or absence of COVID-19 Viral RNAs cannot be determined. If clinically indicated, please collect a new specimen. This test has been authorized by the FDA under an Emergency Use Authorization (EUA) for use by authorized laboratories. Fact sheet for Healthcare Providers: [employer] Fact sheet for Patients: [employer]   Methodology: Isothermal Nucleic Acid Amplification SPOKE TO KEVYN MENDOZA,   SUGGESTED RECOLLECT                   Diagnostics   CXR Results  (Last 48 hours)    None         Assessment/Plan     1. 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 Will d/c Zosyn, start on Meropenem       Seen by Dr María Elena Sanches, CT of b/l legs ordered, will follow results       Again optimal mgt will require wound debridement and IV antibiotics     2. Sacral ulcer, unstageable, no exposed bone, sig skin maceration on exam      3. Left heel ulcer: Stable dry gangrenous changes      4. Acute on chronic renal failure: Pre-renal from poor oral intake      5.  Poor oral intake, More alert and following commands today         Rufino Colmenares MD    6/18/2021

## 2021-06-18 NOTE — PROGRESS NOTES
CM spoke to patient and got a SNF choice. Patient signed choice form. CM sent referral to [facility]. CM spoke to Olivia Avila from [facility] and patient can be admitted today. ADDENDUM  1710 CM received a voice message from Olivia Avila from [facility] at [phone] saying that patient cannot be admitted today due to no available beds. Laila Solis said patient can be admitted tomorrow.

## 2021-06-19 ENCOUNTER — APPOINTMENT (OUTPATIENT)
Dept: CT IMAGING | Age: 86
DRG: 981 | End: 2021-06-19
Attending: SURGERY
Payer: MEDICARE

## 2021-06-19 LAB
BACTERIA SPEC CULT: NORMAL
GLUCOSE BLD STRIP.AUTO-MCNC: 106 MG/DL (ref 65–117)
GLUCOSE BLD STRIP.AUTO-MCNC: 113 MG/DL (ref 65–117)
GLUCOSE BLD STRIP.AUTO-MCNC: 142 MG/DL (ref 65–117)
GLUCOSE BLD STRIP.AUTO-MCNC: 151 MG/DL (ref 65–117)
GRAM STN SPEC: NORMAL
PERFORMED BY, TECHID: ABNORMAL
PERFORMED BY, TECHID: ABNORMAL
PERFORMED BY, TECHID: NORMAL
PERFORMED BY, TECHID: NORMAL
SPECIAL REQUESTS,SREQ: NORMAL
SPECIAL REQUESTS,SREQ: NORMAL

## 2021-06-19 PROCEDURE — 82962 GLUCOSE BLOOD TEST: CPT

## 2021-06-19 PROCEDURE — 94640 AIRWAY INHALATION TREATMENT: CPT

## 2021-06-19 PROCEDURE — 74011250636 HC RX REV CODE- 250/636: Performed by: FAMILY MEDICINE

## 2021-06-19 PROCEDURE — 99232 SBSQ HOSP IP/OBS MODERATE 35: CPT | Performed by: SURGERY

## 2021-06-19 PROCEDURE — 97530 THERAPEUTIC ACTIVITIES: CPT | Performed by: PHYSICAL THERAPIST

## 2021-06-19 PROCEDURE — 73700 CT LOWER EXTREMITY W/O DYE: CPT

## 2021-06-19 PROCEDURE — 99232 SBSQ HOSP IP/OBS MODERATE 35: CPT | Performed by: INTERNAL MEDICINE

## 2021-06-19 PROCEDURE — 74011250637 HC RX REV CODE- 250/637: Performed by: INTERNAL MEDICINE

## 2021-06-19 PROCEDURE — 74011000250 HC RX REV CODE- 250: Performed by: INTERNAL MEDICINE

## 2021-06-19 PROCEDURE — 74011000250 HC RX REV CODE- 250: Performed by: SURGERY

## 2021-06-19 PROCEDURE — 97161 PT EVAL LOW COMPLEX 20 MIN: CPT | Performed by: PHYSICAL THERAPIST

## 2021-06-19 PROCEDURE — 74011250636 HC RX REV CODE- 250/636: Performed by: INTERNAL MEDICINE

## 2021-06-19 PROCEDURE — 74011250637 HC RX REV CODE- 250/637: Performed by: FAMILY MEDICINE

## 2021-06-19 PROCEDURE — 94760 N-INVAS EAR/PLS OXIMETRY 1: CPT

## 2021-06-19 PROCEDURE — 65270000032 HC RM SEMIPRIVATE

## 2021-06-19 RX ORDER — LINEZOLID 600 MG/1
600 TABLET, FILM COATED ORAL EVERY 12 HOURS
Status: DISPENSED | OUTPATIENT
Start: 2021-06-19 | End: 2021-06-26

## 2021-06-19 RX ADMIN — FERROUS SULFATE TAB 325 MG (65 MG ELEMENTAL FE) 325 MG: 325 (65 FE) TAB at 10:22

## 2021-06-19 RX ADMIN — ALBUTEROL SULFATE 2 PUFF: 108 AEROSOL, METERED RESPIRATORY (INHALATION) at 20:17

## 2021-06-19 RX ADMIN — MEROPENEM 1 G: 1 INJECTION, POWDER, FOR SOLUTION INTRAVENOUS at 16:02

## 2021-06-19 RX ADMIN — LINEZOLID 600 MG: 600 TABLET, FILM COATED ORAL at 21:31

## 2021-06-19 RX ADMIN — GABAPENTIN 100 MG: 100 CAPSULE ORAL at 16:04

## 2021-06-19 RX ADMIN — HEPARIN SODIUM 5000 UNITS: 5000 INJECTION INTRAVENOUS; SUBCUTANEOUS at 21:30

## 2021-06-19 RX ADMIN — LATANOPROST 1 DROP: 50 SOLUTION OPHTHALMIC at 21:36

## 2021-06-19 RX ADMIN — LINEZOLID 600 MG: 600 TABLET, FILM COATED ORAL at 16:04

## 2021-06-19 RX ADMIN — FERROUS SULFATE TAB 325 MG (65 MG ELEMENTAL FE) 325 MG: 325 (65 FE) TAB at 10:20

## 2021-06-19 RX ADMIN — ACETAMINOPHEN 1000 MG: 500 TABLET, FILM COATED ORAL at 21:27

## 2021-06-19 RX ADMIN — WHITE PETROLATUM,ZINC OXIDE: 57; 17 PASTE TOPICAL at 10:20

## 2021-06-19 RX ADMIN — GABAPENTIN 100 MG: 100 CAPSULE ORAL at 00:46

## 2021-06-19 RX ADMIN — HEPARIN SODIUM 5000 UNITS: 5000 INJECTION INTRAVENOUS; SUBCUTANEOUS at 12:26

## 2021-06-19 RX ADMIN — COLLAGENASE SANTYL: 250 OINTMENT TOPICAL at 10:31

## 2021-06-19 RX ADMIN — SIMVASTATIN 10 MG: 10 TABLET, FILM COATED ORAL at 21:31

## 2021-06-19 RX ADMIN — WHITE PETROLATUM,ZINC OXIDE: 57; 17 PASTE TOPICAL at 16:04

## 2021-06-19 RX ADMIN — GABAPENTIN 100 MG: 100 CAPSULE ORAL at 21:31

## 2021-06-19 RX ADMIN — PANTOPRAZOLE SODIUM 40 MG: 40 TABLET, DELAYED RELEASE ORAL at 10:20

## 2021-06-19 RX ADMIN — FERROUS SULFATE TAB 325 MG (65 MG ELEMENTAL FE) 325 MG: 325 (65 FE) TAB at 12:26

## 2021-06-19 RX ADMIN — GABAPENTIN 100 MG: 100 CAPSULE ORAL at 10:20

## 2021-06-19 RX ADMIN — ALBUTEROL SULFATE 2 PUFF: 108 AEROSOL, METERED RESPIRATORY (INHALATION) at 12:56

## 2021-06-19 RX ADMIN — FERROUS SULFATE TAB 325 MG (65 MG ELEMENTAL FE) 325 MG: 325 (65 FE) TAB at 16:04

## 2021-06-19 RX ADMIN — ALBUTEROL SULFATE 2 PUFF: 108 AEROSOL, METERED RESPIRATORY (INHALATION) at 01:39

## 2021-06-19 RX ADMIN — TRAMADOL HYDROCHLORIDE 50 MG: 50 TABLET, FILM COATED ORAL at 21:27

## 2021-06-19 RX ADMIN — WHITE PETROLATUM,ZINC OXIDE: 57; 17 PASTE TOPICAL at 21:37

## 2021-06-19 RX ADMIN — HEPARIN SODIUM 5000 UNITS: 5000 INJECTION INTRAVENOUS; SUBCUTANEOUS at 05:27

## 2021-06-19 NOTE — PROGRESS NOTES
Spoke with Dr. Speedy Bonilla concerning patient's CT orders of BLE. Dr. Speedy Bonilla said it would be ok to do them Saturday morning.

## 2021-06-19 NOTE — PROGRESS NOTES
Infectious Disease Progress Note           Subjective:   Pt seen and examined at bedside. Daughter in law at bedside, understand clinical status and long term prognosis. MRSA and Enterobacter Cloacae isolated from wound Cx   Objective:   Physical Exam:     Visit Vitals  BP (!) 106/49   Pulse 85   Temp 98 °F (36.7 °C)   Resp 16   Ht 6' 2\" (1.88 m)   Wt 263 lb 0.1 oz (119.3 kg)   SpO2 97%   BMI 33.77 kg/m²      O2 Device: None (Room air)    Temp (24hrs), Av.4 °F (36.9 °C), Min:98 °F (36.7 °C), Max:99.2 °F (37.3 °C)    No intake/output data recorded.     1901 -  0700  In: -   Out: 79 [Urine:70]    General: NAD, alert and following commands   HEENT: JEAN, Moist mucosa   Lungs: CTA b/l, no wheeze/rhonchi   Heart: S1S2+, RRR, no murmur  Abdo: Soft, NT, ND, +BS   Exts: Leg dressing in place, malodorous smell from wounds   Skin: No wounds, No rashes or lesions    Data Review:       Recent Days:  Recent Labs     21  0924 21  1015   WBC 14.1* 14.5*   HGB 7.7* 7.0*   HCT 24.9* 22.7*    197     Recent Labs     21  0924 21  1015   BUN 76* 75*   CREA 3.64* 4.14*       No results found for: CRPQN, CRP, CRPM       Microbiology     Results     Procedure Component Value Units Date/Time    CULTURE, Rosezella [place] STAIN [[id number]]  (Susceptibility) Collected: 21 0615    Order Status: Completed Specimen: Wound Updated: 21 1018     Special Requests: No Special Requests        GRAM STAIN No wbc's seen         2+ Gram Positive Cocci         2+ Gram Positive Rods         Few Gram Negative Rods        Culture result:       Heavy Enterobacter cloacae complex                  Heavy * methicillin resistant staphylococcus aureus * REFER TO [id number] FOR SENSITIVITIES            Few Diphtheroids         CALLED MRSA REPORT TO Anahi Alva R.N. 1020 2021 BY DP[facility]    Susceptibility      Enterobacter cloacae complex      CATINA      Amikacin ($) Susceptible      Cefazolin ($) Resistant      Cefepime ($$) Susceptible      Cefoxitin Resistant      Ceftazidime ($) Resistant      Ceftriaxone ($) Resistant      Ciprofloxacin ($) Resistant      Gentamicin ($) Susceptible      Levofloxacin ($) Resistant      Meropenem ($$) Susceptible      Piperacillin/Tazobac ($) Resistant      Tobramycin ($) Susceptible      Trimeth/Sulfa Resistant               Linear View                   CULTURE, Dionloretta Lawrences STAIN [[id number]]  (Susceptibility) Collected: 06/16/21 0615    Order Status: Completed Specimen: Wound Updated: 06/19/21 0806     Special Requests: No Special Requests        GRAM STAIN 1+ WBCs seen         4+ Gram Negative Rods               4+ Gram Positive Cocci in pairs                  4+ apparent Gram Positive Rods           Culture result:       Heavy Enterobacter cloacae complex                  Moderate * methicillin resistant staphylococcus aureus *            Light Diphtheroids       Susceptibility      Enterobacter cloacae complex Staphylococcus aureus Methcillin Resistant      CATINA CATINA      Amikacin ($) Susceptible       Cefazolin ($) Resistant       Cefepime ($$) Susceptible       Cefoxitin Resistant       Ceftazidime ($) Resistant       Ceftriaxone ($) Resistant       Ciprofloxacin ($) Resistant Resistant      Clindamycin ($)  Susceptible      Daptomycin ($$$$$)  Susceptible      Doxycycline ($$)  Susceptible      Erythromycin ($$$$)  Susceptible      Gentamicin ($) Susceptible Susceptible      Levofloxacin ($) Resistant Resistant      Linezolid ($$$$$)  Susceptible      Meropenem ($$) Susceptible       Oxacillin  Resistant      Piperacillin/Tazobac ($) Resistant       Rifampin ($$$$)  Susceptible<!--EPICS--><sup style='font-weight:normal'>1</sup></font>      Tetracycline  Resistant      Tobramycin ($) Susceptible       Trimeth/Sulfa Resistant Susceptible      Vancomycin ($)  Susceptible                              COVID-19 RAPID TEST [[id number]] Collected: 06/16/21 0216    Order Status: Completed Specimen: Nasopharyngeal Updated: 06/16/21 0408     Specimen source Nasopharyngeal        COVID-19 rapid test Not Detected        Comment: Rapid Abbott ID Now   Rapid NAAT:  The specimen is NEGATIVE for SARS-CoV-2, the novel coronavirus associated with COVID-19. Negative results should be treated as presumptive and, if inconsistent with clinical signs and symptoms or necessary for patient management, should be tested with an alternative molecular assay. Negative results do not preclude SARS-CoV-2 infection and should not be used as the sole basis for patient management decisions. This test has been authorized by the FDA under   an Emergency Use Authorization (EUA) for use by authorized laboratories. Fact sheet for Healthcare Providers: [employer]nz Fact sheet for Patients: [employer]   Methodology: Isothermal Nucleic Acid Amplification         COVID-19 RAPID TEST [[id number]]  (Abnormal) Collected: 06/16/21 0023    Order Status: Completed Specimen: Nasopharyngeal Updated: 06/16/21 0208     Specimen source Nasopharyngeal        COVID-19 rapid test Indeterminate        Comment: Rapid Abbott ID Now   The presence or absence of COVID-19 Viral RNAs cannot be determined. If clinically indicated, please collect a new specimen. This test has been authorized by the FDA under an Emergency Use Authorization (EUA) for use by authorized laboratories. Fact sheet for Healthcare Providers: [employer]nz Fact sheet for Patients: [employer]   Methodology: Isothermal Nucleic Acid Amplification SPOKE TO KEVYN MENDOZA,   SUGGESTED RECOLLECT                   Diagnostics   CXR Results  (Last 48 hours)    None         Assessment/Plan     1.  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 Moreover pt may not be a surgical candidate, discussed w daughter in law at bedside today (06/19)      2. Sacral ulcer, unstageable, no exposed bone, sig skin maceration on exam        Continue frequent turns to avoid worsening of ulcers     3. Left heel ulcer: Osteo and gas formation noted on CT, needs debridement      4. Acute on chronic renal failure: Cr trending down w fluids      5.  Poor oral intake, improved intake w improved mentation         Francisco Lopez MD    6/19/2021

## 2021-06-19 NOTE — PROGRESS NOTES
Problem: Mobility Impaired (Adult and Pediatric)  Goal: *Acute Goals and Plan of Care (Insert Text)  Description: Pt will be SBA with LE HEP in 7 days. Pt will perform rolling Sergei x2 in 7 days. Pt will be able to supine <>sit at Mod A x2 in 7 days. Pt will be able to tolerate sitting EOB at Min A x2 for 5 minutes in 7 days. Outcome: Not Met     Problem: Patient Education: Go to Patient Education Activity  Goal: Patient/Family Education  Outcome: Not Met     PHYSICAL THERAPY EVALUATION  Patient: Bradly Pham (38 y.o. male)  Date: 6/19/2021  Primary Diagnosis: Acute kidney injury ([facility] [N17.9]  CRISTIAN (acute kidney injury) ([facility] [N17.9]        Precautions: Fall    ASSESSMENT  80 yom who came to the hospital on 6/15/21 due to due to abnormal labs drawn at LT. He was found to have worsening kidney function. He was on dialysis previously, but had reportedly stopped dialysis several years ago. PMHx: diabetes, hypertension, hyperlipidemia, renal insufficiency, and anemia, history of liver mass, arthrirtis, AVM of colon, CAD, CKD, CHF, PVD. Pt's former DIL in the roomo when PT arrived for PT eval. She reported that pt fell in April and after DC from the hospital, pt DC to SNF. A suspicious mass was found but a biopsy was not performed. Pt is not receiving chemo or radiation. Family was approached about hospice but family declined, stating that they would like SNF to improve his function enough so that he can walk to the bathroom before being put on hospice. Pt lives alone in [address] [facility] but staies on frist floor. Used FWW IND prior to fall. Pt A&O x2. Pt Max A x2 with rolling in bed in B directions. Pt demos weakness, poor balance, and decreased activity tolerance which impairs his functional mobility. Since pt is not going on hospice at this time, PT rec DC to SNF. Patient will benefit from skilled therapy intervention to address the above noted impairments.        PLAN :  Recommendations and Planned Interventions: bed mobility training, therapeutic exercises, neuromuscular re-education, and therapeutic activities      Frequency/Duration: Patient will be followed by physical therapy:  4 times a week to address goals. Recommendation for discharge: (in order for the patient to meet his/her long term goals)  SNF    This discharge recommendation:  Has not yet been discussed the attending provider and/or case management    IF patient discharges home will need the following DME: none         SUBJECTIVE:   Patient did not say anything unless addressed. OBJECTIVE DATA SUMMARY:   HISTORY:    Past Medical History:   Diagnosis Date    Arthritis     AVM (arteriovenous malformation) of colon     B12 deficiency     CAD (coronary artery disease)     Chronic anemia     Chronic kidney disease     Diabetes (HCC)     GERD (gastroesophageal reflux disease)     Heart failure ([facility])     Remotely in his 46s    Hypertension     Ill-defined condition     chronic pressure and statis ulcers     PVD (peripheral vascular disease) ([facility])      Past Surgical History:   Procedure Laterality Date    HX HIP REPLACEMENT Bilateral        Personal factors and/or comorbidities impacting plan of care: possible CA    Home Situation  Home Environment: Private residence  [address] [place] [address] Name: [place] [id number].  One/Two Story Residence: Two story, live on 1st floor  Living Alone: Yes  Support Systems: Child(debra)  Patient Expects to be Discharged to[de-identified] Skilled nursing facility  Current DME Used/Available at Home: Walker, rolling    PLOF: Pt IND  for ADLS/IADLS, IND with mobility prior to admission in Aprirl. EXAMINATION/PRESENTATION/DECISION MAKING:   Critical Behavior:  Neurologic State: Alert  Orientation Level: Oriented to person, Oriented to situation  Cognition: Decreased command following     Hearing:   Auditory  Auditory Impairment: Hard of hearing, bilateral    Range Of Motion:  AROM: Grossly decreased, non-functional Strength:    Strength: Grossly decreased, non-functional               Functional Mobility:  Bed Mobility:  Rolling: Maximum assistance;Assist x2          [facility] [address] [address] AM-PAC 6 Clicks         Basic Mobility Inpatient Short Form  How much difficulty does the patient currently have. .. Unable A Lot A Little None   1. Turning over in bed (including adjusting bedclothes, sheets and blankets)? [] 1   [x] 2   [] 3   [] 4   2. Sitting down on and standing up from a chair with arms ( e.g., wheelchair, bedside commode, etc.)   [x] 1   [] 2   [] 3   [] 4   3. Moving from lying on back to sitting on the side of the bed? [x] 1   [] 2   [] 3   [] 4          How much help from another person does the patient currently need. .. Total A Lot A Little None   4. Moving to and from a bed to a chair (including a wheelchair)? [x] 1   [] 2   [] 3   [] 4   5. Need to walk in hospital room? [x] 1   [] 2   [] 3   [] 4   6. Climbing 3-5 steps with a railing? [x] 1   [] 2   [] 3   [] 4   © , Trustees of [address] [facility] [address] [facility] [address] [address], under license to [employer] All rights reserved     Score:  Initial:    Most Recent: X (Date: -- )   Interpretation of Tool:  Represents activities that are increasingly more difficult (i.e. Bed mobility, Transfers, Gait).   Score 24 23 22-20 19-15 14-10 9-7 6   Modifier CH CI CJ CK CL CM CN          Physical Therapy Evaluation Charge Determination   History Examination Presentation Decision-Making   LOW Complexity : Zero comorbidities / personal factors that will impact the outcome / POC LOW Complexity : 1-2 Standardized tests and measures addressing body structure, function, activity limitation and / or participation in recreation  LOW Complexity : Stable, uncomplicated  Other Functional Measure [facility] 6       Based on the above components, the patient evaluation is determined to be of the following complexity level: LOW     Pain Ratin/10    Activity Tolerance:   Poor  Please refer to the flowsheet for vital signs taken during this treatment. After treatment patient left in no apparent distress:   Supine in bed, Call bell within reach, and Caregiver / family present    COMMUNICATION/EDUCATION:   The patients plan of care was discussed with: Registered nurse. Patient understands intent and goals of therapy, but is neutral about his/her participation. Due to several sacral wounds, PT edu family to try to not place tp in semi-supine position as this increases the pressure on his sacrum, that supine is a position that places less pressure on this part of the body. If family wants to feed pt, to increase HOB up to 30 degrees to prevent risk of chocking.      Thank you for this referral.  Kamilah Dupree, PT, DPT   Time Calculation: 28 mins

## 2021-06-19 NOTE — PROGRESS NOTES
[facility] KIDNEY     Renal Daily Progress Note:     Admission Date: 6/15/2021     Subjective: awake and alert but confused  Seems comfortable, offers no complaints, says he is eating ok. BP ok off Norvasc    Review of Systems  Pertinent items are noted in HPI.     Objective:     Visit Vitals  BP (!) 111/54   Pulse 93   Temp 99.2 °F (37.3 °C)   Resp 18   Ht 6' 2\" (1.88 m)   Wt 119.3 kg (263 lb 0.1 oz)   SpO2 97%   BMI 33.77 kg/m²     Temp (24hrs), Av.6 °F (37 °C), Min:97.9 °F (36.6 °C), Max:99.2 °F (37.3 °C)      No intake or output data in the 24 hours ending 21 9115  Current Facility-Administered Medications   Medication Dose Route Frequency    meropenem (MERREM) 1 g in sterile water (preservative free) 20 mL IV syringe  1 g IntraVENous Q24H    acetaminophen (TYLENOL) tablet 500 mg  500 mg Oral Q4H PRN    acetaminophen (TYLENOL) tablet 1,000 mg  1,000 mg Oral Q6H PRN    albuterol (PROVENTIL HFA, VENTOLIN HFA, PROAIR HFA) inhaler 2 Puff  2 Puff Inhalation Q6H RT    [Held by provider] amLODIPine (NORVASC) tablet 5 mg  5 mg Oral DAILY    ferrous sulfate tablet 325 mg  1 Tablet Oral TID WITH MEALS    gabapentin (NEURONTIN) capsule 100 mg  100 mg Oral TID    [Held by provider] furosemide (LASIX) tablet 40 mg  40 mg Oral DAILY    latanoprost (XALATAN) 0.005 % ophthalmic solution 1 Drop  1 Drop Both Eyes QPM    loperamide (IMODIUM) capsule 2 mg  2 mg Oral Q4H PRN    simvastatin (ZOCOR) tablet 10 mg  10 mg Oral QHS    traMADoL (ULTRAM) tablet 50 mg  50 mg Oral Q6H PRN    ondansetron hcl (ZOFRAN) tablet 4 mg  4 mg Oral Q4H PRN    cyanocobalamin (VITAMIN B12) injection 1,000 mcg  1,000 mcg IntraMUSCular EVERY 2 WEEKS    ferrous sulfate tablet 325 mg  325 mg Oral ACB    pantoprazole (PROTONIX) tablet 40 mg  40 mg Oral DAILY    insulin lispro (HUMALOG) injection   SubCUTAneous AC&HS    glucose chewable tablet 16 g  4 Tablet Oral PRN    dextrose (D50W) injection syrg 12.5-25 g  25-50 mL IntraVENous PRN    glucagon (GLUCAGEN) injection 1 mg  1 mg IntraMUSCular PRN    0.9% sodium chloride infusion  75 mL/hr IntraVENous CONTINUOUS    acetaminophen (TYLENOL) tablet 650 mg  650 mg Oral Q6H PRN    Or    acetaminophen (TYLENOL) suppository 650 mg  650 mg Rectal Q6H PRN    polyethylene glycol (MIRALAX) packet 17 g  17 g Oral DAILY PRN    ondansetron (ZOFRAN ODT) tablet 4 mg  4 mg Oral Q8H PRN    Or    ondansetron (ZOFRAN) injection 4 mg  4 mg IntraVENous Q6H PRN    heparin (porcine) injection 5,000 Units  5,000 Units SubCUTAneous Q8H    zinc oxide-white petrolatum 17-57 % topical paste   Topical TID       Physical Exam:General appearance: alert, cooperative, no distress, appears stated age  Lungs: clear to auscultation bilaterally  Heart: regular rate and rhythm  Abdomen: soft, non-tender. Bowel sounds normal. No masses,  no organomegaly  Extremities: no edema but legs wrapped  Neurologic: grossly normal except for dementia    Data Review:     LABS:  Recent Labs     06/18/21  0924 06/17/21  1015 06/16/21  0949    139 137   K 3.4* 3.4* 3.5    105 102   CO2 22 23 24   BUN 76* 75* 72*   CREA 3.64* 4.14* 4.24*   CA 7.8* 7.6* 7.9*   ALB 1.3* 1.4*  --      Recent Labs     06/18/21  0924 06/17/21  1015 06/16/21  0949   WBC 14.1* 14.5* 13.2*   HGB 7.7* 7.0* 7.4*   HCT 24.9* 22.7* 24.2*    197 200     Recent Labs     06/17/21  2200   TONY 25   KU 34   CREAU 111.00  114.00       Assessment:   Renal Specific Problems  CKd 4 at baseline  CRISTIAN likely a combination of volume depletion and acute inflammatory state--improving  Anemia from chronic disease exacerbated by leg infections  Hypoalbumin  ? Liver neoplasm          Plan:     Obtain/ Order: labs/cultures/radiology/procedures:        Therapeutic:    Cont current IVF and antibiotics  Transfuse if Hgb < 7?       Roberta Briones MD    [phone]

## 2021-06-19 NOTE — DISCHARGE SUMMARY
Discharge Summary       PATIENT ID: Chauncey Epperson  MRN: [id number]   YOB: 1932    DATE OF ADMISSION: 6/15/2021  9:37 PM    DATE OF DISCHARGE:   PRIMARY CARE PROVIDER: Joshua Christopher MD     ATTENDING PHYSICIAN: Vanesa Ramirez  DISCHARGING PROVIDER: Vanesa Ramirez      CONSULTATIONS: IP CONSULT TO NEPHROLOGY  IP CONSULT TO INFECTIOUS DISEASES  IP CONSULT TO VASCULAR SURGERY    PROCEDURES/SURGERIES: * No surgery found *    ADMITTING DIAGNOSES:    Patient Active Problem List    Diagnosis Date Noted    Acute kidney injury ([facility] [address] 06/15/2021    Weakness 04/28/2021    CRISTIAN (acute kidney injury) ([facility] 04/28/2021       DISCHARGE DIAGNOSES / PLAN:         Acute on chronic kidney failure-improved from yesterday-creatinine 3.6 and GFR 19  Diabetes  Hypertension   Hyperlipidemia  Anemia-improved from yesterday to 7.7  Leukocytosis  Chronic leg wounds   Liver mass  Hypotension-improved   Hypokalemia  Right moderate hydronephrosis    Discharge back to nursing home           DISCHARGE MEDICATIONS:  Current Discharge Medication List      START taking these medications    Details   albuterol (PROVENTIL HFA, VENTOLIN HFA, PROAIR HFA) 90 mcg/actuation inhaler Take 2 Puffs by inhalation every six (6) hours as needed for Wheezing. Qty: 1 Inhaler, Refills: 0  Start date: 6/18/2021      gabapentin (NEURONTIN) 100 mg capsule Take 1 Capsule by mouth three (3) times daily. Max Daily Amount: 300 mg. Qty: 30 Capsule, Refills: 0  Start date: 6/18/2021    Associated Diagnoses: Ulcers of both lower legs (HCC)      simvastatin (ZOCOR) 10 mg tablet Take 1 Tablet by mouth nightly. Qty: 30 Tablet, Refills: 0  Start date: 6/18/2021      zinc oxide-white petrolatum 17-57 % topical paste Apply  to affected area three (3) times daily. Qty: 60 g, Refills: 0  Start date: 6/18/2021      amoxicillin-clavulanate (Augmentin) 875-125 mg per tablet Take 1 Tablet by mouth two (2) times a day.   Qty: 20 Tablet, Refills: 0  Start date: 6/18/2021      0.9% sodium chloride solution 75 mL/hr by IntraVENous route continuous. Qty: 1000 mL, Refills: 1  Start date: 6/18/2021         CONTINUE these medications which have NOT CHANGED    Details   acetaminophen (TYLENOL) 325 mg tablet Take 2 Tabs by mouth every six (6) hours as needed for Pain. Qty: 30 Tab, Refills: 0      cyanocobalamin (VITAMIN B12) 1,000 mcg/mL injection 1,000 mcg by IntraMUSCular route Once every 2 weeks. ferrous sulfate 325 mg (65 mg iron) tablet Take 325 mg by mouth Daily (before breakfast). latanoprost (XALATAN) 0.005 % ophthalmic solution Administer 1 Drop to both eyes daily. pantoprazole (PROTONIX) 40 mg tablet Take 40 mg by mouth daily. NOTIFY YOUR PHYSICIAN FOR ANY OF THE FOLLOWING:   Fever over 101 degrees for 24 hours. Chest pain, shortness of breath, fever, chills, nausea, vomiting, diarrhea, change in mentation, falling, weakness, bleeding. Severe pain or pain not relieved by medications. Or, any other signs or symptoms that you may have questions about. DISPOSITION:  x  Home With:   OT  PT  HH  RN       Long term SNF/Inpatient Rehab    Independent/assisted living    Hospice    Other:       PATIENT CONDITION AT DISCHARGE: Stable      PHYSICAL EXAMINATION AT DISCHARGE:  General:          Alert, cooperative, no distress, appears stated age. HEENT:           Atraumatic, anicteric sclerae, pink conjunctivae                          No oral ulcers, mucosa moist, throat clear, dentition fair  Neck:               Supple, symmetrical  Lungs:             Clear to auscultation bilaterally. No Wheezing or Rhonchi. No rales. Chest wall:      No tenderness  No Accessory muscle use. Heart:              Regular  rhythm,  No  murmur   No edema  Abdomen:        Soft, non-tender. Not distended. Bowel sounds normal  Extremities:     No cyanosis.   No clubbing,                            Skin turgor normal, Capillary refill normal  Skin: Not pale. Not Jaundiced  No rashes   Psych:             Not anxious or agitated. Neurologic:      Alert, moves all extremities, answers questions appropriately and responds to commands     CT LOW EXT BI WO CONT   Final Result   1. Somewhat suboptimal evaluation, as detailed above. 2. Within exam limitations there is a soft tissue ulcer at the posterior left   calcaneus with infection with gas-forming organism versus soft tissue   devitalization. Small amount of gas within the posterior calcaneus is most   likely related to osteomyelitis. 3. Other soft tissue wounds within both legs. No additional findings of acute   osseous abnormality within exam limitations. Based on level of clinical concern,   could consider further evaluation with targeted MRI of specific regions. 4. Diffuse osseous demineralization likely contributes to the somewhat   heterogeneous appearance of the bones. However, there is an indeterminate 10 mm   lucency within the right medial femoral condyle. Please correlate with clinical   history for a known primary malignancy or plasma abnormality. Otherwise short   interval follow-up in 3 months should be considered. US RETROPERITONEUM COMP   Final Result   Borderline right-sided hydronephrosis. Ascites. XR CHEST PORT   Final Result   No evidence of an acute cardiopulmonary process.            Recent Results (from the past 24 hour(s))   GLUCOSE, POC    Collection Time: 06/18/21  5:21 PM   Result Value Ref Range    Glucose (POC) 178 (H) 65 - 117 mg/dL    Performed by Inder Mckenzie    GLUCOSE, POC    Collection Time: 06/18/21  9:48 PM   Result Value Ref Range    Glucose (POC) 205 (H) 65 - 117 mg/dL    Performed by Marilyn Rodrigues, POC    Collection Time: 06/19/21  8:35 AM   Result Value Ref Range    Glucose (POC) 106 65 - 117 mg/dL    Performed by Juana Hernandez, POC    Collection Time: 06/19/21 12:07 PM   Result Value Ref Range    Glucose (POC) 113 65 - 117 mg/dL    Performed by [facility] ER COURSE:  Patient is [age] y.o. year old male with a past medical history of diabetes, hypertension, hyperlipidemia, renal insufficiency, and anemia history of liver mass who presented to the ER 6/15 due to abnormal labs drawn at the nursing home yesterday. He was found to have worsening kidney function. He was on dialysis previously, but had reportedly stopped dialysis several years ago. He denied chest pain and SOB. CXR showed no acute cardiopulmonary processes. He was admitted for further evaluation and treatment. Patient had a liver mass diagnosis and last admission as per note no further diagnosis procedure treatment was planned with the family by the attending. He had chronic leg wounds for which wound care was consulted. Wound cultures showed GNR and GPC. ID consulted and patient started on renally-dosed Zosyn. Vascular surgery consulted. Nephrology was consulted for worsening renal function. Lasix was discontinued and started on IV fluids. Renal ultrasound showed right moderate hydronephrosis. Discharge back to nursing facility, if cleared by nephrology.      Discussed with the vascular surgeon he recommend to follow-up with him in 2 weeks  Patient follow-up with the wound care nurses regarding chronic venous stasis superficial wound on the legs and sacral ulcer patient may need to follow-up with urologist as an outpatient    Patient need to follow-up with urology regarding right moderate hydronephrosis  Patient have chronic anemia    Discharge date discharge was canceled by the vascular surgeon in order CT scan of the leg  To rule out osteomyelitis patient also seen by the wound care nurse  Wound covered with Santyl ointment on Medihoney ointment  Repeat the labs in the morning    Signed:   Clementina Negrete MD  6/19/2021  11:21 AM

## 2021-06-19 NOTE — PROGRESS NOTES
Update sent to [address] regarding DC readiness via [facility]. Telephoned [address] for Male bed availability. None today.   Will speak with them again on Sunday [phone]    Delay entered

## 2021-06-19 NOTE — PROGRESS NOTES
PROGRESS NOTE      Chief Complaints:  Patient has no new complaints. HPI and  Objective:    Patient is awake and alert denies any chest pain shortness breath, denies abdominal pain chest pain fever chills. Review of Systems:  Rest of review of system negative. EXAM:  Visit Vitals  BP (!) 109/45   Pulse 87   Temp 98 °F (36.7 °C)   Resp 18   Ht 6' 2\" (1.88 m)   Wt 263 lb 0.1 oz (119.3 kg)   SpO2 98%   BMI 33.77 kg/m²     Patient awake  Head and neck atraumatic  Cardiovascular regular rate  Pulmonary no audible wheeze  Abdomen seems to be not distended  Neurological nonfocal  Wound examination discussed and findings reviewed  Recent Results (from the past 24 hour(s))   GLUCOSE, POC    Collection Time: 06/18/21  8:35 AM   Result Value Ref Range    Glucose (POC) 86 65 - 117 mg/dL    Performed by [employer]    CBC WITH AUTOMATED DIFF    Collection Time: 06/18/21  9:24 AM   Result Value Ref Range    WBC 14.1 (H) 4.1 - 11.1 K/uL    RBC 2.56 (L) 4.10 - 5.70 M/uL    HGB 7.7 (L) 12.1 - 17.0 g/dL    HCT 24.9 (L) 36.6 - 50.3 %    MCV 97.3 80.0 - 99.0 FL    MCH 30.1 26.0 - 34.0 PG    MCHC 30.9 30.0 - 36.5 g/dL    RDW 17.8 (H) 11.5 - 14.5 %    PLATELET 263 482 - 494 K/uL    MPV 9.3 8.9 - 12.9 FL    NRBC 0.2 (H) 0.0  WBC    ABSOLUTE NRBC 0.03 (H) 0.00 - 0.01 K/uL    NEUTROPHILS 62 32 - 75 %    LYMPHOCYTES 22 12 - 49 %    MONOCYTES 10 5 - 13 %    EOSINOPHILS 4 0 - 7 %    BASOPHILS 0 0 - 1 %    METAMYELOCYTES 1 (H) 0 %    MYELOCYTES 1 (H) 0 %    IMMATURE GRANULOCYTES 0 %    ABS. NEUTROPHILS 8.7 (H) 1.8 - 8.0 K/UL    ABS. LYMPHOCYTES 3.1 0.8 - 3.5 K/UL    ABS. MONOCYTES 1.4 (H) 0.0 - 1.0 K/UL    ABS. EOSINOPHILS 0.6 (H) 0.0 - 0.4 K/UL    ABS. BASOPHILS 0.0 0.0 - 0.1 K/UL    ABS. IMM.  GRANS. 0.0 K/UL    DF Manual      RBC COMMENTS Hypochromia  2+        RBC COMMENTS Target Cells  1+        RBC COMMENTS Polychromasia  1+        RBC COMMENTS Anisocytosis  1+       METABOLIC PANEL, COMPREHENSIVE    Collection Time: 06/18/21 9:24 AM   Result Value Ref Range    Sodium 140 136 - 145 mmol/L    Potassium 3.4 (L) 3.5 - 5.1 mmol/L    Chloride 107 97 - 108 mmol/L    CO2 22 21 - 32 mmol/L    Anion gap 11 5 - 15 mmol/L    Glucose 73 65 - 100 mg/dL    BUN 76 (H) 6 - 20 mg/dL    Creatinine 3.64 (H) 0.70 - 1.30 mg/dL    BUN/Creatinine ratio 21 (H) 12 - 20      GFR est AA 19 (L) >60 ml/min/1.73m2    GFR est non-AA 16 (L) >60 ml/min/1.73m2    Calcium 7.8 (L) 8.5 - 10.1 mg/dL    Bilirubin, total 3.3 (H) 0.2 - 1.0 mg/dL    AST (SGOT) 82 (H) 15 - 37 U/L    ALT (SGPT) 18 12 - 78 U/L    Alk. phosphatase 522 (H) 45 - 117 U/L    Protein, total 6.5 6.4 - 8.2 g/dL    Albumin 1.3 (L) 3.5 - 5.0 g/dL    Globulin 5.2 (H) 2.0 - 4.0 g/dL    A-G Ratio 0.3 (L) 1.1 - 2.2     GLUCOSE, POC    Collection Time: 06/18/21 11:04 AM   Result Value Ref Range    Glucose (POC) 98 65 - 117 mg/dL    Performed by Alexis Alas    GLUCOSE, POC    Collection Time: 06/18/21  5:21 PM   Result Value Ref Range    Glucose (POC) 178 (H) 65 - 117 mg/dL    Performed by Atiya Espinal    GLUCOSE, POC    Collection Time: 06/18/21  9:48 PM   Result Value Ref Range    Glucose (POC) 205 (H) 65 - 117 mg/dL    Performed by Tyesha Blackman        ASSESSMENT:   Patient is 80 y.o. with diagnosis of : Active Problems:    CRISTIAN (acute kidney injury) ([facility] [address] (4/28/2021)      Acute kidney injury ([facility] [address] (6/15/2021)        PLAN:                 I will CT scan of the bilateral leg to rule out osteomyelitis if his imaging study is inconclusive we get MRI. Continue local wound care as instructed. I recommend  cover all open wound area of the necrotic tissue cover with a either Santyl ointment or Medihoney ointment. We will follow up CT scan result. I also ordered duplex arterial system both legs. JUDSON examination not a good option due to multiple open wound he has. Left leg is particularly worrisome because of left heel has a blackened gangrenous tissue as well.     Please hold off discharge planning until definitive treatment plan is made on complex wound on both legs.

## 2021-06-19 NOTE — CONSULTS
History and Physical    Chief complaints: Multiple wound issues   History of Presenting Illness:  Kellie Mullen is a 80 y.o. pleasant man currently hospitalized with acute kidney injury with sepsis. I was requested to evaluate bilateral leg wound issues. I was here couple days ago examined the patient but was unable to examine. Patient was examined with Dr. Yanelis Wilkerson, posterior leg dressing was removed. Patient has a significant open wound the pretibial area and also left heel gangrene. Foul smell was noted especially left leg. Pretibial open wounds on the left leg has multiple area of necrotic tissues and the larger one is at least 2 x 4 cm near to the bone of the tibia bone. Wounds are carefully redressed with Xeroform gauze and Kerlix rolls. Past Medical History:   Diagnosis Date    Arthritis     AVM (arteriovenous malformation) of colon     B12 deficiency     CAD (coronary artery disease)     Chronic anemia     Chronic kidney disease     Diabetes ([facility] [address]     GERD (gastroesophageal reflux disease)     Heart failure ([facility])     Remotely in his 46s    Hypertension     Ill-defined condition     chronic pressure and statis ulcers     PVD (peripheral vascular disease) ([facility])       Past Surgical History:   Procedure Laterality Date    HX HIP REPLACEMENT Bilateral      History reviewed. No pertinent family history. Social History     Tobacco Use    Smoking status: Never Smoker    Smokeless tobacco: Never Used   Substance Use Topics    Alcohol use: Never       Prior to Admission medications    Medication Sig Start Date End Date Taking? Authorizing Provider   albuterol (PROVENTIL HFA, VENTOLIN HFA, PROAIR HFA) 90 mcg/actuation inhaler Take 2 Puffs by inhalation every six (6) hours as needed for Wheezing. 6/18/21  Yes Jacobo Ramirez MD   gabapentin (NEURONTIN) 100 mg capsule Take 1 Capsule by mouth three (3) times daily.  Max Daily Amount: 300 mg. 6/18/21  Yes Leland Saldivar MD simvastatin (ZOCOR) 10 mg tablet Take 1 Tablet by mouth nightly. 6/18/21  Yes Irvin Ramirez MD   zinc oxide-white petrolatum 17-57 % topical paste Apply  to affected area three (3) times daily. 6/18/21  Yes Irvin Ramirez MD   amoxicillin-clavulanate (Augmentin) 875-125 mg per tablet Take 1 Tablet by mouth two (2) times a day. 6/18/21  Yes Tc Valadez MD   0.9% sodium chloride solution 75 mL/hr by IntraVENous route continuous. 6/18/21  Yes Irvin Ramirez MD   acetaminophen (TYLENOL) 325 mg tablet Take 2 Tabs by mouth every six (6) hours as needed for Pain. 5/3/21   Kishore Lezama MD   cyanocobalamin (VITAMIN B12) 1,000 mcg/mL injection 1,000 mcg by IntraMUSCular route Once every 2 weeks. Fátima Otoole MD   ferrous sulfate 325 mg (65 mg iron) tablet Take 325 mg by mouth Daily (before breakfast). Fátima Otoole MD   latanoprost (XALATAN) 0.005 % ophthalmic solution Administer 1 Drop to both eyes daily. Fátima Otoole MD   pantoprazole (PROTONIX) 40 mg tablet Take 40 mg by mouth daily. Fátima Otoole MD     No Known Allergies     Review of Systems:  Pertinent review of systems discussed in HPI, and rest of organ systems personally reviewed and they are negative. Objective:   Vital signs reviewed:      Visit Vitals  BP (!) 109/45   Pulse 87   Temp 98 °F (36.7 °C)   Resp 18   Ht 6' 2\" (1.88 m)   Wt 263 lb 0.1 oz (119.3 kg)   SpO2 98%   BMI 33.77 kg/m²       Physical Exam:   General appearance:   Patient seems awake and alert  Head and neck atraumatic  Cardiovascular regular rate  Pulmonary no audible wheeze  ENT oral mucosa dry there is no hoarse voice eyes pupil equal gaze appropriate  Chest wall excursion within normal with respiration cycle  Abdomen soft  Neurologically intact, cranial nerves intact  Musculoskeletal moving upper extremity. Skin is warm and moist.  Hematologic system no bruising. Psychosocial is cooperative.   Vascular examination shows patient has nonpalpable pedal pulse bilaterally. Data Review: Labs are reviewed. Discussed  Recent Results (from the past 24 hour(s))   GLUCOSE, POC    Collection Time: 06/18/21  8:35 AM   Result Value Ref Range    Glucose (POC) 86 65 - 117 mg/dL    Performed by Returbo    CBC WITH AUTOMATED DIFF    Collection Time: 06/18/21  9:24 AM   Result Value Ref Range    WBC 14.1 (H) 4.1 - 11.1 K/uL    RBC 2.56 (L) 4.10 - 5.70 M/uL    HGB 7.7 (L) 12.1 - 17.0 g/dL    HCT 24.9 (L) 36.6 - 50.3 %    MCV 97.3 80.0 - 99.0 FL    MCH 30.1 26.0 - 34.0 PG    MCHC 30.9 30.0 - 36.5 g/dL    RDW 17.8 (H) 11.5 - 14.5 %    PLATELET 030 705 - 260 K/uL    MPV 9.3 8.9 - 12.9 FL    NRBC 0.2 (H) 0.0  WBC    ABSOLUTE NRBC 0.03 (H) 0.00 - 0.01 K/uL    NEUTROPHILS 62 32 - 75 %    LYMPHOCYTES 22 12 - 49 %    MONOCYTES 10 5 - 13 %    EOSINOPHILS 4 0 - 7 %    BASOPHILS 0 0 - 1 %    METAMYELOCYTES 1 (H) 0 %    MYELOCYTES 1 (H) 0 %    IMMATURE GRANULOCYTES 0 %    ABS. NEUTROPHILS 8.7 (H) 1.8 - 8.0 K/UL    ABS. LYMPHOCYTES 3.1 0.8 - 3.5 K/UL    ABS. MONOCYTES 1.4 (H) 0.0 - 1.0 K/UL    ABS. EOSINOPHILS 0.6 (H) 0.0 - 0.4 K/UL    ABS. BASOPHILS 0.0 0.0 - 0.1 K/UL    ABS. IMM. GRANS. 0.0 K/UL    DF Manual      RBC COMMENTS Hypochromia  2+        RBC COMMENTS Target Cells  1+        RBC COMMENTS Polychromasia  1+        RBC COMMENTS Anisocytosis  1+       METABOLIC PANEL, COMPREHENSIVE    Collection Time: 06/18/21  9:24 AM   Result Value Ref Range    Sodium 140 136 - 145 mmol/L    Potassium 3.4 (L) 3.5 - 5.1 mmol/L    Chloride 107 97 - 108 mmol/L    CO2 22 21 - 32 mmol/L    Anion gap 11 5 - 15 mmol/L    Glucose 73 65 - 100 mg/dL    BUN 76 (H) 6 - 20 mg/dL    Creatinine 3.64 (H) 0.70 - 1.30 mg/dL    BUN/Creatinine ratio 21 (H) 12 - 20      GFR est AA 19 (L) >60 ml/min/1.73m2    GFR est non-AA 16 (L) >60 ml/min/1.73m2    Calcium 7.8 (L) 8.5 - 10.1 mg/dL    Bilirubin, total 3.3 (H) 0.2 - 1.0 mg/dL    AST (SGOT) 82 (H) 15 - 37 U/L    ALT (SGPT) 18 12 - 78 U/L    Alk. phosphatase 522 (H) 45 - 117 U/L    Protein, total 6.5 6.4 - 8.2 g/dL    Albumin 1.3 (L) 3.5 - 5.0 g/dL    Globulin 5.2 (H) 2.0 - 4.0 g/dL    A-G Ratio 0.3 (L) 1.1 - 2.2     GLUCOSE, POC    Collection Time: 06/18/21 11:04 AM   Result Value Ref Range    Glucose (POC) 98 65 - 117 mg/dL    Performed by Jw Quevedo    GLUCOSE, POC    Collection Time: 06/18/21  5:21 PM   Result Value Ref Range    Glucose (POC) 178 (H) 65 - 117 mg/dL    Performed by Michela Phillips    GLUCOSE, POC    Collection Time: 06/18/21  9:48 PM   Result Value Ref Range    Glucose (POC) 205 (H) 65 - 117 mg/dL    Performed by Gayle Redman reviewed: discussed as below. 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 So we will order a CT scan of the both legs. Meanwhile continue local wound care. Pretibial wounds has multiple area of the islands of the full-thickness skin necrosis. And recommend either Santyl or Medihoney ointment to be applied over these areas. Surgical wound debridement is an option. However patient is a good surgical candidate. And also all this area is close to the bone structures of the tibia bone. Patient was briefly informed about how much we can intervene in terms of wound debridement as well as possibility of amputation with significant left heel gangrene, and patient is simply coherent and agreeable to this. We will talk to the family and will update the wound issues and care plans. I will continue monitor his progress. And I will follow up CT scan result. I will add the arterial duplex exam of both lower extremity. Patient is not a candidate for JUDSON PVR examination due to multiple wounds.

## 2021-06-19 NOTE — PROGRESS NOTES
[facility] [place] [facility]     Renal Daily Progress Note:     Admission Date: 6/15/2021     Subjective: awake and alert less confused, son at bedside. Eating well when fed by family. No distress  Seems comfortable, offers no complaints. BP ok off Norvasc    Review of Systems  Pertinent items are noted in HPI.     Objective:     Visit Vitals  BP (!) 110/51   Pulse 89   Temp 98.8 °F (37.1 °C)   Resp 20   Ht 6' 2\" (1.88 m)   Wt 119.3 kg (263 lb 0.1 oz)   SpO2 97%   BMI 33.77 kg/m²     Temp (24hrs), Av.5 °F (36.9 °C), Min:98 °F (36.7 °C), Max:99.2 °F (37.3 °C)      No intake or output data in the 24 hours ending 21  Current Facility-Administered Medications   Medication Dose Route Frequency    collagenase (SANTYL) 250 unit/gram ointment   Topical DAILY    linezolid (ZYVOX) tablet 600 mg  600 mg Oral Q12H    meropenem (MERREM) 1 g in sterile water (preservative free) 20 mL IV syringe  1 g IntraVENous Q24H    acetaminophen (TYLENOL) tablet 500 mg  500 mg Oral Q4H PRN    acetaminophen (TYLENOL) tablet 1,000 mg  1,000 mg Oral Q6H PRN    albuterol (PROVENTIL HFA, VENTOLIN HFA, PROAIR HFA) inhaler 2 Puff  2 Puff Inhalation Q6H RT    [Held by provider] amLODIPine (NORVASC) tablet 5 mg  5 mg Oral DAILY    ferrous sulfate tablet 325 mg  1 Tablet Oral TID WITH MEALS    gabapentin (NEURONTIN) capsule 100 mg  100 mg Oral TID    [Held by provider] furosemide (LASIX) tablet 40 mg  40 mg Oral DAILY    latanoprost (XALATAN) 0.005 % ophthalmic solution 1 Drop  1 Drop Both Eyes QPM    loperamide (IMODIUM) capsule 2 mg  2 mg Oral Q4H PRN    simvastatin (ZOCOR) tablet 10 mg  10 mg Oral QHS    traMADoL (ULTRAM) tablet 50 mg  50 mg Oral Q6H PRN    ondansetron hcl (ZOFRAN) tablet 4 mg  4 mg Oral Q4H PRN    cyanocobalamin (VITAMIN B12) injection 1,000 mcg  1,000 mcg IntraMUSCular EVERY 2 WEEKS    ferrous sulfate tablet 325 mg  325 mg Oral ACB    pantoprazole (PROTONIX) tablet 40 mg  40 mg Oral DAILY    insulin lispro (HUMALOG) injection   SubCUTAneous AC&HS    glucose chewable tablet 16 g  4 Tablet Oral PRN    dextrose (D50W) injection syrg 12.5-25 g  25-50 mL IntraVENous PRN    glucagon (GLUCAGEN) injection 1 mg  1 mg IntraMUSCular PRN    0.9% sodium chloride infusion  75 mL/hr IntraVENous CONTINUOUS    acetaminophen (TYLENOL) tablet 650 mg  650 mg Oral Q6H PRN    Or    acetaminophen (TYLENOL) suppository 650 mg  650 mg Rectal Q6H PRN    polyethylene glycol (MIRALAX) packet 17 g  17 g Oral DAILY PRN    ondansetron (ZOFRAN ODT) tablet 4 mg  4 mg Oral Q8H PRN    Or    ondansetron (ZOFRAN) injection 4 mg  4 mg IntraVENous Q6H PRN    heparin (porcine) injection 5,000 Units  5,000 Units SubCUTAneous Q8H    zinc oxide-white petrolatum 17-57 % topical paste   Topical TID       Physical Exam:General appearance: alert, cooperative, no distress, appears stated age  Lungs: clear to auscultation bilaterally  Heart: regular rate and rhythm  Abdomen: soft, non-tender. Bowel sounds normal. No masses,  no organomegaly  Extremities: no edema but legs wrapped  Neurologic: grossly normal except for dementia    Data Review:     LABS:  Recent Labs     06/18/21  0924 06/17/21  1015    139   K 3.4* 3.4*    105   CO2 22 23   BUN 76* 75*   CREA 3.64* 4.14*   CA 7.8* 7.6*   ALB 1.3* 1.4*     Recent Labs     06/18/21  0924 06/17/21  1015   WBC 14.1* 14.5*   HGB 7.7* 7.0*   HCT 24.9* 22.7*    197     Recent Labs     06/17/21  2200   TONY 25   KU 34   CREAU 111.00  114.00     CT lower legs w/o contrast 6-19-21     IMPRESSION  1. Somewhat suboptimal evaluation, as detailed above. 2. Within exam limitations there is a soft tissue ulcer at the posterior left  calcaneus with infection with gas-forming organism versus soft tissue  devitalization. Small amount of gas within the posterior calcaneus is most  likely related to osteomyelitis. 3. Other soft tissue wounds within both legs.  No additional findings of acute  osseous abnormality within exam limitations. Based on level of clinical concern,  could consider further evaluation with targeted MRI of specific regions. 4. Diffuse osseous demineralization likely contributes to the somewhat  heterogeneous appearance of the bones. However, there is an indeterminate 10 mm  lucency within the right medial femoral condyle. Please correlate with clinical  history for a known primary malignancy or plasma abnormality. Otherwise short  interval follow-up in 3 months should be considered. Assessment:   Renal Specific Problems  CKd 4 at baseline  CRISTIAN likely a combination of volume depletion and acute inflammatory state--improving  Anemia from chronic disease exacerbated by leg infections  Hypoalbumin  ? Liver neoplasm    PVD, ? Left heel osteo      Plan:     Obtain/ Order: labs/cultures/radiology/procedures:        Therapeutic:    Cont current IVF and antibiotics  Transfuse if Hgb < 7?       Mckayla Anthony MD    [phone]

## 2021-06-20 ENCOUNTER — APPOINTMENT (OUTPATIENT)
Dept: NON INVASIVE DIAGNOSTICS | Age: 86
DRG: 981 | End: 2021-06-20
Attending: SURGERY
Payer: MEDICARE

## 2021-06-20 LAB
ALBUMIN SERPL-MCNC: 1.3 G/DL (ref 3.5–5)
ALBUMIN/GLOB SERPL: 0.2 {RATIO} (ref 1.1–2.2)
ALP SERPL-CCNC: 514 U/L (ref 45–117)
ALT SERPL-CCNC: 18 U/L (ref 12–78)
ANION GAP SERPL CALC-SCNC: 9 MMOL/L (ref 5–15)
AST SERPL W P-5'-P-CCNC: 94 U/L (ref 15–37)
BASOPHILS # BLD: 0.1 K/UL (ref 0–0.1)
BASOPHILS NFR BLD: 0 % (ref 0–1)
BILIRUB SERPL-MCNC: 2.8 MG/DL (ref 0.2–1)
BUN SERPL-MCNC: 75 MG/DL (ref 6–20)
BUN/CREAT SERPL: 24 (ref 12–20)
CA-I BLD-MCNC: 8.1 MG/DL (ref 8.5–10.1)
CHLORIDE SERPL-SCNC: 106 MMOL/L (ref 97–108)
CO2 SERPL-SCNC: 23 MMOL/L (ref 21–32)
CREAT SERPL-MCNC: 3.15 MG/DL (ref 0.7–1.3)
DIFFERENTIAL METHOD BLD: ABNORMAL
EOSINOPHIL # BLD: 0.5 K/UL (ref 0–0.4)
EOSINOPHIL NFR BLD: 4 % (ref 0–7)
ERYTHROCYTE [DISTWIDTH] IN BLOOD BY AUTOMATED COUNT: 18.2 % (ref 11.5–14.5)
GLOBULIN SER CALC-MCNC: 5.3 G/DL (ref 2–4)
GLUCOSE BLD STRIP.AUTO-MCNC: 100 MG/DL (ref 65–117)
GLUCOSE BLD STRIP.AUTO-MCNC: 125 MG/DL (ref 65–117)
GLUCOSE BLD STRIP.AUTO-MCNC: 134 MG/DL (ref 65–117)
GLUCOSE BLD STRIP.AUTO-MCNC: 93 MG/DL (ref 65–117)
GLUCOSE SERPL-MCNC: 105 MG/DL (ref 65–100)
HCT VFR BLD AUTO: 25.5 % (ref 36.6–50.3)
HGB BLD-MCNC: 7.9 G/DL (ref 12.1–17)
IMM GRANULOCYTES # BLD AUTO: 0.6 K/UL (ref 0–0.04)
IMM GRANULOCYTES NFR BLD AUTO: 4 % (ref 0–0.5)
LYMPHOCYTES # BLD: 3 K/UL (ref 0.8–3.5)
LYMPHOCYTES NFR BLD: 22 % (ref 12–49)
MCH RBC QN AUTO: 30.4 PG (ref 26–34)
MCHC RBC AUTO-ENTMCNC: 31 G/DL (ref 30–36.5)
MCV RBC AUTO: 98.1 FL (ref 80–99)
MONOCYTES # BLD: 1.7 K/UL (ref 0–1)
MONOCYTES NFR BLD: 13 % (ref 5–13)
NEUTS SEG # BLD: 7.8 K/UL (ref 1.8–8)
NEUTS SEG NFR BLD: 57 % (ref 32–75)
NRBC # BLD: 0.03 K/UL (ref 0–0.01)
NRBC BLD-RTO: 0.2 PER 100 WBC
PERFORMED BY, TECHID: ABNORMAL
PERFORMED BY, TECHID: ABNORMAL
PERFORMED BY, TECHID: NORMAL
PERFORMED BY, TECHID: NORMAL
PLATELET # BLD AUTO: 183 K/UL (ref 150–400)
PMV BLD AUTO: 10.3 FL (ref 8.9–12.9)
POTASSIUM SERPL-SCNC: 3.9 MMOL/L (ref 3.5–5.1)
PROT SERPL-MCNC: 6.6 G/DL (ref 6.4–8.2)
RBC # BLD AUTO: 2.6 M/UL (ref 4.1–5.7)
SODIUM SERPL-SCNC: 138 MMOL/L (ref 136–145)
WBC # BLD AUTO: 13.6 K/UL (ref 4.1–11.1)

## 2021-06-20 PROCEDURE — 85025 COMPLETE CBC W/AUTO DIFF WBC: CPT

## 2021-06-20 PROCEDURE — 65270000032 HC RM SEMIPRIVATE

## 2021-06-20 PROCEDURE — 74011250636 HC RX REV CODE- 250/636: Performed by: FAMILY MEDICINE

## 2021-06-20 PROCEDURE — 94640 AIRWAY INHALATION TREATMENT: CPT

## 2021-06-20 PROCEDURE — 74011000250 HC RX REV CODE- 250: Performed by: INTERNAL MEDICINE

## 2021-06-20 PROCEDURE — 74011000250 HC RX REV CODE- 250: Performed by: SURGERY

## 2021-06-20 PROCEDURE — 99232 SBSQ HOSP IP/OBS MODERATE 35: CPT | Performed by: INTERNAL MEDICINE

## 2021-06-20 PROCEDURE — 74011250637 HC RX REV CODE- 250/637: Performed by: FAMILY MEDICINE

## 2021-06-20 PROCEDURE — 74011250637 HC RX REV CODE- 250/637: Performed by: INTERNAL MEDICINE

## 2021-06-20 PROCEDURE — 93925 LOWER EXTREMITY STUDY: CPT

## 2021-06-20 PROCEDURE — 74011250636 HC RX REV CODE- 250/636: Performed by: INTERNAL MEDICINE

## 2021-06-20 PROCEDURE — 82962 GLUCOSE BLOOD TEST: CPT

## 2021-06-20 PROCEDURE — 80053 COMPREHEN METABOLIC PANEL: CPT

## 2021-06-20 PROCEDURE — 94760 N-INVAS EAR/PLS OXIMETRY 1: CPT

## 2021-06-20 PROCEDURE — 36415 COLL VENOUS BLD VENIPUNCTURE: CPT

## 2021-06-20 RX ADMIN — SIMVASTATIN 10 MG: 10 TABLET, FILM COATED ORAL at 22:51

## 2021-06-20 RX ADMIN — FERROUS SULFATE TAB 325 MG (65 MG ELEMENTAL FE) 325 MG: 325 (65 FE) TAB at 08:58

## 2021-06-20 RX ADMIN — ACETAMINOPHEN 1000 MG: 500 TABLET, FILM COATED ORAL at 22:51

## 2021-06-20 RX ADMIN — GABAPENTIN 100 MG: 100 CAPSULE ORAL at 22:52

## 2021-06-20 RX ADMIN — WHITE PETROLATUM,ZINC OXIDE: 57; 17 PASTE TOPICAL at 22:52

## 2021-06-20 RX ADMIN — MEROPENEM 1 G: 1 INJECTION, POWDER, FOR SOLUTION INTRAVENOUS at 16:46

## 2021-06-20 RX ADMIN — HEPARIN SODIUM 5000 UNITS: 5000 INJECTION INTRAVENOUS; SUBCUTANEOUS at 05:38

## 2021-06-20 RX ADMIN — PANTOPRAZOLE SODIUM 40 MG: 40 TABLET, DELAYED RELEASE ORAL at 08:55

## 2021-06-20 RX ADMIN — ALBUTEROL SULFATE 2 PUFF: 108 AEROSOL, METERED RESPIRATORY (INHALATION) at 01:26

## 2021-06-20 RX ADMIN — ALBUTEROL SULFATE 2 PUFF: 108 AEROSOL, METERED RESPIRATORY (INHALATION) at 08:29

## 2021-06-20 RX ADMIN — WHITE PETROLATUM,ZINC OXIDE: 57; 17 PASTE TOPICAL at 08:56

## 2021-06-20 RX ADMIN — WHITE PETROLATUM,ZINC OXIDE: 57; 17 PASTE TOPICAL at 16:46

## 2021-06-20 RX ADMIN — COLLAGENASE SANTYL: 250 OINTMENT TOPICAL at 08:57

## 2021-06-20 RX ADMIN — ALBUTEROL SULFATE 2 PUFF: 108 AEROSOL, METERED RESPIRATORY (INHALATION) at 20:17

## 2021-06-20 RX ADMIN — LATANOPROST 1 DROP: 50 SOLUTION OPHTHALMIC at 17:27

## 2021-06-20 RX ADMIN — TRAMADOL HYDROCHLORIDE 50 MG: 50 TABLET, FILM COATED ORAL at 06:49

## 2021-06-20 RX ADMIN — GABAPENTIN 100 MG: 100 CAPSULE ORAL at 16:46

## 2021-06-20 RX ADMIN — FERROUS SULFATE TAB 325 MG (65 MG ELEMENTAL FE) 325 MG: 325 (65 FE) TAB at 16:46

## 2021-06-20 RX ADMIN — FERROUS SULFATE TAB 325 MG (65 MG ELEMENTAL FE) 325 MG: 325 (65 FE) TAB at 08:55

## 2021-06-20 RX ADMIN — GABAPENTIN 100 MG: 100 CAPSULE ORAL at 08:55

## 2021-06-20 RX ADMIN — LINEZOLID 600 MG: 600 TABLET, FILM COATED ORAL at 08:55

## 2021-06-20 NOTE — DISCHARGE SUMMARY
Discharge Summary       PATIENT ID: Deni Lui  MRN: [id number]   YOB: 1932    DATE OF ADMISSION: 6/15/2021  9:37 PM    DATE OF DISCHARGE:   PRIMARY CARE PROVIDER: Lurdes Gomez MD     ATTENDING PHYSICIAN: Kamilah Ramirez  DISCHARGING PROVIDER: Kamilah Ramirez      CONSULTATIONS: IP CONSULT TO NEPHROLOGY  IP CONSULT TO INFECTIOUS DISEASES  IP CONSULT TO VASCULAR SURGERY    PROCEDURES/SURGERIES: * No surgery found *    ADMITTING DIAGNOSES:    Patient Active Problem List    Diagnosis Date Noted    Acute kidney injury ([facility] [address] 06/15/2021    Weakness 04/28/2021    CRISTIAN (acute kidney injury) ([facility] [address] 04/28/2021       DISCHARGE DIAGNOSES / PLAN:         Acute on chronic kidney failure-improved from yesterday-creatinine 3.6 and GFR 19  Diabetes  Hypertension   Hyperlipidemia  Anemia-improved from yesterday to 7.9  Leukocytosis  Chronic leg wounds   Liver mass  Hypotension-improved   Hypokalemia  Right moderate hydronephrosis    Discharge back to nursing home           DISCHARGE MEDICATIONS:  Current Discharge Medication List      START taking these medications    Details   albuterol (PROVENTIL HFA, VENTOLIN HFA, PROAIR HFA) 90 mcg/actuation inhaler Take 2 Puffs by inhalation every six (6) hours as needed for Wheezing. Qty: 1 Inhaler, Refills: 0  Start date: 6/18/2021      gabapentin (NEURONTIN) 100 mg capsule Take 1 Capsule by mouth three (3) times daily. Max Daily Amount: 300 mg. Qty: 30 Capsule, Refills: 0  Start date: 6/18/2021    Associated Diagnoses: Ulcers of both lower legs (HCC)      simvastatin (ZOCOR) 10 mg tablet Take 1 Tablet by mouth nightly. Qty: 30 Tablet, Refills: 0  Start date: 6/18/2021      zinc oxide-white petrolatum 17-57 % topical paste Apply  to affected area three (3) times daily. Qty: 60 g, Refills: 0  Start date: 6/18/2021      amoxicillin-clavulanate (Augmentin) 875-125 mg per tablet Take 1 Tablet by mouth two (2) times a day.   Qty: 20 Tablet, Refills: 0  Start date: 6/18/2021      0.9% sodium chloride solution 75 mL/hr by IntraVENous route continuous. Qty: 1000 mL, Refills: 1  Start date: 6/18/2021         CONTINUE these medications which have NOT CHANGED    Details   acetaminophen (TYLENOL) 325 mg tablet Take 2 Tabs by mouth every six (6) hours as needed for Pain. Qty: 30 Tab, Refills: 0      cyanocobalamin (VITAMIN B12) 1,000 mcg/mL injection 1,000 mcg by IntraMUSCular route Once every 2 weeks. ferrous sulfate 325 mg (65 mg iron) tablet Take 325 mg by mouth Daily (before breakfast). latanoprost (XALATAN) 0.005 % ophthalmic solution Administer 1 Drop to both eyes daily. pantoprazole (PROTONIX) 40 mg tablet Take 40 mg by mouth daily. NOTIFY YOUR PHYSICIAN FOR ANY OF THE FOLLOWING:   Fever over 101 degrees for 24 hours. Chest pain, shortness of breath, fever, chills, nausea, vomiting, diarrhea, change in mentation, falling, weakness, bleeding. Severe pain or pain not relieved by medications. Or, any other signs or symptoms that you may have questions about. DISPOSITION:  x  Home With:   OT  PT  HH  RN       Long term SNF/Inpatient Rehab    Independent/assisted living    Hospice    Other:       PATIENT CONDITION AT DISCHARGE: Stable      PHYSICAL EXAMINATION AT DISCHARGE:  General:          Alert, cooperative, no distress, appears stated age. HEENT:           Atraumatic, anicteric sclerae, pink conjunctivae                          No oral ulcers, mucosa moist, throat clear, dentition fair  Neck:               Supple, symmetrical  Lungs:             Clear to auscultation bilaterally. No Wheezing or Rhonchi. No rales. Chest wall:      No tenderness  No Accessory muscle use. Heart:              Regular  rhythm,  No  murmur   No edema  Abdomen:        Soft, non-tender. Not distended. Bowel sounds normal  Extremities:     No cyanosis.   No clubbing,                            Skin turgor normal, Capillary refill normal  Skin: Not pale. Not Jaundiced  No rashes   Psych:             Not anxious or agitated. Neurologic:      Alert, moves all extremities, answers questions appropriately and responds to commands     CT LOW EXT BI WO CONT   Final Result   1. Somewhat suboptimal evaluation, as detailed above. 2. Within exam limitations there is a soft tissue ulcer at the posterior left   calcaneus with infection with gas-forming organism versus soft tissue   devitalization. Small amount of gas within the posterior calcaneus is most   likely related to osteomyelitis. 3. Other soft tissue wounds within both legs. No additional findings of acute   osseous abnormality within exam limitations. Based on level of clinical concern,   could consider further evaluation with targeted MRI of specific regions. 4. Diffuse osseous demineralization likely contributes to the somewhat   heterogeneous appearance of the bones. However, there is an indeterminate 10 mm   lucency within the right medial femoral condyle. Please correlate with clinical   history for a known primary malignancy or plasma abnormality. Otherwise short   interval follow-up in 3 months should be considered. US RETROPERITONEUM COMP   Final Result   Borderline right-sided hydronephrosis. Ascites. XR CHEST PORT   Final Result   No evidence of an acute cardiopulmonary process.            Recent Results (from the past 24 hour(s))   GLUCOSE, POC    Collection Time: 06/19/21  4:52 PM   Result Value Ref Range    Glucose (POC) 142 (H) 65 - 117 mg/dL    Performed by Nighat Fontaine, POC    Collection Time: 06/19/21  9:00 PM   Result Value Ref Range    Glucose (POC) 151 (H) 65 - 117 mg/dL    Performed by Alana Blandon    GLUCOSE, POC    Collection Time: 06/20/21  8:54 AM   Result Value Ref Range    Glucose (POC) 93 65 - 117 mg/dL    Performed by Indigo Garner    CBC WITH AUTOMATED DIFF    Collection Time: 06/20/21 11:10 AM   Result Value Ref Range    WBC 13.6 (H) 4.1 - 11.1 K/uL    RBC 2.60 (L) 4.10 - 5.70 M/uL    HGB 7.9 (L) 12.1 - 17.0 g/dL    HCT 25.5 (L) 36.6 - 50.3 %    MCV 98.1 80.0 - 99.0 FL    MCH 30.4 26.0 - 34.0 PG    MCHC 31.0 30.0 - 36.5 g/dL    RDW 18.2 (H) 11.5 - 14.5 %    PLATELET 981 865 - 522 K/uL    MPV 10.3 8.9 - 12.9 FL    NRBC 0.2 (H) 0.0  WBC    ABSOLUTE NRBC 0.03 (H) 0.00 - 0.01 K/uL    NEUTROPHILS 57 32 - 75 %    LYMPHOCYTES 22 12 - 49 %    MONOCYTES 13 5 - 13 %    EOSINOPHILS 4 0 - 7 %    BASOPHILS 0 0 - 1 %    IMMATURE GRANULOCYTES 4 (H) 0 - 0.5 %    ABS. NEUTROPHILS 7.8 1.8 - 8.0 K/UL    ABS. LYMPHOCYTES 3.0 0.8 - 3.5 K/UL    ABS. MONOCYTES 1.7 (H) 0.0 - 1.0 K/UL    ABS. EOSINOPHILS 0.5 (H) 0.0 - 0.4 K/UL    ABS. BASOPHILS 0.1 0.0 - 0.1 K/UL    ABS. IMM. GRANS. 0.6 (H) 0.00 - 0.04 K/UL    DF AUTOMATED     GLUCOSE, POC    Collection Time: 06/20/21 11:34 AM   Result Value Ref Range    Glucose (POC) 125 (H) 65 - 117 mg/dL    Performed by [address] [place] [address]:  Patient is [age] y.o. year old [address] a past medical history of diabetes, hypertension, hyperlipidemia, renal insufficiency, and anemia history of liver mass who presented to the ER 6/15 due to abnormal labs drawn at the nursing home yesterday. He was found to have worsening kidney function. He was on dialysis previously, but had reportedly stopped dialysis several years ago. He denied chest pain and SOB. CXR showed no acute cardiopulmonary processes. He was admitted for further evaluation and treatment. Patient had a liver mass diagnosis and last admission as per note no further diagnosis procedure treatment was planned with the family by the attending. He had chronic leg wounds for which wound care was consulted. Wound cultures showed GNR and GPC. ID consulted and patient started on renally-dosed Zosyn. Vascular surgery consulted. Nephrology was consulted for worsening renal function. Lasix was discontinued and started on IV fluids.  Renal ultrasound showed right moderate hydronephrosis. Discharge back to nursing facility, if cleared by nephrology.      Discussed with the vascular surgeon he recommend to follow-up with him in 2 weeks  Patient follow-up with the wound care nurses regarding chronic venous stasis superficial wound on the legs and sacral ulcer patient may need to follow-up with urologist as an outpatient    Patient need to follow-up with urology regarding right moderate hydronephrosis  Patient have chronic anemia    Discharge date discharge was canceled by the vascular surgeon in order CT scan of the leg  To rule out osteomyelitis patient also seen by the wound care nurse  Wound covered with Santyl ointment on Mediney ointment    Ct show     Possible osteomyelitis small gas within the posterior calcaneus    Started on IV meropenem and IV Zyvox      Signed:   Mckenzie Lazar MD  6/20/2021  11:21 AM

## 2021-06-21 LAB
ALBUMIN SERPL-MCNC: 1.2 G/DL (ref 3.5–5)
ALBUMIN/GLOB SERPL: 0.2 {RATIO} (ref 1.1–2.2)
ALP SERPL-CCNC: 510 U/L (ref 45–117)
ALT SERPL-CCNC: 16 U/L (ref 12–78)
ANION GAP SERPL CALC-SCNC: 8 MMOL/L (ref 5–15)
AST SERPL W P-5'-P-CCNC: 84 U/L (ref 15–37)
BASOPHILS # BLD: 0 K/UL (ref 0–0.1)
BASOPHILS NFR BLD: 0 % (ref 0–1)
BILIRUB SERPL-MCNC: 2.5 MG/DL (ref 0.2–1)
BUN SERPL-MCNC: 78 MG/DL (ref 6–20)
BUN/CREAT SERPL: 25 (ref 12–20)
CA-I BLD-MCNC: 8 MG/DL (ref 8.5–10.1)
CHLORIDE SERPL-SCNC: 107 MMOL/L (ref 97–108)
CO2 SERPL-SCNC: 22 MMOL/L (ref 21–32)
CREAT SERPL-MCNC: 3.11 MG/DL (ref 0.7–1.3)
DIFFERENTIAL METHOD BLD: ABNORMAL
EOSINOPHIL # BLD: 0.4 K/UL (ref 0–0.4)
EOSINOPHIL NFR BLD: 3 % (ref 0–7)
ERYTHROCYTE [DISTWIDTH] IN BLOOD BY AUTOMATED COUNT: 18.4 % (ref 11.5–14.5)
GLOBULIN SER CALC-MCNC: 5.1 G/DL (ref 2–4)
GLUCOSE BLD STRIP.AUTO-MCNC: 114 MG/DL (ref 65–117)
GLUCOSE BLD STRIP.AUTO-MCNC: 132 MG/DL (ref 65–117)
GLUCOSE BLD STRIP.AUTO-MCNC: 136 MG/DL (ref 65–117)
GLUCOSE BLD STRIP.AUTO-MCNC: 80 MG/DL (ref 65–117)
GLUCOSE SERPL-MCNC: 78 MG/DL (ref 65–100)
HCT VFR BLD AUTO: 23.3 % (ref 36.6–50.3)
HGB BLD-MCNC: 7.2 G/DL (ref 12.1–17)
IMM GRANULOCYTES # BLD AUTO: 0 K/UL
IMM GRANULOCYTES NFR BLD AUTO: 0 %
LEFT CFA PROX SYS PSV: 161 CM/S
LEFT POP A PROX VEL RATIO: 0.45
LEFT POPLITEAL DIST SYS PSV: 50.7 CM/S
LEFT POPLITEAL PROX SYS PSV: 90.9 CM/S
LEFT PROFUNDA SYS PSV: 87.9 CM/S
LEFT SFA DIST VEL RATIO: 1.55
LEFT SFA MID VEL RATIO: 0.99
LEFT SFA PROX VEL RATIO: 0.81
LEFT SUPER FEMORAL DIST SYS PSV: 201 CM/S
LEFT SUPER FEMORAL MID SYS PSV: 130 CM/S
LEFT SUPER FEMORAL PROX SYS PSV: 131 CM/S
LYMPHOCYTES # BLD: 1.8 K/UL (ref 0.8–3.5)
LYMPHOCYTES NFR BLD: 15 % (ref 12–49)
MCH RBC QN AUTO: 30.5 PG (ref 26–34)
MCHC RBC AUTO-ENTMCNC: 30.9 G/DL (ref 30–36.5)
MCV RBC AUTO: 98.7 FL (ref 80–99)
METAMYELOCYTES NFR BLD MANUAL: 1 %
MONOCYTES # BLD: 1.8 K/UL (ref 0–1)
MONOCYTES NFR BLD: 15 % (ref 5–13)
MYELOCYTES NFR BLD MANUAL: 1 %
NEUTS BAND NFR BLD MANUAL: 1 % (ref 0–6)
NEUTS SEG # BLD: 7.8 K/UL (ref 1.8–8)
NEUTS SEG NFR BLD: 64 % (ref 32–75)
NRBC # BLD: 0.04 K/UL (ref 0–0.01)
NRBC BLD-RTO: 0.3 PER 100 WBC
PERFORMED BY, TECHID: ABNORMAL
PERFORMED BY, TECHID: ABNORMAL
PERFORMED BY, TECHID: NORMAL
PERFORMED BY, TECHID: NORMAL
PLATELET # BLD AUTO: 169 K/UL (ref 150–400)
PMV BLD AUTO: 10.7 FL (ref 8.9–12.9)
POTASSIUM SERPL-SCNC: 3.9 MMOL/L (ref 3.5–5.1)
PROT SERPL-MCNC: 6.3 G/DL (ref 6.4–8.2)
RBC # BLD AUTO: 2.36 M/UL (ref 4.1–5.7)
RBC MORPH BLD: ABNORMAL
RIGHT CFA PROX SYS PSV: 188 CM/S
RIGHT POPLITEAL DIST SYS PSV: 48.5 CM/S
RIGHT POPLITEAL PROX SYS PSV: 43.3 CM/S
RIGHT PROX PFA A PSV: 122 CM/S
RIGHT SUPER FEMORAL MID SYS PSV: 71.8 CM/S
SODIUM SERPL-SCNC: 137 MMOL/L (ref 136–145)
WBC # BLD AUTO: 12 K/UL (ref 4.1–11.1)

## 2021-06-21 PROCEDURE — 82962 GLUCOSE BLOOD TEST: CPT

## 2021-06-21 PROCEDURE — 36415 COLL VENOUS BLD VENIPUNCTURE: CPT

## 2021-06-21 PROCEDURE — 77010033678 HC OXYGEN DAILY

## 2021-06-21 PROCEDURE — 94640 AIRWAY INHALATION TREATMENT: CPT

## 2021-06-21 PROCEDURE — 65270000032 HC RM SEMIPRIVATE

## 2021-06-21 PROCEDURE — 97530 THERAPEUTIC ACTIVITIES: CPT

## 2021-06-21 PROCEDURE — 74011250636 HC RX REV CODE- 250/636: Performed by: FAMILY MEDICINE

## 2021-06-21 PROCEDURE — 99232 SBSQ HOSP IP/OBS MODERATE 35: CPT | Performed by: SURGERY

## 2021-06-21 PROCEDURE — 74011250636 HC RX REV CODE- 250/636: Performed by: INTERNAL MEDICINE

## 2021-06-21 PROCEDURE — 97165 OT EVAL LOW COMPLEX 30 MIN: CPT

## 2021-06-21 PROCEDURE — 74011000250 HC RX REV CODE- 250: Performed by: INTERNAL MEDICINE

## 2021-06-21 PROCEDURE — 80053 COMPREHEN METABOLIC PANEL: CPT

## 2021-06-21 PROCEDURE — 74011250637 HC RX REV CODE- 250/637: Performed by: INTERNAL MEDICINE

## 2021-06-21 PROCEDURE — 74011250637 HC RX REV CODE- 250/637: Performed by: FAMILY MEDICINE

## 2021-06-21 PROCEDURE — 94760 N-INVAS EAR/PLS OXIMETRY 1: CPT

## 2021-06-21 PROCEDURE — 93925 LOWER EXTREMITY STUDY: CPT | Performed by: SURGERY

## 2021-06-21 PROCEDURE — 85025 COMPLETE CBC W/AUTO DIFF WBC: CPT

## 2021-06-21 PROCEDURE — 99232 SBSQ HOSP IP/OBS MODERATE 35: CPT | Performed by: INTERNAL MEDICINE

## 2021-06-21 RX ADMIN — MEROPENEM 1 G: 1 INJECTION, POWDER, FOR SOLUTION INTRAVENOUS at 17:12

## 2021-06-21 RX ADMIN — PANTOPRAZOLE SODIUM 40 MG: 40 TABLET, DELAYED RELEASE ORAL at 09:33

## 2021-06-21 RX ADMIN — WHITE PETROLATUM,ZINC OXIDE: 57; 17 PASTE TOPICAL at 17:12

## 2021-06-21 RX ADMIN — LINEZOLID 600 MG: 600 TABLET, FILM COATED ORAL at 09:32

## 2021-06-21 RX ADMIN — WHITE PETROLATUM,ZINC OXIDE: 57; 17 PASTE TOPICAL at 21:49

## 2021-06-21 RX ADMIN — SIMVASTATIN 10 MG: 10 TABLET, FILM COATED ORAL at 21:40

## 2021-06-21 RX ADMIN — ALBUTEROL SULFATE 2 PUFF: 108 AEROSOL, METERED RESPIRATORY (INHALATION) at 01:20

## 2021-06-21 RX ADMIN — FERROUS SULFATE TAB 325 MG (65 MG ELEMENTAL FE) 325 MG: 325 (65 FE) TAB at 09:32

## 2021-06-21 RX ADMIN — COLLAGENASE SANTYL: 250 OINTMENT TOPICAL at 09:39

## 2021-06-21 RX ADMIN — GABAPENTIN 100 MG: 100 CAPSULE ORAL at 17:12

## 2021-06-21 RX ADMIN — ALBUTEROL SULFATE 2 PUFF: 108 AEROSOL, METERED RESPIRATORY (INHALATION) at 19:45

## 2021-06-21 RX ADMIN — TRAMADOL HYDROCHLORIDE 50 MG: 50 TABLET, FILM COATED ORAL at 21:40

## 2021-06-21 RX ADMIN — FERROUS SULFATE TAB 325 MG (65 MG ELEMENTAL FE) 325 MG: 325 (65 FE) TAB at 11:46

## 2021-06-21 RX ADMIN — HEPARIN SODIUM 5000 UNITS: 5000 INJECTION INTRAVENOUS; SUBCUTANEOUS at 11:46

## 2021-06-21 RX ADMIN — LINEZOLID 600 MG: 600 TABLET, FILM COATED ORAL at 21:40

## 2021-06-21 RX ADMIN — HEPARIN SODIUM 5000 UNITS: 5000 INJECTION INTRAVENOUS; SUBCUTANEOUS at 02:31

## 2021-06-21 RX ADMIN — GABAPENTIN 100 MG: 100 CAPSULE ORAL at 21:40

## 2021-06-21 RX ADMIN — HEPARIN SODIUM 5000 UNITS: 5000 INJECTION INTRAVENOUS; SUBCUTANEOUS at 21:40

## 2021-06-21 RX ADMIN — ALBUTEROL SULFATE 2 PUFF: 108 AEROSOL, METERED RESPIRATORY (INHALATION) at 13:22

## 2021-06-21 RX ADMIN — FERROUS SULFATE TAB 325 MG (65 MG ELEMENTAL FE) 325 MG: 325 (65 FE) TAB at 17:12

## 2021-06-21 RX ADMIN — LATANOPROST 1 DROP: 50 SOLUTION OPHTHALMIC at 17:13

## 2021-06-21 RX ADMIN — GABAPENTIN 100 MG: 100 CAPSULE ORAL at 09:33

## 2021-06-21 RX ADMIN — WHITE PETROLATUM,ZINC OXIDE: 57; 17 PASTE TOPICAL at 09:34

## 2021-06-21 RX ADMIN — ALBUTEROL SULFATE 2 PUFF: 108 AEROSOL, METERED RESPIRATORY (INHALATION) at 08:06

## 2021-06-21 NOTE — PROGRESS NOTES
Report given to Judah Rodgers RN oncoming nurse to include sbar, mar, kardex, recent orders and changes.

## 2021-06-21 NOTE — PROGRESS NOTES
PROGRESS NOTE      Chief Complaints:  he looks comfortable he denies any pain. HPI and  Objective:    Patient seems more coherent. No fever overnight patient denies chest pain shortness of breath fever or abdominal pain or nausea. Review of Systems: Rest of review of system negative. EXAM:  Visit Vitals  BP (!) 113/52 (BP 1 Location: Left upper arm, BP Patient Position: At rest;Lying)   Pulse 94   Temp 98.4 °F (36.9 °C)   Resp 20   Ht 6' 2\" (1.88 m)   Wt 263 lb 0.1 oz (119.3 kg)   SpO2 96%   BMI 33.77 kg/m²     Patient is awake and alert  Head and neck atraumatic normocephalic  Cardiovascular regular rate  Pulmonary no audible wheeze abdomen soft neurologically nonfocal.  Wound examination previously documented.   Recent Results (from the past 24 hour(s))   DUPLEX LOWER EXT ARTERY BILAT    Collection Time: 06/20/21  3:22 PM   Result Value Ref Range    Left CFA prox sys .0 cm/s    Left popliteal dist sys PSV 50.7 cm/s    Left popliteal prox sys PSV 90.9 cm/s    Left super femoral dist sys .0 cm/s    Left super femoral mid sys .0 cm/s    Left super femoral prox sys .0 cm/s    Left profunda sys PSV 87.90 cm/s    Right CFA prox sys .0 cm/s    Right popliteal dist sys PSV 48.5 cm/s    Right popliteal prox sys PSV 43.3 cm/s    Right super femoral mid sys PSV 71.8 cm/s    Right Prox PFA A .0 cm/s    Left SFA Prox Alex Ratio 0.81     Left SFA Mid Alex Ratio 0.99     Left SFA Dist Alex Ratio 1.55     Left Pop A Prox Alex Ratio 0.45    GLUCOSE, POC    Collection Time: 06/20/21  4:33 PM   Result Value Ref Range    Glucose (POC) 134 (H) 65 - 117 mg/dL    Performed by Angelica Kim, POC    Collection Time: 06/20/21 10:33 PM   Result Value Ref Range    Glucose (POC) 100 65 - 117 mg/dL    Performed by Emily Shoemaker    CBC WITH AUTOMATED DIFF    Collection Time: 06/21/21  7:09 AM   Result Value Ref Range    WBC 12.0 (H) 4.1 - 11.1 K/uL    RBC 2.36 (L) 4.10 - 5.70 M/uL    HGB 7.2 (L) 12.1 - 17.0 g/dL    HCT 23.3 (L) 36.6 - 50.3 %    MCV 98.7 80.0 - 99.0 FL    MCH 30.5 26.0 - 34.0 PG    MCHC 30.9 30.0 - 36.5 g/dL    RDW 18.4 (H) 11.5 - 14.5 %    PLATELET 198 392 - 093 K/uL    MPV 10.7 8.9 - 12.9 FL    NRBC 0.3 (H) 0.0  WBC    ABSOLUTE NRBC 0.04 (H) 0.00 - 0.01 K/uL    NEUTROPHILS 64 32 - 75 %    BAND NEUTROPHILS 1 0 - 6 %    LYMPHOCYTES 15 12 - 49 %    MONOCYTES 15 (H) 5 - 13 %    EOSINOPHILS 3 0 - 7 %    BASOPHILS 0 0 - 1 %    METAMYELOCYTES 1 (H) 0 %    MYELOCYTES 1 (H) 0 %    IMMATURE GRANULOCYTES 0 %    ABS. NEUTROPHILS 7.8 1.8 - 8.0 K/UL    ABS. LYMPHOCYTES 1.8 0.8 - 3.5 K/UL    ABS. MONOCYTES 1.8 (H) 0.0 - 1.0 K/UL    ABS. EOSINOPHILS 0.4 0.0 - 0.4 K/UL    ABS. BASOPHILS 0.0 0.0 - 0.1 K/UL    ABS. IMM. GRANS. 0.0 K/UL    DF Manual      RBC COMMENTS Anisocytosis  1+        RBC COMMENTS Polychromasia  1+        RBC COMMENTS Hypochromia  2+       METABOLIC PANEL, COMPREHENSIVE    Collection Time: 06/21/21  7:09 AM   Result Value Ref Range    Sodium 137 136 - 145 mmol/L    Potassium 3.9 3.5 - 5.1 mmol/L    Chloride 107 97 - 108 mmol/L    CO2 22 21 - 32 mmol/L    Anion gap 8 5 - 15 mmol/L    Glucose 78 65 - 100 mg/dL    BUN 78 (H) 6 - 20 mg/dL    Creatinine 3.11 (H) 0.70 - 1.30 mg/dL    BUN/Creatinine ratio 25 (H) 12 - 20      GFR est AA 23 (L) >60 ml/min/1.73m2    GFR est non-AA 19 (L) >60 ml/min/1.73m2    Calcium 8.0 (L) 8.5 - 10.1 mg/dL    Bilirubin, total 2.5 (H) 0.2 - 1.0 mg/dL    AST (SGOT) 84 (H) 15 - 37 U/L    ALT (SGPT) 16 12 - 78 U/L    Alk.  phosphatase 510 (H) 45 - 117 U/L    Protein, total 6.3 (L) 6.4 - 8.2 g/dL    Albumin 1.2 (L) 3.5 - 5.0 g/dL    Globulin 5.1 (H) 2.0 - 4.0 g/dL    A-G Ratio 0.2 (L) 1.1 - 2.2     GLUCOSE, POC    Collection Time: 06/21/21  8:26 AM   Result Value Ref Range    Glucose (POC) 80 65 - 117 mg/dL    Performed by [address], POC    Collection Time: 06/21/21 10:53 AM   Result Value Ref Range    Glucose (POC) 114 65 - 117 mg/dL Performed by Jesenia Matute        ASSESSMENT:   Patient is 80 y.o. with diagnosis of : Active Problems:    CRISTIAN (acute kidney injury) ([facility] [address] (4/28/2021)      Acute kidney injury ([facility] [address] (6/15/2021)        PLAN:       I reviewed the CT scan and patient does have significant soft tissue infection in the right heel. 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 With these findings I recommend personally some level of amputation. I do not think a below-knee amputation for help addressing the significant tissue necrosis on the pretibial area of the left leg. Most likely he will benefit from above-knee amputation. But will talk to the patient's and son about level of amputation. Apparently family wants and agreeable to this procedure. Planning to talk to the family personally. Meanwhile we will continue local wound care. Right leg pretibial wounds are not too bad. Even though a very concerned that he may have a significant osteo on the right pretibial area.

## 2021-06-21 NOTE — PROGRESS NOTES
General Daily Progress Note          Patient Name:   Mabeline Bamberger       YOB: 1932       Age:  80 y.o. Admit Date: 6/15/2021      Subjective:   Patient is Floranastasiyaus.Nose y.o. year old male with a past medical history of diabetes, hypertension, hyperlipidemia, renal insufficiency, and anemia history of liver mass who presented to the ER 6/15 due to abnormal labs drawn at the nursing home yesterday. He was found to have worsening kidney function. He was on dialysis previously, but had reportedly stopped dialysis several years ago. He denied chest pain and SOB. CXR showed no acute cardiopulmonary processes. He was admitted for further evaluation and treatment. Patient have a liver mass diagnosis and last admission as per note no further diagnosis procedure treatment was planned with the family by the attending. Patient's discharge date canceled by vascular surgeon due to complex wounds on bilateral legs and need for CT. 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 Wound cultures showed E. Cloacae resistant to Zosyn and MRSA. Today patient is alert and awake in bed, complaining of severe leg aching. He denies chest pain and SOB.                    Objective:     Visit Vitals  BP (!) 113/52 (BP 1 Location: Left upper arm, BP Patient Position: At rest;Lying)   Pulse 94   Temp 98.4 °F (36.9 °C)   Resp 20   Ht 6' 2\" (1.88 m)   Wt 119.3 kg (263 lb 0.1 oz)   SpO2 94%   BMI 33.77 kg/m²        Recent Results (from the past 24 hour(s))   CBC WITH AUTOMATED DIFF    Collection Time: 06/20/21 11:10 AM   Result Value Ref Range    WBC 13.6 (H) 4.1 - 11.1 K/uL    RBC 2.60 (L) 4.10 - 5.70 M/uL    HGB 7.9 (L) 12.1 - 17.0 g/dL    HCT 25.5 (L) 36.6 - 50.3 %    MCV 98.1 80.0 - 99.0 FL    MCH 30.4 26.0 - 34.0 PG    MCHC 31.0 30.0 - 36.5 g/dL    RDW 18.2 (H) 11.5 - 14.5 %    PLATELET 571 910 - 999 K/uL    MPV 10.3 8.9 - 12.9 FL    NRBC 0.2 (H) 0.0  WBC    ABSOLUTE NRBC 0.03 (H) 0.00 - 0.01 K/uL    NEUTROPHILS 57 32 - 75 %    LYMPHOCYTES 22 12 - 49 %    MONOCYTES 13 5 - 13 %    EOSINOPHILS 4 0 - 7 %    BASOPHILS 0 0 - 1 %    IMMATURE GRANULOCYTES 4 (H) 0 - 0.5 %    ABS. NEUTROPHILS 7.8 1.8 - 8.0 K/UL    ABS. LYMPHOCYTES 3.0 0.8 - 3.5 K/UL    ABS. MONOCYTES 1.7 (H) 0.0 - 1.0 K/UL    ABS. EOSINOPHILS 0.5 (H) 0.0 - 0.4 K/UL    ABS. BASOPHILS 0.1 0.0 - 0.1 K/UL    ABS. IMM. GRANS. 0.6 (H) 0.00 - 0.04 K/UL    DF AUTOMATED     METABOLIC PANEL, COMPREHENSIVE    Collection Time: 06/20/21 11:10 AM   Result Value Ref Range    Sodium 138 136 - 145 mmol/L    Potassium 3.9 3.5 - 5.1 mmol/L    Chloride 106 97 - 108 mmol/L    CO2 23 21 - 32 mmol/L    Anion gap 9 5 - 15 mmol/L    Glucose 105 (H) 65 - 100 mg/dL    BUN 75 (H) 6 - 20 mg/dL    Creatinine 3.15 (H) 0.70 - 1.30 mg/dL    BUN/Creatinine ratio 24 (H) 12 - 20      GFR est AA 23 (L) >60 ml/min/1.73m2    GFR est non-AA 19 (L) >60 ml/min/1.73m2    Calcium 8.1 (L) 8.5 - 10.1 mg/dL    Bilirubin, total 2.8 (H) 0.2 - 1.0 mg/dL    AST (SGOT) 94 (H) 15 - 37 U/L    ALT (SGPT) 18 12 - 78 U/L    Alk.  phosphatase 514 (H) 45 - 117 U/L    Protein, total 6.6 6.4 - 8.2 g/dL    Albumin 1.3 (L) 3.5 - 5.0 g/dL    Globulin 5.3 (H) 2.0 - 4.0 g/dL    A-G Ratio 0.2 (L) 1.1 - 2.2     GLUCOSE, POC    Collection Time: 06/20/21 11:34 AM   Result Value Ref Range    Glucose (POC) 125 (H) 65 - 117 mg/dL    Performed by Alfred Whyte    DUPLEX LOWER EXT ARTERY BILAT    Collection Time: 06/20/21  3:22 PM   Result Value Ref Range    Left CFA prox sys .0 cm/s    Left popliteal dist sys PSV 50.7 cm/s    Left popliteal prox sys PSV 90.9 cm/s    Left super femoral dist sys .0 cm/s    Left super femoral mid sys .0 cm/s    Left super femoral prox sys .0 cm/s    Left profunda sys PSV 87.90 cm/s    Right CFA prox sys .0 cm/s    Right popliteal dist sys PSV 48.5 cm/s    Right popliteal prox sys PSV 43.3 cm/s    Right super femoral mid sys PSV 71.8 cm/s    Right Prox PFA A .0 cm/s    Left SFA Prox Alex Ratio 0.81     Left SFA Mid Alex Ratio 0.99     Left SFA Dist Alex Ratio 1.55     Left Pop A Prox Alex Ratio 0.45    GLUCOSE, POC    Collection Time: 06/20/21  4:33 PM   Result Value Ref Range    Glucose (POC) 134 (H) 65 - 117 mg/dL    Performed by Yary Ma    GLUCOSE, POC    Collection Time: 06/20/21 10:33 PM   Result Value Ref Range    Glucose (POC) 100 65 - 117 mg/dL    Performed by Angella Huitron    CBC WITH AUTOMATED DIFF    Collection Time: 06/21/21  7:09 AM   Result Value Ref Range    WBC 12.0 (H) 4.1 - 11.1 K/uL    RBC 2.36 (L) 4.10 - 5.70 M/uL    HGB 7.2 (L) 12.1 - 17.0 g/dL    HCT 23.3 (L) 36.6 - 50.3 %    MCV 98.7 80.0 - 99.0 FL    MCH 30.5 26.0 - 34.0 PG    MCHC 30.9 30.0 - 36.5 g/dL    RDW 18.4 (H) 11.5 - 14.5 %    PLATELET 617 136 - 197 K/uL    MPV 10.7 8.9 - 12.9 FL    NRBC 0.3 (H) 0.0  WBC    ABSOLUTE NRBC 0.04 (H) 0.00 - 0.01 K/uL    NEUTROPHILS PENDING %    LYMPHOCYTES PENDING %    MONOCYTES PENDING %    EOSINOPHILS PENDING %    BASOPHILS PENDING %    IMMATURE GRANULOCYTES PENDING %    ABS. NEUTROPHILS PENDING K/UL    ABS. LYMPHOCYTES PENDING K/UL    ABS. MONOCYTES PENDING K/UL    ABS. EOSINOPHILS PENDING K/UL    ABS. BASOPHILS PENDING K/UL    ABS. IMM. GRANS. PENDING K/UL    DF PENDING    METABOLIC PANEL, COMPREHENSIVE    Collection Time: 06/21/21  7:09 AM   Result Value Ref Range    Sodium 137 136 - 145 mmol/L    Potassium 3.9 3.5 - 5.1 mmol/L    Chloride 107 97 - 108 mmol/L    CO2 22 21 - 32 mmol/L    Anion gap 8 5 - 15 mmol/L    Glucose 78 65 - 100 mg/dL    BUN 78 (H) 6 - 20 mg/dL    Creatinine 3.11 (H) 0.70 - 1.30 mg/dL    BUN/Creatinine ratio 25 (H) 12 - 20      GFR est AA 23 (L) >60 ml/min/1.73m2    GFR est non-AA 19 (L) >60 ml/min/1.73m2    Calcium 8.0 (L) 8.5 - 10.1 mg/dL    Bilirubin, total 2.5 (H) 0.2 - 1.0 mg/dL    AST (SGOT) 84 (H) 15 - 37 U/L    ALT (SGPT) 16 12 - 78 U/L    Alk.  phosphatase 510 (H) 45 - 117 U/L    Protein, total 6.3 (L) 6.4 - 8.2 g/dL    Albumin 1.2 (L) 3.5 - 5.0 g/dL    Globulin 5.1 (H) 2.0 - 4.0 g/dL    A-G Ratio 0.2 (L) 1.1 - 2.2     GLUCOSE, POC    Collection Time: 06/21/21  8:26 AM   Result Value Ref Range    Glucose (POC) 80 65 - 117 mg/dL    Performed by Axel Platt      [unfilled]      Review of Systems    Constitutional: Negative for chills and fever. HENT: Negative. Eyes: Negative. Respiratory: Negative. Cardiovascular: Negative. Gastrointestinal: Negative for abdominal pain and nausea. Skin: Negative. Neurological: Negative. Extremities: bilateral aching       Physical Exam:      Constitutional: pt is oriented to person, place, and time. HENT:   Head: Normocephalic and atraumatic. Eyes: Pupils are equal, round, and reactive to light. EOM are normal.   Cardiovascular: Normal rate, regular rhythm and normal heart sounds. Pulmonary/Chest: Breath sounds normal. No wheezes. No rales. Exhibits no tenderness. Abdominal: Soft. Bowel sounds are normal. There is no abdominal tenderness. There is no rebound and no guarding. Musculoskeletal: Normal range of motion. Neurological: pt is alert and oriented to person, place, and time. Extremities: dressing in place    CT LOW EXT BI WO CONT   Final Result   1. Somewhat suboptimal evaluation, as detailed above. 2. Within exam limitations there is a soft tissue ulcer at the posterior left   calcaneus with infection with gas-forming organism versus soft tissue   devitalization. Small amount of gas within the posterior calcaneus is most   likely related to osteomyelitis. 3. Other soft tissue wounds within both legs. No additional findings of acute   osseous abnormality within exam limitations. Based on level of clinical concern,   could consider further evaluation with targeted MRI of specific regions. 4. Diffuse osseous demineralization likely contributes to the somewhat   heterogeneous appearance of the bones.  However, there is an indeterminate 10 mm   lucency within the right medial femoral condyle. Please correlate with clinical   history for a known primary malignancy or plasma abnormality. Otherwise short   interval follow-up in 3 months should be considered. US RETROPERITONEUM COMP   Final Result   Borderline right-sided hydronephrosis. Ascites. XR CHEST PORT   Final Result   No evidence of an acute cardiopulmonary process. Recent Results (from the past 24 hour(s))   CBC WITH AUTOMATED DIFF    Collection Time: 06/20/21 11:10 AM   Result Value Ref Range    WBC 13.6 (H) 4.1 - 11.1 K/uL    RBC 2.60 (L) 4.10 - 5.70 M/uL    HGB 7.9 (L) 12.1 - 17.0 g/dL    HCT 25.5 (L) 36.6 - 50.3 %    MCV 98.1 80.0 - 99.0 FL    MCH 30.4 26.0 - 34.0 PG    MCHC 31.0 30.0 - 36.5 g/dL    RDW 18.2 (H) 11.5 - 14.5 %    PLATELET 739 067 - 040 K/uL    MPV 10.3 8.9 - 12.9 FL    NRBC 0.2 (H) 0.0  WBC    ABSOLUTE NRBC 0.03 (H) 0.00 - 0.01 K/uL    NEUTROPHILS 57 32 - 75 %    LYMPHOCYTES 22 12 - 49 %    MONOCYTES 13 5 - 13 %    EOSINOPHILS 4 0 - 7 %    BASOPHILS 0 0 - 1 %    IMMATURE GRANULOCYTES 4 (H) 0 - 0.5 %    ABS. NEUTROPHILS 7.8 1.8 - 8.0 K/UL    ABS. LYMPHOCYTES 3.0 0.8 - 3.5 K/UL    ABS. MONOCYTES 1.7 (H) 0.0 - 1.0 K/UL    ABS. EOSINOPHILS 0.5 (H) 0.0 - 0.4 K/UL    ABS. BASOPHILS 0.1 0.0 - 0.1 K/UL    ABS. IMM.  GRANS. 0.6 (H) 0.00 - 0.04 K/UL    DF AUTOMATED     METABOLIC PANEL, COMPREHENSIVE    Collection Time: 06/20/21 11:10 AM   Result Value Ref Range    Sodium 138 136 - 145 mmol/L    Potassium 3.9 3.5 - 5.1 mmol/L    Chloride 106 97 - 108 mmol/L    CO2 23 21 - 32 mmol/L    Anion gap 9 5 - 15 mmol/L    Glucose 105 (H) 65 - 100 mg/dL    BUN 75 (H) 6 - 20 mg/dL    Creatinine 3.15 (H) 0.70 - 1.30 mg/dL    BUN/Creatinine ratio 24 (H) 12 - 20      GFR est AA 23 (L) >60 ml/min/1.73m2    GFR est non-AA 19 (L) >60 ml/min/1.73m2    Calcium 8.1 (L) 8.5 - 10.1 mg/dL    Bilirubin, total 2.8 (H) 0.2 - 1.0 mg/dL    AST (SGOT) 94 (H) 15 - 37 U/L    ALT (SGPT) 18 12 - 78 U/L    Alk.  phosphatase 514 (H) 45 - 117 U/L    Protein, total 6.6 6.4 - 8.2 g/dL    Albumin 1.3 (L) 3.5 - 5.0 g/dL    Globulin 5.3 (H) 2.0 - 4.0 g/dL    A-G Ratio 0.2 (L) 1.1 - 2.2     GLUCOSE, POC    Collection Time: 06/20/21 11:34 AM   Result Value Ref Range    Glucose (POC) 125 (H) 65 - 117 mg/dL    Performed by Odell Gleason    DUPLEX LOWER EXT ARTERY BILAT    Collection Time: 06/20/21  3:22 PM   Result Value Ref Range    Left CFA prox sys .0 cm/s    Left popliteal dist sys PSV 50.7 cm/s    Left popliteal prox sys PSV 90.9 cm/s    Left super femoral dist sys .0 cm/s    Left super femoral mid sys .0 cm/s    Left super femoral prox sys .0 cm/s    Left profunda sys PSV 87.90 cm/s    Right CFA prox sys .0 cm/s    Right popliteal dist sys PSV 48.5 cm/s    Right popliteal prox sys PSV 43.3 cm/s    Right super femoral mid sys PSV 71.8 cm/s    Right Prox PFA A .0 cm/s    Left SFA Prox Alex Ratio 0.81     Left SFA Mid Alex Ratio 0.99     Left SFA Dist Alex Ratio 1.55     Left Pop A Prox Alex Ratio 0.45    GLUCOSE, POC    Collection Time: 06/20/21  4:33 PM   Result Value Ref Range    Glucose (POC) 134 (H) 65 - 117 mg/dL    Performed by Vito Ramirez    GLUCOSE, POC    Collection Time: 06/20/21 10:33 PM   Result Value Ref Range    Glucose (POC) 100 65 - 117 mg/dL    Performed by Maulik Ramirez    CBC WITH AUTOMATED DIFF    Collection Time: 06/21/21  7:09 AM   Result Value Ref Range    WBC 12.0 (H) 4.1 - 11.1 K/uL    RBC 2.36 (L) 4.10 - 5.70 M/uL    HGB 7.2 (L) 12.1 - 17.0 g/dL    HCT 23.3 (L) 36.6 - 50.3 %    MCV 98.7 80.0 - 99.0 FL    MCH 30.5 26.0 - 34.0 PG    MCHC 30.9 30.0 - 36.5 g/dL    RDW 18.4 (H) 11.5 - 14.5 %    PLATELET 359 101 - 853 K/uL    MPV 10.7 8.9 - 12.9 FL    NRBC 0.3 (H) 0.0  WBC    ABSOLUTE NRBC 0.04 (H) 0.00 - 0.01 K/uL    NEUTROPHILS PENDING %    LYMPHOCYTES PENDING %    MONOCYTES PENDING %    EOSINOPHILS PENDING % BASOPHILS PENDING %    IMMATURE GRANULOCYTES PENDING %    ABS. NEUTROPHILS PENDING K/UL    ABS. LYMPHOCYTES PENDING K/UL    ABS. MONOCYTES PENDING K/UL    ABS. EOSINOPHILS PENDING K/UL    ABS. BASOPHILS PENDING K/UL    ABS. IMM. GRANS. PENDING K/UL    DF PENDING    METABOLIC PANEL, COMPREHENSIVE    Collection Time: 06/21/21  7:09 AM   Result Value Ref Range    Sodium 137 136 - 145 mmol/L    Potassium 3.9 3.5 - 5.1 mmol/L    Chloride 107 97 - 108 mmol/L    CO2 22 21 - 32 mmol/L    Anion gap 8 5 - 15 mmol/L    Glucose 78 65 - 100 mg/dL    BUN 78 (H) 6 - 20 mg/dL    Creatinine 3.11 (H) 0.70 - 1.30 mg/dL    BUN/Creatinine ratio 25 (H) 12 - 20      GFR est AA 23 (L) >60 ml/min/1.73m2    GFR est non-AA 19 (L) >60 ml/min/1.73m2    Calcium 8.0 (L) 8.5 - 10.1 mg/dL    Bilirubin, total 2.5 (H) 0.2 - 1.0 mg/dL    AST (SGOT) 84 (H) 15 - 37 U/L    ALT (SGPT) 16 12 - 78 U/L    Alk.  phosphatase 510 (H) 45 - 117 U/L    Protein, total 6.3 (L) 6.4 - 8.2 g/dL    Albumin 1.2 (L) 3.5 - 5.0 g/dL    Globulin 5.1 (H) 2.0 - 4.0 g/dL    A-G Ratio 0.2 (L) 1.1 - 2.2     GLUCOSE, POC    Collection Time: 06/21/21  8:26 AM   Result Value Ref Range    Glucose (POC) 80 65 - 117 mg/dL    Performed by Adan Blum        Results     Procedure Component Value Units Date/Time    CULTURE, [place] STAIN [[id number]]  (Susceptibility) Collected: 06/16/21 0615    Order Status: Completed Specimen: Wound Updated: 06/19/21 1018     Special Requests: No Special Requests        GRAM STAIN No wbc's seen         2+ Gram Positive Cocci         2+ Gram Positive Rods         Few Gram Negative Rods        Culture result:       Heavy Enterobacter cloacae complex                  Heavy * methicillin resistant staphylococcus aureus * REFER TO [id number] FOR SENSITIVITIES            Few Diphtheroids         CALLED MRSA REPORT TO Carrie ALONZONFatoumata 1020 06/19/2021 BY DPW    Susceptibility      Enterobacter cloacae complex      CATINA      Amikacin ($) Susceptible Cefazolin ($) Resistant      Cefepime ($$) Susceptible      Cefoxitin Resistant      Ceftazidime ($) Resistant      Ceftriaxone ($) Resistant      Ciprofloxacin ($) Resistant      Gentamicin ($) Susceptible      Levofloxacin ($) Resistant      Meropenem ($$) Susceptible      Piperacillin/Tazobac ($) Resistant      Tobramycin ($) Susceptible      Trimeth/Sulfa Resistant               Linear View                   CULTURE, [place] STAIN [[id number]]  (Susceptibility) Collected: 06/16/21 0615    Order Status: Completed Specimen: Wound Updated: 06/19/21 0806     Special Requests: No Special Requests        GRAM STAIN 1+ WBCs seen         4+ Gram Negative Rods               4+ Gram Positive Cocci in pairs                  4+ apparent Gram Positive Rods           Culture result:       Heavy Enterobacter cloacae complex                  Moderate * methicillin resistant staphylococcus aureus *            Light Diphtheroids       Susceptibility      Enterobacter cloacae complex Staphylococcus aureus Methcillin Resistant      CATINA CATINA      Amikacin ($) Susceptible       Cefazolin ($) Resistant       Cefepime ($$) Susceptible       Cefoxitin Resistant       Ceftazidime ($) Resistant       Ceftriaxone ($) Resistant       Ciprofloxacin ($) Resistant Resistant      Clindamycin ($)  Susceptible      Daptomycin ($$$$$)  Susceptible      Doxycycline ($$)  Susceptible      Erythromycin ($$$$)  Susceptible      Gentamicin ($) Susceptible Susceptible      Levofloxacin ($) Resistant Resistant      Linezolid ($$$$$)  Susceptible      Meropenem ($$) Susceptible       Oxacillin  Resistant      Piperacillin/Tazobac ($) Resistant       Rifampin ($$$$)  Susceptible<sup style='font-weight:normal'>1</sup></font>      Tetracycline  Resistant      Tobramycin ($) Susceptible       Trimeth/Sulfa Resistant Susceptible      Vancomycin ($)  Susceptible               <sup style='font-weight:normal'>1</sup></font> Rifampin is not to be used for mono-therapy. Linear View                   COVID-19 RAPID TEST [[id number]] Collected: 06/16/21 0216    Order Status: Completed Specimen: Nasopharyngeal Updated: 06/16/21 0408     Specimen source Nasopharyngeal        COVID-19 rapid test Not Detected        Comment: Rapid Abbott ID Now   Rapid NAAT:  The specimen is NEGATIVE for SARS-CoV-2, the novel coronavirus associated with COVID-19. Negative results should be treated as presumptive and, if inconsistent with clinical signs and symptoms or necessary for patient management, should be tested with an alternative molecular assay. Negative results do not preclude SARS-CoV-2 infection and should not be used as the sole basis for patient management decisions. This test has been authorized by the FDA under   an Emergency Use Authorization (EUA) for use by authorized laboratories. Fact sheet for Healthcare Providers: ConventionKitLocatedate.co.nz Fact sheet for Patients: Hatchtechdate.co.nz   Methodology: Isothermal Nucleic Acid Amplification         COVID-19 RAPID TEST [[id number]]  (Abnormal) Collected: 06/16/21 0023    Order Status: Completed Specimen: Nasopharyngeal Updated: 06/16/21 0208     Specimen source Nasopharyngeal        COVID-19 rapid test Indeterminate        Comment: Rapid Abbott ID Now   The presence or absence of COVID-19 Viral RNAs cannot be determined. If clinically indicated, please collect a new specimen. This test has been authorized by the FDA under an Emergency Use Authorization (EUA) for use by authorized laboratories.    Fact sheet for Healthcare Providers: ConventionKitLocatedate.co.nz Fact sheet for Patients: ConventionUpdate.co.nz   Methodology: Isothermal Nucleic Acid Amplification SPOKE TO KEVYN MENDOZA,   SUGGESTED RECOLLECT                 Labs:     Recent Labs     06/21/21  0709 06/20/21  1110   WBC 12.0* 13.6*   HGB 7.2* 7.9*   HCT 23.3* 25.5*    183 Recent Labs     06/21/21  0709 06/20/21  1110    138   K 3.9 3.9    106   CO2 22 23   BUN 78* 75*   CREA 3.11* 3.15*   GLU 78 105*   CA 8.0* 8.1*     Recent Labs     06/21/21  0709 06/20/21  1110   ALT 16 18   * 514*   TBILI 2.5* 2.8*   TP 6.3* 6.6   ALB 1.2* 1.3*   GLOB 5.1* 5.3*     No results for input(s): INR, PTP, APTT, INREXT in the last 72 hours. No results for input(s): FE, TIBC, PSAT, FERR in the last 72 hours. No results found for: FOL, RBCF   No results for input(s): PH, PCO2, PO2 in the last 72 hours. No results for input(s): CPK, CKNDX, TROIQ in the last 72 hours. No lab exists for component: CPKMB  No results found for: CHOL, CHOLX, CHLST, CHOLV, HDL, HDLP, LDL, LDLC, DLDLP, TGLX, TRIGL, TRIGP, CHHD, CHHDX  Lab Results   Component Value Date/Time    Glucose (POC) 80 06/21/2021 08:26 AM    Glucose (POC) 100 06/20/2021 10:33 PM    Glucose (POC) 134 (H) 06/20/2021 04:33 PM    Glucose (POC) 125 (H) 06/20/2021 11:34 AM    Glucose (POC) 93 06/20/2021 08:54 AM     Lab Results   Component Value Date/Time    Color Yaritza 04/28/2021 03:00 PM    Appearance Turbid (A) 04/28/2021 03:00 PM    Specific gravity 1.014 04/28/2021 03:00 PM    pH (UA) 5.0 04/28/2021 03:00 PM    Protein 100 (A) 04/28/2021 03:00 PM    Glucose Negative 04/28/2021 03:00 PM    Ketone Negative 04/28/2021 03:00 PM    Bilirubin Negative 04/28/2021 03:00 PM    Urobilinogen 4.0 (H) 04/28/2021 03:00 PM    Nitrites Negative 04/28/2021 03:00 PM    Leukocyte Esterase Negative 04/28/2021 03:00 PM    Bacteria Negative 04/28/2021 03:00 PM    WBC 0-4 04/28/2021 03:00 PM    RBC 0-5 04/28/2021 03:00 PM         Assessment:     Acute on chronic kidney failure-trending down  Diabetes  Hypertension   Hyperlipidemia  Anemia- 7.2  Leukocytosis  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    Continue frequent turns   Monitor H&H  Repeat labs in AM      Current Facility-Administered Medications:     collagenase (SANTYL) 250 unit/gram ointment, , Topical, DAILY, Bernabe, Dominic Hernandez MD, Given at 06/20/21 0857    linezolid (ZYVOX) tablet 600 mg, 600 mg, Oral, Q12H, Sascha Araiza MD, 600 mg at 06/20/21 0855    meropenem (MERREM) 1 g in sterile water (preservative free) 20 mL IV syringe, 1 g, IntraVENous, Q24H, Sascha Araiza MD, 1 g at 06/20/21 1646    acetaminophen (TYLENOL) tablet 500 mg, 500 mg, Oral, Q4H PRN, Dinora Ramirez MD    acetaminophen (TYLENOL) tablet 1,000 mg, 1,000 mg, Oral, Q6H PRN, Dinora Ramirez MD, 1,000 mg at 06/20/21 2251    albuterol (PROVENTIL HFA, VENTOLIN HFA, PROAIR HFA) inhaler 2 Puff, 2 Puff, Inhalation, Q6H RT, Cristina Cain MD, 2 Puff at 06/21/21 0806    [Held by provider] amLODIPine (NORVASC) tablet 5 mg, 5 mg, Oral, DAILY, Dinora Ramirez MD, 5 mg at 06/17/21 1123    ferrous sulfate tablet 325 mg, 1 Tablet, Oral, TID WITH MEALS, Dinora Ramirez MD, 325 mg at 06/20/21 1646    gabapentin (NEURONTIN) capsule 100 mg, 100 mg, Oral, TID, Dinora Ramirez MD, 100 mg at 06/20/21 2252    [Held by provider] furosemide (LASIX) tablet 40 mg, 40 mg, Oral, DAILY, Dinora Ramirez MD, 40 mg at 06/16/21 1107    latanoprost (XALATAN) 0.005 % ophthalmic solution 1 Drop, 1 Drop, Both Eyes, QPM, Dinora Ramirez MD, 1 Drop at 06/20/21 1727    loperamide (IMODIUM) capsule 2 mg, 2 mg, Oral, Q4H PRN, Dinora Ramirez MD    simvastatin (ZOCOR) tablet 10 mg, 10 mg, Oral, QHS, Dinora Ramirez MD, 10 mg at 06/20/21 2251    traMADoL (ULTRAM) tablet 50 mg, 50 mg, Oral, Q6H PRN, Dinora Ramirez MD, 50 mg at 06/20/21 0649    ondansetron hcl (ZOFRAN) tablet 4 mg, 4 mg, Oral, Q4H PRN, Cristina Cain MD  [employer] cyanocobalamin (VITAMIN B12) injection 1,000 mcg, 1,000 mcg, IntraMUSCular, EVERY 2 WEEKS, Dinora Ramirez MD, 1,000 mcg at 06/16/21 1731    ferrous sulfate tablet 325 mg, 325 mg, Oral, ACB, Dinora Ramirez MD, 325 mg at 06/20/21 0855    pantoprazole (PROTONIX) tablet 40 mg, 40 mg, Oral, DAILY, Dinora Ramirez MD, 40 mg at 06/20/21 0704    insulin lispro (HUMALOG) injection, , SubCUTAneous, AC&HS, Dinora Ramirez MD, 2 Units at 06/18/21 2233    glucose chewable tablet 16 g, 4 Tablet, Oral, PRN, Lexie Ramirez MD    dextrose (D50W) injection syrg 12.5-25 g, 25-50 mL, IntraVENous, PRN, Lexie Ramirez MD    glucagon [facility]) injection 1 mg, 1 mg, IntraMUSCular, PRN, Lexie Ramirez MD    0.9% sodium chloride infusion, 75 mL/hr, IntraVENous, CONTINUOUS, Dinora Ramirez MD, Last Rate: 75 mL/hr at 06/18/21 1904, 75 mL/hr at 06/18/21 1904    acetaminophen (TYLENOL) tablet 650 mg, 650 mg, Oral, Q6H PRN **OR** acetaminophen (TYLENOL) suppository 650 mg, 650 mg, Rectal, Q6H PRN, Lexie Ramirez MD    polyethylene glycol (MIRALAX) packet 17 g, 17 g, Oral, DAILY PRN, Lexie Ramirez MD    ondansetron (ZOFRAN ODT) tablet 4 mg, 4 mg, Oral, Q8H PRN **OR** ondansetron (ZOFRAN) injection 4 mg, 4 mg, IntraVENous, Q6H PRN, Dinora Ramirez MD    heparin (porcine) injection 5,000 Units, 5,000 Units, SubCUTAneous, Q8H, Dinora Ramirez MD, 5,000 Units at 06/21/21 0231    zinc oxide-white petrolatum 17-57 % topical paste, , Topical, TID, Lexie Ramirez MD, Given at 06/20/21 3171

## 2021-06-21 NOTE — PROGRESS NOTES
Infectious Disease Progress Note           Subjective:   Pt seen and examined at bedside. Alert and following commands appropriately. No acute events since last seen   Objective:   Physical Exam:     Visit Vitals  BP (!) 113/52 (BP 1 Location: Left upper arm, BP Patient Position: At rest;Lying)   Pulse 94   Temp 98.4 °F (36.9 °C)   Resp 20   Ht 6' 2\" (1.88 m)   Wt 263 lb 0.1 oz (119.3 kg)   SpO2 96%   BMI 33.77 kg/m²      O2 Device: None (Room air)    Temp (24hrs), Av.3 °F (36.8 °C), Min:97.7 °F (36.5 °C), Max:98.8 °F (37.1 °C)    No intake/output data recorded.     1901 -  0700  In: -   Out: 100 [Urine:100]    General: NAD, alert and following commands   HEENT: JEAN, Moist mucosa   Lungs: CTA b/l, no wheeze/rhonchi   Heart: S1S2+, RRR, no murmur  Abdo: Soft, NT, ND, +BS   Exts: Leg dressing in place, malodorous smell from wounds   Skin: No wounds, No rashes or lesions    Data Review:       Recent Days:  Recent Labs     21  0709 21  1110   WBC 12.0* 13.6*   HGB 7.2* 7.9*   HCT 23.3* 25.5*    183     Recent Labs     21  0709 21  1110   BUN 78* 75*   CREA 3.11* 3.15*       Microbiology     Results     Procedure Component Value Units Date/Time    CULTURE, Rivera Cr STAIN [[id number]]  (Susceptibility) Collected: 21 0615    Order Status: Completed Specimen: Wound Updated: 21 1018     Special Requests: No Special Requests        GRAM STAIN No wbc's seen         2+ Gram Positive Cocci         2+ Gram Positive Rods         Few Gram Negative Rods        Culture result:       Heavy Enterobacter cloacae complex                  Heavy * methicillin resistant staphylococcus aureus * REFER TO [id number] FOR SENSITIVITIES            Few Diphtheroids         CALLED MRSA REPORT TO Cheli Nagel R.N. 1020 2021 BY DPW    Susceptibility      Enterobacter cloacae complex      CATINA      Amikacin ($) Susceptible      Cefazolin ($) Resistant      Cefepime ($$) Susceptible      Cefoxitin Resistant      Ceftazidime ($) Resistant      Ceftriaxone ($) Resistant      Ciprofloxacin ($) Resistant      Gentamicin ($) Susceptible      Levofloxacin ($) Resistant      Meropenem ($$) Susceptible      Piperacillin/Tazobac ($) Resistant      Tobramycin ($) Susceptible      Trimeth/Sulfa Resistant               Linear View                   CULTURE, [place] STAIN [[id number]]  (Susceptibility) Collected: 06/16/21 0615    Order Status: Completed Specimen: Wound Updated: 06/19/21 0806     Special Requests: No Special Requests        GRAM STAIN 1+ WBCs seen         4+ Gram Negative Rods               4+ Gram Positive Cocci in pairs                  4+ apparent Gram Positive Rods           Culture result:       Heavy Enterobacter cloacae complex                  Moderate * methicillin resistant staphylococcus aureus *            Light Diphtheroids       Susceptibility      Enterobacter cloacae complex Staphylococcus aureus Methcillin Resistant      CATINA CATINA      Amikacin ($) Susceptible       Cefazolin ($) Resistant       Cefepime ($$) Susceptible       Cefoxitin Resistant       Ceftazidime ($) Resistant       Ceftriaxone ($) Resistant       Ciprofloxacin ($) Resistant Resistant      Clindamycin ($)  Susceptible      Daptomycin ($$$$$)  Susceptible      Doxycycline ($$)  Susceptible      Erythromycin ($$$$)  Susceptible      Gentamicin ($) Susceptible Susceptible      Levofloxacin ($) Resistant Resistant      Linezolid ($$$$$)  Susceptible      Meropenem ($$) Susceptible       Oxacillin  Resistant      Piperacillin/Tazobac ($) Resistant       Rifampin ($$$$)  Susceptible<sup style='font-weight:normal'>1</sup></font>      Tetracycline  Resistant      Tobramycin ($) Susceptible       Trimeth/Sulfa Resistant Susceptible      Vancomycin ($)  Susceptible                              COVID-19 RAPID TEST [[id number]] Collected: 06/16/21 0216    Order Status: Completed Specimen: Nasopharyngeal Updated: 06/16/21 0408     Specimen source Nasopharyngeal        COVID-19 rapid test Not Detected        Comment: Rapid Abbott ID Now   Rapid NAAT:  The specimen is NEGATIVE for SARS-CoV-2, the novel coronavirus associated with COVID-19. Negative results should be treated as presumptive and, if inconsistent with clinical signs and symptoms or necessary for patient management, should be tested with an alternative molecular assay. Negative results do not preclude SARS-CoV-2 infection and should not be used as the sole basis for patient management decisions. This test has been authorized by the FDA under   an Emergency Use Authorization (EUA) for use by authorized laboratories. Fact sheet for Healthcare Providers: [employer] Multimedi[employer]ate.co[employer]nz Fact sheet for Patients: SeeMedia.co.nz   Methodology: Isothermal Nucleic Acid Amplification         COVID-19 RAPID TEST [[id number]]  (Abnormal) Collected: 06/16/21 0023    Order Status: Completed Specimen: Nasopharyngeal Updated: 06/16/21 0208     Specimen source Nasopharyngeal        COVID-19 rapid test Indeterminate        Comment: Rapid Abbott ID Now   The presence or absence of COVID-19 Viral RNAs cannot be determined. If clinically indicated, please collect a new specimen. This test has been authorized by the FDA under an Emergency Use Authorization (EUA) for use by authorized laboratories. Fact sheet for Healthcare Providers: SeeMedia.co.nz Fact sheet for Patients: SeeMedia.co.nz   Methodology: Isothermal Nucleic Acid Amplification SPOKE TO KEVYN MENDOZA,   SUGGESTED RECOLLECT                 Diagnostics   CXR Results  (Last 48 hours)    None         Assessment/Plan     1. 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 Cloacae complex R to Zosyn and MRSA isolated from wound Cx       Reviewed documentation by vascular surgeon and he is recommending left AKA if fmly agreeable       Afebrile, leukocytosis trending down on todays labs       Continue on Linezolid and Meropenem for now       Routine labs in the morning, continue routine wound care     2. Sacral ulcer, unstageable, no exposed bone, sig skin maceration on exam        Continue frequent turns and routine wound care as is already being done    3. Left heel ulcer: gangrenous changes and Osteo and gas formation noted on CT    4. Acute on chronic renal failure: Cr trending down w fluids       5.  Poor oral intake, improved intake since admission         Riccardo Diego MD    6/21/2021

## 2021-06-21 NOTE — PROGRESS NOTES
Problem: Self Care Deficits Care Plan (Adult)  Goal: *Acute Goals and Plan of Care (Insert Text)  Description: Pt will be min A sup<->sit in prep for EOB ADL's  Pt will be min A  sit EOB 10 minutes in prep for EOB ADL's  Pt will be min A  grooming EOB level  Pt will be SBA self feeding  Pt will be mod A  sit<-> prep for toilet transfer  Pt will be mod A  BSC transfer with LRAD  Pt will be MI benjamin UE HEP in prep for self care tasks      Outcome: Not Met     OCCUPATIONAL THERAPY EVALUATION  Patient: Garret Merlos (58 y.o. male)  Date: 6/21/2021  Primary Diagnosis: Acute kidney injury ([facility] [address] [N17.9]  CRISTIAN (acute kidney injury) ([facility] [address] [N17.9]        Precautions: Fall    ASSESSMENT  Pt is 81 y/o male came to [facility] with abnormal labs at [facility] and adm 6/15/2021 for acute on chornic kidney failure, leg wounds, leukocytosis. unstageable sacral ulcer, chornic benjamin anterior leg ulcers, left heel ulcer, benjamin Duplex results pending thus bed level eval completed. Pt has hx of arthritis, AVM of colon, B12 deficiency, CAD, chronic anemia, CKD, DM, GERD, HF, HTN, PVD, benjamin hip replacements. Pt received semi supine in bed A&O to self and place only not time and agreeable for OT eval/tx. Per pt report, pt has been at [facility] for rehab and required assist for all self care including self feeding at times and was donovan lifted to recliner and stating he has not stood up in a long time. Per pt report, prior to [facility] placement, pt lived alone in [facility] home (stays on first floor) with walk in entrance and was independent for self care and MI for functional transfers/mobility using RW (prior OT eval complated [id number] confirms information) and at prior OT eval pt was SBA bed mobility, mod A sit<->stand, able to take steps and get to chair and able to self feed.      Pt currently presents with decreased activity tolerance, generalized weakness, decreased balance (left sided lean in bed) and increased need for assist with self care (total A LB dressing simulated bed level, CGA/SBA drink to mouth with increased time, SBA to mod A self feeding spoon/fork to mouth, SBA simple grooming bed level) and functional transfers/mobility (max A repositioning bed level for set up feeding/grooming activity). PT requiring increased time for self feeding activity able to eat oatmeal with spoon with SBA however mod A with dry food items such as eggs and pancakes. Pt educated on benjamin UE HEP in prep for self care tasks with pt verbalizing understanding. Pt would benefit from skilled OT services while at [facility] in order to increase safety and independence with self care and functional transfers/mobility. Recommend discharge to SNF when medically appropriate. Other factors to consider for discharge: time since onset, severity of deficits, PLOF     Patient will benefit from skilled therapy intervention to address the above noted impairments. PLAN :  Recommendations and Planned Interventions: self care training, functional mobility training, therapeutic exercise, balance training, therapeutic activities, endurance activities, patient education, and home safety training    Frequency/Duration: Patient will be followed by occupational therapy 3 times a week to address goals. Recommendation for discharge: (in order for the patient to meet his/her long term goals)  SNF    This discharge recommendation:  Has been made in collaboration with the attending provider and/or case management    IF patient discharges home will need the following DME: TBD       SUBJECTIVE:   Patient stated Ray Mu-ism usually feed me.     OBJECTIVE DATA SUMMARY:   HISTORY:   Past Medical History:   Diagnosis Date    Arthritis     AVM (arteriovenous malformation) of colon     B12 deficiency     CAD (coronary artery disease)     Chronic anemia     Chronic kidney disease     Diabetes (HCC)     GERD (gastroesophageal reflux disease)     Heart failure ([facility] [address]     Remotely in his 46s    Hypertension Ill-defined condition     chronic pressure and statis ulcers     PVD (peripheral vascular disease) (HCC)      Past Surgical History:   Procedure Laterality Date    HX HIP REPLACEMENT Bilateral        Expanded or extensive additional review of patient history:     Home Situation  Home Environment: [address] [place] [address] Name: [facility]  One/Two Story Residence: Two story, live on 1st floor  Living Alone: Yes  Support Systems: Child(debra)  Patient Expects to be Discharged to[de-identified] Skilled nursing facility  Current DME Used/Available at Home: Walker, rolling    PLOF: Per pt report, pt has been at [facility] for rehab and required assist for all self care including self feeding at times and was donovan lifted to recliner and stating he has not stood up in a long time. Per pt report, prior to [facility] placement, pt lived alone in tw story home (stays on first floor) with walk in entrance and was independent for self care and MI for functional transfers/mobility using RW (prior OT eval complated [id number] confirms information)    Hand dominance: Right    EXAMINATION OF PERFORMANCE DEFICITS:  Cognitive/Behavioral Status:  Neurologic State: Alert;Confused  Orientation Level: Oriented to person;Disoriented to place; Disoriented to situation;Disoriented to time  Cognition: Decreased attention/concentration;Decreased command following;Memory loss;Poor safety awareness     Hearing: Auditory  Auditory Impairment: Hard of hearing, bilateral    Range of Motion:  AROM: Generally decreased, functional (benjamin hands with flex deformities noted)     Strength:  Strength: Generally decreased, functional (grossly 2+/5)     ADL Assessment:  Feeding: Minimum assistance; Moderate assistance    Oral Facial Hygiene/Grooming: Stand-by assistance    Lower Body Dressing: Total assistance (simulated bed level)    ADL Intervention and task modifications:  Feeding  Feeding Assistance: Minimum assistance; Moderate assistance  Cutting Food:  Total assistance (dependent)  Utensil Management: Minimum assistance; Moderate assistance  Food to Mouth: Stand-by assistance; Moderate assistance (SBA for oatmeal, mod A for use of fork/spoon with dry foods)  Drink to Mouth: Stand-by assistance;Contact guard assistance    Grooming  Grooming Assistance: Stand-by assistance  Position Performed:  (semi supine in bed)  Washing Face: Stand-by assistance  Washing Hands: Stand-by assistance    Cognitive Retraining  Orientation Retraining: Reorienting    Therapeutic Exercise:  Pt educated on benjamin UE HEP in prep for self care tasks    [address] [place] [address] AM-PACTM \"6 Clicks\"                                                       Daily Activity Inpatient Short Form  How much help from another person does the patient currently need. .. Total; A Lot A Little None   1. Putting on and taking off regular lower body clothing? [x]  1 []  2 []  3 []  4   2. Bathing (including washing, rinsing, drying)? []  1 [x]  2 []  3 []  4   3. Toileting, which includes using toilet, bedpan or urinal? [x] 1 []  2 []  3 []  4   4. Putting on and taking off regular upper body clothing? []  1 [x]  2 []  3 []  4   5. Taking care of personal grooming such as brushing teeth? []  1 [x]  2 []  3 []  4   6. Eating meals? []  1 [x]  2 []  3 []  4   © 2007, Trustees of [address] [place] [address], under license to [employer]. All rights reserved     Score: 10/24     Interpretation of Tool:  Represents clinically-significant functional categories (i.e. Activities of daily living).   Percentage of Impairment CH    0%   CI    1-19% CJ    20-39% CK    40-59% CL    60-79% CM    80-99% CN     100%   [facility]  Score 6-24 24 23 20-22 15-19 10-14 7-9 6        Occupational Therapy Evaluation Charge Determination   History Examination Decision-Making   LOW Complexity : Brief history review  MEDIUM Complexity : 3-5 performance deficits relating to physical, cognitive , or psychosocial skils that result in activity limitations and / or participation restrictions HIGH Complexity : Patient presents with comorbidities that affect occupational performance. Signifigant modification of tasks or assistance (eg, physical or verbal) with assessment (s) is necessary to enable patient to complete evaluation       Based on the above components, the patient evaluation is determined to be of the following complexity level: LOW   Pain Rating:  10/10 LE pain    Activity Tolerance:   Fair and requires rest breaks  Please refer to the flowsheet for vital signs taken during this treatment. After treatment patient left in no apparent distress:    Supine in bed, Call bell within reach, and all rails up 2/2 type of bed and CNA present    COMMUNICATION/EDUCATION:   The patients plan of care was discussed with: Registered nurse. Home safety education was provided and the patient/caregiver indicated understanding. and Patient/family have participated as able in goal setting and plan of care. This patients plan of care is appropriate for delegation to [facility].     Thank you for this referral.  Cinda Romeo  Time Calculation: 39 mins

## 2021-06-21 NOTE — PROGRESS NOTES
General Daily Progress Note          Patient Name:   Libra Penny       YOB: 1932       Age:  80 y.o. Admit Date: 6/15/2021      Subjective:   Patient is Vishnu.Good y.o. year old male with a past medical history of diabetes, hypertension, hyperlipidemia, renal insufficiency, and anemia history of liver mass who presented to the ER 6/15 due to abnormal labs drawn at the nursing home yesterday. He was found to have worsening kidney function. He was on dialysis previously, but had reportedly stopped dialysis several years ago. He denied chest pain and SOB. CXR showed no acute cardiopulmonary processes. He was admitted for further evaluation and treatment. Patient have a liver mass diagnosis and last admission as per note no further diagnosis procedure treatment was planned with the family by the attending. Patient's discharge date canceled by vascular surgeon due to complex wounds on bilateral legs and need for CT. 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 Wound cultures showed E. Cloacae resistant to Zosyn and MRSA. Today patient is alert and awake in bed, complaining of severe leg aching. He denies chest pain and SOB.                    Objective:     Visit Vitals  BP (!) 113/52 (BP 1 Location: Left upper arm, BP Patient Position: At rest;Lying)   Pulse 94   Temp 98.4 °F (36.9 °C)   Resp 20   Ht 6' 2\" (1.88 m)   Wt 119.3 kg (263 lb 0.1 oz)   SpO2 94%   BMI 33.77 kg/m²        Recent Results (from the past 24 hour(s))   CBC WITH AUTOMATED DIFF    Collection Time: 06/20/21 11:10 AM   Result Value Ref Range    WBC 13.6 (H) 4.1 - 11.1 K/uL    RBC 2.60 (L) 4.10 - 5.70 M/uL    HGB 7.9 (L) 12.1 - 17.0 g/dL    HCT 25.5 (L) 36.6 - 50.3 %    MCV 98.1 80.0 - 99.0 FL    MCH 30.4 26.0 - 34.0 PG    MCHC 31.0 30.0 - 36.5 g/dL    RDW 18.2 (H) 11.5 - 14.5 %    PLATELET 671 632 - 740 K/uL    MPV 10.3 8.9 - 12.9 FL    NRBC 0.2 (H) 0.0  WBC    ABSOLUTE NRBC 0.03 (H) 0.00 - 0.01 K/uL    NEUTROPHILS 57 32 - 75 %    LYMPHOCYTES 22 12 - 49 %    MONOCYTES 13 5 - 13 %    EOSINOPHILS 4 0 - 7 %    BASOPHILS 0 0 - 1 %    IMMATURE GRANULOCYTES 4 (H) 0 - 0.5 %    ABS. NEUTROPHILS 7.8 1.8 - 8.0 K/UL    ABS. LYMPHOCYTES 3.0 0.8 - 3.5 K/UL    ABS. MONOCYTES 1.7 (H) 0.0 - 1.0 K/UL    ABS. EOSINOPHILS 0.5 (H) 0.0 - 0.4 K/UL    ABS. BASOPHILS 0.1 0.0 - 0.1 K/UL    ABS. IMM. GRANS. 0.6 (H) 0.00 - 0.04 K/UL    DF AUTOMATED     METABOLIC PANEL, COMPREHENSIVE    Collection Time: 06/20/21 11:10 AM   Result Value Ref Range    Sodium 138 136 - 145 mmol/L    Potassium 3.9 3.5 - 5.1 mmol/L    Chloride 106 97 - 108 mmol/L    CO2 23 21 - 32 mmol/L    Anion gap 9 5 - 15 mmol/L    Glucose 105 (H) 65 - 100 mg/dL    BUN 75 (H) 6 - 20 mg/dL    Creatinine 3.15 (H) 0.70 - 1.30 mg/dL    BUN/Creatinine ratio 24 (H) 12 - 20      GFR est AA 23 (L) >60 ml/min/1.73m2    GFR est non-AA 19 (L) >60 ml/min/1.73m2    Calcium 8.1 (L) 8.5 - 10.1 mg/dL    Bilirubin, total 2.8 (H) 0.2 - 1.0 mg/dL    AST (SGOT) 94 (H) 15 - 37 U/L    ALT (SGPT) 18 12 - 78 U/L    Alk.  phosphatase 514 (H) 45 - 117 U/L    Protein, total 6.6 6.4 - 8.2 g/dL    Albumin 1.3 (L) 3.5 - 5.0 g/dL    Globulin 5.3 (H) 2.0 - 4.0 g/dL    A-G Ratio 0.2 (L) 1.1 - 2.2     GLUCOSE, POC    Collection Time: 06/20/21 11:34 AM   Result Value Ref Range    Glucose (POC) 125 (H) 65 - 117 mg/dL    Performed by Cherry Alarcon    DUPLEX LOWER EXT ARTERY BILAT    Collection Time: 06/20/21  3:22 PM   Result Value Ref Range    Left CFA prox sys .0 cm/s    Left popliteal dist sys PSV 50.7 cm/s    Left popliteal prox sys PSV 90.9 cm/s    Left super femoral dist sys .0 cm/s    Left super femoral mid sys .0 cm/s    Left super femoral prox sys .0 cm/s    Left profunda sys PSV 87.90 cm/s    Right CFA prox sys .0 cm/s    Right popliteal dist sys PSV 48.5 cm/s    Right popliteal prox sys PSV 43.3 cm/s    Right super femoral mid sys PSV 71.8 cm/s    Right Prox PFA A .0 cm/s    Left SFA Prox Alex Ratio 0.81     Left SFA Mid Alex Ratio 0.99     Left SFA Dist Alex Ratio 1.55     Left Pop A Prox Alex Ratio 0.45    GLUCOSE, POC    Collection Time: 06/20/21  4:33 PM   Result Value Ref Range    Glucose (POC) 134 (H) 65 - 117 mg/dL    Performed by Vito Ramirez    GLUCOSE, POC    Collection Time: 06/20/21 10:33 PM   Result Value Ref Range    Glucose (POC) 100 65 - 117 mg/dL    Performed by Maulik Ramirez    CBC WITH AUTOMATED DIFF    Collection Time: 06/21/21  7:09 AM   Result Value Ref Range    WBC 12.0 (H) 4.1 - 11.1 K/uL    RBC 2.36 (L) 4.10 - 5.70 M/uL    HGB 7.2 (L) 12.1 - 17.0 g/dL    HCT 23.3 (L) 36.6 - 50.3 %    MCV 98.7 80.0 - 99.0 FL    MCH 30.5 26.0 - 34.0 PG    MCHC 30.9 30.0 - 36.5 g/dL    RDW 18.4 (H) 11.5 - 14.5 %    PLATELET 650 343 - 765 K/uL    MPV 10.7 8.9 - 12.9 FL    NRBC 0.3 (H) 0.0  WBC    ABSOLUTE NRBC 0.04 (H) 0.00 - 0.01 K/uL    NEUTROPHILS PENDING %    LYMPHOCYTES PENDING %    MONOCYTES PENDING %    EOSINOPHILS PENDING %    BASOPHILS PENDING %    IMMATURE GRANULOCYTES PENDING %    ABS. NEUTROPHILS PENDING K/UL    ABS. LYMPHOCYTES PENDING K/UL    ABS. MONOCYTES PENDING K/UL    ABS. EOSINOPHILS PENDING K/UL    ABS. BASOPHILS PENDING K/UL    ABS. IMM. GRANS. PENDING K/UL    DF PENDING    METABOLIC PANEL, COMPREHENSIVE    Collection Time: 06/21/21  7:09 AM   Result Value Ref Range    Sodium 137 136 - 145 mmol/L    Potassium 3.9 3.5 - 5.1 mmol/L    Chloride 107 97 - 108 mmol/L    CO2 22 21 - 32 mmol/L    Anion gap 8 5 - 15 mmol/L    Glucose 78 65 - 100 mg/dL    BUN 78 (H) 6 - 20 mg/dL    Creatinine 3.11 (H) 0.70 - 1.30 mg/dL    BUN/Creatinine ratio 25 (H) 12 - 20      GFR est AA 23 (L) >60 ml/min/1.73m2    GFR est non-AA 19 (L) >60 ml/min/1.73m2    Calcium 8.0 (L) 8.5 - 10.1 mg/dL    Bilirubin, total 2.5 (H) 0.2 - 1.0 mg/dL    AST (SGOT) 84 (H) 15 - 37 U/L    ALT (SGPT) 16 12 - 78 U/L    Alk.  phosphatase 510 (H) 45 - 117 U/L    Protein, total 6.3 (L) 6.4 - 8.2 g/dL    Albumin 1.2 (L) 3.5 - 5.0 g/dL    Globulin 5.1 (H) 2.0 - 4.0 g/dL    A-G Ratio 0.2 (L) 1.1 - 2.2     GLUCOSE, POC    Collection Time: 06/21/21  8:26 AM   Result Value Ref Range    Glucose (POC) 80 65 - 117 mg/dL    Performed by Tanya Angeles      [unfilled]      Review of Systems    Constitutional: Negative for chills and fever. HENT: Negative. Eyes: Negative. Respiratory: Negative. Cardiovascular: Negative. Gastrointestinal: Negative for abdominal pain and nausea. Skin: Negative. Neurological: Negative. Extremities: bilateral aching       Physical Exam:      Constitutional: pt is oriented to person, place, and time. HENT:   Head: Normocephalic and atraumatic. Eyes: Pupils are equal, round, and reactive to light. EOM are normal.   Cardiovascular: Normal rate, regular rhythm and normal heart sounds. Pulmonary/Chest: Breath sounds normal. No wheezes. No rales. Exhibits no tenderness. Abdominal: Soft. Bowel sounds are normal. There is no abdominal tenderness. There is no rebound and no guarding. Musculoskeletal: Normal range of motion. Neurological: pt is alert and oriented to person, place, and time. Extremities: dressing in place    CT LOW EXT BI WO CONT   Final Result   1. Somewhat suboptimal evaluation, as detailed above. 2. Within exam limitations there is a soft tissue ulcer at the posterior left   calcaneus with infection with gas-forming organism versus soft tissue   devitalization. Small amount of gas within the posterior calcaneus is most   likely related to osteomyelitis. 3. Other soft tissue wounds within both legs. No additional findings of acute   osseous abnormality within exam limitations. Based on level of clinical concern,   could consider further evaluation with targeted MRI of specific regions. 4. Diffuse osseous demineralization likely contributes to the somewhat   heterogeneous appearance of the bones.  However, there is an indeterminate 10 mm   lucency within the right medial femoral condyle. Please correlate with clinical   history for a known primary malignancy or plasma abnormality. Otherwise short   interval follow-up in 3 months should be considered. US RETROPERITONEUM COMP   Final Result   Borderline right-sided hydronephrosis. Ascites. XR CHEST PORT   Final Result   No evidence of an acute cardiopulmonary process. Recent Results (from the past 24 hour(s))   CBC WITH AUTOMATED DIFF    Collection Time: 06/20/21 11:10 AM   Result Value Ref Range    WBC 13.6 (H) 4.1 - 11.1 K/uL    RBC 2.60 (L) 4.10 - 5.70 M/uL    HGB 7.9 (L) 12.1 - 17.0 g/dL    HCT 25.5 (L) 36.6 - 50.3 %    MCV 98.1 80.0 - 99.0 FL    MCH 30.4 26.0 - 34.0 PG    MCHC 31.0 30.0 - 36.5 g/dL    RDW 18.2 (H) 11.5 - 14.5 %    PLATELET 486 182 - 023 K/uL    MPV 10.3 8.9 - 12.9 FL    NRBC 0.2 (H) 0.0  WBC    ABSOLUTE NRBC 0.03 (H) 0.00 - 0.01 K/uL    NEUTROPHILS 57 32 - 75 %    LYMPHOCYTES 22 12 - 49 %    MONOCYTES 13 5 - 13 %    EOSINOPHILS 4 0 - 7 %    BASOPHILS 0 0 - 1 %    IMMATURE GRANULOCYTES 4 (H) 0 - 0.5 %    ABS. NEUTROPHILS 7.8 1.8 - 8.0 K/UL    ABS. LYMPHOCYTES 3.0 0.8 - 3.5 K/UL    ABS. MONOCYTES 1.7 (H) 0.0 - 1.0 K/UL    ABS. EOSINOPHILS 0.5 (H) 0.0 - 0.4 K/UL    ABS. BASOPHILS 0.1 0.0 - 0.1 K/UL    ABS. IMM.  GRANS. 0.6 (H) 0.00 - 0.04 K/UL    DF AUTOMATED     METABOLIC PANEL, COMPREHENSIVE    Collection Time: 06/20/21 11:10 AM   Result Value Ref Range    Sodium 138 136 - 145 mmol/L    Potassium 3.9 3.5 - 5.1 mmol/L    Chloride 106 97 - 108 mmol/L    CO2 23 21 - 32 mmol/L    Anion gap 9 5 - 15 mmol/L    Glucose 105 (H) 65 - 100 mg/dL    BUN 75 (H) 6 - 20 mg/dL    Creatinine 3.15 (H) 0.70 - 1.30 mg/dL    BUN/Creatinine ratio 24 (H) 12 - 20      GFR est AA 23 (L) >60 ml/min/1.73m2    GFR est non-AA 19 (L) >60 ml/min/1.73m2    Calcium 8.1 (L) 8.5 - 10.1 mg/dL    Bilirubin, total 2.8 (H) 0.2 - 1.0 mg/dL    AST (SGOT) 94 (H) 15 - 37 U/L    ALT (SGPT) 18 12 - 78 U/L    Alk.  phosphatase 514 (H) 45 - 117 U/L    Protein, total 6.6 6.4 - 8.2 g/dL    Albumin 1.3 (L) 3.5 - 5.0 g/dL    Globulin 5.3 (H) 2.0 - 4.0 g/dL    A-G Ratio 0.2 (L) 1.1 - 2.2     GLUCOSE, POC    Collection Time: 06/20/21 11:34 AM   Result Value Ref Range    Glucose (POC) 125 (H) 65 - 117 mg/dL    Performed by Luis Hernandez    DUPLEX LOWER EXT ARTERY BILAT    Collection Time: 06/20/21  3:22 PM   Result Value Ref Range    Left CFA prox sys .0 cm/s    Left popliteal dist sys PSV 50.7 cm/s    Left popliteal prox sys PSV 90.9 cm/s    Left super femoral dist sys .0 cm/s    Left super femoral mid sys .0 cm/s    Left super femoral prox sys .0 cm/s    Left profunda sys PSV 87.90 cm/s    Right CFA prox sys .0 cm/s    Right popliteal dist sys PSV 48.5 cm/s    Right popliteal prox sys PSV 43.3 cm/s    Right super femoral mid sys PSV 71.8 cm/s    Right Prox PFA A .0 cm/s    Left SFA Prox Alex Ratio 0.81     Left SFA Mid Alex Ratio 0.99     Left SFA Dist Alex Ratio 1.55     Left Pop A Prox Alex Ratio 0.45    GLUCOSE, POC    Collection Time: 06/20/21  4:33 PM   Result Value Ref Range    Glucose (POC) 134 (H) 65 - 117 mg/dL    Performed by Kenya Snowden    GLUCOSE, POC    Collection Time: 06/20/21 10:33 PM   Result Value Ref Range    Glucose (POC) 100 65 - 117 mg/dL    Performed by Radha Kinsey    CBC WITH AUTOMATED DIFF    Collection Time: 06/21/21  7:09 AM   Result Value Ref Range    WBC 12.0 (H) 4.1 - 11.1 K/uL    RBC 2.36 (L) 4.10 - 5.70 M/uL    HGB 7.2 (L) 12.1 - 17.0 g/dL    HCT 23.3 (L) 36.6 - 50.3 %    MCV 98.7 80.0 - 99.0 FL    MCH 30.5 26.0 - 34.0 PG    MCHC 30.9 30.0 - 36.5 g/dL    RDW 18.4 (H) 11.5 - 14.5 %    PLATELET 441 652 - 343 K/uL    MPV 10.7 8.9 - 12.9 FL    NRBC 0.3 (H) 0.0  WBC    ABSOLUTE NRBC 0.04 (H) 0.00 - 0.01 K/uL    NEUTROPHILS PENDING %    LYMPHOCYTES PENDING %    MONOCYTES PENDING %    EOSINOPHILS PENDING % BASOPHILS PENDING %    IMMATURE GRANULOCYTES PENDING %    ABS. NEUTROPHILS PENDING K/UL    ABS. LYMPHOCYTES PENDING K/UL    ABS. MONOCYTES PENDING K/UL    ABS. EOSINOPHILS PENDING K/UL    ABS. BASOPHILS PENDING K/UL    ABS. IMM. GRANS. PENDING K/UL    DF PENDING    METABOLIC PANEL, COMPREHENSIVE    Collection Time: 06/21/21  7:09 AM   Result Value Ref Range    Sodium 137 136 - 145 mmol/L    Potassium 3.9 3.5 - 5.1 mmol/L    Chloride 107 97 - 108 mmol/L    CO2 22 21 - 32 mmol/L    Anion gap 8 5 - 15 mmol/L    Glucose 78 65 - 100 mg/dL    BUN 78 (H) 6 - 20 mg/dL    Creatinine 3.11 (H) 0.70 - 1.30 mg/dL    BUN/Creatinine ratio 25 (H) 12 - 20      GFR est AA 23 (L) >60 ml/min/1.73m2    GFR est non-AA 19 (L) >60 ml/min/1.73m2    Calcium 8.0 (L) 8.5 - 10.1 mg/dL    Bilirubin, total 2.5 (H) 0.2 - 1.0 mg/dL    AST (SGOT) 84 (H) 15 - 37 U/L    ALT (SGPT) 16 12 - 78 U/L    Alk.  phosphatase 510 (H) 45 - 117 U/L    Protein, total 6.3 (L) 6.4 - 8.2 g/dL    Albumin 1.2 (L) 3.5 - 5.0 g/dL    Globulin 5.1 (H) 2.0 - 4.0 g/dL    A-G Ratio 0.2 (L) 1.1 - 2.2     GLUCOSE, POC    Collection Time: 06/21/21  8:26 AM   Result Value Ref Range    Glucose (POC) 80 65 - 117 mg/dL    Performed by Rico Whittington        Results     Procedure Component Value Units Date/Time    CULTURE, Kadi Meuse STAIN [[id number]]  (Susceptibility) Collected: 06/16/21 0615    Order Status: Completed Specimen: Wound Updated: 06/19/21 1018     Special Requests: No Special Requests        GRAM STAIN No wbc's seen         2+ Gram Positive Cocci         2+ Gram Positive Rods         Few Gram Negative Rods        Culture result:       Heavy Enterobacter cloacae complex                  Heavy * methicillin resistant staphylococcus aureus * REFER TO [id number] FOR SENSITIVITIES            Few Diphtheroids         CALLED MRSA REPORT TO Kal Olsen R.N. 1020 06/19/2021 BY [facility]    Susceptibility      Enterobacter cloacae complex      CATINA      Amikacin ($) Susceptible Cefazolin ($) Resistant      Cefepime ($$) Susceptible      Cefoxitin Resistant      Ceftazidime ($) Resistant      Ceftriaxone ($) Resistant      Ciprofloxacin ($) Resistant      Gentamicin ($) Susceptible      Levofloxacin ($) Resistant      Meropenem ($$) Susceptible      Piperacillin/Tazobac ($) Resistant      Tobramycin ($) Susceptible      Trimeth/Sulfa Resistant               Linear View                   CULTURE, Palomo Manna STAIN [[id number]]  (Susceptibility) Collected: 06/16/21 0615    Order Status: Completed Specimen: Wound Updated: 06/19/21 0806     Special Requests: No Special Requests        GRAM STAIN 1+ WBCs seen         4+ Gram Negative Rods               4+ Gram Positive Cocci in pairs                  4+ apparent Gram Positive Rods           Culture result:       Heavy Enterobacter cloacae complex                  Moderate * methicillin resistant staphylococcus aureus *            Light Diphtheroids       Susceptibility      Enterobacter cloacae complex Staphylococcus aureus Methcillin Resistant      CATINA CATINA      Amikacin ($) Susceptible       Cefazolin ($) Resistant       Cefepime ($$) Susceptible       Cefoxitin Resistant       Ceftazidime ($) Resistant       Ceftriaxone ($) Resistant       Ciprofloxacin ($) Resistant Resistant      Clindamycin ($)  Susceptible      Daptomycin ($$$$$)  Susceptible      Doxycycline ($$)  Susceptible      Erythromycin ($$$$)  Susceptible      Gentamicin ($) Susceptible Susceptible      Levofloxacin ($) Resistant Resistant      Linezolid ($$$$$)  Susceptible      Meropenem ($$) Susceptible       Oxacillin  Resistant      Piperacillin/Tazobac ($) Resistant       Rifampin ($$$$)  Susceptible<!--EPICS--><sup style='font-weight:normal'>1</sup></font>      Tetracycline  Resistant      Tobramycin ($) Susceptible       Trimeth/Sulfa Resistant Susceptible      Vancomycin ($)  Susceptible               <!--EPICS--><sup style='font-weight:normal'>1</sup></font> Rifampin is not to be used for mono-therapy. Linear View                   COVID-19 RAPID TEST [[id number]] Collected: 06/16/21 0216    Order Status: Completed Specimen: Nasopharyngeal Updated: 06/16/21 0408     Specimen source Nasopharyngeal        COVID-19 rapid test Not Detected        Comment: Rapid Abbott ID Now   Rapid NAAT:  The specimen is NEGATIVE for SARS-CoV-2, the novel coronavirus associated with COVID-19. Negative results should be treated as presumptive and, if inconsistent with clinical signs and symptoms or necessary for patient management, should be tested with an alternative molecular assay. Negative results do not preclude SARS-CoV-2 infection and should not be used as the sole basis for patient management decisions. This test has been authorized by the FDA under   an Emergency Use Authorization (EUA) for use by authorized laboratories. Fact sheet for Healthcare Providers: [employer] Fact sheet for Patients: [employer]   Methodology: Isothermal Nucleic Acid Amplification         COVID-19 RAPID TEST [[id number]]  (Abnormal) Collected: 06/16/21 0023    Order Status: Completed Specimen: Nasopharyngeal Updated: 06/16/21 0208     Specimen source Nasopharyngeal        COVID-19 rapid test Indeterminate        Comment: Rapid Abbott ID Now   The presence or absence of COVID-19 Viral RNAs cannot be determined. If clinically indicated, please collect a new specimen. This test has been authorized by the FDA under an Emergency Use Authorization (EUA) for use by authorized laboratories.    Fact sheet for Healthcare Providers: [employer] Fact sheet for Patients: [email or web address]   Methodology: Isothermal Nucleic Acid Amplification SPOKE TO KEVYN MENDOZA,   SUGGESTED RECOLLECT                 Labs:     Recent Labs     06/21/21  0709 06/20/21  1110   WBC 12.0* 13.6*   HGB 7.2* 7.9*   HCT 23.3* 25.5*    183     Recent Labs     06/21/21  0709 06/20/21  1110    138   K 3.9 3.9    106   CO2 22 23   BUN 78* 75*   CREA 3.11* 3.15*   GLU 78 105*   CA 8.0* 8.1*     Recent Labs     06/21/21  0709 06/20/21  1110   ALT 16 18   * 514*   TBILI 2.5* 2.8*   TP 6.3* 6.6   ALB 1.2* 1.3*   GLOB 5.1* 5.3*     No results for input(s): INR, PTP, APTT, INREXT, INREXT in the last 72 hours. No results for input(s): FE, TIBC, PSAT, FERR in the last 72 hours. No results found for: FOL, RBCF   No results for input(s): PH, PCO2, PO2 in the last 72 hours. No results for input(s): CPK, CKNDX, TROIQ in the last 72 hours. No lab exists for component: CPKMB  No results found for: CHOL, CHOLX, CHLST, CHOLV, HDL, HDLP, LDL, LDLC, DLDLP, TGLX, TRIGL, TRIGP, CHHD, CHHDX  Lab Results   Component Value Date/Time    Glucose (POC) 80 06/21/2021 08:26 AM    Glucose (POC) 100 06/20/2021 10:33 PM    Glucose (POC) 134 (H) 06/20/2021 04:33 PM    Glucose (POC) 125 (H) 06/20/2021 11:34 AM    Glucose (POC) 93 06/20/2021 08:54 AM     Lab Results   Component Value Date/Time    Color Yaritza 04/28/2021 03:00 PM    Appearance Turbid (A) 04/28/2021 03:00 PM    Specific gravity 1.014 04/28/2021 03:00 PM    pH (UA) 5.0 04/28/2021 03:00 PM    Protein 100 (A) 04/28/2021 03:00 PM    Glucose Negative 04/28/2021 03:00 PM    Ketone Negative 04/28/2021 03:00 PM    Bilirubin Negative 04/28/2021 03:00 PM    Urobilinogen 4.0 (H) 04/28/2021 03:00 PM    Nitrites Negative 04/28/2021 03:00 PM    Leukocyte Esterase Negative 04/28/2021 03:00 PM    Bacteria Negative 04/28/2021 03:00 PM    WBC 0-4 04/28/2021 03:00 PM    RBC 0-5 04/28/2021 03:00 PM         Assessment:     Acute on chronic kidney failure-trending down  Diabetes  Hypertension   Hyperlipidemia  Anemia- 7.2  Leukocytosis  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    Continue frequent turns   Monitor H&H  Repeat labs in AM    Discussed with patient daughter-in-law yesterday  She want to talk to the surgeon regarding further treatment of left leg  They completely revoke hospice   Current Facility-Administered Medications:     collagenase (SANTYL) 250 unit/gram ointment, , Topical, DAILY, Bernabe, Osmany Edwards MD, Given at 06/21/21 0939    linezolid (ZYVOX) tablet 600 mg, 600 mg, Oral, Q12H, Sascha Araiza MD, 600 mg at 06/21/21 0932    meropenem (MERREM) 1 g in sterile water (preservative free) 20 mL IV syringe, 1 g, IntraVENous, Q24H, Sascha Araiza MD, 1 g at 06/20/21 1646    acetaminophen (TYLENOL) tablet 500 mg, 500 mg, Oral, Q4H PRN, Dinora Ramirez MD    acetaminophen (TYLENOL) tablet 1,000 mg, 1,000 mg, Oral, Q6H PRN, Laura Flowers MD, 1,000 mg at 06/20/21 2251    albuterol (PROVENTIL HFA, VENTOLIN HFA, PROAIR HFA) inhaler 2 Puff, 2 Puff, Inhalation, Q6H RT, Laura Flowers MD, 2 Puff at 06/21/21 0806    [Held by provider] amLODIPine (NORVASC) tablet 5 mg, 5 mg, Oral, DAILY, Laura Flowers MD, 5 mg at 06/17/21 1123    ferrous sulfate tablet 325 mg, 1 Tablet, Oral, TID WITH MEALS, Dinora Ramirez MD, 325 mg at 06/21/21 0932    gabapentin (NEURONTIN) capsule 100 mg, 100 mg, Oral, TID, Laura Flowers MD, 100 mg at 06/21/21 0933    [Held by provider] furosemide (LASIX) tablet 40 mg, 40 mg, Oral, DAILY, Laura Flowers MD, 40 mg at 06/16/21 1107    latanoprost (XALATAN) 0.005 % ophthalmic solution 1 Drop, 1 Drop, Both Eyes, QPM, Dniora Ramirez MD, 1 Drop at 06/20/21 1727    loperamide (IMODIUM) capsule 2 mg, 2 mg, Oral, Q4H PRN, Dinora Ramirez MD    simvastatin (ZOCOR) tablet 10 mg, 10 mg, Oral, QHS, Dinora Ramirez MD, 10 mg at 06/20/21 5623    traMADoL (ULTRAM) tablet 50 mg, 50 mg, Oral, Q6H PRN, Dinora Ramirez MD, 50 mg at 06/20/21 0649    ondansetron hcl (ZOFRAN) tablet 4 mg, 4 mg, Oral, Q4H PRN, Kamilah Ramirez MD    cyanocobalamin (VITAMIN B12) injection 1,000 mcg, 1,000 mcg, IntraMUSCular, EVERY 2 WEEKS, Dinora Ramirez MD, 1,000 mcg at 06/16/21 1731    ferrous sulfate tablet 325 mg, 325 mg, Oral, ACB, Dinora Ramirez MD, 325 mg at 06/20/21 0855    pantoprazole (PROTONIX) tablet 40 mg, 40 mg, Oral, DAILY, Dinora Ramirez MD, 40 mg at 06/21/21 6819    insulin lispro (HUMALOG) injection, , SubCUTAneous, AC&HS, Dinora Ramirez MD, 2 Units at 06/18/21 2233    glucose chewable tablet 16 g, 4 Tablet, Oral, PRN, Kamliah Ramirez MD    dextrose (D50W) injection syrg 12.5-25 g, 25-50 mL, IntraVENous, PRN, Kamilah Ramirez MD    glucagon (GLUCAGEN) injection 1 mg, 1 mg, IntraMUSCular, PRN, Kamilah Ramirez MD    0.9% sodium chloride infusion, 75 mL/hr, IntraVENous, CONTINUOUS, Dinora Ramirez MD, Last Rate: 75 mL/hr at 06/18/21 1904, 75 mL/hr at 06/18/21 1904    acetaminophen (TYLENOL) tablet 650 mg, 650 mg, Oral, Q6H PRN **OR** acetaminophen (TYLENOL) suppository 650 mg, 650 mg, Rectal, Q6H PRN, Kamilah Ramirez MD    polyethylene glycol (MIRALAX) packet 17 g, 17 g, Oral, DAILY PRN, Kamilah Ramirez MD    ondansetron (ZOFRAN ODT) tablet 4 mg, 4 mg, Oral, Q8H PRN **OR** ondansetron (ZOFRAN) injection 4 mg, 4 mg, IntraVENous, Q6H PRN, Dinora Ramirez MD    heparin (porcine) injection 5,000 Units, 5,000 Units, SubCUTAneous, Q8H, Dinora Ramirez MD, 5,000 Units at 06/21/21 0231    zinc oxide-white petrolatum 17-57 % topical paste, , Topical, TID, Yue Georges MD, Given at 06/21/21 3843

## 2021-06-22 ENCOUNTER — ANESTHESIA EVENT (OUTPATIENT)
Dept: SURGERY | Age: 86
DRG: 981 | End: 2021-06-22
Payer: MEDICARE

## 2021-06-22 LAB
GLUCOSE BLD STRIP.AUTO-MCNC: 121 MG/DL (ref 65–117)
GLUCOSE BLD STRIP.AUTO-MCNC: 126 MG/DL (ref 65–117)
GLUCOSE BLD STRIP.AUTO-MCNC: 128 MG/DL (ref 65–117)
GLUCOSE BLD STRIP.AUTO-MCNC: 92 MG/DL (ref 65–117)
PERFORMED BY, TECHID: ABNORMAL
PERFORMED BY, TECHID: NORMAL

## 2021-06-22 PROCEDURE — 65270000032 HC RM SEMIPRIVATE

## 2021-06-22 PROCEDURE — P9016 RBC LEUKOCYTES REDUCED: HCPCS

## 2021-06-22 PROCEDURE — 99232 SBSQ HOSP IP/OBS MODERATE 35: CPT | Performed by: INTERNAL MEDICINE

## 2021-06-22 PROCEDURE — 0Y6D0Z3 DETACHMENT AT LEFT UPPER LEG, LOW, OPEN APPROACH: ICD-10-PCS | Performed by: SURGERY

## 2021-06-22 PROCEDURE — 86901 BLOOD TYPING SEROLOGIC RH(D): CPT

## 2021-06-22 PROCEDURE — 36415 COLL VENOUS BLD VENIPUNCTURE: CPT

## 2021-06-22 PROCEDURE — 74011250637 HC RX REV CODE- 250/637: Performed by: INTERNAL MEDICINE

## 2021-06-22 PROCEDURE — 74011250636 HC RX REV CODE- 250/636: Performed by: FAMILY MEDICINE

## 2021-06-22 PROCEDURE — 74011000250 HC RX REV CODE- 250: Performed by: INTERNAL MEDICINE

## 2021-06-22 PROCEDURE — 36430 TRANSFUSION BLD/BLD COMPNT: CPT

## 2021-06-22 PROCEDURE — 82962 GLUCOSE BLOOD TEST: CPT

## 2021-06-22 PROCEDURE — 74011250636 HC RX REV CODE- 250/636: Performed by: INTERNAL MEDICINE

## 2021-06-22 PROCEDURE — 86923 COMPATIBILITY TEST ELECTRIC: CPT

## 2021-06-22 PROCEDURE — 74011250637 HC RX REV CODE- 250/637: Performed by: FAMILY MEDICINE

## 2021-06-22 PROCEDURE — 94640 AIRWAY INHALATION TREATMENT: CPT

## 2021-06-22 RX ORDER — ALBUTEROL SULFATE 90 UG/1
2 AEROSOL, METERED RESPIRATORY (INHALATION)
Status: DISCONTINUED | OUTPATIENT
Start: 2021-06-22 | End: 2021-06-27 | Stop reason: HOSPADM

## 2021-06-22 RX ORDER — SODIUM CHLORIDE 9 MG/ML
250 INJECTION, SOLUTION INTRAVENOUS AS NEEDED
Status: DISCONTINUED | OUTPATIENT
Start: 2021-06-22 | End: 2021-06-27 | Stop reason: HOSPADM

## 2021-06-22 RX ADMIN — COLLAGENASE SANTYL: 250 OINTMENT TOPICAL at 08:42

## 2021-06-22 RX ADMIN — TRAMADOL HYDROCHLORIDE 50 MG: 50 TABLET, FILM COATED ORAL at 20:49

## 2021-06-22 RX ADMIN — SIMVASTATIN 10 MG: 10 TABLET, FILM COATED ORAL at 20:12

## 2021-06-22 RX ADMIN — MEROPENEM 1 G: 1 INJECTION, POWDER, FOR SOLUTION INTRAVENOUS at 17:23

## 2021-06-22 RX ADMIN — PANTOPRAZOLE SODIUM 40 MG: 40 TABLET, DELAYED RELEASE ORAL at 08:41

## 2021-06-22 RX ADMIN — LINEZOLID 600 MG: 600 TABLET, FILM COATED ORAL at 08:41

## 2021-06-22 RX ADMIN — FERROUS SULFATE TAB 325 MG (65 MG ELEMENTAL FE) 325 MG: 325 (65 FE) TAB at 17:23

## 2021-06-22 RX ADMIN — LATANOPROST 1 DROP: 50 SOLUTION OPHTHALMIC at 18:23

## 2021-06-22 RX ADMIN — HEPARIN SODIUM 5000 UNITS: 5000 INJECTION INTRAVENOUS; SUBCUTANEOUS at 12:03

## 2021-06-22 RX ADMIN — FERROUS SULFATE TAB 325 MG (65 MG ELEMENTAL FE) 325 MG: 325 (65 FE) TAB at 12:03

## 2021-06-22 RX ADMIN — ALBUTEROL SULFATE 2 PUFF: 108 AEROSOL, METERED RESPIRATORY (INHALATION) at 01:32

## 2021-06-22 RX ADMIN — GABAPENTIN 100 MG: 100 CAPSULE ORAL at 17:33

## 2021-06-22 RX ADMIN — HEPARIN SODIUM 5000 UNITS: 5000 INJECTION INTRAVENOUS; SUBCUTANEOUS at 20:12

## 2021-06-22 RX ADMIN — LINEZOLID 600 MG: 600 TABLET, FILM COATED ORAL at 20:12

## 2021-06-22 RX ADMIN — FERROUS SULFATE TAB 325 MG (65 MG ELEMENTAL FE) 325 MG: 325 (65 FE) TAB at 08:40

## 2021-06-22 RX ADMIN — WHITE PETROLATUM,ZINC OXIDE: 57; 17 PASTE TOPICAL at 09:00

## 2021-06-22 RX ADMIN — GABAPENTIN 100 MG: 100 CAPSULE ORAL at 20:12

## 2021-06-22 RX ADMIN — WHITE PETROLATUM,ZINC OXIDE: 57; 17 PASTE TOPICAL at 20:17

## 2021-06-22 RX ADMIN — WHITE PETROLATUM,ZINC OXIDE: 57; 17 PASTE TOPICAL at 17:29

## 2021-06-22 RX ADMIN — SODIUM CHLORIDE 75 ML/HR: 9 INJECTION, SOLUTION INTRAVENOUS at 12:03

## 2021-06-22 RX ADMIN — GABAPENTIN 100 MG: 100 CAPSULE ORAL at 08:41

## 2021-06-22 RX ADMIN — HEPARIN SODIUM 5000 UNITS: 5000 INJECTION INTRAVENOUS; SUBCUTANEOUS at 06:26

## 2021-06-22 NOTE — PROGRESS NOTES
Infectious Disease Progress Note           Subjective:   Assessed pt at bedside. Reported right leg pain, states he is okay w left AKA if that's what he needs.    Objective:   Physical Exam:     Visit Vitals  BP (!) 113/55   Pulse 91   Temp 98 °F (36.7 °C)   Resp 16   Ht 6' 2\" (1.88 m)   Wt 281 lb 15.5 oz (127.9 kg)   SpO2 99%   BMI 36.20 kg/m²      O2 Device: None (Room air)    Temp (24hrs), Av.4 °F (36.9 °C), Min:98 °F (36.7 °C), Max:99 °F (37.2 °C)    701 - 1900  In: 120 [P.O.:120]  Out: -    1901 -  07  In: -   Out: 2 [Urine:1]    General: NAD, alert and following commands   HEENT: JEAN, Moist mucosa   Lungs: CTA b/l, no wheeze/rhonchi   Heart: S1S2+, RRR, no murmur  Abdo: Soft, NT, ND, +BS   Exts: Leg dressing in place, malodorous smell from wounds   Skin: No wounds, No rashes or lesions    Data Review:       Recent Days:  Recent Labs     21  0709 21  1110   WBC 12.0* 13.6*   HGB 7.2* 7.9*   HCT 23.3* 25.5*    183     Recent Labs     21  0709 21  1110   BUN 78* 75*   CREA 3.11* 3.15*       Microbiology     Results     Procedure Component Value Units Date/Time    CULTURE, Beatriz Patel STAIN [[id number]]  (Susceptibility) Collected: 21 0615    Order Status: Completed Specimen: Wound Updated: 21 1018     Special Requests: No Special Requests        GRAM STAIN No wbc's seen         2+ Gram Positive Cocci         2+ Gram Positive Rods         Few Gram Negative Rods        Culture result:       Heavy Enterobacter cloacae complex                  Heavy * methicillin resistant staphylococcus aureus * REFER TO [id number] FOR SENSITIVITIES            Few Diphtheroids         CALLED MRSA REPORT TO Red Gorman R.N. 1020 2021 BY [facility]    Susceptibility      Enterobacter cloacae complex      CATINA      Amikacin ($) Susceptible      Cefazolin ($) Resistant      Cefepime ($$) Susceptible      Cefoxitin Resistant      Ceftazidime ($) Resistant      Ceftriaxone ($) Resistant      Ciprofloxacin ($) Resistant      Gentamicin ($) Susceptible      Levofloxacin ($) Resistant      Meropenem ($$) Susceptible      Piperacillin/Tazobac ($) Resistant      Tobramycin ($) Susceptible      Trimeth/Sulfa Resistant               Linear View                   CULTURE, Sean Gardnero STAIN [[id number]]  (Susceptibility) Collected: 06/16/21 0615    Order Status: Completed Specimen: Wound Updated: 06/19/21 0806     Special Requests: No Special Requests        GRAM STAIN 1+ WBCs seen         4+ Gram Negative Rods               4+ Gram Positive Cocci in pairs                  4+ apparent Gram Positive Rods           Culture result:       Heavy Enterobacter cloacae complex                  Moderate * methicillin resistant staphylococcus aureus *            Light Diphtheroids       Susceptibility      Enterobacter cloacae complex Staphylococcus aureus Methcillin Resistant      CATINA CATINA      Amikacin ($) Susceptible       Cefazolin ($) Resistant       Cefepime ($$) Susceptible       Cefoxitin Resistant       Ceftazidime ($) Resistant       Ceftriaxone ($) Resistant       Ciprofloxacin ($) Resistant Resistant      Clindamycin ($)  Susceptible      Daptomycin ($$$$$)  Susceptible      Doxycycline ($$)  Susceptible      Erythromycin ($$$$)  Susceptible      Gentamicin ($) Susceptible Susceptible      Levofloxacin ($) Resistant Resistant      Linezolid ($$$$$)  Susceptible      Meropenem ($$) Susceptible       Oxacillin  Resistant      Piperacillin/Tazobac ($) Resistant       Rifampin ($$$$)  Susceptible<sup style='font-weight:normal'>1</sup></font>      Tetracycline  Resistant      Tobramycin ($) Susceptible       Trimeth/Sulfa Resistant Susceptible      Vancomycin ($)  Susceptible                              COVID-19 RAPID TEST [[id number]] Collected: 06/16/21 0216    Order Status: Completed Specimen: Nasopharyngeal Updated: 06/16/21 0408     Specimen source Nasopharyngeal        COVID-19 rapid test Not Detected        Comment: Rapid Abbott ID Now   Rapid NAAT:  The specimen is NEGATIVE for SARS-CoV-2, the novel coronavirus associated with COVID-19. Negative results should be treated as presumptive and, if inconsistent with clinical signs and symptoms or necessary for patient management, should be tested with an alternative molecular assay. Negative results do not preclude SARS-CoV-2 infection and should not be used as the sole basis for patient management decisions. This test has been authorized by the FDA under   an Emergency Use Authorization (EUA) for use by authorized laboratories. Fact sheet for Healthcare Providers: [employer]out[employer]co.nz Fact sheet for Patients: TwentyPeople.co.nz   Methodology: Isothermal Nucleic Acid Amplification         COVID-19 RAPID TEST [[id number]]  (Abnormal) Collected: 06/16/21 0023    Order Status: Completed Specimen: Nasopharyngeal Updated: 06/16/21 0208     Specimen source Nasopharyngeal        COVID-19 rapid test Indeterminate        Comment: Rapid Abbott ID Now   The presence or absence of COVID-19 Viral RNAs cannot be determined. If clinically indicated, please collect a new specimen. This test has been authorized by the FDA under an Emergency Use Authorization (EUA) for use by authorized laboratories. Fact sheet for Healthcare Providers: TwentyPeople.co.nz Fact sheet for Patients: TwentyPeople[employer]co[employer]nz   Methodology: Isothermal Nucleic Acid Amplification SPOKE TO KEVYN MENDOZA,   SUGGESTED RECOLLECT                 Diagnostics   CXR Results  (Last 48 hours)    None         Assessment/Plan     1. 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 Cloacae complex R to Zosyn and MRSA isolated from wound Cx       Afebrile, WB trending down on linezolid day # 3 and Meropenem day # 4      AKA recommended by vascular surgeon, pending discussion w fmly       Continue current antibiotics, routine labs in the morning     2. Sacral ulcer, unstageable, continue frequent turns and routine wound care     3. Left heel ulcer: Osteo and gas formation noted on CT      Reports right leg pain today (06/22), agreeable w needed surgery       AKA recommended by vascular     4. Acute on chronic renal failure: Cr trending down on most recent labs (06/21)      5.  Poor oral intake,improved since admission         Maggie Gong MD    6/22/2021

## 2021-06-22 NOTE — PROGRESS NOTES
[facility] KIDNEY     Renal Daily Progress Note:     Admission Date: 6/15/2021     Subjective: awake and alert. Eating well when fed by CNA. No distress. Creatinine trending down. Seems he will go for left AKA  Seems comfortable, offers no complaints. BP ok off Norvasc    Review of Systems  Pertinent items are noted in HPI.     Objective:     Visit Vitals  BP (!) 113/55   Pulse 91   Temp 98 °F (36.7 °C)   Resp 16   Ht 6' 2\" (1.88 m)   Wt 127.9 kg (281 lb 15.5 oz)   SpO2 99%   BMI 36.20 kg/m²     Temp (24hrs), Av.2 °F (36.8 °C), Min:98 °F (36.7 °C), Max:98.7 °F (37.1 °C)        Intake/Output Summary (Last 24 hours) at 2021 1801  Last data filed at 2021 1352  Gross per 24 hour   Intake 120 ml   Output 2 ml   Net 118 ml     Current Facility-Administered Medications   Medication Dose Route Frequency    albuterol (PROVENTIL HFA, VENTOLIN HFA, PROAIR HFA) inhaler 2 Puff  2 Puff Inhalation Q6H PRN    0.9% sodium chloride infusion 250 mL  250 mL IntraVENous PRN    collagenase (SANTYL) 250 unit/gram ointment   Topical DAILY    linezolid (ZYVOX) tablet 600 mg  600 mg Oral Q12H    meropenem (MERREM) 1 g in sterile water (preservative free) 20 mL IV syringe  1 g IntraVENous Q24H    acetaminophen (TYLENOL) tablet 500 mg  500 mg Oral Q4H PRN    acetaminophen (TYLENOL) tablet 1,000 mg  1,000 mg Oral Q6H PRN    [Held by provider] amLODIPine (NORVASC) tablet 5 mg  5 mg Oral DAILY    ferrous sulfate tablet 325 mg  1 Tablet Oral TID WITH MEALS    gabapentin (NEURONTIN) capsule 100 mg  100 mg Oral TID    [Held by provider] furosemide (LASIX) tablet 40 mg  40 mg Oral DAILY    latanoprost (XALATAN) 0.005 % ophthalmic solution 1 Drop  1 Drop Both Eyes QPM    loperamide (IMODIUM) capsule 2 mg  2 mg Oral Q4H PRN    simvastatin (ZOCOR) tablet 10 mg  10 mg Oral QHS    traMADoL (ULTRAM) tablet 50 mg  50 mg Oral Q6H PRN    ondansetron hcl (ZOFRAN) tablet 4 mg  4 mg Oral Q4H PRN    cyanocobalamin (VITAMIN B12) injection 1,000 mcg  1,000 mcg IntraMUSCular EVERY 2 WEEKS    ferrous sulfate tablet 325 mg  325 mg Oral ACB    pantoprazole (PROTONIX) tablet 40 mg  40 mg Oral DAILY    insulin lispro (HUMALOG) injection   SubCUTAneous AC&HS    glucose chewable tablet 16 g  4 Tablet Oral PRN    dextrose (D50W) injection syrg 12.5-25 g  25-50 mL IntraVENous PRN    glucagon (GLUCAGEN) injection 1 mg  1 mg IntraMUSCular PRN    0.9% sodium chloride infusion  75 mL/hr IntraVENous CONTINUOUS    acetaminophen (TYLENOL) tablet 650 mg  650 mg Oral Q6H PRN    Or    acetaminophen (TYLENOL) suppository 650 mg  650 mg Rectal Q6H PRN    polyethylene glycol (MIRALAX) packet 17 g  17 g Oral DAILY PRN    ondansetron (ZOFRAN ODT) tablet 4 mg  4 mg Oral Q8H PRN    Or    ondansetron (ZOFRAN) injection 4 mg  4 mg IntraVENous Q6H PRN    heparin (porcine) injection 5,000 Units  5,000 Units SubCUTAneous Q8H    zinc oxide-white petrolatum 17-57 % topical paste   Topical TID       Physical Exam:General appearance: alert, cooperative, no distress, appears stated age  Lungs: clear to auscultation bilaterally  Heart: regular rate and rhythm  Abdomen: soft, non-tender. Bowel sounds normal. No masses,  no organomegaly  Extremities: no edema but legs wrapped  Neurologic: grossly normal except for dementia    Data Review:     LABS:  Recent Labs     06/21/21  0709 06/20/21  1110    138   K 3.9 3.9    106   CO2 22 23   BUN 78* 75*   CREA 3.11* 3.15*   CA 8.0* 8.1*   ALB 1.2* 1.3*     Recent Labs     06/21/21  0709 06/20/21  1110   WBC 12.0* 13.6*   HGB 7.2* 7.9*   HCT 23.3* 25.5*    183     No results for input(s): TONY, KU, CLU, CREAU in the last 72 hours. No lab exists for component: PROU  CT lower legs w/o contrast 6-19-21     IMPRESSION  1. Somewhat suboptimal evaluation, as detailed above.   2. Within exam limitations there is a soft tissue ulcer at the posterior left  calcaneus with infection with gas-forming organism versus soft tissue  devitalization. Small amount of gas within the posterior calcaneus is most  likely related to osteomyelitis. 3. Other soft tissue wounds within both legs. No additional findings of acute  osseous abnormality within exam limitations. Based on level of clinical concern,  could consider further evaluation with targeted MRI of specific regions. 4. Diffuse osseous demineralization likely contributes to the somewhat  heterogeneous appearance of the bones. However, there is an indeterminate 10 mm  lucency within the right medial femoral condyle. Please correlate with clinical  history for a known primary malignancy or plasma abnormality. Otherwise short  interval follow-up in 3 months should be considered. Assessment:   Renal Specific Problems  CKd 4 at baseline  CRISTIAN likely a combination of volume depletion and acute inflammatory state--improving  Anemia from chronic disease exacerbated by leg infections  Hypoalbumin  ? Liver neoplasm    PVD, ? Left heel osteo      Plan:     Obtain/ Order: labs/cultures/radiology/procedures:        Therapeutic:    Cont current IVF and antibiotics  Transfuse if Hgb < 7?   Agree with left AKA, we'll probably see continued improvement in renal function with removal of necrotic tissue    Servando Dunne MD    [phone]

## 2021-06-22 NOTE — PROGRESS NOTES
Physician Progress Note      PATIENT:               Jessica Montejo  CSN #:                  [id number]  :                       1932  ADMIT DATE:       6/15/2021 9:37 PM  [address] [place] DATE:  RESPONDING  PROVIDER #:        Vidhi Boyer MD          QUERY TEXT:    Dear Dr. Brigitte Lomeli,    Patient admitted with CRISTIAN. Noted documentation of sepsis in  Vascular consult note. In order to support the diagnosis of sepsis, please include additional clinical indicators in your documentation. Or please document if the diagnosis of sepsis has been ruled out after further study. The medical record reflects the following:  Risk Factors: 80year old male, CRISTIAN, chronic bilateral leg wounds  Clinical Indicators:  Vascular consult - currently hospitalized with acute kidney injury with sepsis. WBC: 14.3-13.2-14.5-14.1-13.6-12.0  Wound culture left leg - Heavy * methicillin resistant staphylococcus aureus  Wound culture right leg - Moderate * methicillin resistant staphylococcus aureus  Visit Vitals  BP (!) 109/45  Pulse 87  Temp 98 ? F (36.7 ? C)  Resp 18  SpO2       98%  Treatment: IVF NS, IV Meropenem, wound care    Please call [phone] with any questions  Options provided:  -- Sepsis present as evidenced by, Please document evidence. -- Sepsis was ruled out after study  -- Other - I will add my own diagnosis  -- Disagree - Not applicable / Not valid  -- Disagree - Clinically unable to determine / Unknown  -- Refer to Clinical Documentation Reviewer    PROVIDER RESPONSE TEXT:    Sepsis was ruled out after study. Query created by:  Saad Almaraz on 2021 10:22 AM      Electronically signed by:  Vidhi Boyer MD 2021 1:42 PM

## 2021-06-22 NOTE — PROGRESS NOTES
General Daily Progress Note          Patient Name:   Kia Foster       YOB: 1932       Age:  80 y.o. Admit Date: 6/15/2021      Subjective:   Patient is Carlos.Pacific y.o. year old male with a past medical history of diabetes, hypertension, hyperlipidemia, renal insufficiency, and anemia history of liver mass who presented to the ER 6/15 due to abnormal labs drawn at the nursing home yesterday. He was found to have worsening kidney function. He was on dialysis previously, but had reportedly stopped dialysis several years ago. He denied chest pain and SOB. CXR showed no acute cardiopulmonary processes. He was admitted for further evaluation and treatment. Patient have a liver mass diagnosis and last admission as per note no further diagnosis procedure treatment was planned with the family by the attending. Patient's discharge date canceled by vascular surgeon due to complex wounds on bilateral legs and need for CT. 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 Wound cultures showed E. Cloacae resistant to Zosyn and MRSA. Today patient is alert and awake in bed, complaining of severe leg aching. He denies chest pain and SOB.                    Objective:     Visit Vitals  BP (!) 109/57   Pulse 93   Temp 98 °F (36.7 °C)   Resp 18   Ht 6' 2\" (1.88 m)   Wt 127.9 kg (281 lb 15.5 oz)   SpO2 99%   BMI 36.20 kg/m²        Recent Results (from the past 24 hour(s))   GLUCOSE, POC    Collection Time: 06/21/21 10:53 AM   Result Value Ref Range    Glucose (POC) 114 65 - 117 mg/dL    Performed by [address], POC    Collection Time: 06/21/21  5:01 PM   Result Value Ref Range    Glucose (POC) 136 (H) 65 - 117 mg/dL    Performed by [address] [place] [address], POC    Collection Time: 06/21/21  7:38 PM   Result Value Ref Range    Glucose (POC) 132 (H) 65 - 117 mg/dL    Performed by [address] [place] [address], POC    Collection Time: 06/22/21  7:48 AM   Result Value Ref Range Glucose (POC) 92 65 - 117 mg/dL    Performed by Abhinav Roblero      [unfilled]      Review of Systems    Constitutional: Negative for chills and fever. HENT: Negative. Eyes: Negative. Respiratory: Negative. Cardiovascular: Negative. Gastrointestinal: Negative for abdominal pain and nausea. Skin: Negative. Neurological: Negative. Extremities: bilateral aching       Physical Exam:      Constitutional: pt is oriented to person, place, and time. HENT:   Head: Normocephalic and atraumatic. Eyes: Pupils are equal, round, and reactive to light. EOM are normal.   Cardiovascular: Normal rate, regular rhythm and normal heart sounds. Pulmonary/Chest: Breath sounds normal. No wheezes. No rales. Exhibits no tenderness. Abdominal: Soft. Bowel sounds are normal. There is no abdominal tenderness. There is no rebound and no guarding. Musculoskeletal: Normal range of motion. Neurological: pt is alert and oriented to person, place, and time. Extremities: dressing in place    CT LOW EXT BI WO CONT   Final Result   1. Somewhat suboptimal evaluation, as detailed above. 2. Within exam limitations there is a soft tissue ulcer at the posterior left   calcaneus with infection with gas-forming organism versus soft tissue   devitalization. Small amount of gas within the posterior calcaneus is most   likely related to osteomyelitis. 3. Other soft tissue wounds within both legs. No additional findings of acute   osseous abnormality within exam limitations. Based on level of clinical concern,   could consider further evaluation with targeted MRI of specific regions. 4. Diffuse osseous demineralization likely contributes to the somewhat   heterogeneous appearance of the bones. However, there is an indeterminate 10 mm   lucency within the right medial femoral condyle. Please correlate with clinical   history for a known primary malignancy or plasma abnormality.  Otherwise short   interval follow-up in 3 months should be considered. US RETROPERITONEUM COMP   Final Result   Borderline right-sided hydronephrosis. Ascites. XR CHEST PORT   Final Result   No evidence of an acute cardiopulmonary process.            Recent Results (from the past 24 hour(s))   GLUCOSE, POC    Collection Time: 06/21/21 10:53 AM   Result Value Ref Range    Glucose (POC) 114 65 - 117 mg/dL    Performed by [address], POC    Collection Time: 06/21/21  5:01 PM   Result Value Ref Range    Glucose (POC) 136 (H) 65 - 117 mg/dL    Performed by [facility]    GLUCOSE, POC    Collection Time: 06/21/21  7:38 PM   Result Value Ref Range    Glucose (POC) 132 (H) 65 - 117 mg/dL    Performed by [facility]    GLUCOSE, POC    Collection Time: 06/22/21  7:48 AM   Result Value Ref Range    Glucose (POC) 92 65 - 117 mg/dL    Performed by Lindsey Hay        Results     Procedure Component Value Units Date/Time    CULTURE, Veronika Thompsonder STAIN [[id number]]  (Susceptibility) Collected: 06/16/21 0615    Order Status: Completed Specimen: Wound Updated: 06/19/21 1018     Special Requests: No Special Requests        GRAM STAIN No wbc's seen         2+ Gram Positive Cocci         2+ Gram Positive Rods         Few Gram Negative Rods        Culture result:       Heavy Enterobacter cloacae complex                  Heavy * methicillin resistant staphylococcus aureus * REFER TO [id number] FOR SENSITIVITIES            Few Diphtheroids         CALLED MRSA REPORT TO Jhonathan Shaver R.N. 1020 06/19/2021 BY [facility]    Susceptibility      Enterobacter cloacae complex      CATINA      Amikacin ($) Susceptible      Cefazolin ($) Resistant      Cefepime ($$) Susceptible      Cefoxitin Resistant      Ceftazidime ($) Resistant      Ceftriaxone ($) Resistant      Ciprofloxacin ($) Resistant      Gentamicin ($) Susceptible      Levofloxacin ($) Resistant      Meropenem ($$) Susceptible      Piperacillin/Tazobac ($) Resistant      Tobramycin ($) Susceptible      Trimeth/Sulfa Resistant               Linear View                   CULTURE, Alvaro Robe STAIN [[id number]]  (Susceptibility) Collected: 06/16/21 0615    Order Status: Completed Specimen: Wound Updated: 06/19/21 0806     Special Requests: No Special Requests        GRAM STAIN 1+ WBCs seen         4+ Gram Negative Rods               4+ Gram Positive Cocci in pairs                  4+ apparent Gram Positive Rods           Culture result:       Heavy Enterobacter cloacae complex                  Moderate * methicillin resistant staphylococcus aureus *            Light Diphtheroids       Susceptibility      Enterobacter cloacae complex Staphylococcus aureus Methcillin Resistant      CATINA CATINA      Amikacin ($) Susceptible       Cefazolin ($) Resistant       Cefepime ($$) Susceptible       Cefoxitin Resistant       Ceftazidime ($) Resistant       Ceftriaxone ($) Resistant       Ciprofloxacin ($) Resistant Resistant      Clindamycin ($)  Susceptible      Daptomycin ($$$$$)  Susceptible      Doxycycline ($$)  Susceptible      Erythromycin ($$$$)  Susceptible      Gentamicin ($) Susceptible Susceptible      Levofloxacin ($) Resistant Resistant      Linezolid ($$$$$)  Susceptible      Meropenem ($$) Susceptible       Oxacillin  Resistant      Piperacillin/Tazobac ($) Resistant       Rifampin ($$$$)  Susceptible<!--EPICS--><sup style='font-weight:normal'>1</sup></font>      Tetracycline  Resistant      Tobramycin ($) Susceptible       Trimeth/Sulfa Resistant Susceptible      Vancomycin ($)  Susceptible               <!--EPICS--><sup style='font-weight:normal'>1</sup></font> Rifampin is not to be used for mono-therapy.           Linear View                   COVID-19 RAPID TEST [[id number]] Collected: 06/16/21 0216    Order Status: Completed Specimen: Nasopharyngeal Updated: 06/16/21 0408     Specimen source Nasopharyngeal        COVID-19 rapid test Not Detected        Comment: Rapid Abbott ID Now   Rapid NAAT: The specimen is NEGATIVE for SARS-CoV-2, the novel coronavirus associated with COVID-19. Negative results should be treated as presumptive and, if inconsistent with clinical signs and symptoms or necessary for patient management, should be tested with an alternative molecular assay. Negative results do not preclude SARS-CoV-2 infection and should not be used as the sole basis for patient management decisions. This test has been authorized by the FDA under   an Emergency Use Authorization (EUA) for use by authorized laboratories. Fact sheet for Healthcare Providers: [employer] Fact sheet for Patients: [employer]   Methodology: Isothermal Nucleic Acid Amplification         COVID-19 RAPID TEST [[id number]]  (Abnormal) Collected: 06/16/21 0023    Order Status: Completed Specimen: Nasopharyngeal Updated: 06/16/21 0208     Specimen source Nasopharyngeal        COVID-19 rapid test Indeterminate        Comment: Rapid Abbott ID Now   The presence or absence of COVID-19 Viral RNAs cannot be determined. If clinically indicated, please collect a new specimen. This test has been authorized by the FDA under an Emergency Use Authorization (EUA) for use by authorized laboratories.    Fact sheet for Healthcare Providers: [employer] Fact sheet for Patients: [employer]   Methodology: Isothermal Nucleic Acid Amplification SPOKE TO KEVYN MENDOZA,   SUGGESTED RECOLLECT                 Labs:     Recent Labs     06/21/21  0709 06/20/21  1110   WBC 12.0* 13.6*   HGB 7.2* 7.9*   HCT 23.3* 25.5*    183     Recent Labs     06/21/21  0709 06/20/21  1110    138   K 3.9 3.9    106   CO2 22 23   BUN 78* 75*   CREA 3.11* 3.15*   GLU 78 105*   CA 8.0* 8.1*     Recent Labs     06/21/21  0709 06/20/21  1110   ALT 16 18   * 514*   TBILI 2.5* 2.8*   TP 6.3* 6.6   ALB 1.2* 1.3*   GLOB 5.1* 5.3*     No results for input(s): INR, PTP, APTT, INREXT, INREXT in the last 72 hours. No results for input(s): FE, TIBC, PSAT, FERR in the last 72 hours. No results found for: FOL, RBCF   No results for input(s): PH, PCO2, PO2 in the last 72 hours. No results for input(s): CPK, CKNDX, TROIQ in the last 72 hours. No lab exists for component: CPKMB  No results found for: CHOL, CHOLX, CHLST, CHOLV, HDL, HDLP, LDL, LDLC, DLDLP, TGLX, TRIGL, TRIGP, CHHD, CHHDX  Lab Results   Component Value Date/Time    Glucose (POC) 92 06/22/2021 07:48 AM    Glucose (POC) 132 (H) 06/21/2021 07:38 PM    Glucose (POC) 136 (H) 06/21/2021 05:01 PM    Glucose (POC) 114 06/21/2021 10:53 AM    Glucose (POC) 80 06/21/2021 08:26 AM     Lab Results   Component Value Date/Time    Color Yaritza 04/28/2021 03:00 PM    Appearance Turbid (A) 04/28/2021 03:00 PM    Specific gravity 1.014 04/28/2021 03:00 PM    pH (UA) 5.0 04/28/2021 03:00 PM    Protein 100 (A) 04/28/2021 03:00 PM    Glucose Negative 04/28/2021 03:00 PM    Ketone Negative 04/28/2021 03:00 PM    Bilirubin Negative 04/28/2021 03:00 PM    Urobilinogen 4.0 (H) 04/28/2021 03:00 PM    Nitrites Negative 04/28/2021 03:00 PM    Leukocyte Esterase Negative 04/28/2021 03:00 PM    Bacteria Negative 04/28/2021 03:00 PM    WBC 0-4 04/28/2021 03:00 PM    RBC 0-5 04/28/2021 03:00 PM         Assessment:     Acute on chronic kidney failure-trending down  Diabetes  Hypertension   Hyperlipidemia  Anemia- 7.2  Leukocytosis  Chronic leg wounds-E. Cloacae resistant to Zosyn and MRSA isolated   Liver mass  Hypotension-improved   Hypokalemia  Right moderate hydronephrosis  Left heel ulcer-gas-forming organisms and osteomyelitis       Plan:     Continue on Linezolid and Meropenem  If aggressive measures are being pursued, patient will need wound debridement.    Continue frequent turns   Monitor H&H  Repeat labs in AM    Plan for left above-knee amputation      Discussed with patient daughter-in-law yesterday  She want to talk to the surgeon regarding further treatment of left leg  They completely revoke hospice   Current Facility-Administered Medications:     albuterol (PROVENTIL HFA, VENTOLIN HFA, PROAIR HFA) inhaler 2 Puff, 2 Puff, Inhalation, Q6H PRN, Yanira Ramirez MD    collagenase (SANTYL) 250 unit/gram ointment, , Topical, DAILY, Bernabe, Bufyf Stubbs MD, Given at 06/22/21 0063    linezolid (ZYVOX) tablet 600 mg, 600 mg, Oral, Q12H, Sascha Araiza MD, 600 mg at 06/22/21 0841    meropenem (MERREM) 1 g in sterile water (preservative free) 20 mL IV syringe, 1 g, IntraVENous, Q24H, Sascha Araiza MD, 1 g at 06/21/21 1712    acetaminophen (TYLENOL) tablet 500 mg, 500 mg, Oral, Q4H PRN, Dinora Ramirez MD    acetaminophen (TYLENOL) tablet 1,000 mg, 1,000 mg, Oral, Q6H PRN, Dinora Ramirez MD, 1,000 mg at 06/20/21 2251    [Held by provider] amLODIPine (NORVASC) tablet 5 mg, 5 mg, Oral, DAILY, Chase Blake MD, 5 mg at 06/17/21 1123    ferrous sulfate tablet 325 mg, 1 Tablet, Oral, TID WITH MEALS, Dinora Ramirez MD, 325 mg at 06/22/21 0840    gabapentin (NEURONTIN) capsule 100 mg, 100 mg, Oral, TID, Dinora Ramirez MD, 100 mg at 06/22/21 0841    [Held by provider] furosemide (LASIX) tablet 40 mg, 40 mg, Oral, DAILY, Chase Blake MD, 40 mg at 06/16/21 1107    latanoprost (XALATAN) 0.005 % ophthalmic solution 1 Drop, 1 Drop, Both Eyes, QPM, Dinora Ramirez MD, 1 Drop at 06/21/21 1713    loperamide (IMODIUM) capsule 2 mg, 2 mg, Oral, Q4H PRN, Dinora Ramirez MD    simvastatin (ZOCOR) tablet 10 mg, 10 mg, Oral, QHS, Dinora Ramirez MD, 10 mg at 06/21/21 2140    traMADoL (ULTRAM) tablet 50 mg, 50 mg, Oral, Q6H PRN, Dinora Ramirez MD, 50 mg at 06/21/21 2140    ondansetron hcl (ZOFRAN) tablet 4 mg, 4 mg, Oral, Q4H PRN, Dinora Ramirez MD    cyanocobalamin (VITAMIN B12) injection 1,000 mcg, 1,000 mcg, IntraMUSCular, EVERY 2 WEEKS, Dinora Ramirez MD, 1,000 mcg at 06/16/21 6357   ferrous sulfate tablet 325 mg, 325 mg, Oral, ACB, Dinora Ramirez MD, 325 mg at 06/20/21 0855    pantoprazole (PROTONIX) tablet 40 mg, 40 mg, Oral, DAILY, Dinora Ramirez MD, 40 mg at 06/22/21 0841    insulin lispro (HUMALOG) injection, , SubCUTAneous, AC&HS, Dinora Ramirez MD, 2 Units at 06/18/21 2233    glucose chewable tablet 16 g, 4 Tablet, Oral, PRN, Mayte Ramirez MD    dextrose (D50W) injection syrg 12.5-25 g, 25-50 mL, IntraVENous, PRN, Mayte Ramirez MD    glucagon [facility]) injection 1 mg, 1 mg, IntraMUSCular, PRN, Mayte Ramirez MD    0.9% sodium chloride infusion, 75 mL/hr, IntraVENous, CONTINUOUS, Dinora Ramirez MD, Last Rate: 75 mL/hr at 06/18/21 1904, 75 mL/hr at 06/18/21 1904    acetaminophen (TYLENOL) tablet 650 mg, 650 mg, Oral, Q6H PRN **OR** acetaminophen (TYLENOL) suppository 650 mg, 650 mg, Rectal, Q6H PRN, Mayte Ramirez MD    polyethylene glycol (MIRALAX) packet 17 g, 17 g, Oral, DAILY PRN, Mayte Ramirez MD    ondansetron (ZOFRAN ODT) tablet 4 mg, 4 mg, Oral, Q8H PRN **OR** ondansetron (ZOFRAN) injection 4 mg, 4 mg, IntraVENous, Q6H PRN, Dinora Ramirez MD    heparin (porcine) injection 5,000 Units, 5,000 Units, SubCUTAneous, Q8H, Dinora Ramirez MD, 5,000 Units at 06/22/21 3041    zinc oxide-white petrolatum 17-57 % topical paste, , Topical, TID, Mayte Ramirez MD, Given at 06/21/21 2833

## 2021-06-22 NOTE — ANESTHESIA PREPROCEDURE EVALUATION
Relevant Problems   RENAL FAILURE   (+) CRISTIAN (acute kidney injury) ([facility] [address]   (+) Acute kidney injury ([facility] [address]   EKG    Anesthetic History   No history of anesthetic complications            Review of Systems / Medical History  Patient summary reviewed, nursing notes reviewed and pertinent labs reviewed    Pulmonary                Comments: FORMER SMOKER   Neuro/Psych              Cardiovascular    Hypertension          CAD      Comments: EKG (6-15-21) Sinus rhythm with Premature atrial complexes   Anterior infarct /poor R wave progression   Abnormal ECG   When compared with ECG of 15-APR-2019 00:33,   TX interval has decreased   QT has lengthened. GI/Hepatic/Renal     GERD    Renal disease: ARF      Comments: SEPSIS  AVM MALFORMATION OF COLON.   Endo/Other    Diabetes    Obesity and arthritis     Other Findings            Physical Exam    Airway  Mallampati: II  TM Distance: > 6 cm  Neck ROM: decreased range of motion        Cardiovascular    Rhythm: regular  Rate: normal         Dental    Dentition: Poor dentition  Comments: Upper dentures   Pulmonary      Decreased breath sounds           Abdominal         Other Findings            Anesthetic Plan    ASA: 3  Anesthesia type: general          Induction: Intravenous  Anesthetic plan and risks discussed with: Patient

## 2021-06-22 NOTE — PROGRESS NOTES
65- Patient's son updated on plan of care. Gave patient the phone number requested by vascular to discuss procedure and consent  1400- wound care completed. Patient tolerated well. New prevalon heel boots used. Patient repositioned in bed, left in no distress, call bell in reach. 1900- Bedside shift change report given to Kacey Pineda (oncoming nurse) by Hector Cavanaugh (offgoing nurse). Report included the following information SBAR, Kardex, Procedure Summary, Intake/Output, MAR, Recent Results and Cardiac Rhythm NSR.

## 2021-06-23 ENCOUNTER — ANESTHESIA (OUTPATIENT)
Dept: SURGERY | Age: 86
DRG: 981 | End: 2021-06-23
Payer: MEDICARE

## 2021-06-23 LAB
ALBUMIN SERPL-MCNC: 1.2 G/DL (ref 3.5–5)
ALBUMIN/GLOB SERPL: 0.2 {RATIO} (ref 1.1–2.2)
ALP SERPL-CCNC: 573 U/L (ref 45–117)
ALT SERPL-CCNC: 18 U/L (ref 12–78)
ANION GAP SERPL CALC-SCNC: 6 MMOL/L (ref 5–15)
AST SERPL W P-5'-P-CCNC: 107 U/L (ref 15–37)
BASOPHILS # BLD: 0.1 K/UL (ref 0–0.1)
BASOPHILS NFR BLD: 0 % (ref 0–1)
BILIRUB SERPL-MCNC: 2.3 MG/DL (ref 0.2–1)
BUN SERPL-MCNC: 78 MG/DL (ref 6–20)
BUN/CREAT SERPL: 29 (ref 12–20)
CA-I BLD-MCNC: 8.1 MG/DL (ref 8.5–10.1)
CHLORIDE SERPL-SCNC: 109 MMOL/L (ref 97–108)
CO2 SERPL-SCNC: 22 MMOL/L (ref 21–32)
CREAT SERPL-MCNC: 2.71 MG/DL (ref 0.7–1.3)
DIFFERENTIAL METHOD BLD: ABNORMAL
EOSINOPHIL # BLD: 0.4 K/UL (ref 0–0.4)
EOSINOPHIL NFR BLD: 3 % (ref 0–7)
ERYTHROCYTE [DISTWIDTH] IN BLOOD BY AUTOMATED COUNT: 18.6 % (ref 11.5–14.5)
GLOBULIN SER CALC-MCNC: 5.2 G/DL (ref 2–4)
GLUCOSE BLD STRIP.AUTO-MCNC: 132 MG/DL (ref 65–117)
GLUCOSE BLD STRIP.AUTO-MCNC: 158 MG/DL (ref 65–117)
GLUCOSE BLD STRIP.AUTO-MCNC: 83 MG/DL (ref 65–117)
GLUCOSE BLD STRIP.AUTO-MCNC: 86 MG/DL (ref 65–117)
GLUCOSE SERPL-MCNC: 81 MG/DL (ref 65–100)
HCT VFR BLD AUTO: 28.4 % (ref 36.6–50.3)
HGB BLD-MCNC: 8.8 G/DL (ref 12.1–17)
IMM GRANULOCYTES # BLD AUTO: 0.3 K/UL (ref 0–0.04)
IMM GRANULOCYTES NFR BLD AUTO: 2 % (ref 0–0.5)
LYMPHOCYTES # BLD: 4.2 K/UL (ref 0.8–3.5)
LYMPHOCYTES NFR BLD: 28 % (ref 12–49)
MCH RBC QN AUTO: 29.9 PG (ref 26–34)
MCHC RBC AUTO-ENTMCNC: 31 G/DL (ref 30–36.5)
MCV RBC AUTO: 96.6 FL (ref 80–99)
MONOCYTES # BLD: 1.9 K/UL (ref 0–1)
MONOCYTES NFR BLD: 13 % (ref 5–13)
NEUTS SEG # BLD: 7.9 K/UL (ref 1.8–8)
NEUTS SEG NFR BLD: 54 % (ref 32–75)
NRBC # BLD: 0.02 K/UL (ref 0–0.01)
NRBC BLD-RTO: 0.1 PER 100 WBC
PERFORMED BY, TECHID: ABNORMAL
PERFORMED BY, TECHID: ABNORMAL
PERFORMED BY, TECHID: NORMAL
PERFORMED BY, TECHID: NORMAL
PLATELET # BLD AUTO: 195 K/UL (ref 150–400)
PMV BLD AUTO: 10.6 FL (ref 8.9–12.9)
POTASSIUM SERPL-SCNC: 4.2 MMOL/L (ref 3.5–5.1)
PROT SERPL-MCNC: 6.4 G/DL (ref 6.4–8.2)
RBC # BLD AUTO: 2.94 M/UL (ref 4.1–5.7)
SODIUM SERPL-SCNC: 137 MMOL/L (ref 136–145)
WBC # BLD AUTO: 14.7 K/UL (ref 4.1–11.1)

## 2021-06-23 PROCEDURE — 85025 COMPLETE CBC W/AUTO DIFF WBC: CPT

## 2021-06-23 PROCEDURE — 65270000032 HC RM SEMIPRIVATE

## 2021-06-23 PROCEDURE — 80053 COMPREHEN METABOLIC PANEL: CPT

## 2021-06-23 PROCEDURE — 36415 COLL VENOUS BLD VENIPUNCTURE: CPT

## 2021-06-23 PROCEDURE — 76060000033 HC ANESTHESIA 1 TO 1.5 HR: Performed by: SURGERY

## 2021-06-23 PROCEDURE — P9045 ALBUMIN (HUMAN), 5%, 250 ML: HCPCS | Performed by: NURSE ANESTHETIST, CERTIFIED REGISTERED

## 2021-06-23 PROCEDURE — 74011250636 HC RX REV CODE- 250/636: Performed by: NURSE ANESTHETIST, CERTIFIED REGISTERED

## 2021-06-23 PROCEDURE — 99232 SBSQ HOSP IP/OBS MODERATE 35: CPT | Performed by: INTERNAL MEDICINE

## 2021-06-23 PROCEDURE — 74011636637 HC RX REV CODE- 636/637: Performed by: FAMILY MEDICINE

## 2021-06-23 PROCEDURE — 88307 TISSUE EXAM BY PATHOLOGIST: CPT

## 2021-06-23 PROCEDURE — 77030003029 HC SUT VCRL J&J -B: Performed by: SURGERY

## 2021-06-23 PROCEDURE — 82962 GLUCOSE BLOOD TEST: CPT

## 2021-06-23 PROCEDURE — 36430 TRANSFUSION BLD/BLD COMPNT: CPT

## 2021-06-23 PROCEDURE — 77030040361 HC SLV COMPR DVT MDII -B: Performed by: SURGERY

## 2021-06-23 PROCEDURE — 74011000250 HC RX REV CODE- 250: Performed by: NURSE ANESTHETIST, CERTIFIED REGISTERED

## 2021-06-23 PROCEDURE — 76210000016 HC OR PH I REC 1 TO 1.5 HR: Performed by: SURGERY

## 2021-06-23 PROCEDURE — 76010000149 HC OR TIME 1 TO 1.5 HR: Performed by: SURGERY

## 2021-06-23 PROCEDURE — 27590 AMPUTATE LEG AT THIGH: CPT | Performed by: SURGERY

## 2021-06-23 PROCEDURE — 74011250636 HC RX REV CODE- 250/636: Performed by: FAMILY MEDICINE

## 2021-06-23 PROCEDURE — P9016 RBC LEUKOCYTES REDUCED: HCPCS

## 2021-06-23 PROCEDURE — 2709999900 HC NON-CHARGEABLE SUPPLY: Performed by: SURGERY

## 2021-06-23 PROCEDURE — 88311 DECALCIFY TISSUE: CPT

## 2021-06-23 PROCEDURE — 74011250636 HC RX REV CODE- 250/636: Performed by: INTERNAL MEDICINE

## 2021-06-23 PROCEDURE — 77030002916 HC SUT ETHLN J&J -A: Performed by: SURGERY

## 2021-06-23 PROCEDURE — 74011000258 HC RX REV CODE- 258: Performed by: NURSE ANESTHETIST, CERTIFIED REGISTERED

## 2021-06-23 PROCEDURE — 74011250637 HC RX REV CODE- 250/637: Performed by: FAMILY MEDICINE

## 2021-06-23 PROCEDURE — 74011000250 HC RX REV CODE- 250: Performed by: INTERNAL MEDICINE

## 2021-06-23 PROCEDURE — 77030030163 HC BN WAX J&J -A: Performed by: SURGERY

## 2021-06-23 PROCEDURE — 74011250637 HC RX REV CODE- 250/637: Performed by: INTERNAL MEDICINE

## 2021-06-23 PROCEDURE — 77030006835 HC BLD SAW SAG STRY -B: Performed by: SURGERY

## 2021-06-23 PROCEDURE — 77030002986 HC SUT PROL J&J -A: Performed by: SURGERY

## 2021-06-23 RX ORDER — SODIUM CHLORIDE 0.9 % (FLUSH) 0.9 %
5-40 SYRINGE (ML) INJECTION EVERY 8 HOURS
Status: DISCONTINUED | OUTPATIENT
Start: 2021-06-23 | End: 2021-06-27 | Stop reason: HOSPADM

## 2021-06-23 RX ORDER — SUCCINYLCHOLINE CHLORIDE 20 MG/ML
INJECTION INTRAMUSCULAR; INTRAVENOUS AS NEEDED
Status: DISCONTINUED | OUTPATIENT
Start: 2021-06-23 | End: 2021-06-23 | Stop reason: HOSPADM

## 2021-06-23 RX ORDER — PROPOFOL 10 MG/ML
INJECTION, EMULSION INTRAVENOUS AS NEEDED
Status: DISCONTINUED | OUTPATIENT
Start: 2021-06-23 | End: 2021-06-23 | Stop reason: HOSPADM

## 2021-06-23 RX ORDER — MIDAZOLAM HYDROCHLORIDE 1 MG/ML
0.5 INJECTION, SOLUTION INTRAMUSCULAR; INTRAVENOUS
Status: DISCONTINUED | OUTPATIENT
Start: 2021-06-23 | End: 2021-06-23

## 2021-06-23 RX ORDER — SODIUM CHLORIDE 0.9 % (FLUSH) 0.9 %
5-40 SYRINGE (ML) INJECTION AS NEEDED
Status: DISCONTINUED | OUTPATIENT
Start: 2021-06-23 | End: 2021-06-23

## 2021-06-23 RX ORDER — FENTANYL CITRATE 50 UG/ML
INJECTION, SOLUTION INTRAMUSCULAR; INTRAVENOUS AS NEEDED
Status: DISCONTINUED | OUTPATIENT
Start: 2021-06-23 | End: 2021-06-23 | Stop reason: HOSPADM

## 2021-06-23 RX ORDER — SODIUM CHLORIDE 9 MG/ML
250 INJECTION, SOLUTION INTRAVENOUS AS NEEDED
Status: DISCONTINUED | OUTPATIENT
Start: 2021-06-23 | End: 2021-06-27 | Stop reason: HOSPADM

## 2021-06-23 RX ORDER — SODIUM CHLORIDE 9 MG/ML
INJECTION, SOLUTION INTRAVENOUS
Status: DISCONTINUED | OUTPATIENT
Start: 2021-06-23 | End: 2021-06-23 | Stop reason: HOSPADM

## 2021-06-23 RX ORDER — KETAMINE HYDROCHLORIDE 10 MG/ML
INJECTION, SOLUTION INTRAMUSCULAR; INTRAVENOUS AS NEEDED
Status: DISCONTINUED | OUTPATIENT
Start: 2021-06-23 | End: 2021-06-23 | Stop reason: HOSPADM

## 2021-06-23 RX ORDER — ONDANSETRON 2 MG/ML
INJECTION INTRAMUSCULAR; INTRAVENOUS AS NEEDED
Status: DISCONTINUED | OUTPATIENT
Start: 2021-06-23 | End: 2021-06-23 | Stop reason: HOSPADM

## 2021-06-23 RX ORDER — HYDROMORPHONE HYDROCHLORIDE 1 MG/ML
0.4 INJECTION, SOLUTION INTRAMUSCULAR; INTRAVENOUS; SUBCUTANEOUS
Status: DISCONTINUED | OUTPATIENT
Start: 2021-06-23 | End: 2021-06-23

## 2021-06-23 RX ORDER — LIDOCAINE HYDROCHLORIDE 10 MG/ML
0.1 INJECTION, SOLUTION EPIDURAL; INFILTRATION; INTRACAUDAL; PERINEURAL AS NEEDED
Status: DISCONTINUED | OUTPATIENT
Start: 2021-06-23 | End: 2021-06-23

## 2021-06-23 RX ORDER — HYDROCODONE BITARTRATE AND ACETAMINOPHEN 5; 325 MG/1; MG/1
1 TABLET ORAL AS NEEDED
Status: DISCONTINUED | OUTPATIENT
Start: 2021-06-23 | End: 2021-06-23

## 2021-06-23 RX ORDER — DIPHENHYDRAMINE HYDROCHLORIDE 50 MG/ML
12.5 INJECTION, SOLUTION INTRAMUSCULAR; INTRAVENOUS AS NEEDED
Status: DISCONTINUED | OUTPATIENT
Start: 2021-06-23 | End: 2021-06-23

## 2021-06-23 RX ORDER — METOCLOPRAMIDE HYDROCHLORIDE 5 MG/ML
INJECTION INTRAMUSCULAR; INTRAVENOUS AS NEEDED
Status: DISCONTINUED | OUTPATIENT
Start: 2021-06-23 | End: 2021-06-23 | Stop reason: HOSPADM

## 2021-06-23 RX ORDER — ALBUMIN HUMAN 50 G/1000ML
SOLUTION INTRAVENOUS AS NEEDED
Status: DISCONTINUED | OUTPATIENT
Start: 2021-06-23 | End: 2021-06-23 | Stop reason: HOSPADM

## 2021-06-23 RX ORDER — LIDOCAINE HYDROCHLORIDE 20 MG/ML
INJECTION, SOLUTION EPIDURAL; INFILTRATION; INTRACAUDAL; PERINEURAL AS NEEDED
Status: DISCONTINUED | OUTPATIENT
Start: 2021-06-23 | End: 2021-06-23 | Stop reason: HOSPADM

## 2021-06-23 RX ORDER — ONDANSETRON 4 MG/1
4 TABLET, ORALLY DISINTEGRATING ORAL
Status: DISCONTINUED | OUTPATIENT
Start: 2021-06-23 | End: 2021-06-23

## 2021-06-23 RX ORDER — MIDAZOLAM HYDROCHLORIDE 1 MG/ML
1 INJECTION, SOLUTION INTRAMUSCULAR; INTRAVENOUS AS NEEDED
Status: DISCONTINUED | OUTPATIENT
Start: 2021-06-23 | End: 2021-06-23

## 2021-06-23 RX ORDER — SODIUM CHLORIDE 0.9 % (FLUSH) 0.9 %
5-40 SYRINGE (ML) INJECTION AS NEEDED
Status: DISCONTINUED | OUTPATIENT
Start: 2021-06-23 | End: 2021-06-27 | Stop reason: HOSPADM

## 2021-06-23 RX ORDER — FENTANYL CITRATE 50 UG/ML
50 INJECTION, SOLUTION INTRAMUSCULAR; INTRAVENOUS AS NEEDED
Status: DISCONTINUED | OUTPATIENT
Start: 2021-06-23 | End: 2021-06-23

## 2021-06-23 RX ORDER — DEXAMETHASONE SODIUM PHOSPHATE 4 MG/ML
INJECTION, SOLUTION INTRA-ARTICULAR; INTRALESIONAL; INTRAMUSCULAR; INTRAVENOUS; SOFT TISSUE AS NEEDED
Status: DISCONTINUED | OUTPATIENT
Start: 2021-06-23 | End: 2021-06-23 | Stop reason: HOSPADM

## 2021-06-23 RX ORDER — SODIUM CHLORIDE 0.9 % (FLUSH) 0.9 %
5-40 SYRINGE (ML) INJECTION EVERY 8 HOURS
Status: DISCONTINUED | OUTPATIENT
Start: 2021-06-23 | End: 2021-06-23

## 2021-06-23 RX ORDER — ONDANSETRON 2 MG/ML
4 INJECTION INTRAMUSCULAR; INTRAVENOUS
Status: DISCONTINUED | OUTPATIENT
Start: 2021-06-23 | End: 2021-06-23

## 2021-06-23 RX ORDER — ENOXAPARIN SODIUM 100 MG/ML
30 INJECTION SUBCUTANEOUS DAILY
Status: DISCONTINUED | OUTPATIENT
Start: 2021-06-24 | End: 2021-06-23 | Stop reason: SDUPTHER

## 2021-06-23 RX ADMIN — LINEZOLID 600 MG: 600 TABLET, FILM COATED ORAL at 20:38

## 2021-06-23 RX ADMIN — FERROUS SULFATE TAB 325 MG (65 MG ELEMENTAL FE) 325 MG: 325 (65 FE) TAB at 15:37

## 2021-06-23 RX ADMIN — SUCCINYLCHOLINE CHLORIDE 40 MG: 20 INJECTION, SOLUTION INTRAMUSCULAR; INTRAVENOUS at 10:41

## 2021-06-23 RX ADMIN — ALBUMIN (HUMAN) 250 ML: 12.5 INJECTION, SOLUTION INTRAVENOUS at 11:12

## 2021-06-23 RX ADMIN — MEROPENEM 1 G: 1 INJECTION, POWDER, FOR SOLUTION INTRAVENOUS at 15:39

## 2021-06-23 RX ADMIN — METOCLOPRAMIDE HYDROCHLORIDE 10 MG: 5 INJECTION INTRAMUSCULAR; INTRAVENOUS at 10:44

## 2021-06-23 RX ADMIN — WHITE PETROLATUM,ZINC OXIDE: 57; 17 PASTE TOPICAL at 22:47

## 2021-06-23 RX ADMIN — KETAMINE HYDROCHLORIDE 25 MG: 10 INJECTION INTRAMUSCULAR; INTRAVENOUS at 10:58

## 2021-06-23 RX ADMIN — FENTANYL CITRATE 50 MCG: 50 INJECTION, SOLUTION INTRAMUSCULAR; INTRAVENOUS at 10:40

## 2021-06-23 RX ADMIN — FENTANYL CITRATE 50 MCG: 50 INJECTION, SOLUTION INTRAMUSCULAR; INTRAVENOUS at 10:56

## 2021-06-23 RX ADMIN — LIDOCAINE HYDROCHLORIDE 100 MG: 20 INJECTION, SOLUTION EPIDURAL; INFILTRATION; INTRACAUDAL; PERINEURAL at 10:40

## 2021-06-23 RX ADMIN — GABAPENTIN 100 MG: 100 CAPSULE ORAL at 15:23

## 2021-06-23 RX ADMIN — PANTOPRAZOLE SODIUM 40 MG: 40 TABLET, DELAYED RELEASE ORAL at 15:23

## 2021-06-23 RX ADMIN — TRAMADOL HYDROCHLORIDE 50 MG: 50 TABLET, FILM COATED ORAL at 15:23

## 2021-06-23 RX ADMIN — SODIUM CHLORIDE 75 ML/HR: 9 INJECTION, SOLUTION INTRAVENOUS at 14:58

## 2021-06-23 RX ADMIN — WHITE PETROLATUM,ZINC OXIDE: 57; 17 PASTE TOPICAL at 09:00

## 2021-06-23 RX ADMIN — SODIUM CHLORIDE: 9 INJECTION, SOLUTION INTRAVENOUS at 10:10

## 2021-06-23 RX ADMIN — PROPOFOL 150 MG: 10 INJECTION, EMULSION INTRAVENOUS at 10:40

## 2021-06-23 RX ADMIN — PHENYLEPHRINE HYDROCHLORIDE 40 MCG/MIN: 10 INJECTION INTRAVENOUS at 10:48

## 2021-06-23 RX ADMIN — INSULIN LISPRO 3 UNITS: 100 INJECTION, SOLUTION INTRAVENOUS; SUBCUTANEOUS at 22:30

## 2021-06-23 RX ADMIN — SIMVASTATIN 10 MG: 10 TABLET, FILM COATED ORAL at 22:23

## 2021-06-23 RX ADMIN — HEPARIN SODIUM 5000 UNITS: 5000 INJECTION INTRAVENOUS; SUBCUTANEOUS at 20:38

## 2021-06-23 RX ADMIN — ACETAMINOPHEN 650 MG: 325 TABLET ORAL at 00:42

## 2021-06-23 RX ADMIN — LINEZOLID 600 MG: 600 TABLET, FILM COATED ORAL at 15:35

## 2021-06-23 RX ADMIN — Medication 10 ML: at 22:47

## 2021-06-23 RX ADMIN — WHITE PETROLATUM,ZINC OXIDE: 57; 17 PASTE TOPICAL at 15:02

## 2021-06-23 RX ADMIN — LATANOPROST 1 DROP: 50 SOLUTION OPHTHALMIC at 22:32

## 2021-06-23 RX ADMIN — KETAMINE HYDROCHLORIDE 25 MG: 10 INJECTION INTRAMUSCULAR; INTRAVENOUS at 11:09

## 2021-06-23 RX ADMIN — SODIUM CHLORIDE 75 ML/HR: 9 INJECTION, SOLUTION INTRAVENOUS at 08:59

## 2021-06-23 RX ADMIN — GABAPENTIN 100 MG: 100 CAPSULE ORAL at 22:23

## 2021-06-23 RX ADMIN — DEXAMETHASONE SODIUM PHOSPHATE 4 MG: 4 INJECTION, SOLUTION INTRA-ARTICULAR; INTRALESIONAL; INTRAMUSCULAR; INTRAVENOUS; SOFT TISSUE at 10:44

## 2021-06-23 RX ADMIN — ONDANSETRON 4 MG: 2 INJECTION INTRAMUSCULAR; INTRAVENOUS at 10:44

## 2021-06-23 RX ADMIN — Medication 10 ML: at 15:02

## 2021-06-23 NOTE — PROGRESS NOTES
Bedside shift change report given to Yeison Rust RN (oncoming nurse) by Chris Muniz   (offgoing nurse). Report included the following information SBAR, Kardex and MAR.

## 2021-06-23 NOTE — PROGRESS NOTES
[facility]     Renal Daily Progress Note:     Admission Date: 6/15/2021     Subjective: seen earlier today (around [phone]) in PACU post left AKA. Lethargic but arousable, creatinine down further. Vital signs stable      Review of Systems  Pertinent items are noted in HPI.     Objective:     Visit Vitals  /78 (BP 1 Location: Right upper arm, BP Patient Position: At rest;Semi fowlers)   Pulse 89   Temp 98.7 °F (37.1 °C)   Resp 16   Ht 6' 2\" (1.88 m)   Wt 127 kg (279 lb 15.8 oz)   SpO2 100%   BMI 35.95 kg/m²     Temp (24hrs), Av.9 °F (36.6 °C), Min:97.2 °F (36.2 °C), Max:98.9 °F (37.2 °C)        Intake/Output Summary (Last 24 hours) at 2021 1828  Last data filed at 2021 1149  Gross per 24 hour   Intake 1725 ml   Output 500 ml   Net 1225 ml     Current Facility-Administered Medications   Medication Dose Route Frequency    0.9% sodium chloride infusion 250 mL  250 mL IntraVENous PRN    0.9% sodium chloride infusion 250 mL  250 mL IntraVENous PRN    sodium chloride (NS) flush 5-40 mL  5-40 mL IntraVENous Q8H    sodium chloride (NS) flush 5-40 mL  5-40 mL IntraVENous PRN    albuterol (PROVENTIL HFA, VENTOLIN HFA, PROAIR HFA) inhaler 2 Puff  2 Puff Inhalation Q6H PRN    0.9% sodium chloride infusion 250 mL  250 mL IntraVENous PRN    collagenase (SANTYL) 250 unit/gram ointment   Topical DAILY    linezolid (ZYVOX) tablet 600 mg  600 mg Oral Q12H    meropenem (MERREM) 1 g in sterile water (preservative free) 20 mL IV syringe  1 g IntraVENous Q24H    acetaminophen (TYLENOL) tablet 500 mg  500 mg Oral Q4H PRN    acetaminophen (TYLENOL) tablet 1,000 mg  1,000 mg Oral Q6H PRN    [Held by provider] amLODIPine (NORVASC) tablet 5 mg  5 mg Oral DAILY    ferrous sulfate tablet 325 mg  1 Tablet Oral TID WITH MEALS    gabapentin (NEURONTIN) capsule 100 mg  100 mg Oral TID    [Held by provider] furosemide (LASIX) tablet 40 mg  40 mg Oral DAILY    latanoprost (XALATAN) 0.005 % ophthalmic solution 1 Drop  1 Drop Both Eyes QPM    loperamide (IMODIUM) capsule 2 mg  2 mg Oral Q4H PRN    simvastatin (ZOCOR) tablet 10 mg  10 mg Oral QHS    traMADoL (ULTRAM) tablet 50 mg  50 mg Oral Q6H PRN    ondansetron hcl (ZOFRAN) tablet 4 mg  4 mg Oral Q4H PRN    cyanocobalamin (VITAMIN B12) injection 1,000 mcg  1,000 mcg IntraMUSCular EVERY 2 WEEKS    ferrous sulfate tablet 325 mg  325 mg Oral ACB    pantoprazole (PROTONIX) tablet 40 mg  40 mg Oral DAILY    insulin lispro (HUMALOG) injection   SubCUTAneous AC&HS    glucose chewable tablet 16 g  4 Tablet Oral PRN    dextrose (D50W) injection syrg 12.5-25 g  25-50 mL IntraVENous PRN    glucagon (GLUCAGEN) injection 1 mg  1 mg IntraMUSCular PRN    0.9% sodium chloride infusion  75 mL/hr IntraVENous CONTINUOUS    acetaminophen (TYLENOL) tablet 650 mg  650 mg Oral Q6H PRN    Or    acetaminophen (TYLENOL) suppository 650 mg  650 mg Rectal Q6H PRN    polyethylene glycol (MIRALAX) packet 17 g  17 g Oral DAILY PRN    ondansetron (ZOFRAN ODT) tablet 4 mg  4 mg Oral Q8H PRN    Or    ondansetron (ZOFRAN) injection 4 mg  4 mg IntraVENous Q6H PRN    heparin (porcine) injection 5,000 Units  5,000 Units SubCUTAneous Q8H    zinc oxide-white petrolatum 17-57 % topical paste   Topical TID       Physical Exam:  General appearance: lethargic but arousable, no distress, appears stated age  Lungs: clear to auscultation bilaterally  Heart: regular rate and rhythm  Abdomen: soft, non-tender.  Bowel sounds normal. No masses,  no organomegaly  Extremities: no edema right leg, left AKA stump wrapped  Neurologic: non-focal motor  Data Review:     LABS:  Recent Labs     06/23/21  0758 06/21/21  0709    137   K 4.2 3.9   * 107   CO2 22 22   BUN 78* 78*   CREA 2.71* 3.11*   CA 8.1* 8.0*   ALB 1.2* 1.2*     Recent Labs     06/23/21  0758 06/21/21  0709   WBC 14.7* 12.0*   HGB 8.8* 7.2*   HCT 28.4* 23.3*    169     No results for input(s): TONY, SKINNY, MARTHA, ANGELA in the last 72 hours.    No lab exists for component: PROU  CT lower legs w/o contrast 6-19-21     IMPRESSION  1. Somewhat suboptimal evaluation, as detailed above. 2. Within exam limitations there is a soft tissue ulcer at the posterior left  calcaneus with infection with gas-forming organism versus soft tissue  devitalization. Small amount of gas within the posterior calcaneus is most  likely related to osteomyelitis. 3. Other soft tissue wounds within both legs. No additional findings of acute  osseous abnormality within exam limitations. Based on level of clinical concern,  could consider further evaluation with targeted MRI of specific regions. 4. Diffuse osseous demineralization likely contributes to the somewhat  heterogeneous appearance of the bones. However, there is an indeterminate 10 mm  lucency within the right medial femoral condyle. Please correlate with clinical  history for a known primary malignancy or plasma abnormality. Otherwise short  interval follow-up in 3 months should be considered. Assessment:   Renal Specific Problems  CKd 4 at baseline  CRISTIAN likely a combination of volume depletion and acute inflammatory state--improving  Anemia from chronic disease exacerbated by leg infections  Hypoalbumin  ? Liver neoplasm    PVD, ?  Left heel osteo      Plan:     Obtain/ Order: labs/cultures/radiology/procedures:        Therapeutic:    Cont current IVF and antibiotics    Post  left AKA, we'll probably see continued improvement in renal function with removal of necrotic tissue    Rupal Trammell MD    [phone]

## 2021-06-23 NOTE — PROGRESS NOTES
Infectious Disease Progress Note           Subjective:   Stable, underwent left AKA today by Dr Sharan Amaral. Doing well post-op.  Denies new complaints, son at bedside   Objective:   Physical Exam:     Visit Vitals  /71 (BP 1 Location: Left upper arm, BP Patient Position: At rest)   Pulse 95   Temp 97.9 °F (36.6 °C)   Resp 10   Ht 6' 2\" (1.88 m)   Wt 279 lb 15.8 oz (127 kg)   SpO2 99%   BMI 35.95 kg/m²    O2 Flow Rate (L/min): 3 l/min O2 Device: Nasal cannula    Temp (24hrs), Av.8 °F (36.6 °C), Min:97.2 °F (36.2 °C), Max:98.9 °F (37.2 °C)    701 - 1900  In: 856.7 [I.V.:500]  Out: 500 [Urine:100]   1901 -  0700  In: 1537.3 [P.O.:180; I.V.:489]  Out: 2 [Urine:1]    General: NAD, alert and following commands   HEENT: JEAN, Moist mucosa   Lungs: CTA b/l, no wheeze/rhonchi   Heart: S1S2+, RRR, no murmur  Abdo: Soft, NT, ND, +BS   Exts: Leg dressing in place, malodorous smell from wounds   Skin: No wounds, No rashes or lesions    Data Review:       Recent Days:  Recent Labs     21  0758 21  0709   WBC 14.7* 12.0*   HGB 8.8* 7.2*   HCT 28.4* 23.3*    169     Recent Labs     21  0758 21  0709   BUN 78* 78*   CREA 2.71* 3.11*       Microbiology     Results     Procedure Component Value Units Date/Time    CULTURE, Gonzalo Waldrop STAIN [[id number]]  (Susceptibility) Collected: 21 0615    Order Status: Completed Specimen: Wound Updated: 21 1018     Special Requests: No Special Requests        GRAM STAIN No wbc's seen         2+ Gram Positive Cocci         2+ Gram Positive Rods         Few Gram Negative Rods        Culture result:       Heavy Enterobacter cloacae complex                  Heavy * methicillin resistant staphylococcus aureus * REFER TO [id number] FOR SENSITIVITIES            Few Diphtheroids         CALLED MRSA REPORT TO Mirna Mercado R.N. 1020 2021 BY [facility]    Susceptibility      Enterobacter cloacae complex      CATINA Amikacin ($) Susceptible      Cefazolin ($) Resistant      Cefepime ($$) Susceptible      Cefoxitin Resistant      Ceftazidime ($) Resistant      Ceftriaxone ($) Resistant      Ciprofloxacin ($) Resistant      Gentamicin ($) Susceptible      Levofloxacin ($) Resistant      Meropenem ($$) Susceptible      Piperacillin/Tazobac ($) Resistant      Tobramycin ($) Susceptible      Trimeth/Sulfa Resistant               Linear View                   CULTURE, Daly Azar STAIN [[id number]]  (Susceptibility) Collected: 06/16/21 0615    Order Status: Completed Specimen: Wound Updated: 06/19/21 0806     Special Requests: No Special Requests        GRAM STAIN 1+ WBCs seen         4+ Gram Negative Rods               4+ Gram Positive Cocci in pairs                  4+ apparent Gram Positive Rods           Culture result:       Heavy Enterobacter cloacae complex                  Moderate * methicillin resistant staphylococcus aureus *            Light Diphtheroids       Susceptibility      Enterobacter cloacae complex Staphylococcus aureus Methcillin Resistant      CATINA CATINA      Amikacin ($) Susceptible       Cefazolin ($) Resistant       Cefepime ($$) Susceptible       Cefoxitin Resistant       Ceftazidime ($) Resistant       Ceftriaxone ($) Resistant       Ciprofloxacin ($) Resistant Resistant      Clindamycin ($)  Susceptible      Daptomycin ($$$$$)  Susceptible      Doxycycline ($$)  Susceptible      Erythromycin ($$$$)  Susceptible      Gentamicin ($) Susceptible Susceptible      Levofloxacin ($) Resistant Resistant      Linezolid ($$$$$)  Susceptible      Meropenem ($$) Susceptible       Oxacillin  Resistant      Piperacillin/Tazobac ($) Resistant       Rifampin ($$$$)  Susceptible<sup style='font-weight:normal'>1</sup></font>      Tetracycline  Resistant      Tobramycin ($) Susceptible       Trimeth/Sulfa Resistant Susceptible      Vancomycin ($)  Susceptible                              COVID-19 RAPID TEST [[id number]] Collected: 06/16/21 0216    Order Status: Completed Specimen: Nasopharyngeal Updated: 06/16/21 0408     Specimen source Nasopharyngeal        COVID-19 rapid test Not Detected        Comment: Rapid Abbott ID Now   Rapid NAAT:  The specimen is NEGATIVE for SARS-CoV-2, the novel coronavirus associated with COVID-19. Negative results should be treated as presumptive and, if inconsistent with clinical signs and symptoms or necessary for patient management, should be tested with an alternative molecular assay. Negative results do not preclude SARS-CoV-2 infection and should not be used as the sole basis for patient management decisions. This test has been authorized by the FDA under   an Emergency Use Authorization (EUA) for use by authorized laboratories. Fact sheet for Healthcare Providers: [employer] Fact sheet for Patients: [email or web address]   Methodology: Isothermal Nucleic Acid Amplification         COVID-19 RAPID TEST [[id number]]  (Abnormal) Collected: 06/16/21 0023    Order Status: Completed Specimen: Nasopharyngeal Updated: 06/16/21 0208     Specimen source Nasopharyngeal        COVID-19 rapid test Indeterminate        Comment: Rapid Abbott ID Now   The presence or absence of COVID-19 Viral RNAs cannot be determined. If clinically indicated, please collect a new specimen. This test has been authorized by the FDA under an Emergency Use Authorization (EUA) for use by authorized laboratories. Fact sheet for Healthcare Providers: [email or web address] Fact sheet for Patients: [employer]   Methodology: Isothermal Nucleic Acid Amplification SPOKE TO KEVYN EMNDOZA,   SUGGESTED RECOLLECT                 Diagnostics   CXR Results  (Last 48 hours)    None         Assessment/Plan     1. 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 Cloacae complex R to Zosyn and MRSA isolated from wound Cx       S/p left AKA today (06/23), dressing in place       On linezolid day # 4 and Meropenem day # 5      Monitor AKA stump for post-op infection, Continue current antibiotics for right leg infection     2. Sacral ulcer, unstageable, continue frequent turns and routine wound care     3. Left heel ulcer: Osteo and gas formation noted on CT: S/p Left AKA on 06/23    4. Acute on chronic renal failure: Cr down to 2.71 on todays labs      5.  Poor oral intake, better w improved mentation         Piter Sun MD     6/23/2021

## 2021-06-23 NOTE — ANESTHESIA POSTPROCEDURE EVALUATION
Procedure(s):  LEFT KNEE (AKA) AMPUTATION (URGENT).     general    Anesthesia Post Evaluation      Multimodal analgesia: multimodal analgesia used between 6 hours prior to anesthesia start to PACU discharge  Patient location during evaluation: PACU  Patient participation: complete - patient participated  Level of consciousness: awake  Pain score: 0  Pain management: adequate  Airway patency: patent  Anesthetic complications: no  Cardiovascular status: acceptable  Respiratory status: acceptable  Hydration status: acceptable  Post anesthesia nausea and vomiting:  controlled  Final Post Anesthesia Temperature Assessment:  Normothermia (36.0-37.5 degrees C)      INITIAL Post-op Vital signs:   Vitals Value Taken Time   /61 06/23/21 1215   Temp 36.2 °C (97.2 °F) 06/23/21 1215   Pulse 89 06/23/21 1215   Resp 10 06/23/21 1215   SpO2 100 % 06/23/21 1215

## 2021-06-23 NOTE — PROGRESS NOTES
General Daily Progress Note          Patient Name:   Garen Olszewski       YOB: 1932       Age:  80 y.o. Admit Date: 6/15/2021      Subjective:     Patient is Chandrakant.Gagan y.o. year old male with a past medical history of diabetes, hypertension, hyperlipidemia, renal insufficiency, and anemia history of liver mass who presented to the ER 6/15 due to abnormal labs drawn at the nursing home yesterday. He was found to have worsening kidney function. He was on dialysis previously, but had reportedly stopped dialysis several years ago. He denied chest pain and SOB. CXR showed no acute cardiopulmonary processes. He was admitted for further evaluation and treatment. Patient have a liver mass diagnosis and last admission as per note no further diagnosis procedure treatment was planned with the family by the attending.     Patient's discharge date canceled by vascular surgeon due to complex wounds on bilateral legs and need for CT. 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 Wound cultures showed E. Cloacae resistant to Zosyn and MRSA. Patient is currently off the floor for a left above knee amputation.               Objective:     Visit Vitals  BP (!) 131/57   Pulse 96   Temp 97.5 °F (36.4 °C)   Resp 14   Ht 6' 2\" (1.88 m)   Wt 127 kg (279 lb 15.8 oz)   SpO2 95%   BMI 35.95 kg/m²        Recent Results (from the past 24 hour(s))   GLUCOSE, POC    Collection Time: 06/22/21 11:09 AM   Result Value Ref Range    Glucose (POC) 126 (H) 65 - 117 mg/dL    Performed by Elena Acharya    GLUCOSE, POC    Collection Time: 06/22/21  3:58 PM   Result Value Ref Range    Glucose (POC) 121 (H) 65 - 117 mg/dL    Performed by Liz Maki    Collection Time: 06/22/21  6:00 PM   Result Value Ref Range    Crossmatch Expiration 06/25/2021,7403     ABO/Rh(D) O Positive     Antibody screen Negative     Unit number [id number]     Blood component type RC LR     Unit division 00     Status of unit [address] to transfuse     Crossmatch result Compatible     Unit number [id number]     Blood component type RC LR     Unit division 00     Status of unit Issued,final     TRANSFUSION STATUS Ok to transfuse     Crossmatch result Compatible    GLUCOSE, POC    Collection Time: 06/22/21  7:30 PM   Result Value Ref Range    Glucose (POC) 128 (H) 65 - 117 mg/dL    Performed by Delaney Carter    CBC WITH AUTOMATED DIFF    Collection Time: 06/23/21  7:58 AM   Result Value Ref Range    WBC 14.7 (H) 4.1 - 11.1 K/uL    RBC 2.94 (L) 4.10 - 5.70 M/uL    HGB 8.8 (L) 12.1 - 17.0 g/dL    HCT 28.4 (L) 36.6 - 50.3 %    MCV 96.6 80.0 - 99.0 FL    MCH 29.9 26.0 - 34.0 PG    MCHC 31.0 30.0 - 36.5 g/dL    RDW 18.6 (H) 11.5 - 14.5 %    PLATELET 504 796 - 639 K/uL    MPV 10.6 8.9 - 12.9 FL    NRBC 0.1 (H) 0.0  WBC    ABSOLUTE NRBC 0.02 (H) 0.00 - 0.01 K/uL    NEUTROPHILS 54 32 - 75 %    LYMPHOCYTES 28 12 - 49 %    MONOCYTES 13 5 - 13 %    EOSINOPHILS 3 0 - 7 %    BASOPHILS 0 0 - 1 %    IMMATURE GRANULOCYTES 2 (H) 0 - 0.5 %    ABS. NEUTROPHILS 7.9 1.8 - 8.0 K/UL    ABS. LYMPHOCYTES 4.2 (H) 0.8 - 3.5 K/UL    ABS. MONOCYTES 1.9 (H) 0.0 - 1.0 K/UL    ABS. EOSINOPHILS 0.4 0.0 - 0.4 K/UL    ABS. BASOPHILS 0.1 0.0 - 0.1 K/UL    ABS. IMM.  GRANS. 0.3 (H) 0.00 - 0.04 K/UL    DF AUTOMATED     METABOLIC PANEL, COMPREHENSIVE    Collection Time: 06/23/21  7:58 AM   Result Value Ref Range    Sodium 137 136 - 145 mmol/L    Potassium 4.2 3.5 - 5.1 mmol/L    Chloride 109 (H) 97 - 108 mmol/L    CO2 22 21 - 32 mmol/L    Anion gap 6 5 - 15 mmol/L    Glucose 81 65 - 100 mg/dL    BUN 78 (H) 6 - 20 mg/dL    Creatinine 2.71 (H) 0.70 - 1.30 mg/dL    BUN/Creatinine ratio 29 (H) 12 - 20      GFR est AA 27 (L) >60 ml/min/1.73m2    GFR est non-AA 22 (L) >60 ml/min/1.73m2    Calcium 8.1 (L) 8.5 - 10.1 mg/dL    Bilirubin, total 2.3 (H) 0.2 - 1.0 mg/dL    AST (SGOT) 107 (H) 15 - 37 U/L    ALT (SGPT) 18 12 - 78 U/L    Alk. phosphatase 573 (H) 45 - 117 U/L    Protein, total 6.4 6.4 - 8.2 g/dL    Albumin 1.2 (L) 3.5 - 5.0 g/dL    Globulin 5.2 (H) 2.0 - 4.0 g/dL    A-G Ratio 0.2 (L) 1.1 - 2.2     GLUCOSE, POC    Collection Time: 06/23/21  8:15 AM   Result Value Ref Range    Glucose (POC) 86 65 - 117 mg/dL    Performed by [address] [place] [address], POC    Collection Time: 06/23/21  9:58 AM   Result Value Ref Range    Glucose (POC) 83 65 - 117 mg/dL    Performed by Ella Valdez      [unfilled]      Review of Systems    Constitutional: Negative for chills and fever. HENT: Negative. Eyes: Negative. Respiratory: Negative. Cardiovascular: Negative. Gastrointestinal: Negative for abdominal pain and nausea. Skin: Negative. Neurological: Negative. Physical Exam:      Constitutional: pt is oriented to person, place, and time. HENT:   Head: Normocephalic and atraumatic. Eyes: Pupils are equal, round, and reactive to light. EOM are normal.   Cardiovascular: Normal rate, regular rhythm and normal heart sounds. Pulmonary/Chest: Breath sounds normal. No wheezes. No rales. Exhibits no tenderness. Abdominal: Soft. Bowel sounds are normal. There is no abdominal tenderness. There is no rebound and no guarding. Musculoskeletal: Normal range of motion. Neurological: pt is alert and oriented to person, place, and time. CT LOW EXT BI WO CONT   Final Result   1. Somewhat suboptimal evaluation, as detailed above. 2. Within exam limitations there is a soft tissue ulcer at the posterior left   calcaneus with infection with gas-forming organism versus soft tissue   devitalization. Small amount of gas within the posterior calcaneus is most   likely related to osteomyelitis. 3. Other soft tissue wounds within both legs. No additional findings of acute   osseous abnormality within exam limitations.  Based on level of clinical concern,   could consider further evaluation with targeted MRI of specific regions. 4. Diffuse osseous demineralization likely contributes to the somewhat   heterogeneous appearance of the bones. However, there is an indeterminate 10 mm   lucency within the right medial femoral condyle. Please correlate with clinical   history for a known primary malignancy or plasma abnormality. Otherwise short   interval follow-up in 3 months should be considered. US RETROPERITONEUM COMP   Final Result   Borderline right-sided hydronephrosis. Ascites. XR CHEST PORT   Final Result   No evidence of an acute cardiopulmonary process.            Recent Results (from the past 24 hour(s))   GLUCOSE, POC    Collection Time: 06/22/21 11:09 AM   Result Value Ref Range    Glucose (POC) 126 (H) 65 - 117 mg/dL    Performed by Jaja Foote, POC    Collection Time: 06/22/21  3:58 PM   Result Value Ref Range    Glucose (POC) 121 (H) 65 - 117 mg/dL    Performed by Maira Alarcon    TYPE & SCREEN    Collection Time: 06/22/21  6:00 PM   Result Value Ref Range    Crossmatch Expiration 06/25/2021,2359     ABO/Rh(D) O Positive     Antibody screen Negative     Unit number [id number]     Blood component type  LR     Unit division 00     Status of unit [address] to transfuse     Crossmatch result Compatible     Unit number [id number]     Blood component type [facility]     Unit division 00     Status of unit Issued,final     TRANSFUSION STATUS Ok to transfuse     Crossmatch result Compatible    GLUCOSE, POC    Collection Time: 06/22/21  7:30 PM   Result Value Ref Range    Glucose (POC) 128 (H) 65 - 117 mg/dL    Performed by Mikayla Reed    CBC WITH AUTOMATED DIFF    Collection Time: 06/23/21  7:58 AM   Result Value Ref Range    WBC 14.7 (H) 4.1 - 11.1 K/uL    RBC 2.94 (L) 4.10 - 5.70 M/uL    HGB 8.8 (L) 12.1 - 17.0 g/dL    HCT 28.4 (L) 36.6 - 50.3 %    MCV 96.6 80.0 - 99.0 FL    MCH 29.9 26.0 - 34.0 PG    MCHC 31.0 30.0 - 36.5 g/dL    RDW 18.6 (H) 11.5 - 14.5 % PLATELET 741 644 - 615 K/uL    MPV 10.6 8.9 - 12.9 FL    NRBC 0.1 (H) 0.0  WBC    ABSOLUTE NRBC 0.02 (H) 0.00 - 0.01 K/uL    NEUTROPHILS 54 32 - 75 %    LYMPHOCYTES 28 12 - 49 %    MONOCYTES 13 5 - 13 %    EOSINOPHILS 3 0 - 7 %    BASOPHILS 0 0 - 1 %    IMMATURE GRANULOCYTES 2 (H) 0 - 0.5 %    ABS. NEUTROPHILS 7.9 1.8 - 8.0 K/UL    ABS. LYMPHOCYTES 4.2 (H) 0.8 - 3.5 K/UL    ABS. MONOCYTES 1.9 (H) 0.0 - 1.0 K/UL    ABS. EOSINOPHILS 0.4 0.0 - 0.4 K/UL    ABS. BASOPHILS 0.1 0.0 - 0.1 K/UL    ABS. IMM. GRANS. 0.3 (H) 0.00 - 0.04 K/UL    DF AUTOMATED     METABOLIC PANEL, COMPREHENSIVE    Collection Time: 06/23/21  7:58 AM   Result Value Ref Range    Sodium 137 136 - 145 mmol/L    Potassium 4.2 3.5 - 5.1 mmol/L    Chloride 109 (H) 97 - 108 mmol/L    CO2 22 21 - 32 mmol/L    Anion gap 6 5 - 15 mmol/L    Glucose 81 65 - 100 mg/dL    BUN 78 (H) 6 - 20 mg/dL    Creatinine 2.71 (H) 0.70 - 1.30 mg/dL    BUN/Creatinine ratio 29 (H) 12 - 20      GFR est AA 27 (L) >60 ml/min/1.73m2    GFR est non-AA 22 (L) >60 ml/min/1.73m2    Calcium 8.1 (L) 8.5 - 10.1 mg/dL    Bilirubin, total 2.3 (H) 0.2 - 1.0 mg/dL    AST (SGOT) 107 (H) 15 - 37 U/L    ALT (SGPT) 18 12 - 78 U/L    Alk.  phosphatase 573 (H) 45 - 117 U/L    Protein, total 6.4 6.4 - 8.2 g/dL    Albumin 1.2 (L) 3.5 - 5.0 g/dL    Globulin 5.2 (H) 2.0 - 4.0 g/dL    A-G Ratio 0.2 (L) 1.1 - 2.2     GLUCOSE, POC    Collection Time: 06/23/21  8:15 AM   Result Value Ref Range    Glucose (POC) 86 65 - 117 mg/dL    Performed by [address] [place] [address], POC    Collection Time: 06/23/21  9:58 AM   Result Value Ref Range    Glucose (POC) 83 65 - 117 mg/dL    Performed by [facility]        Results     Procedure Component Value Units Date/Time    CULTURE, Aaliyah Ear STAIN [[id number]]  (Susceptibility) Collected: 06/16/21 0615    Order Status: Completed Specimen: Wound Updated: 06/19/21 1018     Special Requests: No Special Requests        GRAM STAIN No wbc's seen         2+ Gram Positive Cocci         2+ Gram Positive Rods         Few Gram Negative Rods        Culture result:       Heavy Enterobacter cloacae complex                  Heavy * methicillin resistant staphylococcus aureus * REFER TO [id number] FOR SENSITIVITIES            Few Diphtheroids         CALLED MRSA REPORT TO Kash Hassan R.N. 1020 06/19/2021 BY [facility]    Susceptibility      Enterobacter cloacae complex      CATINA      Amikacin ($) Susceptible      Cefazolin ($) Resistant      Cefepime ($$) Susceptible      Cefoxitin Resistant      Ceftazidime ($) Resistant      Ceftriaxone ($) Resistant      Ciprofloxacin ($) Resistant      Gentamicin ($) Susceptible      Levofloxacin ($) Resistant      Meropenem ($$) Susceptible      Piperacillin/Tazobac ($) Resistant      Tobramycin ($) Susceptible      Trimeth/Sulfa Resistant               Linear View                   CULTURE, Verline Greulich STAIN [[id number]]  (Susceptibility) Collected: 06/16/21 0615    Order Status: Completed Specimen: Wound Updated: 06/19/21 0806     Special Requests: No Special Requests        GRAM STAIN 1+ WBCs seen         4+ Gram Negative Rods               4+ Gram Positive Cocci in pairs                  4+ apparent Gram Positive Rods           Culture result:       Heavy Enterobacter cloacae complex                  Moderate * methicillin resistant staphylococcus aureus *            Light Diphtheroids       Susceptibility      Enterobacter cloacae complex Staphylococcus aureus Methcillin Resistant      CATINA CATINA      Amikacin ($) Susceptible       Cefazolin ($) Resistant       Cefepime ($$) Susceptible       Cefoxitin Resistant       Ceftazidime ($) Resistant       Ceftriaxone ($) Resistant       Ciprofloxacin ($) Resistant Resistant      Clindamycin ($)  Susceptible      Daptomycin ($$$$$)  Susceptible      Doxycycline ($$)  Susceptible      Erythromycin ($$$$)  Susceptible      Gentamicin ($) Susceptible Susceptible      Levofloxacin ($) Resistant Resistant      Linezolid ($$$$$)  Susceptible      Meropenem ($$) Susceptible       Oxacillin  Resistant      Piperacillin/Tazobac ($) Resistant       Rifampin ($$$$)  Susceptible  [1]       Tetracycline  Resistant      Tobramycin ($) Susceptible       Trimeth/Sulfa Resistant Susceptible      Vancomycin ($)  Susceptible                [1]  Rifampin is not to be used for mono-therapy. Linear View                   COVID-19 RAPID TEST [[id number]] Collected: 06/16/21 0216    Order Status: Completed Specimen: Nasopharyngeal Updated: 06/16/21 0408     Specimen source Nasopharyngeal        COVID-19 rapid test Not Detected        Comment: Rapid Abbott ID Now   Rapid NAAT:  The specimen is NEGATIVE for SARS-CoV-2, the novel coronavirus associated with COVID-19. Negative results should be treated as presumptive and, if inconsistent with clinical signs and symptoms or necessary for patient management, should be tested with an alternative molecular assay. Negative results do not preclude SARS-CoV-2 infection and should not be used as the sole basis for patient management decisions. This test has been authorized by the FDA under   an Emergency Use Authorization (EUA) for use by authorized laboratories. Fact sheet for Healthcare Providers: ConventionUpdate.co.nz Fact sheet for Patients: ConventionUpdate.co.nz   Methodology: Isothermal Nucleic Acid Amplification         COVID-19 RAPID TEST [[id number]]  (Abnormal) Collected: 06/16/21 0023    Order Status: Completed Specimen: Nasopharyngeal Updated: 06/16/21 0208     Specimen source Nasopharyngeal        COVID-19 rapid test Indeterminate        Comment: Rapid Abbott ID Now   The presence or absence of COVID-19 Viral RNAs cannot be determined. If clinically indicated, please collect a new specimen. This test has been authorized by the FDA under an Emergency Use Authorization (EUA) for use by authorized laboratories.    Fact sheet for Healthcare Providers: ConventionUpdate.co.nz Fact sheet for Patients: ConventionUpdate.co.nz   Methodology: Isothermal Nucleic Acid Amplification SPOKE TO KEVYN MENDOZA,   SUGGESTED RECOLLECT                 Labs:     Recent Labs     06/23/21 0758 06/21/21  0709   WBC 14.7* 12.0*   HGB 8.8* 7.2*   HCT 28.4* 23.3*    169     Recent Labs     06/23/21  0758 06/21/21  0709 06/20/21  1110    137 138   K 4.2 3.9 3.9   * 107 106   CO2 22 22 23   BUN 78* 78* 75*   CREA 2.71* 3.11* 3.15*   GLU 81 78 105*   CA 8.1* 8.0* 8.1*     Recent Labs     06/23/21 0758 06/21/21  0709 06/20/21  1110   ALT 18 16 18   * 510* 514*   TBILI 2.3* 2.5* 2.8*   TP 6.4 6.3* 6.6   ALB 1.2* 1.2* 1.3*   GLOB 5.2* 5.1* 5.3*     No results for input(s): INR, PTP, APTT, INREXT in the last 72 hours. No results for input(s): FE, TIBC, PSAT, FERR in the last 72 hours. No results found for: FOL, RBCF   No results for input(s): PH, PCO2, PO2 in the last 72 hours. No results for input(s): CPK, CKNDX, TROIQ in the last 72 hours.     No lab exists for component: CPKMB  No results found for: CHOL, CHOLX, CHLST, CHOLV, HDL, HDLP, LDL, LDLC, DLDLP, TGLX, TRIGL, TRIGP, CHHD, CHHDX  Lab Results   Component Value Date/Time    Glucose (POC) 83 06/23/2021 09:58 AM    Glucose (POC) 86 06/23/2021 08:15 AM    Glucose (POC) 128 (H) 06/22/2021 07:30 PM    Glucose (POC) 121 (H) 06/22/2021 03:58 PM    Glucose (POC) 126 (H) 06/22/2021 11:09 AM     Lab Results   Component Value Date/Time    Color Yaritza 04/28/2021 03:00 PM    Appearance Turbid (A) 04/28/2021 03:00 PM    Specific gravity 1.014 04/28/2021 03:00 PM    pH (UA) 5.0 04/28/2021 03:00 PM    Protein 100 (A) 04/28/2021 03:00 PM    Glucose Negative 04/28/2021 03:00 PM    Ketone Negative 04/28/2021 03:00 PM    Bilirubin Negative 04/28/2021 03:00 PM    Urobilinogen 4.0 (H) 04/28/2021 03:00 PM    Nitrites Negative 04/28/2021 03:00 PM    Leukocyte Esterase Negative 04/28/2021 03:00 PM    Bacteria Negative 04/28/2021 03:00 PM    WBC 0-4 04/28/2021 03:00 PM    RBC 0-5 04/28/2021 03:00 PM         Assessment:        Acute on chronic kidney failure-trending down  Diabetes  Hypertension   Hyperlipidemia  Anemia- 8.8  Leukocytosis  Chronic leg wounds-E.  Cloacae resistant to Zosyn and MRSA isolated   Liver mass  Hypotension-improved   Hypokalemia  Right moderate hydronephrosis  Left heel ulcer-gas-forming organisms and osteomyelitis       Plan:        Continue on Linezolid and Meropenem   Continue frequent turns   Monitor H&H  Repeat labs in AM    Patient getting left AKA today          Current Facility-Administered Medications:     albuterol (PROVENTIL HFA, VENTOLIN HFA, PROAIR HFA) inhaler 2 Puff, 2 Puff, Inhalation, Q6H PRN, Dinora Ramirez MD    0.9% sodium chloride infusion 250 mL, 250 mL, IntraVENous, PRN, Dilcia Kidd MD    collagenase (SANTYL) 250 unit/gram ointment, , Topical, DAILY, Dilcia Kidd MD, Given at 06/22/21 3177    linezolid (ZYVOX) tablet 600 mg, 600 mg, Oral, Q12H, Sascha Araiza MD, 600 mg at 06/22/21 2012    meropenem (MERREM) 1 g in sterile water (preservative free) 20 mL IV syringe, 1 g, IntraVENous, Q24H, Sascha Araiza MD, 1 g at 06/22/21 1723    acetaminophen (TYLENOL) tablet 500 mg, 500 mg, Oral, Q4H PRN, Dinora Ramirez MD    acetaminophen (TYLENOL) tablet 1,000 mg, 1,000 mg, Oral, Q6H PRN, Dinora Ramirez MD, 1,000 mg at 06/20/21 2251    [Held by provider] amLODIPine (NORVASC) tablet 5 mg, 5 mg, Oral, DAILY, Dinora Ramirez MD, 5 mg at 06/17/21 1123    ferrous sulfate tablet 325 mg, 1 Tablet, Oral, TID WITH MEALS, Dinora Ramirez MD, 325 mg at 06/22/21 1723    gabapentin (NEURONTIN) capsule 100 mg, 100 mg, Oral, TID, Dinora Ramirez MD, 100 mg at 06/22/21 2012    [Held by provider] furosemide (LASIX) tablet 40 mg, 40 mg, Oral, DAILY, Dinora Ramirez MD, 40 mg at 06/16/21 1107    latanoprost (XALATAN) 0.005 % ophthalmic solution 1 Drop, 1 Drop, Both Eyes, QPM, Dinora Ramirez MD, 1 Drop at 06/22/21 1823    loperamide (IMODIUM) capsule 2 mg, 2 mg, Oral, Q4H PRN, Maurice Ramirez MD    simvastatin (ZOCOR) tablet 10 mg, 10 mg, Oral, QHS, Dinora Ramirez MD, 10 mg at 06/22/21 2012    traMADoL (ULTRAM) tablet 50 mg, 50 mg, Oral, Q6H PRN, Dinora Ramirez MD, 50 mg at 06/22/21 2049    ondansetron hcl (ZOFRAN) tablet 4 mg, 4 mg, Oral, Q4H PRN, Maurice Ramirez MD    cyanocobalamin (VITAMIN B12) injection 1,000 mcg, 1,000 mcg, IntraMUSCular, EVERY 2 WEEKS, Dinora Ramirez MD, 1,000 mcg at 06/16/21 1731    ferrous sulfate tablet 325 mg, 325 mg, Oral, ACB, Dinora Ramirez MD, 325 mg at 06/20/21 0855    pantoprazole (PROTONIX) tablet 40 mg, 40 mg, Oral, DAILY, Dinora Ramirez MD, 40 mg at 06/22/21 0841    insulin lispro (HUMALOG) injection, , SubCUTAneous, AC&HS, Dinora Ramirez MD, 2 Units at 06/18/21 2233    glucose chewable tablet 16 g, 4 Tablet, Oral, PRN, Maurice Ramirez MD    dextrose (D50W) injection syrg 12.5-25 g, 25-50 mL, IntraVENous, PRN, Maurice Ramirez MD    glucagon (GLUCAGEN) injection 1 mg, 1 mg, IntraMUSCular, PRN, Maurice Ramirez MD    0.9% sodium chloride infusion, 75 mL/hr, IntraVENous, CONTINUOUS, Dinora Ramirez MD, Last Rate: 75 mL/hr at 06/23/21 0859, 75 mL/hr at 06/23/21 0859    acetaminophen (TYLENOL) tablet 650 mg, 650 mg, Oral, Q6H PRN, 650 mg at 06/23/21 0042 **OR** acetaminophen (TYLENOL) suppository 650 mg, 650 mg, Rectal, Q6H PRN, Maurice Ramirez MD    polyethylene glycol (MIRALAX) packet 17 g, 17 g, Oral, DAILY PRN, Maurice Ramirez MD    ondansetron (ZOFRAN ODT) tablet 4 mg, 4 mg, Oral, Q8H PRN **OR** ondansetron (ZOFRAN) injection 4 mg, 4 mg, IntraVENous, Q6H PRN, Dinora Ramirez MD    heparin (porcine) injection 5,000 Units, 5,000 Units, SubCUTAneous, Q8H, Dinora Ramirez MD, 5,000 Units at 06/22/21 2012    zinc oxide-white petrolatum 17-57 % topical paste, , Topical, TID, Dinora Ramirez MD, Given at 06/23/21 0900    Facility-Administered Medications Ordered in Other Encounters:     lidocaine (PF) (XYLOCAINE) 20 mg/mL (2 %) injection, , IntraVENous, PRN, Joy Velez CRNA, 100 mg at 06/23/21 1040    fentaNYL citrate (PF) injection, , IntraVENous, PRN, Joy Velez CRNA, 50 mcg at 06/23/21 1056    propofoL (DIPRIVAN) 10 mg/mL injection, , IntraVENous, PRN, Joy Velez CRNA, 150 mg at 06/23/21 1040    dexamethasone (DECADRON) 4 mg/mL injection, , IntraVENous, PRN, Joy Velez CRNA, 4 mg at 06/23/21 1044    ondansetron (ZOFRAN) injection, , IntraVENous, PRN, Joy Velez CRNA, 4 mg at 06/23/21 1044    metoclopramide HCl (REGLAN) injection, , IntraVENous, PRN, Joy Velez CRNA, 10 mg at 06/23/21 1044    PHENYLephrine (CARMEN-SYNEPHRINE) 10 mg in dextrose 5% 100 mL infusion, , IntraVENous, CONTINUOUS, Joy Velez CRNA, Stopped at 06/23/21 1056    0.9% sodium chloride infusion, , IntraVENous, CONTINUOUS, Joy Velez CRNA, New Bag at 06/23/21 1010    ketamine (KETALAR) 10 mg/mL injection, , IntraVENous, PRN, Joy Velez CRNA, 25 mg at 06/23/21 1058

## 2021-06-23 NOTE — PROGRESS NOTES
13:00 return to room from surgery. Son at bedside. Patient denies pain. Left AKA noted. Dressing clean dry and intact. Leg elevated on pillow, bel linen changed, gown changed due to blood on them. Tolerating chantelle dry soda brought in by family.

## 2021-06-23 NOTE — PROGRESS NOTES
Visit attempted for patient in the [address] [place] [address] Rd med/oncology unit for initial assessment. Medical staff was providing care to the patient during the visit. There were no visitors present. I provided silent support and prayer. Chaplains will follow up if further referrals are requested. Chaplain Mona Hogan M.Div.    can be reached by calling the  at General acute hospital  [phone]

## 2021-06-23 NOTE — PROGRESS NOTES
Comprehensive Nutrition Assessment    Type and Reason for Visit: Reassess (Goal)    Nutrition Recommendations/Plan:   Continue ensure HP TIB (320kcal, 32g pro)  Continue Jesus Alberto TIB    Obtain updated wt after AKA    Nutrition Assessment:  Admitted for abnormal lab results. Pt with worsening kidney function, Now CKD IV- stopped HD years ago. 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 Previous visit, pt provided diet hx, but questions his reliability. Pt was eating 3 meals/day PTA but unable to say how much of those meals he was eating. RD saw B at bedside- ~25% consumed on 6/16. Pt states he consumed >75% of B, L, ~25% D, and 100% ONS on 6/22. Pt eating well when nsg helps. Labs: H/H 8.8/28.4, Na 137, BUN 78, Creat 2.71,Glu 83, Ca 8.1, , Alk Phos 573, Bili 2.3. Meds: IVF, amlodipine, gabapentin, heparin, merrem, PPI. Malnutrition Assessment:  Malnutrition Status:  Mild malnutrition    Context:  Acute illness     Findings of the 6 clinical characteristics of malnutrition:   Energy Intake:  7 - 50% or less of est energy requirements for 5 or more days  Weight Loss:  No significant weight loss     Body Fat Loss:  No significant body fat loss,     Muscle Mass Loss:  No significant muscle mass loss,    Fluid Accumulation:  No significant fluid accumulation,      Estimated Daily Nutrient Needs:  Energy (kcal): 2400kcal (25kcal/kg); Weight Used for Energy Requirements: Adjusted (96.3kg)  Protein (g): 125g (1.3/kg); Weight Used for Protein Requirements: Adjusted (96.3kg)  Fluid (ml/day): 1500ml; Method Used for Fluid Requirements: Other (comment) (adult minimum)   *estimated needs prior to AKA- wt after amputation not obtained    Nutrition Related Findings:  NFPE not performed- appears nourished. BM within the past 2-3 days per pt. Denies c/s issues, good dentition. No N/V/D/C. Nonpitting generalized edema, LUE and RUE 2+.       Wounds:    Stage II (buttocks, gluteal cleft)       Current Nutrition Therapies:  ADULT ORAL NUTRITION SUPPLEMENT Breakfast, Lunch; Low Calorie/High Protein  ADULT ORAL NUTRITION SUPPLEMENT Breakfast, Lunch; Wound Healing Supplement  ADULT DIET Dysphagia - Minced & Moist    Anthropometric Measures:  · Height:  6' 2\" (188 cm)  · Current Body Wt:  127 kg (279 lb 15.8 oz)   · Usual Body Wt:   (TREVOR)     · Ideal Body Wt:  190 lbs:  147.4 %   · Adjusted Body Weight:   ; Weight Adjustment for:  (Pt with AKA, but no updated weight taken after amputation)    · BMI Category:  Obese class 2 (BMI 35.0-39.9)     *wt after amputation not obtained  Wt Readings from Last 3 Encounters:   06/22/21 127 kg (279 lb 15.8 oz)   04/28/21 109.7 kg (241 lb 13.5 oz)     Nutrition Diagnosis:   · Inadequate oral intake related to increased demand for energy/nutrients as evidenced by wounds    Nutrition Interventions:   Food and/or Nutrient Delivery: Continue current diet, Continue oral nutrition supplement  Nutrition Education and Counseling: No recommendations at this time  Coordination of Nutrition Care: Continue to monitor while inpatient    Goals:  Pt to meet >75% of EEN within 7 days. Nutrition Monitoring and Evaluation:   Behavioral-Environmental Outcomes: None identified  Food/Nutrient Intake Outcomes: Food and nutrient intake, Supplement intake  Physical Signs/Symptoms Outcomes: Meal time behavior, Biochemical data    Discharge Planning:     Too soon to determine     Electronically signed by Gagan Nieves RD on 6/23/2021 at 3:14 PM    Contact: [phone]

## 2021-06-23 NOTE — PROGRESS NOTES
2 Person skin assessment completed with Trev Rojas, moisture to bilateral groin. Left AKA, dressing intact with some bleeding. ulcers to anterior and posterior  lower  Right leg.

## 2021-06-23 NOTE — PROGRESS NOTES
OT tx attempted at [id number] however pt off the floor in OR and per nursing pt having amputation today. Will need new OT eval order post surgery when pt is medically appropriate. Thank you.

## 2021-06-23 NOTE — PROGRESS NOTES
General Daily Progress Note          Patient Name:   Bradly Pham       YOB: 1932       Age:  80 y.o. Admit Date: 6/15/2021      Subjective:     Patient is Lactantius.Feather y.o. year old male with a past medical history of diabetes, hypertension, hyperlipidemia, renal insufficiency, and anemia history of liver mass who presented to the ER 6/15 due to abnormal labs drawn at the nursing home yesterday. He was found to have worsening kidney function. He was on dialysis previously, but had reportedly stopped dialysis several years ago. He denied chest pain and SOB. CXR showed no acute cardiopulmonary processes. He was admitted for further evaluation and treatment. Patient have a liver mass diagnosis and last admission as per note no further diagnosis procedure treatment was planned with the family by the attending.     Patient's discharge date canceled by vascular surgeon due to complex wounds on bilateral legs and need for CT. 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 Wound cultures showed E. Cloacae resistant to Zosyn and MRSA.      Going for surgery today             Objective:     Visit Vitals  BP (!) (P) 125/56 (BP 1 Location: Right upper arm, BP Patient Position: At rest)   Pulse (P) 90   Temp 97.3 °F (36.3 °C)   Resp (P) 12   Ht 6' 2\" (1.88 m)   Wt 127 kg (279 lb 15.8 oz)   SpO2 (P) 93%   BMI 35.95 kg/m²        Recent Results (from the past 24 hour(s))   GLUCOSE, POC    Collection Time: 06/22/21  3:58 PM   Result Value Ref Range    Glucose (POC) 121 (H) 65 - 117 mg/dL    Performed by Liz Maki    Collection Time: 06/22/21  6:00 PM   Result Value Ref Range    Crossmatch Expiration 06/25/2021,2358     ABO/Rh(D) O Positive     Antibody screen Negative     Unit number [id number]     Blood component type RC LR     Unit division 00     Status of unit [address] to transfuse     Crossmatch result Compatible     Unit number [id number]     Blood component type RC LR     Unit division 00     Status of unit Issued,final     TRANSFUSION STATUS Ok to transfuse     Crossmatch result Compatible     Unit number [id number]     Blood component type RC LR     Unit division 00     Status of unit [address] to transfuse     Crossmatch result Compatible     Unit number [id number]     Blood component type RC LR     Unit division 00     Status of unit [address] to transfuse     Crossmatch result Compatible     Unit number [id number]     Blood component type RC LR,2     Unit division 00     Status of unit Allocated     TRANSFUSION STATUS Ok to transfuse     Crossmatch result Compatible     Unit number [id number]     Blood component type RC LR     Unit division 00     Status of unit [place] to transfuse     Crossmatch result Compatible    GLUCOSE, POC    Collection Time: 06/22/21  7:30 PM   Result Value Ref Range    Glucose (POC) 128 (H) 65 - 117 mg/dL    Performed by Cristina Beltre    CBC WITH AUTOMATED DIFF    Collection Time: 06/23/21  7:58 AM   Result Value Ref Range    WBC 14.7 (H) 4.1 - 11.1 K/uL    RBC 2.94 (L) 4.10 - 5.70 M/uL    HGB 8.8 (L) 12.1 - 17.0 g/dL    HCT 28.4 (L) 36.6 - 50.3 %    MCV 96.6 80.0 - 99.0 FL    MCH 29.9 26.0 - 34.0 PG    MCHC 31.0 30.0 - 36.5 g/dL    RDW 18.6 (H) 11.5 - 14.5 %    PLATELET 441 756 - 653 K/uL    MPV 10.6 8.9 - 12.9 FL    NRBC 0.1 (H) 0.0  WBC    ABSOLUTE NRBC 0.02 (H) 0.00 - 0.01 K/uL    NEUTROPHILS 54 32 - 75 %    LYMPHOCYTES 28 12 - 49 %    MONOCYTES 13 5 - 13 %    EOSINOPHILS 3 0 - 7 %    BASOPHILS 0 0 - 1 %    IMMATURE GRANULOCYTES 2 (H) 0 - 0.5 %    ABS. NEUTROPHILS 7.9 1.8 - 8.0 K/UL    ABS. LYMPHOCYTES 4.2 (H) 0.8 - 3.5 K/UL    ABS. MONOCYTES 1.9 (H) 0.0 - 1.0 K/UL    ABS. EOSINOPHILS 0.4 0.0 - 0.4 K/UL    ABS. BASOPHILS 0.1 0.0 - 0.1 K/UL    ABS. IMM.  GRANS. 0.3 (H) 0.00 - 0.04 K/UL    DF AUTOMATED METABOLIC PANEL, COMPREHENSIVE    Collection Time: 06/23/21  7:58 AM   Result Value Ref Range    Sodium 137 136 - 145 mmol/L    Potassium 4.2 3.5 - 5.1 mmol/L    Chloride 109 (H) 97 - 108 mmol/L    CO2 22 21 - 32 mmol/L    Anion gap 6 5 - 15 mmol/L    Glucose 81 65 - 100 mg/dL    BUN 78 (H) 6 - 20 mg/dL    Creatinine 2.71 (H) 0.70 - 1.30 mg/dL    BUN/Creatinine ratio 29 (H) 12 - 20      GFR est AA 27 (L) >60 ml/min/1.73m2    GFR est non-AA 22 (L) >60 ml/min/1.73m2    Calcium 8.1 (L) 8.5 - 10.1 mg/dL    Bilirubin, total 2.3 (H) 0.2 - 1.0 mg/dL    AST (SGOT) 107 (H) 15 - 37 U/L    ALT (SGPT) 18 12 - 78 U/L    Alk. phosphatase 573 (H) 45 - 117 U/L    Protein, total 6.4 6.4 - 8.2 g/dL    Albumin 1.2 (L) 3.5 - 5.0 g/dL    Globulin 5.2 (H) 2.0 - 4.0 g/dL    A-G Ratio 0.2 (L) 1.1 - 2.2     GLUCOSE, POC    Collection Time: 06/23/21  8:15 AM   Result Value Ref Range    Glucose (POC) 86 65 - 117 mg/dL    Performed by [address] [place] [address], POC    Collection Time: 06/23/21  9:58 AM   Result Value Ref Range    Glucose (POC) 83 65 - 117 mg/dL    Performed by Miri Cao      [unfilled]      Review of Systems    Constitutional: Negative for chills and fever. HENT: Negative. Eyes: Negative. Respiratory: Negative. Cardiovascular: Negative. Gastrointestinal: Negative for abdominal pain and nausea. Skin: Negative. Neurological: Negative. Physical Exam:      Constitutional: pt is oriented to person, place, and time. HENT:   Head: Normocephalic and atraumatic. Eyes: Pupils are equal, round, and reactive to light. EOM are normal.   Cardiovascular: Normal rate, regular rhythm and normal heart sounds. Pulmonary/Chest: Breath sounds normal. No wheezes. No rales. Exhibits no tenderness. Abdominal: Soft. Bowel sounds are normal. There is no abdominal tenderness. There is no rebound and no guarding. Musculoskeletal: Normal range of motion.    Neurological: pt is alert and oriented to person, place, and time. CT LOW EXT BI WO CONT   Final Result   1. Somewhat suboptimal evaluation, as detailed above. 2. Within exam limitations there is a soft tissue ulcer at the posterior left   calcaneus with infection with gas-forming organism versus soft tissue   devitalization. Small amount of gas within the posterior calcaneus is most   likely related to osteomyelitis. 3. Other soft tissue wounds within both legs. No additional findings of acute   osseous abnormality within exam limitations. Based on level of clinical concern,   could consider further evaluation with targeted MRI of specific regions. 4. Diffuse osseous demineralization likely contributes to the somewhat   heterogeneous appearance of the bones. However, there is an indeterminate 10 mm   lucency within the right medial femoral condyle. Please correlate with clinical   history for a known primary malignancy or plasma abnormality. Otherwise short   interval follow-up in 3 months should be considered. US RETROPERITONEUM COMP   Final Result   Borderline right-sided hydronephrosis. Ascites. XR CHEST PORT   Final Result   No evidence of an acute cardiopulmonary process.            Recent Results (from the past 24 hour(s))   GLUCOSE, POC    Collection Time: 06/22/21  3:58 PM   Result Value Ref Range    Glucose (POC) 121 (H) 65 - 117 mg/dL    Performed by Liz     Collection Time: 06/22/21  6:00 PM   Result Value Ref Range    Crossmatch Expiration 06/25/2021,2359     ABO/Rh(D) O Positive     Antibody screen Negative     Unit number [id number]     Blood component type [facility]     Unit division 00     Status of unit [address] to transfuse     Crossmatch result Compatible     Unit number [id number]     Blood component type [facility]     Unit division 00     Status of unit [address] to transfuse     Crossmatch result Compatible     Unit number [id number]     Blood component type RC LR     Unit division 00     Status of unit [address] to transfuse     Crossmatch result Compatible     Unit number [id number]     Blood component type RC LR     Unit division 00     Status of unit [address] to transfuse     Crossmatch result Compatible     Unit number [id number]     Blood component type RC LR,2     Unit division 00     Status of unit Allocated     TRANSFUSION STATUS Ok to transfuse     Crossmatch result Compatible     Unit number [id number]     Blood component type RC LR     Unit division 00     Status of unit Allocated     TRANSFUSION STATUS Ok to transfuse     Crossmatch result Compatible    GLUCOSE, POC    Collection Time: 06/22/21  7:30 PM   Result Value Ref Range    Glucose (POC) 128 (H) 65 - 117 mg/dL    Performed by Valora Ace    CBC WITH AUTOMATED DIFF    Collection Time: 06/23/21  7:58 AM   Result Value Ref Range    WBC 14.7 (H) 4.1 - 11.1 K/uL    RBC 2.94 (L) 4.10 - 5.70 M/uL    HGB 8.8 (L) 12.1 - 17.0 g/dL    HCT 28.4 (L) 36.6 - 50.3 %    MCV 96.6 80.0 - 99.0 FL    MCH 29.9 26.0 - 34.0 PG    MCHC 31.0 30.0 - 36.5 g/dL    RDW 18.6 (H) 11.5 - 14.5 %    PLATELET 768 696 - 396 K/uL    MPV 10.6 8.9 - 12.9 FL    NRBC 0.1 (H) 0.0  WBC    ABSOLUTE NRBC 0.02 (H) 0.00 - 0.01 K/uL    NEUTROPHILS 54 32 - 75 %    LYMPHOCYTES 28 12 - 49 %    MONOCYTES 13 5 - 13 %    EOSINOPHILS 3 0 - 7 %    BASOPHILS 0 0 - 1 %    IMMATURE GRANULOCYTES 2 (H) 0 - 0.5 %    ABS. NEUTROPHILS 7.9 1.8 - 8.0 K/UL    ABS. LYMPHOCYTES 4.2 (H) 0.8 - 3.5 K/UL    ABS. MONOCYTES 1.9 (H) 0.0 - 1.0 K/UL    ABS. EOSINOPHILS 0.4 0.0 - 0.4 K/UL    ABS. BASOPHILS 0.1 0.0 - 0.1 K/UL    ABS. IMM.  GRANS. 0.3 (H) 0.00 - 0.04 K/UL    DF AUTOMATED     METABOLIC PANEL, COMPREHENSIVE    Collection Time: 06/23/21  7:58 AM   Result Value Ref Range    Sodium 137 136 - 145 mmol/L    Potassium 4.2 3.5 - 5.1 mmol/L    Chloride 109 (H) 97 - 108 mmol/L    CO2 22 21 - 32 mmol/L    Anion gap 6 5 - 15 mmol/L    Glucose 81 65 - 100 mg/dL    BUN 78 (H) 6 - 20 mg/dL    Creatinine 2.71 (H) 0.70 - 1.30 mg/dL    BUN/Creatinine ratio 29 (H) 12 - 20      GFR est AA 27 (L) >60 ml/min/1.73m2    GFR est non-AA 22 (L) >60 ml/min/1.73m2    Calcium 8.1 (L) 8.5 - 10.1 mg/dL    Bilirubin, total 2.3 (H) 0.2 - 1.0 mg/dL    AST (SGOT) 107 (H) 15 - 37 U/L    ALT (SGPT) 18 12 - 78 U/L    Alk.  phosphatase 573 (H) 45 - 117 U/L    Protein, total 6.4 6.4 - 8.2 g/dL    Albumin 1.2 (L) 3.5 - 5.0 g/dL    Globulin 5.2 (H) 2.0 - 4.0 g/dL    A-G Ratio 0.2 (L) 1.1 - 2.2     GLUCOSE, POC    Collection Time: 06/23/21  8:15 AM   Result Value Ref Range    Glucose (POC) 86 65 - 117 mg/dL    Performed by Jen Guardado    GLUCOSE, POC    Collection Time: 06/23/21  9:58 AM   Result Value Ref Range    Glucose (POC) 83 65 - 117 mg/dL    Performed by [facility]        Results     Procedure Component Value Units Date/Time    CULTURE, Palomo Ellis STAIN [[id number]]  (Susceptibility) Collected: 06/16/21 0615    Order Status: Completed Specimen: Wound Updated: 06/19/21 1018     Special Requests: No Special Requests        GRAM STAIN No wbc's seen         2+ Gram Positive Cocci         2+ Gram Positive Rods         Few Gram Negative Rods        Culture result:       Heavy Enterobacter cloacae complex                  Heavy * methicillin resistant staphylococcus aureus * REFER TO [id number] FOR SENSITIVITIES            Few Diphtheroids         CALLED MRSA REPORT TO Svetlana Menon R.N. 1020 06/19/2021 BY [facility]    Susceptibility      Enterobacter cloacae complex      CATINA      Amikacin ($) Susceptible      Cefazolin ($) Resistant      Cefepime ($$) Susceptible      Cefoxitin Resistant      Ceftazidime ($) Resistant      Ceftriaxone ($) Resistant      Ciprofloxacin ($) Resistant      Gentamicin ($) Susceptible      Levofloxacin ($) Resistant      Meropenem ($$) Susceptible      Piperacillin/Tazobac ($) Resistant      Tobramycin ($) Susceptible      Trimeth/Sulfa Resistant               Linear View                   CULTURE, Bhavna Royals STAIN [[id number]]  (Susceptibility) Collected: 06/16/21 0615    Order Status: Completed Specimen: Wound Updated: 06/19/21 0806     Special Requests: No Special Requests        GRAM STAIN 1+ WBCs seen         4+ Gram Negative Rods               4+ Gram Positive Cocci in pairs                  4+ apparent Gram Positive Rods           Culture result:       Heavy Enterobacter cloacae complex                  Moderate * methicillin resistant staphylococcus aureus *            Light Diphtheroids       Susceptibility      Enterobacter cloacae complex Staphylococcus aureus Methcillin Resistant      CATINA CATINA      Amikacin ($) Susceptible       Cefazolin ($) Resistant       Cefepime ($$) Susceptible       Cefoxitin Resistant       Ceftazidime ($) Resistant       Ceftriaxone ($) Resistant       Ciprofloxacin ($) Resistant Resistant      Clindamycin ($)  Susceptible      Daptomycin ($$$$$)  Susceptible      Doxycycline ($$)  Susceptible      Erythromycin ($$$$)  Susceptible      Gentamicin ($) Susceptible Susceptible      Levofloxacin ($) Resistant Resistant      Linezolid ($$$$$)  Susceptible      Meropenem ($$) Susceptible       Oxacillin  Resistant      Piperacillin/Tazobac ($) Resistant       Rifampin ($$$$)  Susceptible  [1]       Tetracycline  Resistant      Tobramycin ($) Susceptible       Trimeth/Sulfa Resistant Susceptible      Vancomycin ($)  Susceptible                [1]  Rifampin is not to be used for mono-therapy.           Linear View                   COVID-19 RAPID TEST [[id number]] Collected: 06/16/21 0216    Order Status: Completed Specimen: Nasopharyngeal Updated: 06/16/21 0408     Specimen source Nasopharyngeal        COVID-19 rapid test Not Detected        Comment: Rapid Abbott ID Now   Rapid NAAT:  The specimen is NEGATIVE for SARS-CoV-2, the novel coronavirus associated with COVID-19. Negative results should be treated as presumptive and, if inconsistent with clinical signs and symptoms or necessary for patient management, should be tested with an alternative molecular assay. Negative results do not preclude SARS-CoV-2 infection and should not be used as the sole basis for patient management decisions. This test has been authorized by the FDA under   an Emergency Use Authorization (EUA) for use by authorized laboratories. Fact sheet for Healthcare Providers: [employer] Fact sheet for Patients: [employer]   Methodology: Isothermal Nucleic Acid Amplification         COVID-19 RAPID TEST [[id number]]  (Abnormal) Collected: 06/16/21 0023    Order Status: Completed Specimen: Nasopharyngeal Updated: 06/16/21 0208     Specimen source Nasopharyngeal        COVID-19 rapid test Indeterminate        Comment: Rapid Abbott ID Now   The presence or absence of COVID-19 Viral RNAs cannot be determined. If clinically indicated, please collect a new specimen. This test has been authorized by the FDA under an Emergency Use Authorization (EUA) for use by authorized laboratories. Fact sheet for Healthcare Providers: [employer] Fact sheet for Patients: [employer]   Methodology: Isothermal Nucleic Acid Amplification SPOKE TO KEVYN MENDOZA,   SUGGESTED RECOLLECT                 Labs:     Recent Labs     06/23/21  0758 06/21/21  0709   WBC 14.7* 12.0*   HGB 8.8* 7.2*   HCT 28.4* 23.3*    169     Recent Labs     06/23/21  0758 06/21/21  0709    137   K 4.2 3.9   * 107   CO2 22 22   BUN 78* 78*   CREA 2.71* 3.11*   GLU 81 78   CA 8.1* 8.0*     Recent Labs     06/23/21  0758 06/21/21  0709   ALT 18 16   * 510*   TBILI 2.3* 2.5*   TP 6.4 6.3*   ALB 1.2* 1.2*   GLOB 5.2* 5.1*     No results for input(s): INR, PTP, APTT, INREXT, INREXT in the last 72 hours. No results for input(s): FE, TIBC, PSAT, FERR in the last 72 hours. No results found for: FOL, RBCF   No results for input(s): PH, PCO2, PO2 in the last 72 hours. No results for input(s): CPK, CKNDX, TROIQ in the last 72 hours. No lab exists for component: CPKMB  No results found for: CHOL, CHOLX, CHLST, CHOLV, HDL, HDLP, LDL, LDLC, DLDLP, TGLX, TRIGL, TRIGP, CHHD, CHHDX  Lab Results   Component Value Date/Time    Glucose (POC) 83 06/23/2021 09:58 AM    Glucose (POC) 86 06/23/2021 08:15 AM    Glucose (POC) 128 (H) 06/22/2021 07:30 PM    Glucose (POC) 121 (H) 06/22/2021 03:58 PM    Glucose (POC) 126 (H) 06/22/2021 11:09 AM     Lab Results   Component Value Date/Time    Color Yaritza 04/28/2021 03:00 PM    Appearance Turbid (A) 04/28/2021 03:00 PM    Specific gravity 1.014 04/28/2021 03:00 PM    pH (UA) 5.0 04/28/2021 03:00 PM    Protein 100 (A) 04/28/2021 03:00 PM    Glucose Negative 04/28/2021 03:00 PM    Ketone Negative 04/28/2021 03:00 PM    Bilirubin Negative 04/28/2021 03:00 PM    Urobilinogen 4.0 (H) 04/28/2021 03:00 PM    Nitrites Negative 04/28/2021 03:00 PM    Leukocyte Esterase Negative 04/28/2021 03:00 PM    Bacteria Negative 04/28/2021 03:00 PM    WBC 0-4 04/28/2021 03:00 PM    RBC 0-5 04/28/2021 03:00 PM         Assessment:        Acute on chronic kidney failure-trending down  Diabetes  Hypertension   Hyperlipidemia  Anemia- 8.8  Leukocytosis  Chronic leg wounds-E.  Cloacae resistant to Zosyn and MRSA isolated   Liver mass  Hypotension-improved   Hypokalemia  Right moderate hydronephrosis  Left heel ulcer-gas-forming organisms and osteomyelitis       Plan:        Continue on Linezolid and Meropenem   Continue frequent turns   Monitor H&H  Repeat labs in AM    Patient getting left AKA today          Current Facility-Administered Medications:     0.9% sodium chloride infusion 250 mL, 250 mL, IntraVENous, PRN, Bernabe, Shantel Giang MD    0.9% sodium chloride infusion 250 mL, 250 mL, IntraVENous, PRN, Bernabe, Lindsey Lim MD    albuterol (PROVENTIL HFA, VENTOLIN HFA, PROAIR HFA) inhaler 2 Puff, 2 Puff, Inhalation, Q6H PRN, Dinora Ramirez MD    0.9% sodium chloride infusion 250 mL, 250 mL, IntraVENous, PRN, Lindsey Kidd MD    collagenase [facility]) 250 unit/gram ointment, , Topical, DAILY, Bernabe, Lindsey Lim MD, Given at 06/22/21 4362    linezolid (ZYVOX) tablet 600 mg, 600 mg, Oral, Q12H, Sascha Araiza MD, 600 mg at 06/22/21 2012    meropenem (MERREM) 1 g in sterile water (preservative free) 20 mL IV syringe, 1 g, IntraVENous, Q24H, Sascha Araiza MD, 1 g at 06/22/21 1723    acetaminophen (TYLENOL) tablet 500 mg, 500 mg, Oral, Q4H PRN, Dinora Ramirez MD    acetaminophen (TYLENOL) tablet 1,000 mg, 1,000 mg, Oral, Q6H PRN, Dinora Ramirez MD, 1,000 mg at 06/20/21 2251    [Held by provider] amLODIPine (NORVASC) tablet 5 mg, 5 mg, Oral, DAILY, Dinora Ramirez MD, 5 mg at 06/17/21 1123    ferrous sulfate tablet 325 mg, 1 Tablet, Oral, TID WITH MEALS, Dinora Ramirez MD, 325 mg at 06/22/21 1723    gabapentin (NEURONTIN) capsule 100 mg, 100 mg, Oral, TID, Dinora Ramirez MD, 100 mg at 06/22/21 2012    [Held by provider] furosemide (LASIX) tablet 40 mg, 40 mg, Oral, DAILY, Dinora Ramirez MD, 40 mg at 06/16/21 1107    latanoprost (XALATAN) 0.005 % ophthalmic solution 1 Drop, 1 Drop, Both Eyes, QPM, Dinora Ramirez MD, 1 Drop at 06/22/21 1823    loperamide (IMODIUM) capsule 2 mg, 2 mg, Oral, Q4H PRN, Dinora Ramirez MD    simvastatin (ZOCOR) tablet 10 mg, 10 mg, Oral, QHS, Dinora Ramirez MD, 10 mg at 06/22/21 2012    traMADoL (ULTRAM) tablet 50 mg, 50 mg, Oral, Q6H PRN, Dinora Ramirez MD, 50 mg at 06/22/21 2049    ondansetron hcl (ZOFRAN) tablet 4 mg, 4 mg, Oral, Q4H PRN, Dinora Ramirez MD    cyanocobalamin (VITAMIN B12) injection 1,000 mcg, 1,000 mcg, IntraMUSCular, EVERY 2 WEEKS, Dinora Ramirez MD, 1,000 mcg at 06/16/21 1731    ferrous sulfate tablet 325 mg, 325 mg, Oral, ACB, Jacobo Ramirez MD, 325 mg at 06/20/21 0855    pantoprazole (PROTONIX) tablet 40 mg, 40 mg, Oral, DAILY, Dinora Ramirez MD, 40 mg at 06/22/21 0841    insulin lispro (HUMALOG) injection, , SubCUTAneous, AC&HS, Dinora Ramirez MD, 2 Units at 06/18/21 2233    glucose chewable tablet 16 g, 4 Tablet, Oral, PRN, Jacobo Ramirez MD    dextrose (D50W) injection syrg 12.5-25 g, 25-50 mL, IntraVENous, PRN, Jacobo Ramirez MD    glucagon (GLUCAGEN) injection 1 mg, 1 mg, IntraMUSCular, PRN, Jacobo Ramirez MD    0.9% sodium chloride infusion, 75 mL/hr, IntraVENous, CONTINUOUS, Dinora Ramirez MD, Last Rate: 75 mL/hr at 06/23/21 0859, 75 mL/hr at 06/23/21 0859    acetaminophen (TYLENOL) tablet 650 mg, 650 mg, Oral, Q6H PRN, 650 mg at 06/23/21 0042 **OR** acetaminophen (TYLENOL) suppository 650 mg, 650 mg, Rectal, Q6H PRN, Jacobo Ramirez MD    polyethylene glycol (MIRALAX) packet 17 g, 17 g, Oral, DAILY PRN, Jacobo Ramirez MD    ondansetron (ZOFRAN ODT) tablet 4 mg, 4 mg, Oral, Q8H PRN **OR** ondansetron (ZOFRAN) injection 4 mg, 4 mg, IntraVENous, Q6H PRN, Dinora Ramirez MD    heparin (porcine) injection 5,000 Units, 5,000 Units, SubCUTAneous, Q8H, Dinora Ramirez MD, 5,000 Units at 06/22/21 2012    zinc oxide-white petrolatum 17-57 % topical paste, , Topical, TID, Jacobo Ramirez MD, Given at 06/23/21 0900

## 2021-06-23 NOTE — PROGRESS NOTES
PT tx attempt this morning however pt off the floor in OR and per nursing pt having amputation today. Will need new PT eval order post surgery when pt is medically appropriate. Thank you!

## 2021-06-24 LAB
ALBUMIN SERPL-MCNC: 1.4 G/DL (ref 3.5–5)
ALBUMIN/GLOB SERPL: 0.3 {RATIO} (ref 1.1–2.2)
ALP SERPL-CCNC: 548 U/L (ref 45–117)
ALT SERPL-CCNC: 18 U/L (ref 12–78)
ANION GAP SERPL CALC-SCNC: 7 MMOL/L (ref 5–15)
AST SERPL W P-5'-P-CCNC: 115 U/L (ref 15–37)
BASOPHILS # BLD: 0.1 K/UL (ref 0–0.1)
BASOPHILS NFR BLD: 0 % (ref 0–1)
BILIRUB SERPL-MCNC: 2.1 MG/DL (ref 0.2–1)
BUN SERPL-MCNC: 77 MG/DL (ref 6–20)
BUN/CREAT SERPL: 30 (ref 12–20)
CA-I BLD-MCNC: 8.1 MG/DL (ref 8.5–10.1)
CHLORIDE SERPL-SCNC: 109 MMOL/L (ref 97–108)
CO2 SERPL-SCNC: 22 MMOL/L (ref 21–32)
CREAT SERPL-MCNC: 2.54 MG/DL (ref 0.7–1.3)
DIFFERENTIAL METHOD BLD: ABNORMAL
EOSINOPHIL # BLD: 0.1 K/UL (ref 0–0.4)
EOSINOPHIL NFR BLD: 0 % (ref 0–7)
ERYTHROCYTE [DISTWIDTH] IN BLOOD BY AUTOMATED COUNT: 18.6 % (ref 11.5–14.5)
GLOBULIN SER CALC-MCNC: 5 G/DL (ref 2–4)
GLUCOSE BLD STRIP.AUTO-MCNC: 104 MG/DL (ref 65–117)
GLUCOSE BLD STRIP.AUTO-MCNC: 115 MG/DL (ref 65–117)
GLUCOSE BLD STRIP.AUTO-MCNC: 124 MG/DL (ref 65–117)
GLUCOSE BLD STRIP.AUTO-MCNC: 66 MG/DL (ref 65–117)
GLUCOSE BLD STRIP.AUTO-MCNC: 67 MG/DL (ref 65–117)
GLUCOSE BLD STRIP.AUTO-MCNC: 68 MG/DL (ref 65–117)
GLUCOSE BLD STRIP.AUTO-MCNC: 69 MG/DL (ref 65–117)
GLUCOSE BLD STRIP.AUTO-MCNC: 80 MG/DL (ref 65–117)
GLUCOSE SERPL-MCNC: 68 MG/DL (ref 65–100)
HCT VFR BLD AUTO: 34.9 % (ref 36.6–50.3)
HGB BLD-MCNC: 10.8 G/DL (ref 12.1–17)
IMM GRANULOCYTES # BLD AUTO: 0.2 K/UL (ref 0–0.04)
IMM GRANULOCYTES NFR BLD AUTO: 2 % (ref 0–0.5)
LYMPHOCYTES # BLD: 3.6 K/UL (ref 0.8–3.5)
LYMPHOCYTES NFR BLD: 22 % (ref 12–49)
MCH RBC QN AUTO: 29.6 PG (ref 26–34)
MCHC RBC AUTO-ENTMCNC: 30.9 G/DL (ref 30–36.5)
MCV RBC AUTO: 95.6 FL (ref 80–99)
MONOCYTES # BLD: 1.8 K/UL (ref 0–1)
MONOCYTES NFR BLD: 11 % (ref 5–13)
NEUTS SEG # BLD: 10.8 K/UL (ref 1.8–8)
NEUTS SEG NFR BLD: 65 % (ref 32–75)
NRBC # BLD: 0.02 K/UL (ref 0–0.01)
NRBC BLD-RTO: 0.1 PER 100 WBC
PERFORMED BY, TECHID: ABNORMAL
PERFORMED BY, TECHID: NORMAL
PLATELET # BLD AUTO: 173 K/UL (ref 150–400)
PMV BLD AUTO: 9.9 FL (ref 8.9–12.9)
POTASSIUM SERPL-SCNC: 4.5 MMOL/L (ref 3.5–5.1)
PROT SERPL-MCNC: 6.4 G/DL (ref 6.4–8.2)
RBC # BLD AUTO: 3.65 M/UL (ref 4.1–5.7)
SODIUM SERPL-SCNC: 138 MMOL/L (ref 136–145)
WBC # BLD AUTO: 16.5 K/UL (ref 4.1–11.1)

## 2021-06-24 PROCEDURE — 74011250637 HC RX REV CODE- 250/637: Performed by: INTERNAL MEDICINE

## 2021-06-24 PROCEDURE — 99232 SBSQ HOSP IP/OBS MODERATE 35: CPT | Performed by: INTERNAL MEDICINE

## 2021-06-24 PROCEDURE — 94760 N-INVAS EAR/PLS OXIMETRY 1: CPT

## 2021-06-24 PROCEDURE — 74011250636 HC RX REV CODE- 250/636: Performed by: INTERNAL MEDICINE

## 2021-06-24 PROCEDURE — 85025 COMPLETE CBC W/AUTO DIFF WBC: CPT

## 2021-06-24 PROCEDURE — 74011000250 HC RX REV CODE- 250: Performed by: INTERNAL MEDICINE

## 2021-06-24 PROCEDURE — 80053 COMPREHEN METABOLIC PANEL: CPT

## 2021-06-24 PROCEDURE — 77010033678 HC OXYGEN DAILY

## 2021-06-24 PROCEDURE — 82962 GLUCOSE BLOOD TEST: CPT

## 2021-06-24 PROCEDURE — 74011250637 HC RX REV CODE- 250/637: Performed by: FAMILY MEDICINE

## 2021-06-24 PROCEDURE — 65270000032 HC RM SEMIPRIVATE

## 2021-06-24 PROCEDURE — 74011250636 HC RX REV CODE- 250/636: Performed by: FAMILY MEDICINE

## 2021-06-24 PROCEDURE — 36415 COLL VENOUS BLD VENIPUNCTURE: CPT

## 2021-06-24 RX ORDER — LINEZOLID 600 MG/1
600 TABLET, FILM COATED ORAL EVERY 12 HOURS
Qty: 20 TABLET | Refills: 0 | Status: SHIPPED
Start: 2021-06-24 | End: 2021-07-04

## 2021-06-24 RX ADMIN — WHITE PETROLATUM,ZINC OXIDE: 57; 17 PASTE TOPICAL at 20:19

## 2021-06-24 RX ADMIN — WHITE PETROLATUM,ZINC OXIDE: 57; 17 PASTE TOPICAL at 18:00

## 2021-06-24 RX ADMIN — LATANOPROST 1 DROP: 50 SOLUTION OPHTHALMIC at 20:00

## 2021-06-24 RX ADMIN — SODIUM CHLORIDE 75 ML/HR: 9 INJECTION, SOLUTION INTRAVENOUS at 17:58

## 2021-06-24 RX ADMIN — GABAPENTIN 100 MG: 100 CAPSULE ORAL at 17:56

## 2021-06-24 RX ADMIN — Medication 10 ML: at 05:12

## 2021-06-24 RX ADMIN — ACETAMINOPHEN 650 MG: 325 TABLET ORAL at 20:17

## 2021-06-24 RX ADMIN — MEROPENEM 1 G: 1 INJECTION, POWDER, FOR SOLUTION INTRAVENOUS at 17:56

## 2021-06-24 RX ADMIN — SIMVASTATIN 10 MG: 10 TABLET, FILM COATED ORAL at 20:17

## 2021-06-24 RX ADMIN — LINEZOLID 600 MG: 600 TABLET, FILM COATED ORAL at 08:27

## 2021-06-24 RX ADMIN — SODIUM CHLORIDE 75 ML/HR: 9 INJECTION, SOLUTION INTRAVENOUS at 03:15

## 2021-06-24 RX ADMIN — HEPARIN SODIUM 5000 UNITS: 5000 INJECTION INTRAVENOUS; SUBCUTANEOUS at 20:17

## 2021-06-24 RX ADMIN — HEPARIN SODIUM 5000 UNITS: 5000 INJECTION INTRAVENOUS; SUBCUTANEOUS at 13:22

## 2021-06-24 RX ADMIN — FERROUS SULFATE TAB 325 MG (65 MG ELEMENTAL FE) 325 MG: 325 (65 FE) TAB at 08:20

## 2021-06-24 RX ADMIN — PANTOPRAZOLE SODIUM 40 MG: 40 TABLET, DELAYED RELEASE ORAL at 08:26

## 2021-06-24 RX ADMIN — HEPARIN SODIUM 5000 UNITS: 5000 INJECTION INTRAVENOUS; SUBCUTANEOUS at 03:16

## 2021-06-24 RX ADMIN — LINEZOLID 600 MG: 600 TABLET, FILM COATED ORAL at 20:17

## 2021-06-24 RX ADMIN — COLLAGENASE SANTYL: 250 OINTMENT TOPICAL at 13:22

## 2021-06-24 RX ADMIN — TRAMADOL HYDROCHLORIDE 50 MG: 50 TABLET, FILM COATED ORAL at 14:10

## 2021-06-24 RX ADMIN — GABAPENTIN 100 MG: 100 CAPSULE ORAL at 08:27

## 2021-06-24 RX ADMIN — WHITE PETROLATUM,ZINC OXIDE: 57; 17 PASTE TOPICAL at 08:22

## 2021-06-24 RX ADMIN — Medication 10 ML: at 20:18

## 2021-06-24 RX ADMIN — GABAPENTIN 100 MG: 100 CAPSULE ORAL at 20:17

## 2021-06-24 NOTE — PROGRESS NOTES
07:30 Assumed patients care. Patient awake, alert, responds appropriately to simple commands. Denies pain left stump(AKA) elevated on 2 pillows, old bloody drainage noted on ace wrap with reinforcement to under portion of dressing. Isolation maintained. Patient with low blood sugars, asymptomatic,  Apple juice 4 oz given,  08:15  Blood sugar repeated. Same  Low( see POC) breakfast given . Assisted with same. Tolerated, orange juice , apple juice, and ensure and few bites of egg. 14:10 Tramadol given for pain  To left AKA.

## 2021-06-24 NOTE — PROGRESS NOTES
General Daily Progress Note          Patient Name:   Thomas Stock       YOB: 1932       Age:  80 y.o. Admit Date: 6/15/2021      Subjective:     Patient is [email or web address] y.o. year old male with a past medical history of diabetes, hypertension, hyperlipidemia, renal insufficiency, and anemia history of liver mass who presented to the ER 6/15 due to abnormal labs drawn at the nursing home yesterday. He was found to have worsening kidney function. He was on dialysis previously, but had reportedly stopped dialysis several years ago. He denied chest pain and SOB. CXR showed no acute cardiopulmonary processes. He was admitted for further evaluation and treatment. Patient have a liver mass diagnosis and last admission as per note no further diagnosis procedure treatment was planned with the family by the attending.     Patient's discharge date canceled by vascular surgeon due to complex wounds on bilateral legs and need for CT. 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 Wound cultures showed E. Cloacae resistant to Zosyn and MRSA. Patient underwent left above knee amputation yesterday with no complications. Today the patient is awake in bed and in no acute distress. He is complaining of pain on left leg, where AKA was performed. Denies chest pain and SOB.               Objective:     Visit Vitals  BP (!) 118/58 (BP 1 Location: Left upper arm, BP Patient Position: At rest)   Pulse 96   Temp 97.7 °F (36.5 °C)   Resp 14   Ht 6' 2\" (1.88 m)   Wt 127 kg (279 lb 15.8 oz)   SpO2 99%   BMI 35.95 kg/m²        Recent Results (from the past 24 hour(s))   GLUCOSE, POC    Collection Time: 06/23/21  4:37 PM   Result Value Ref Range    Glucose (POC) 132 (H) 65 - 117 mg/dL    Performed by Benito Plant, POC    Collection Time: 06/23/21  7:52 PM   Result Value Ref Range    Glucose (POC) 158 (H) 65 - 117 mg/dL    Performed by [facility], POC    Collection Time: 06/24/21  7:27 AM   Result Value Ref Range    Glucose (POC) 66 65 - 117 mg/dL    Performed by Sol Christian(PCT)    CBC WITH AUTOMATED DIFF    Collection Time: 06/24/21  7:47 AM   Result Value Ref Range    WBC 16.5 (H) 4.1 - 11.1 K/uL    RBC 3.65 (L) 4.10 - 5.70 M/uL    HGB 10.8 (L) 12.1 - 17.0 g/dL    HCT 34.9 (L) 36.6 - 50.3 %    MCV 95.6 80.0 - 99.0 FL    MCH 29.6 26.0 - 34.0 PG    MCHC 30.9 30.0 - 36.5 g/dL    RDW 18.6 (H) 11.5 - 14.5 %    PLATELET 583 437 - 235 K/uL    MPV 9.9 8.9 - 12.9 FL    NRBC 0.1 (H) 0.0  WBC    ABSOLUTE NRBC 0.02 (H) 0.00 - 0.01 K/uL    NEUTROPHILS 65 32 - 75 %    LYMPHOCYTES 22 12 - 49 %    MONOCYTES 11 5 - 13 %    EOSINOPHILS 0 0 - 7 %    BASOPHILS 0 0 - 1 %    IMMATURE GRANULOCYTES 2 (H) 0 - 0.5 %    ABS. NEUTROPHILS 10.8 (H) 1.8 - 8.0 K/UL    ABS. LYMPHOCYTES 3.6 (H) 0.8 - 3.5 K/UL    ABS. MONOCYTES 1.8 (H) 0.0 - 1.0 K/UL    ABS. EOSINOPHILS 0.1 0.0 - 0.4 K/UL    ABS. BASOPHILS 0.1 0.0 - 0.1 K/UL    ABS. IMM. GRANS. 0.2 (H) 0.00 - 0.04 K/UL    DF AUTOMATED     METABOLIC PANEL, COMPREHENSIVE    Collection Time: 06/24/21  7:47 AM   Result Value Ref Range    Sodium 138 136 - 145 mmol/L    Potassium 4.5 3.5 - 5.1 mmol/L    Chloride 109 (H) 97 - 108 mmol/L    CO2 22 21 - 32 mmol/L    Anion gap 7 5 - 15 mmol/L    Glucose 68 65 - 100 mg/dL    BUN 77 (H) 6 - 20 mg/dL    Creatinine 2.54 (H) 0.70 - 1.30 mg/dL    BUN/Creatinine ratio 30 (H) 12 - 20      GFR est AA 29 (L) >60 ml/min/1.73m2    GFR est non-AA 24 (L) >60 ml/min/1.73m2    Calcium 8.1 (L) 8.5 - 10.1 mg/dL    Bilirubin, total 2.1 (H) 0.2 - 1.0 mg/dL    AST (SGOT) 115 (H) 15 - 37 U/L    ALT (SGPT) 18 12 - 78 U/L    Alk.  phosphatase 548 (H) 45 - 117 U/L    Protein, total 6.4 6.4 - 8.2 g/dL    Albumin 1.4 (L) 3.5 - 5.0 g/dL    Globulin 5.0 (H) 2.0 - 4.0 g/dL    A-G Ratio 0.3 (L) 1.1 - 2.2     GLUCOSE, POC    Collection Time: 06/24/21  7:53 AM   Result Value Ref Range    Glucose (POC) 67 65 - 117 mg/dL    Performed by Sol Canchola Gino(PCT)    GLUCOSE, POC    Collection Time: 06/24/21  8:15 AM   Result Value Ref Range    Glucose (POC) 68 65 - 117 mg/dL    Performed by Saniyajoshkhai Christian(PCT)    GLUCOSE, POC    Collection Time: 06/24/21  8:30 AM   Result Value Ref Range    Glucose (POC) 69 65 - 117 mg/dL    Performed by Saniyalam Christian(PCT)    GLUCOSE, POC    Collection Time: 06/24/21  8:52 AM   Result Value Ref Range    Glucose (POC) 80 65 - 117 mg/dL    Performed by Corie Christian(PCT)      [unfilled]      Review of Systems    Constitutional: Negative for chills and fever. HENT: Negative. Eyes: Negative. Respiratory: Negative. Cardiovascular: Negative. Gastrointestinal: Negative for abdominal pain and nausea. Skin: Negative. Neurological: Negative. Physical Exam:      Constitutional: pt is oriented to person, place, and time. HENT:   Head: Normocephalic and atraumatic. Eyes: Pupils are equal, round, and reactive to light. EOM are normal.   Cardiovascular: Normal rate, regular rhythm and normal heart sounds. Pulmonary/Chest: Breath sounds normal. No wheezes. No rales. Exhibits no tenderness. Abdominal: Soft. Bowel sounds are normal. There is no abdominal tenderness. There is no rebound and no guarding. Musculoskeletal: left AKA   Neurological: pt is alert and oriented to person, place, and time. CT LOW EXT BI WO CONT   Final Result   1. Somewhat suboptimal evaluation, as detailed above. 2. Within exam limitations there is a soft tissue ulcer at the posterior left   calcaneus with infection with gas-forming organism versus soft tissue   devitalization. Small amount of gas within the posterior calcaneus is most   likely related to osteomyelitis. 3. Other soft tissue wounds within both legs. No additional findings of acute   osseous abnormality within exam limitations. Based on level of clinical concern,   could consider further evaluation with targeted MRI of specific regions.    4. Diffuse osseous demineralization likely contributes to the somewhat   heterogeneous appearance of the bones. However, there is an indeterminate 10 mm   lucency within the right medial femoral condyle. Please correlate with clinical   history for a known primary malignancy or plasma abnormality. Otherwise short   interval follow-up in 3 months should be considered. US RETROPERITONEUM COMP   Final Result   Borderline right-sided hydronephrosis. Ascites. XR CHEST PORT   Final Result   No evidence of an acute cardiopulmonary process. Recent Results (from the past 24 hour(s))   GLUCOSE, POC    Collection Time: 06/23/21  4:37 PM   Result Value Ref Range    Glucose (POC) 132 (H) 65 - 117 mg/dL    Performed by [address] [place] [address], POC    Collection Time: 06/23/21  7:52 PM   Result Value Ref Range    Glucose (POC) 158 (H) 65 - 117 mg/dL    Performed by Hope Cool    GLUCOSE, POC    Collection Time: 06/24/21  7:27 AM   Result Value Ref Range    Glucose (POC) 66 65 - 117 mg/dL    Performed by Mathew Lombard Yudong(PCT)    CBC WITH AUTOMATED DIFF    Collection Time: 06/24/21  7:47 AM   Result Value Ref Range    WBC 16.5 (H) 4.1 - 11.1 K/uL    RBC 3.65 (L) 4.10 - 5.70 M/uL    HGB 10.8 (L) 12.1 - 17.0 g/dL    HCT 34.9 (L) 36.6 - 50.3 %    MCV 95.6 80.0 - 99.0 FL    MCH 29.6 26.0 - 34.0 PG    MCHC 30.9 30.0 - 36.5 g/dL    RDW 18.6 (H) 11.5 - 14.5 %    PLATELET 517 042 - 317 K/uL    MPV 9.9 8.9 - 12.9 FL    NRBC 0.1 (H) 0.0  WBC    ABSOLUTE NRBC 0.02 (H) 0.00 - 0.01 K/uL    NEUTROPHILS 65 32 - 75 %    LYMPHOCYTES 22 12 - 49 %    MONOCYTES 11 5 - 13 %    EOSINOPHILS 0 0 - 7 %    BASOPHILS 0 0 - 1 %    IMMATURE GRANULOCYTES 2 (H) 0 - 0.5 %    ABS. NEUTROPHILS 10.8 (H) 1.8 - 8.0 K/UL    ABS. LYMPHOCYTES 3.6 (H) 0.8 - 3.5 K/UL    ABS. MONOCYTES 1.8 (H) 0.0 - 1.0 K/UL    ABS. EOSINOPHILS 0.1 0.0 - 0.4 K/UL    ABS. BASOPHILS 0.1 0.0 - 0.1 K/UL    ABS. IMM.  GRANS. 0.2 (H) 0.00 - 0.04 K/UL    DF AUTOMATED METABOLIC PANEL, COMPREHENSIVE    Collection Time: 06/24/21  7:47 AM   Result Value Ref Range    Sodium 138 136 - 145 mmol/L    Potassium 4.5 3.5 - 5.1 mmol/L    Chloride 109 (H) 97 - 108 mmol/L    CO2 22 21 - 32 mmol/L    Anion gap 7 5 - 15 mmol/L    Glucose 68 65 - 100 mg/dL    BUN 77 (H) 6 - 20 mg/dL    Creatinine 2.54 (H) 0.70 - 1.30 mg/dL    BUN/Creatinine ratio 30 (H) 12 - 20      GFR est AA 29 (L) >60 ml/min/1.73m2    GFR est non-AA 24 (L) >60 ml/min/1.73m2    Calcium 8.1 (L) 8.5 - 10.1 mg/dL    Bilirubin, total 2.1 (H) 0.2 - 1.0 mg/dL    AST (SGOT) 115 (H) 15 - 37 U/L    ALT (SGPT) 18 12 - 78 U/L    Alk.  phosphatase 548 (H) 45 - 117 U/L    Protein, total 6.4 6.4 - 8.2 g/dL    Albumin 1.4 (L) 3.5 - 5.0 g/dL    Globulin 5.0 (H) 2.0 - 4.0 g/dL    A-G Ratio 0.3 (L) 1.1 - 2.2     GLUCOSE, POC    Collection Time: 06/24/21  7:53 AM   Result Value Ref Range    Glucose (POC) 67 65 - 117 mg/dL    Performed by Mathew Lombard Yudong([facility])    GLUCOSE, POC    Collection Time: 06/24/21  8:15 AM   Result Value Ref Range    Glucose (POC) 68 65 - 117 mg/dL    Performed by Mathew Lombard Yudong([facility])    GLUCOSE, POC    Collection Time: 06/24/21  8:30 AM   Result Value Ref Range    Glucose (POC) 69 65 - 117 mg/dL    Performed by Mathew Lombard Yudong([facility])    GLUCOSE, POC    Collection Time: 06/24/21  8:52 AM   Result Value Ref Range    Glucose (POC) 80 65 - 117 mg/dL    Performed by Mathew Lombard Yudong(PCT)        Results     Procedure Component Value Units Date/Time    CULTURE, Rosezella Chambers STAIN [[id number]]  (Susceptibility) Collected: 06/16/21 0615    Order Status: Completed Specimen: Wound Updated: 06/19/21 1018     Special Requests: No Special Requests        GRAM STAIN No wbc's seen         2+ Gram Positive Cocci         2+ Gram Positive Rods         Few Gram Negative Rods        Culture result:       Heavy Enterobacter cloacae complex                  Heavy * methicillin resistant staphylococcus aureus * REFER TO [id number] FOR SENSITIVITIES            Few Diphtheroids         CALLED MRSA REPORT TO Mirian Bueno R.N. 1020 06/19/2021 BY [facility]    Susceptibility      Enterobacter cloacae complex      CATINA      Amikacin ($) Susceptible      Cefazolin ($) Resistant      Cefepime ($$) Susceptible      Cefoxitin Resistant      Ceftazidime ($) Resistant      Ceftriaxone ($) Resistant      Ciprofloxacin ($) Resistant      Gentamicin ($) Susceptible      Levofloxacin ($) Resistant      Meropenem ($$) Susceptible      Piperacillin/Tazobac ($) Resistant      Tobramycin ($) Susceptible      Trimeth/Sulfa Resistant               Linear View                   CULTURE, Laine Morales STAIN [[id number]]  (Susceptibility) Collected: 06/16/21 0615    Order Status: Completed Specimen: Wound Updated: 06/19/21 0806     Special Requests: No Special Requests        GRAM STAIN 1+ WBCs seen         4+ Gram Negative Rods               4+ Gram Positive Cocci in pairs                  4+ apparent Gram Positive Rods           Culture result:       Heavy Enterobacter cloacae complex                  Moderate * methicillin resistant staphylococcus aureus *            Light Diphtheroids       Susceptibility      Enterobacter cloacae complex Staphylococcus aureus Methcillin Resistant      CATINA CATINA      Amikacin ($) Susceptible       Cefazolin ($) Resistant       Cefepime ($$) Susceptible       Cefoxitin Resistant       Ceftazidime ($) Resistant       Ceftriaxone ($) Resistant       Ciprofloxacin ($) Resistant Resistant      Clindamycin ($)  Susceptible      Daptomycin ($$$$$)  Susceptible      Doxycycline ($$)  Susceptible      Erythromycin ($$$$)  Susceptible      Gentamicin ($) Susceptible Susceptible      Levofloxacin ($) Resistant Resistant      Linezolid ($$$$$)  Susceptible      Meropenem ($$) Susceptible       Oxacillin  Resistant      Piperacillin/Tazobac ($) Resistant       Rifampin ($$$$)  Susceptible  [1]       Tetracycline  Resistant      Tobramycin ($) Susceptible       Trimeth/Sulfa Resistant Susceptible      Vancomycin ($)  Susceptible                [1]  Rifampin is not to be used for mono-therapy. Linear View                   COVID-19 RAPID TEST [[id number]] Collected: 06/16/21 0216    Order Status: Completed Specimen: Nasopharyngeal Updated: 06/16/21 0408     Specimen source Nasopharyngeal        COVID-19 rapid test Not Detected        Comment: Rapid Abbott ID Now   Rapid NAAT:  The specimen is NEGATIVE for SARS-CoV-2, the novel coronavirus associated with COVID-19. Negative results should be treated as presumptive and, if inconsistent with clinical signs and symptoms or necessary for patient management, should be tested with an alternative molecular assay. Negative results do not preclude SARS-CoV-2 infection and should not be used as the sole basis for patient management decisions. This test has been authorized by the FDA under   an Emergency Use Authorization (EUA) for use by authorized laboratories. Fact sheet for Healthcare Providers: kstattoo[employer]com Fact sheet for Patients: kstattoo.com   Methodology: Isothermal Nucleic Acid Amplification         COVID-19 RAPID TEST [[id number]]  (Abnormal) Collected: 06/16/21 0023    Order Status: Completed Specimen: Nasopharyngeal Updated: 06/16/21 0208     Specimen source Nasopharyngeal        COVID-19 rapid test Indeterminate        Comment: Rapid Abbott ID Now   The presence or absence of COVID-19 Viral RNAs cannot be determined. If clinically indicated, please collect a new specimen. This test has been authorized by the FDA under an Emergency Use Authorization (EUA) for use by authorized laboratories.    Fact sheet for Healthcare Providers: [employer]stattoo[employer]com Fact sheet for Patients: kstattoo.com   Methodology: Isothermal Nucleic Acid Amplification SPOKE TO KEVYN MENDOZA,   SUGGESTED RECOLLECT                 Labs:     Recent Labs     06/24/21  0747 06/23/21  0758   WBC 16.5* 14.7*   HGB 10.8* 8.8*   HCT 34.9* 28.4*    195     Recent Labs     06/24/21  0747 06/23/21  0758    137   K 4.5 4.2   * 109*   CO2 22 22   BUN 77* 78*   CREA 2.54* 2.71*   GLU 68 81   CA 8.1* 8.1*     Recent Labs     06/24/21  0747 06/23/21  0758   ALT 18 18   * 573*   TBILI 2.1* 2.3*   TP 6.4 6.4   ALB 1.4* 1.2*   GLOB 5.0* 5.2*     No results for input(s): INR, PTP, APTT, INREXT in the last 72 hours. No results for input(s): FE, TIBC, PSAT, FERR in the last 72 hours. No results found for: FOL, RBCF   No results for input(s): PH, PCO2, PO2 in the last 72 hours. No results for input(s): CPK, CKNDX, TROIQ in the last 72 hours. No lab exists for component: CPKMB  No results found for: CHOL, CHOLX, CHLST, CHOLV, HDL, HDLP, LDL, LDLC, DLDLP, TGLX, TRIGL, TRIGP, CHHD, CHHDX  Lab Results   Component Value Date/Time    Glucose (POC) 80 06/24/2021 08:52 AM    Glucose (POC) 69 06/24/2021 08:30 AM    Glucose (POC) 68 06/24/2021 08:15 AM    Glucose (POC) 67 06/24/2021 07:53 AM    Glucose (POC) 66 06/24/2021 07:27 AM     Lab Results   Component Value Date/Time    Color Yaritza 04/28/2021 03:00 PM    Appearance Turbid (A) 04/28/2021 03:00 PM    Specific gravity 1.014 04/28/2021 03:00 PM    pH (UA) 5.0 04/28/2021 03:00 PM    Protein 100 (A) 04/28/2021 03:00 PM    Glucose Negative 04/28/2021 03:00 PM    Ketone Negative 04/28/2021 03:00 PM    Bilirubin Negative 04/28/2021 03:00 PM    Urobilinogen 4.0 (H) 04/28/2021 03:00 PM    Nitrites Negative 04/28/2021 03:00 PM    Leukocyte Esterase Negative 04/28/2021 03:00 PM    Bacteria Negative 04/28/2021 03:00 PM    WBC 0-4 04/28/2021 03:00 PM    RBC 0-5 04/28/2021 03:00 PM         Assessment:     Acute on chronic kidney failure-trending down  Diabetes  Hypertension   Hyperlipidemia  Anemia- improving  Leukocytosis  Chronic leg wounds-E.  Cloacae resistant to Zosyn and MRSA isolated   Liver mass  Hypotension-improved   Hypokalemia  Right moderate hydronephrosis  Left heel ulcer-gas-forming organisms and osteomyelitis   Post op day #1 left AKA     Plan:      On linezolid day #5 and Meropenem day # 6  Monitor AKA stump for post-op infection  Frequent turns and routine wound care  Repeat labs in AM       Current Facility-Administered Medications:     0.9% sodium chloride infusion 250 mL, 250 mL, IntraVENous, PRN, Jun, Victor Koyanagi, MD    0.9% sodium chloride infusion 250 mL, 250 mL, IntraVENous, PRN, Jun, Victor Koyanagi, MD    sodium chloride (NS) flush 5-40 mL, 5-40 mL, IntraVENous, Q8H, Jun, Victor Koyanagi, MD, 10 mL at 06/24/21 3457    sodium chloride (NS) flush 5-40 mL, 5-40 mL, IntraVENous, PRN, Jun, Victor Koyanagi, MD    albuterol (PROVENTIL HFA, VENTOLIN HFA, PROAIR HFA) inhaler 2 Puff, 2 Puff, Inhalation, Q6H PRN, Denver Macadam, MD    0.9% sodium chloride infusion 250 mL, 250 mL, IntraVENous, PRN, Jun, Victor Koyanagi, MD    collagenase (SANTYL) 250 unit/gram ointment, , Topical, DAILY, Jun, Victor Koyanagi, MD, Given at 06/22/21 6973    linezolid (ZYVOX) tablet 600 mg, 600 mg, Oral, Q12H, Sascha Araiza MD, 600 mg at 06/24/21 0827    meropenem (MERREM) 1 g in sterile water (preservative free) 20 mL IV syringe, 1 g, IntraVENous, Q24H, Sascha Araiza MD, 1 g at 06/23/21 1539    [Held by provider] amLODIPine (NORVASC) tablet 5 mg, 5 mg, Oral, DAILY, Denver Macadam, MD, 5 mg at 06/17/21 1123    gabapentin (NEURONTIN) capsule 100 mg, 100 mg, Oral, TID, Denver Macadam, MD, 100 mg at 06/24/21 0827    [Held by provider] furosemide (LASIX) tablet 40 mg, 40 mg, Oral, DAILY, Denver Macadam, MD, 40 mg at 06/16/21 1107    latanoprost (XALATAN) 0.005 % ophthalmic solution 1 Drop, 1 Drop, Both Eyes, QPM, Dinora Ramirez MD, 1 Drop at 06/23/21 2232    loperamide (IMODIUM) capsule 2 mg, 2 mg, Oral, Q4H PRN, Dinora Ramirez MD    simvastatin (ZOCOR) tablet 10 mg, 10 mg, Oral, QHS, Denver Macadam, MD, 10 mg at 06/23/21 2223    traMADoL (ULTRAM) tablet 50 mg, 50 mg, Oral, Q6H PRN, Dinora Ramirez MD, 50 mg at 06/23/21 1523    cyanocobalamin (VITAMIN B12) injection 1,000 mcg, 1,000 mcg, IntraMUSCular, EVERY 2 WEEKS, Dinora Ramirez MD, 1,000 mcg at 06/16/21 1731    ferrous sulfate tablet 325 mg, 325 mg, Oral, ACB, Dinora Ramirez MD, 325 mg at 06/20/21 0855    pantoprazole (PROTONIX) tablet 40 mg, 40 mg, Oral, DAILY, Dinora Ramirez MD, 40 mg at 06/24/21 0351    insulin lispro (HUMALOG) injection, , SubCUTAneous, AC&HS, Mark Flowers MD, 3 Units at 06/23/21 2230    glucose chewable tablet 16 g, 4 Tablet, Oral, PRN, Bhanu Ramirez MD    dextrose (D50W) injection syrg 12.5-25 g, 25-50 mL, IntraVENous, PRN, Bhanu Ramirez MD    glucagon (GLUCAGEN) injection 1 mg, 1 mg, IntraMUSCular, PRN, Bhanu Ramirez MD    0.9% sodium chloride infusion, 75 mL/hr, IntraVENous, CONTINUOUS, Dinora Ramirez MD, Last Rate: 75 mL/hr at 06/24/21 0315, 75 mL/hr at 06/24/21 0315    acetaminophen (TYLENOL) tablet 650 mg, 650 mg, Oral, Q6H PRN, 650 mg at 06/23/21 0042 **OR** acetaminophen (TYLENOL) suppository 650 mg, 650 mg, Rectal, Q6H PRN, Bhanu Ramirez MD    polyethylene glycol (MIRALAX) packet 17 g, 17 g, Oral, DAILY PRN, Bhanu Ramirez MD    ondansetron (ZOFRAN ODT) tablet 4 mg, 4 mg, Oral, Q8H PRN **OR** ondansetron (ZOFRAN) injection 4 mg, 4 mg, IntraVENous, Q6H PRN, Dinora Ramirez MD    heparin (porcine) injection 5,000 Units, 5,000 Units, SubCUTAneous, Q8H, Dinora Ramirez MD, 5,000 Units at 06/24/21 4736    zinc oxide-white petrolatum 17-57 % topical paste, , Topical, TID, Bhanu Rmairez MD, Given at 06/24/21 6754

## 2021-06-24 NOTE — OP NOTES
This is operative report on Lucky Hammans, patient's date of birth January 12th 1932. Date of surgery: June 23, 2021. Surgeon: Dr. Anette Oliveira. Preop diagnosis:  1. Severe peripheral vascular disease left leg.  2.  Gangrene left heel. 3.  Osteomyelitis of the left tibia bone. 4.  Significant soft tissue necrosis left calf. Postprocedure diagnosis: Same as above. Procedure: Left above-knee amputation. Anesthesia: General.  Anesthesiologist: See anesthesia report. EBL: Minimal.  Fluids urine output: Please see anesthesia report. Assistant: Please see the OR record. Complication: None. Indication for surgery: Mr. Pam Williamson is currently hospitalized with multiple medical problems including chronic nonhealing wounds of the significant infection and a gangrene on the left heel. Patient also has a significant soft tissue necrosis on the right calf area. After discussion of the treatment this problem we discussed proceed with a left above-knee amputation. We talked about rationale for the procedure, level of amputation, rationale for the surgery, risk benefits complications and postop wound cares. Family has signed surgical consent for left above-knee amputation. Description of procedure: Patient was brought to the operating table comfortable supine position followed by general anesthesia was initiated. Followed by left leg prepped using upper thigh level using Betadine solution. Followed by sterile drapes applied usual fashion. At this time timeout was called after confirmation was made procedure commenced. Using marking pen appropriate level of the anterior posterior flap was made. Followed by using a #15 blade skin incision was made over the anterior and posterior flap marking. Followed by using electro Bovie cautery subcutaneous tissue was divided on both anterior and posterior flap. Followed by distal left tibia bone was exposed appropriate level. Divided with electric saw.   Followed by posterior flap was amputated with a amputation knife. Major vascular pedicle was suture-ligated using 0 Vicryl sutures. After hemostasis obtained both anterior and posterior flap was reapproximated at the fascial level using 0 Vicryl sutures. Skin was closed with 2-0 nylon sutures. Followed by sterile dressing was placed. EBL was minimal at the end the procedure instrument counts correct. At the end the procedure patient was extubated in the OR transferred to recovery area stable conditions.

## 2021-06-24 NOTE — PROGRESS NOTES
[facility], [place] [facility]     Renal Daily Progress Note:     Admission Date: 6/15/2021     Subjective:  post left AKA-POD#1. Awake and interactive, son at bedside, creatinine down further. Vital signs stable      Review of Systems  Pertinent items are noted in HPI.     Objective:     Visit Vitals  BP (!) 118/58 (BP 1 Location: Left upper arm, BP Patient Position: At rest)   Pulse 96   Temp 97.7 °F (36.5 °C)   Resp 14   Ht 6' 2\" (1.88 m)   Wt 127 kg (279 lb 15.8 oz)   SpO2 99%   BMI 35.95 kg/m²     Temp (24hrs), Av.2 °F (36.8 °C), Min:97.7 °F (36.5 °C), Max:98.7 °F (37.1 °C)      No intake or output data in the 24 hours ending 21 1344  Current Facility-Administered Medications   Medication Dose Route Frequency    0.9% sodium chloride infusion 250 mL  250 mL IntraVENous PRN    0.9% sodium chloride infusion 250 mL  250 mL IntraVENous PRN    sodium chloride (NS) flush 5-40 mL  5-40 mL IntraVENous Q8H    sodium chloride (NS) flush 5-40 mL  5-40 mL IntraVENous PRN    albuterol (PROVENTIL HFA, VENTOLIN HFA, PROAIR HFA) inhaler 2 Puff  2 Puff Inhalation Q6H PRN    0.9% sodium chloride infusion 250 mL  250 mL IntraVENous PRN    collagenase (SANTYL) 250 unit/gram ointment   Topical DAILY    linezolid (ZYVOX) tablet 600 mg  600 mg Oral Q12H    meropenem (MERREM) 1 g in sterile water (preservative free) 20 mL IV syringe  1 g IntraVENous Q24H    [Held by provider] amLODIPine (NORVASC) tablet 5 mg  5 mg Oral DAILY    gabapentin (NEURONTIN) capsule 100 mg  100 mg Oral TID    [Held by provider] furosemide (LASIX) tablet 40 mg  40 mg Oral DAILY    latanoprost (XALATAN) 0.005 % ophthalmic solution 1 Drop  1 Drop Both Eyes QPM    loperamide (IMODIUM) capsule 2 mg  2 mg Oral Q4H PRN    simvastatin (ZOCOR) tablet 10 mg  10 mg Oral QHS    traMADoL (ULTRAM) tablet 50 mg  50 mg Oral Q6H PRN    cyanocobalamin (VITAMIN B12) injection 1,000 mcg  1,000 mcg IntraMUSCular EVERY 2 WEEKS    ferrous sulfate tablet 325 mg  325 mg Oral ACB    pantoprazole (PROTONIX) tablet 40 mg  40 mg Oral DAILY    insulin lispro (HUMALOG) injection   SubCUTAneous AC&HS    glucose chewable tablet 16 g  4 Tablet Oral PRN    dextrose (D50W) injection syrg 12.5-25 g  25-50 mL IntraVENous PRN    glucagon (GLUCAGEN) injection 1 mg  1 mg IntraMUSCular PRN    0.9% sodium chloride infusion  75 mL/hr IntraVENous CONTINUOUS    acetaminophen (TYLENOL) tablet 650 mg  650 mg Oral Q6H PRN    Or    acetaminophen (TYLENOL) suppository 650 mg  650 mg Rectal Q6H PRN    polyethylene glycol (MIRALAX) packet 17 g  17 g Oral DAILY PRN    ondansetron (ZOFRAN ODT) tablet 4 mg  4 mg Oral Q8H PRN    Or    ondansetron (ZOFRAN) injection 4 mg  4 mg IntraVENous Q6H PRN    heparin (porcine) injection 5,000 Units  5,000 Units SubCUTAneous Q8H    zinc oxide-white petrolatum 17-57 % topical paste   Topical TID       Physical Exam:  General appearance: alert, no distress, appears stated age  Lungs: clear to auscultation bilaterally  Heart: regular rate and rhythm  Abdomen: soft, non-tender. Bowel sounds normal. No masses,  no organomegaly  Extremities: no edema right leg, left AKA stump wrapped  Neurologic: non-focal motor  Data Review:     LABS:  Recent Labs     06/24/21  0747 06/23/21  0758    137   K 4.5 4.2   * 109*   CO2 22 22   BUN 77* 78*   CREA 2.54* 2.71*   CA 8.1* 8.1*   ALB 1.4* 1.2*     Recent Labs     06/24/21  0747 06/23/21  0758   WBC 16.5* 14.7*   HGB 10.8* 8.8*   HCT 34.9* 28.4*    195     No results for input(s): TONY, KU, CLU, CREAU in the last 72 hours. No lab exists for component: PROU  CT lower legs w/o contrast 6-19-21     IMPRESSION  1. Somewhat suboptimal evaluation, as detailed above. 2. Within exam limitations there is a soft tissue ulcer at the posterior left  calcaneus with infection with gas-forming organism versus soft tissue  devitalization.  Small amount of gas within the posterior calcaneus is most  likely related to osteomyelitis. 3. Other soft tissue wounds within both legs. No additional findings of acute  osseous abnormality within exam limitations. Based on level of clinical concern,  could consider further evaluation with targeted MRI of specific regions. 4. Diffuse osseous demineralization likely contributes to the somewhat  heterogeneous appearance of the bones. However, there is an indeterminate 10 mm  lucency within the right medial femoral condyle. Please correlate with clinical  history for a known primary malignancy or plasma abnormality. Otherwise short  interval follow-up in 3 months should be considered. Assessment:   Renal Specific Problems  CKd 4 at baseline  CRISTIAN likely a combination of volume depletion and acute inflammatory state--improving  Anemia from chronic disease exacerbated by leg infections  Hypoalbumin  ? Liver neoplasm    PVD, ?  Left heel osteo      Plan:     Obtain/ Order: labs/cultures/radiology/procedures:        Therapeutic:    Cont current IVF and antibiotics; probably d/c IVF if eating well    Post  left AKA, we'll probably see continued improvement in renal function with removal of necrotic tissue    Bridgette Erazo MD    [phone]

## 2021-06-24 NOTE — PROGRESS NOTES
General Daily Progress Note          Patient Name:   Bradly Pham       YOB: 1932       Age:  80 y.o. Admit Date: 6/15/2021      Subjective:     Patient is Lactantius.Chakaher y.o. year old male with a past medical history of diabetes, hypertension, hyperlipidemia, renal insufficiency, and anemia history of liver mass who presented to the ER 6/15 due to abnormal labs drawn at the nursing home yesterday. He was found to have worsening kidney function. He was on dialysis previously, but had reportedly stopped dialysis several years ago. He denied chest pain and SOB. CXR showed no acute cardiopulmonary processes. He was admitted for further evaluation and treatment. Patient have a liver mass diagnosis and last admission as per note no further diagnosis procedure treatment was planned with the family by the attending.     Patient's discharge date canceled by vascular surgeon due to complex wounds on bilateral legs and need for CT. 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 Wound cultures showed E. Cloacae resistant to Zosyn and MRSA. Patient underwent left above knee amputation yesterday with no complications. Today the patient is awake in bed and in no acute distress. He is complaining of pain on left leg, where AKA was performed. Denies chest pain and SOB.               Objective:     Visit Vitals  BP (!) 118/58 (BP 1 Location: Left upper arm, BP Patient Position: At rest)   Pulse 96   Temp 97.7 °F (36.5 °C)   Resp 14   Ht 6' 2\" (1.88 m)   Wt 127 kg (279 lb 15.8 oz)   SpO2 99%   BMI 35.95 kg/m²        Recent Results (from the past 24 hour(s))   GLUCOSE, POC    Collection Time: 06/23/21  4:37 PM   Result Value Ref Range    Glucose (POC) 132 (H) 65 - 117 mg/dL    Performed by [facility], POC    Collection Time: 06/23/21  7:52 PM   Result Value Ref Range    Glucose (POC) 158 (H) 65 - 117 mg/dL    Performed by [address] [place] [address], POC    Collection Time: 06/24/21  7:27 AM   Result Value Ref Range    Glucose (POC) 66 65 - 117 mg/dL    Performed by Mathew Lombard Yudong(PCT)    CBC WITH AUTOMATED DIFF    Collection Time: 06/24/21  7:47 AM   Result Value Ref Range    WBC 16.5 (H) 4.1 - 11.1 K/uL    RBC 3.65 (L) 4.10 - 5.70 M/uL    HGB 10.8 (L) 12.1 - 17.0 g/dL    HCT 34.9 (L) 36.6 - 50.3 %    MCV 95.6 80.0 - 99.0 FL    MCH 29.6 26.0 - 34.0 PG    MCHC 30.9 30.0 - 36.5 g/dL    RDW 18.6 (H) 11.5 - 14.5 %    PLATELET 730 544 - 895 K/uL    MPV 9.9 8.9 - 12.9 FL    NRBC 0.1 (H) 0.0  WBC    ABSOLUTE NRBC 0.02 (H) 0.00 - 0.01 K/uL    NEUTROPHILS 65 32 - 75 %    LYMPHOCYTES 22 12 - 49 %    MONOCYTES 11 5 - 13 %    EOSINOPHILS 0 0 - 7 %    BASOPHILS 0 0 - 1 %    IMMATURE GRANULOCYTES 2 (H) 0 - 0.5 %    ABS. NEUTROPHILS 10.8 (H) 1.8 - 8.0 K/UL    ABS. LYMPHOCYTES 3.6 (H) 0.8 - 3.5 K/UL    ABS. MONOCYTES 1.8 (H) 0.0 - 1.0 K/UL    ABS. EOSINOPHILS 0.1 0.0 - 0.4 K/UL    ABS. BASOPHILS 0.1 0.0 - 0.1 K/UL    ABS. IMM. GRANS. 0.2 (H) 0.00 - 0.04 K/UL    DF AUTOMATED     METABOLIC PANEL, COMPREHENSIVE    Collection Time: 06/24/21  7:47 AM   Result Value Ref Range    Sodium 138 136 - 145 mmol/L    Potassium 4.5 3.5 - 5.1 mmol/L    Chloride 109 (H) 97 - 108 mmol/L    CO2 22 21 - 32 mmol/L    Anion gap 7 5 - 15 mmol/L    Glucose 68 65 - 100 mg/dL    BUN 77 (H) 6 - 20 mg/dL    Creatinine 2.54 (H) 0.70 - 1.30 mg/dL    BUN/Creatinine ratio 30 (H) 12 - 20      GFR est AA 29 (L) >60 ml/min/1.73m2    GFR est non-AA 24 (L) >60 ml/min/1.73m2    Calcium 8.1 (L) 8.5 - 10.1 mg/dL    Bilirubin, total 2.1 (H) 0.2 - 1.0 mg/dL    AST (SGOT) 115 (H) 15 - 37 U/L    ALT (SGPT) 18 12 - 78 U/L    Alk.  phosphatase 548 (H) 45 - 117 U/L    Protein, total 6.4 6.4 - 8.2 g/dL    Albumin 1.4 (L) 3.5 - 5.0 g/dL    Globulin 5.0 (H) 2.0 - 4.0 g/dL    A-G Ratio 0.3 (L) 1.1 - 2.2     GLUCOSE, POC    Collection Time: 06/24/21  7:53 AM   Result Value Ref Range    Glucose (POC) 67 65 - 117 mg/dL    Performed by Mathew Lombard Gino(PCT)    GLUCOSE, POC    Collection Time: 06/24/21  8:15 AM   Result Value Ref Range    Glucose (POC) 68 65 - 117 mg/dL    Performed by Sol Christian(PCT)    GLUCOSE, POC    Collection Time: 06/24/21  8:30 AM   Result Value Ref Range    Glucose (POC) 69 65 - 117 mg/dL    Performed by Sol Christian(PCT)    GLUCOSE, POC    Collection Time: 06/24/21  8:52 AM   Result Value Ref Range    Glucose (POC) 80 65 - 117 mg/dL    Performed by Sol Christian(PCT)      [unfilled]      Review of Systems    Constitutional: Negative for chills and fever. HENT: Negative. Eyes: Negative. Respiratory: Negative. Cardiovascular: Negative. Gastrointestinal: Negative for abdominal pain and nausea. Skin: Negative. Neurological: Negative. Physical Exam:      Constitutional: pt is oriented to person, place, and time. HENT:   Head: Normocephalic and atraumatic. Eyes: Pupils are equal, round, and reactive to light. EOM are normal.   Cardiovascular: Normal rate, regular rhythm and normal heart sounds. Pulmonary/Chest: Breath sounds normal. No wheezes. No rales. Exhibits no tenderness. Abdominal: Soft. Bowel sounds are normal. There is no abdominal tenderness. There is no rebound and no guarding. Musculoskeletal: left AKA   Neurological: pt is alert and oriented to person, place, and time. CT LOW EXT BI WO CONT   Final Result   1. Somewhat suboptimal evaluation, as detailed above. 2. Within exam limitations there is a soft tissue ulcer at the posterior left   calcaneus with infection with gas-forming organism versus soft tissue   devitalization. Small amount of gas within the posterior calcaneus is most   likely related to osteomyelitis. 3. Other soft tissue wounds within both legs. No additional findings of acute   osseous abnormality within exam limitations. Based on level of clinical concern,   could consider further evaluation with targeted MRI of specific regions.    4. Diffuse osseous demineralization likely contributes to the somewhat   heterogeneous appearance of the bones. However, there is an indeterminate 10 mm   lucency within the right medial femoral condyle. Please correlate with clinical   history for a known primary malignancy or plasma abnormality. Otherwise short   interval follow-up in 3 months should be considered. US RETROPERITONEUM COMP   Final Result   Borderline right-sided hydronephrosis. Ascites. XR CHEST PORT   Final Result   No evidence of an acute cardiopulmonary process. Recent Results (from the past 24 hour(s))   GLUCOSE, POC    Collection Time: 06/23/21  4:37 PM   Result Value Ref Range    Glucose (POC) 132 (H) 65 - 117 mg/dL    Performed by [address] [place] [address], POC    Collection Time: 06/23/21  7:52 PM   Result Value Ref Range    Glucose (POC) 158 (H) 65 - 117 mg/dL    Performed by Tahmina Castaneda    GLUCOSE, POC    Collection Time: 06/24/21  7:27 AM   Result Value Ref Range    Glucose (POC) 66 65 - 117 mg/dL    Performed by Pennie Christian(PCT)    CBC WITH AUTOMATED DIFF    Collection Time: 06/24/21  7:47 AM   Result Value Ref Range    WBC 16.5 (H) 4.1 - 11.1 K/uL    RBC 3.65 (L) 4.10 - 5.70 M/uL    HGB 10.8 (L) 12.1 - 17.0 g/dL    HCT 34.9 (L) 36.6 - 50.3 %    MCV 95.6 80.0 - 99.0 FL    MCH 29.6 26.0 - 34.0 PG    MCHC 30.9 30.0 - 36.5 g/dL    RDW 18.6 (H) 11.5 - 14.5 %    PLATELET 000 157 - 388 K/uL    MPV 9.9 8.9 - 12.9 FL    NRBC 0.1 (H) 0.0  WBC    ABSOLUTE NRBC 0.02 (H) 0.00 - 0.01 K/uL    NEUTROPHILS 65 32 - 75 %    LYMPHOCYTES 22 12 - 49 %    MONOCYTES 11 5 - 13 %    EOSINOPHILS 0 0 - 7 %    BASOPHILS 0 0 - 1 %    IMMATURE GRANULOCYTES 2 (H) 0 - 0.5 %    ABS. NEUTROPHILS 10.8 (H) 1.8 - 8.0 K/UL    ABS. LYMPHOCYTES 3.6 (H) 0.8 - 3.5 K/UL    ABS. MONOCYTES 1.8 (H) 0.0 - 1.0 K/UL    ABS. EOSINOPHILS 0.1 0.0 - 0.4 K/UL    ABS. BASOPHILS 0.1 0.0 - 0.1 K/UL    ABS. IMM.  GRANS. 0.2 (H) 0.00 - 0.04 K/UL    DF AUTOMATED METABOLIC PANEL, COMPREHENSIVE    Collection Time: 06/24/21  7:47 AM   Result Value Ref Range    Sodium 138 136 - 145 mmol/L    Potassium 4.5 3.5 - 5.1 mmol/L    Chloride 109 (H) 97 - 108 mmol/L    CO2 22 21 - 32 mmol/L    Anion gap 7 5 - 15 mmol/L    Glucose 68 65 - 100 mg/dL    BUN 77 (H) 6 - 20 mg/dL    Creatinine 2.54 (H) 0.70 - 1.30 mg/dL    BUN/Creatinine ratio 30 (H) 12 - 20      GFR est AA 29 (L) >60 ml/min/1.73m2    GFR est non-AA 24 (L) >60 ml/min/1.73m2    Calcium 8.1 (L) 8.5 - 10.1 mg/dL    Bilirubin, total 2.1 (H) 0.2 - 1.0 mg/dL    AST (SGOT) 115 (H) 15 - 37 U/L    ALT (SGPT) 18 12 - 78 U/L    Alk.  phosphatase 548 (H) 45 - 117 U/L    Protein, total 6.4 6.4 - 8.2 g/dL    Albumin 1.4 (L) 3.5 - 5.0 g/dL    Globulin 5.0 (H) 2.0 - 4.0 g/dL    A-G Ratio 0.3 (L) 1.1 - 2.2     GLUCOSE, POC    Collection Time: 06/24/21  7:53 AM   Result Value Ref Range    Glucose (POC) 67 65 - 117 mg/dL    Performed by Ashley Christian([facility])    GLUCOSE, POC    Collection Time: 06/24/21  8:15 AM   Result Value Ref Range    Glucose (POC) 68 65 - 117 mg/dL    Performed by Ashley Christian([facility])    GLUCOSE, POC    Collection Time: 06/24/21  8:30 AM   Result Value Ref Range    Glucose (POC) 69 65 - 117 mg/dL    Performed by Ashley Christian([facility])    GLUCOSE, POC    Collection Time: 06/24/21  8:52 AM   Result Value Ref Range    Glucose (POC) 80 65 - 117 mg/dL    Performed by Ashley Christian(PCT)        Results     Procedure Component Value Units Date/Time    CULTURE, Gonzalo Riversyle STAIN [[id number]]  (Susceptibility) Collected: 06/16/21 0615    Order Status: Completed Specimen: Wound Updated: 06/19/21 1018     Special Requests: No Special Requests        GRAM STAIN No wbc's seen         2+ Gram Positive Cocci         2+ Gram Positive Rods         Few Gram Negative Rods        Culture result:       Heavy Enterobacter cloacae complex                  Heavy * methicillin resistant staphylococcus aureus * REFER TO [id number] FOR SENSITIVITIES            Few Diphtheroids         [facility] 06/19/2021 BY DPW    Susceptibility      Enterobacter cloacae complex      CATINA      Amikacin ($) Susceptible      Cefazolin ($) Resistant      Cefepime ($$) Susceptible      Cefoxitin Resistant      Ceftazidime ($) Resistant      Ceftriaxone ($) Resistant      Ciprofloxacin ($) Resistant      Gentamicin ($) Susceptible      Levofloxacin ($) Resistant      Meropenem ($$) Susceptible      Piperacillin/Tazobac ($) Resistant      Tobramycin ($) Susceptible      Trimeth/Sulfa Resistant               Linear View                   CULTURE, Adrian Spangler STAIN [[id number]]  (Susceptibility) Collected: 06/16/21 0615    Order Status: Completed Specimen: Wound Updated: 06/19/21 0806     Special Requests: No Special Requests        GRAM STAIN 1+ WBCs seen         4+ Gram Negative Rods               4+ Gram Positive Cocci in pairs                  4+ apparent Gram Positive Rods           Culture result:       Heavy Enterobacter cloacae complex                  Moderate * methicillin resistant staphylococcus aureus *            Light Diphtheroids       Susceptibility      Enterobacter cloacae complex Staphylococcus aureus Methcillin Resistant      CATINA CATINA      Amikacin ($) Susceptible       Cefazolin ($) Resistant       Cefepime ($$) Susceptible       Cefoxitin Resistant       Ceftazidime ($) Resistant       Ceftriaxone ($) Resistant       Ciprofloxacin ($) Resistant Resistant      Clindamycin ($)  Susceptible      Daptomycin ($$$$$)  Susceptible      Doxycycline ($$)  Susceptible      Erythromycin ($$$$)  Susceptible      Gentamicin ($) Susceptible Susceptible      Levofloxacin ($) Resistant Resistant      Linezolid ($$$$$)  Susceptible      Meropenem ($$) Susceptible       Oxacillin  Resistant      Piperacillin/Tazobac ($) Resistant       Rifampin ($$$$)  Susceptible  [1]       Tetracycline  Resistant      Tobramycin ($) Susceptible       Trimeth/Sulfa Resistant Susceptible      Vancomycin ($)  Susceptible                [1]  Rifampin is not to be used for mono-therapy. Linear View                   COVID-19 RAPID TEST [[id number]] Collected: 06/16/21 0216    Order Status: Completed Specimen: Nasopharyngeal Updated: 06/16/21 0408     Specimen source Nasopharyngeal        COVID-19 rapid test Not Detected        Comment: Rapid Abbott ID Now   Rapid NAAT:  The specimen is NEGATIVE for SARS-CoV-2, the novel coronavirus associated with COVID-19. Negative results should be treated as presumptive and, if inconsistent with clinical signs and symptoms or necessary for patient management, should be tested with an alternative molecular assay. Negative results do not preclude SARS-CoV-2 infection and should not be used as the sole basis for patient management decisions. This test has been authorized by the FDA under   an Emergency Use Authorization (EUA) for use by authorized laboratories. Fact sheet for Healthcare Providers: [employer] [place] Fact sheet for Patients: [employer]ee   Methodology: Isothermal Nucleic Acid Amplification         COVID-19 RAPID TEST [[id number]]  (Abnormal) Collected: 06/16/21 0023    Order Status: Completed Specimen: Nasopharyngeal Updated: 06/16/21 0208     Specimen source Nasopharyngeal        COVID-19 rapid test Indeterminate        Comment: Rapid Abbott ID Now   The presence or absence of COVID-19 Viral RNAs cannot be determined. If clinically indicated, please collect a new specimen. This test has been authorized by the FDA under an Emergency Use Authorization (EUA) for use by authorized laboratories.    Fact sheet for Healthcare Providers: [employer]ee Fact sheet for Patients: [employer]   Methodology: Isothermal Nucleic Acid Amplification SPOKE TO KEVYN MENDOZA,   SUGGESTED RECOLLECT                 Labs:     Recent Labs     06/24/21  0747 06/23/21  0758   WBC 16.5* 14.7*   HGB 10.8* 8.8*   HCT 34.9* 28.4*    195     Recent Labs     06/24/21  0747 06/23/21  0758    137   K 4.5 4.2   * 109*   CO2 22 22   BUN 77* 78*   CREA 2.54* 2.71*   GLU 68 81   CA 8.1* 8.1*     Recent Labs     06/24/21  0747 06/23/21  0758   ALT 18 18   * 573*   TBILI 2.1* 2.3*   TP 6.4 6.4   ALB 1.4* 1.2*   GLOB 5.0* 5.2*     No results for input(s): INR, PTP, APTT, INREXT, INREXT in the last 72 hours. No results for input(s): FE, TIBC, PSAT, FERR in the last 72 hours. No results found for: FOL, RBCF   No results for input(s): PH, PCO2, PO2 in the last 72 hours. No results for input(s): CPK, CKNDX, TROIQ in the last 72 hours. No lab exists for component: CPKMB  No results found for: CHOL, CHOLX, CHLST, CHOLV, HDL, HDLP, LDL, LDLC, DLDLP, TGLX, TRIGL, TRIGP, CHHD, CHHDX  Lab Results   Component Value Date/Time    Glucose (POC) 80 06/24/2021 08:52 AM    Glucose (POC) 69 06/24/2021 08:30 AM    Glucose (POC) 68 06/24/2021 08:15 AM    Glucose (POC) 67 06/24/2021 07:53 AM    Glucose (POC) 66 06/24/2021 07:27 AM     Lab Results   Component Value Date/Time    Color Yaritza 04/28/2021 03:00 PM    Appearance Turbid (A) 04/28/2021 03:00 PM    Specific gravity 1.014 04/28/2021 03:00 PM    pH (UA) 5.0 04/28/2021 03:00 PM    Protein 100 (A) 04/28/2021 03:00 PM    Glucose Negative 04/28/2021 03:00 PM    Ketone Negative 04/28/2021 03:00 PM    Bilirubin Negative 04/28/2021 03:00 PM    Urobilinogen 4.0 (H) 04/28/2021 03:00 PM    Nitrites Negative 04/28/2021 03:00 PM    Leukocyte Esterase Negative 04/28/2021 03:00 PM    Bacteria Negative 04/28/2021 03:00 PM    WBC 0-4 04/28/2021 03:00 PM    RBC 0-5 04/28/2021 03:00 PM         Assessment:     Acute on chronic kidney failure-trending down  Diabetes  Hypertension   Hyperlipidemia  Anemia- improving  Leukocytosis  Chronic leg wounds-E.  Cloacae resistant to Zosyn and MRSA isolated   Liver mass  Hypotension-improved   Hypokalemia  Right moderate hydronephrosis  Left heel ulcer-gas-forming organisms and osteomyelitis   Post op day #1 left AKA     Plan:      On linezolid day #5 and Meropenem day # 6  Monitor AKA stump for post-op infection  Frequent turns and routine wound care  Repeat labs in AM       Current Facility-Administered Medications:     0.9% sodium chloride infusion 250 mL, 250 mL, IntraVENous, PRN, Rimma Kidd MD    0.9% sodium chloride infusion 250 mL, 250 mL, IntraVENous, PRN, Rimma Kidd MD    sodium chloride (NS) flush 5-40 mL, 5-40 mL, IntraVENous, Q8H, Rimma Kidd MD, 10 mL at 06/24/21 5913    sodium chloride (NS) flush 5-40 mL, 5-40 mL, IntraVENous, PRN, Rimma Kidd MD    albuterol (PROVENTIL HFA, VENTOLIN HFA, PROAIR HFA) inhaler 2 Puff, 2 Puff, Inhalation, Q6H PRN, Janet Miranda MD    0.9% sodium chloride infusion 250 mL, 250 mL, IntraVENous, PRN, Rimma Kidd MD    collagenase (SANTYL) 250 unit/gram ointment, , Topical, DAILY, Rimma Kidd MD, Given at 06/22/21 7040    linezolid (ZYVOX) tablet 600 mg, 600 mg, Oral, Q12H, Sascha Araiza MD, 600 mg at 06/24/21 0827    meropenem (MERREM) 1 g in sterile water (preservative free) 20 mL IV syringe, 1 g, IntraVENous, Q24H, Sascha Araiza MD, 1 g at 06/23/21 1539    [Held by provider] amLODIPine (NORVASC) tablet 5 mg, 5 mg, Oral, DAILY, Janet Miranda MD, 5 mg at 06/17/21 1123    gabapentin (NEURONTIN) capsule 100 mg, 100 mg, Oral, TID, Janet Miranda MD, 100 mg at 06/24/21 0827    [Held by provider] furosemide (LASIX) tablet 40 mg, 40 mg, Oral, DAILY, Janet Miranda MD, 40 mg at 06/16/21 1107    latanoprost (XALATAN) 0.005 % ophthalmic solution 1 Drop, 1 Drop, Both Eyes, QPM, Dinora Ramirez MD, 1 Drop at 06/23/21 4942    loperamide (IMODIUM) capsule 2 mg, 2 mg, Oral, Q4H PRN, Dinora Ramirez MD    simvastatin (ZOCOR) tablet 10 mg, 10 mg, Oral, QHS, Janet Miranda MD, 10 mg at 06/23/21 2223    traMADoL (ULTRAM) tablet 50 mg, 50 mg, Oral, Q6H PRN, Dinora Ramirez MD, 50 mg at 06/23/21 1523    cyanocobalamin (VITAMIN B12) injection 1,000 mcg, 1,000 mcg, IntraMUSCular, EVERY 2 WEEKS, Dinora Ramirez MD, 1,000 mcg at 06/16/21 1731    ferrous sulfate tablet 325 mg, 325 mg, Oral, ACB, Dinora Ramirez MD, 325 mg at 06/20/21 0855    pantoprazole (PROTONIX) tablet 40 mg, 40 mg, Oral, DAILY, Dinora Ramirez MD, 40 mg at 06/24/21 9670    insulin lispro (HUMALOG) injection, , SubCUTAneous, AC&HS, Talya Arias MD, 3 Units at 06/23/21 2230    glucose chewable tablet 16 g, 4 Tablet, Oral, PRN, Oni Ramirez MD    dextrose (D50W) injection syrg 12.5-25 g, 25-50 mL, IntraVENous, PRN, Oni Ramirez MD    glucagon (GLUCAGEN) injection 1 mg, 1 mg, IntraMUSCular, PRN, Oni Ramirez MD    0.9% sodium chloride infusion, 75 mL/hr, IntraVENous, CONTINUOUS, Dinora Ramirez MD, Last Rate: 75 mL/hr at 06/24/21 0315, 75 mL/hr at 06/24/21 0315    acetaminophen (TYLENOL) tablet 650 mg, 650 mg, Oral, Q6H PRN, 650 mg at 06/23/21 0042 **OR** acetaminophen (TYLENOL) suppository 650 mg, 650 mg, Rectal, Q6H PRN, Oni Ramirez MD    polyethylene glycol (MIRALAX) packet 17 g, 17 g, Oral, DAILY PRN, Oni Ramirez MD    ondansetron (ZOFRAN ODT) tablet 4 mg, 4 mg, Oral, Q8H PRN **OR** ondansetron (ZOFRAN) injection 4 mg, 4 mg, IntraVENous, Q6H PRN, Dinora Ramirez MD    heparin (porcine) injection 5,000 Units, 5,000 Units, SubCUTAneous, Q8H, Dinora Ramirez MD, 5,000 Units at 06/24/21 0314    zinc oxide-white petrolatum 17-57 % topical paste, , Topical, TID, Oni Ramirez MD, Given at 06/24/21 9877

## 2021-06-24 NOTE — PROGRESS NOTES
Infectious Disease Progress Note           Subjective:   Doing well, denies pain, no acute events since last seen. Denies right BKA stump pain or discomfort   Objective:   Physical Exam:     Visit Vitals  BP (!) 118/58 (BP 1 Location: Left upper arm, BP Patient Position: At rest)   Pulse 96   Temp 97.7 °F (36.5 °C)   Resp 14   Ht 6' 2\" (1.88 m)   Wt 279 lb 15.8 oz (127 kg)   SpO2 99%   BMI 35.95 kg/m²    O2 Flow Rate (L/min): 1 l/min O2 Device: Nasal cannula    Temp (24hrs), Av.2 °F (36.8 °C), Min:97.7 °F (36.5 °C), Max:98.7 °F (37.1 °C)    No intake/output data recorded.     1901 -  0700  In: 1725 [I.V.:500]  Out: 500 [Urine:100]    General: NAD, alert and following commands   HEENT: JEAN, Moist mucosa   Lungs: CTA b/l, no wheeze/rhonchi   Heart: S1S2+, RRR, no murmur  Abdo: Soft, NT, ND, +BS   Exts: Leg dressing in place, malodorous smell from wounds   Skin: No wounds, No rashes or lesions    Data Review:       Recent Days:  Recent Labs     21  0747 21  0758   WBC 16.5* 14.7*   HGB 10.8* 8.8*   HCT 34.9* 28.4*    195     Recent Labs     21  0747 21  0758   BUN 77* 78*   CREA 2.54* 2.71*     Microbiology     Results     Procedure Component Value Units Date/Time    CULTURE, Rivera Trentlim STAIN [[id number]]  (Susceptibility) Collected: 21 0615    Order Status: Completed Specimen: Wound Updated: 21 1018     Special Requests: No Special Requests        GRAM STAIN No wbc's seen         2+ Gram Positive Cocci         2+ Gram Positive Rods         Few Gram Negative Rods        Culture result:       Heavy Enterobacter cloacae complex                  Heavy * methicillin resistant staphylococcus aureus * REFER TO [id number] FOR SENSITIVITIES            Few Diphtheroids         CALLED MRSA REPORT TO Cheli Nagel R.N. 1020 2021 BY DPW    Susceptibility      Enterobacter cloacae complex      CATINA      Amikacin ($) Susceptible      Cefazolin ($) Resistant      Cefepime ($$) Susceptible      Cefoxitin Resistant      Ceftazidime ($) Resistant      Ceftriaxone ($) Resistant      Ciprofloxacin ($) Resistant      Gentamicin ($) Susceptible      Levofloxacin ($) Resistant      Meropenem ($$) Susceptible      Piperacillin/Tazobac ($) Resistant      Tobramycin ($) Susceptible      Trimeth/Sulfa Resistant               Linear View                   CULTURE, Aaliyah Ear STAIN [[id number]]  (Susceptibility) Collected: 06/16/21 0615    Order Status: Completed Specimen: Wound Updated: 06/19/21 0806     Special Requests: No Special Requests        GRAM STAIN 1+ WBCs seen         4+ Gram Negative Rods               4+ Gram Positive Cocci in pairs                  4+ apparent Gram Positive Rods           Culture result:       Heavy Enterobacter cloacae complex                  Moderate * methicillin resistant staphylococcus aureus *            Light Diphtheroids       Susceptibility      Enterobacter cloacae complex Staphylococcus aureus Methcillin Resistant      CATINA CATINA      Amikacin ($) Susceptible       Cefazolin ($) Resistant       Cefepime ($$) Susceptible       Cefoxitin Resistant       Ceftazidime ($) Resistant       Ceftriaxone ($) Resistant       Ciprofloxacin ($) Resistant Resistant      Clindamycin ($)  Susceptible      Daptomycin ($$$$$)  Susceptible      Doxycycline ($$)  Susceptible      Erythromycin ($$$$)  Susceptible      Gentamicin ($) Susceptible Susceptible      Levofloxacin ($) Resistant Resistant      Linezolid ($$$$$)  Susceptible      Meropenem ($$) Susceptible       Oxacillin  Resistant      Piperacillin/Tazobac ($) Resistant       Rifampin ($$$$)  Susceptible<sup style='font-weight:normal'>1</sup></font>      Tetracycline  Resistant      Tobramycin ($) Susceptible       Trimeth/Sulfa Resistant Susceptible      Vancomycin ($)  Susceptible                              COVID-19 RAPID TEST [[id number]] Collected: 06/16/21 0216    Order Status: Completed Specimen: Nasopharyngeal Updated: 06/16/21 0408     Specimen source Nasopharyngeal        COVID-19 rapid test Not Detected        Comment: Rapid Abbott ID Now   Rapid NAAT:  The specimen is NEGATIVE for SARS-CoV-2, the novel coronavirus associated with COVID-19. Negative results should be treated as presumptive and, if inconsistent with clinical signs and symptoms or necessary for patient management, should be tested with an alternative molecular assay. Negative results do not preclude SARS-CoV-2 infection and should not be used as the sole basis for patient management decisions. This test has been authorized by the FDA under   an Emergency Use Authorization (EUA) for use by authorized laboratories. Fact sheet for Healthcare Providers: iTendency.uy Fact sheet for Patients: iTendency.uy   Methodology: Isothermal Nucleic Acid Amplification         COVID-19 RAPID TEST [[id number]]  (Abnormal) Collected: 06/16/21 0023    Order Status: Completed Specimen: Nasopharyngeal Updated: 06/16/21 0208     Specimen source Nasopharyngeal        COVID-19 rapid test Indeterminate        Comment: Rapid Abbott ID Now   The presence or absence of COVID-19 Viral RNAs cannot be determined. If clinically indicated, please collect a new specimen. This test has been authorized by the FDA under an Emergency Use Authorization (EUA) for use by authorized laboratories. Fact sheet for Healthcare Providers: iTendency.uy Fact sheet for Patients: iTendency.uy   Methodology: Isothermal Nucleic Acid Amplification SPOKE TO KEVYN MENDOZA,   SUGGESTED RECOLLECT                 Diagnostics   CXR Results  (Last 48 hours)    None         Assessment/Plan     1. 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 Cloacae complex R to Zosyn and MRSA isolated from wound Cx collected from b/l Legs       S/p left AKA on 06/23, doing well post-operatively       On linezolid day # 4/14 days and Meropenem day # 5/21 days       Plan on continuing Linezolid for 14 days and Meropenem for 3 wks post-op for right leg ulcer       If pt is discharged on 06/25, I will follow in 2 wks     2. Sacral ulcer, unstageable, continue frequent turns and routine wound care     3. Left heel ulcer: Osteo and gas formation noted on CT      S/p Left AKA on 06/23, Pain is well controlled     4. Acute on chronic renal failure: Cr down to 2.71 on todays labs      5.  Poor oral intake, better w improved mentation         Francine Richter MD     6/24/2021

## 2021-06-24 NOTE — PROGRESS NOTES
Assume care pt drowsy able to make needs . Alert and oriented x 1 person. Follow simple commands. Lt BKA noted some drainage re-enforced with ABD pad and tape. Tolerated all meds and treatments. Presently resting with eyes open. HOB up and call bell in reach.

## 2021-06-25 ENCOUNTER — APPOINTMENT (OUTPATIENT)
Dept: GENERAL RADIOLOGY | Age: 86
DRG: 981 | End: 2021-06-25
Attending: FAMILY MEDICINE
Payer: MEDICARE

## 2021-06-25 LAB
ALBUMIN SERPL-MCNC: 1.3 G/DL (ref 3.5–5)
ALBUMIN/GLOB SERPL: 0.3 {RATIO} (ref 1.1–2.2)
ALP SERPL-CCNC: 567 U/L (ref 45–117)
ALT SERPL-CCNC: 17 U/L (ref 12–78)
ANION GAP SERPL CALC-SCNC: 7 MMOL/L (ref 5–15)
ARTERIAL PATENCY WRIST A: ABNORMAL
ARTERIAL PATENCY WRIST A: ABNORMAL
AST SERPL W P-5'-P-CCNC: 124 U/L (ref 15–37)
BASE DEFICIT BLDA-SCNC: 3.9 MMOL/L (ref 0–2)
BASE DEFICIT BLDA-SCNC: 5.1 MMOL/L (ref 0–2)
BASOPHILS # BLD: 0.1 K/UL (ref 0–0.1)
BASOPHILS NFR BLD: 0 % (ref 0–1)
BDY SITE: ABNORMAL
BDY SITE: ABNORMAL
BILIRUB SERPL-MCNC: 2 MG/DL (ref 0.2–1)
BUN SERPL-MCNC: 79 MG/DL (ref 6–20)
BUN/CREAT SERPL: 32 (ref 12–20)
CA-I BLD-MCNC: 7.9 MG/DL (ref 8.5–10.1)
CHLORIDE SERPL-SCNC: 110 MMOL/L (ref 97–108)
CO2 SERPL-SCNC: 22 MMOL/L (ref 21–32)
CREAT SERPL-MCNC: 2.45 MG/DL (ref 0.7–1.3)
DIFFERENTIAL METHOD BLD: ABNORMAL
EOSINOPHIL # BLD: 0.1 K/UL (ref 0–0.4)
EOSINOPHIL NFR BLD: 1 % (ref 0–7)
EPAP/CPAP/PEEP, PAPEEP: 8
ERYTHROCYTE [DISTWIDTH] IN BLOOD BY AUTOMATED COUNT: 18.6 % (ref 11.5–14.5)
FIO2 ON VENT: 35 %
GAS FLOW.O2 O2 DELIVERY SYS: 2 L/MIN
GAS FLOW.O2 SETTING OXYMISER: 18 L/MIN
GLOBULIN SER CALC-MCNC: 5 G/DL (ref 2–4)
GLUCOSE BLD STRIP.AUTO-MCNC: 105 MG/DL (ref 65–117)
GLUCOSE BLD STRIP.AUTO-MCNC: 132 MG/DL (ref 65–117)
GLUCOSE BLD STRIP.AUTO-MCNC: 93 MG/DL (ref 65–117)
GLUCOSE BLD STRIP.AUTO-MCNC: 98 MG/DL (ref 65–117)
GLUCOSE SERPL-MCNC: 82 MG/DL (ref 65–100)
HCO3 BLDA-SCNC: 20 MMOL/L (ref 22–26)
HCO3 BLDA-SCNC: 21 MMOL/L (ref 22–26)
HCT VFR BLD AUTO: 30.8 % (ref 36.6–50.3)
HGB BLD-MCNC: 9.3 G/DL (ref 12.1–17)
IMM GRANULOCYTES # BLD AUTO: 0.2 K/UL (ref 0–0.04)
IMM GRANULOCYTES NFR BLD AUTO: 1 % (ref 0–0.5)
LYMPHOCYTES # BLD: 3.6 K/UL (ref 0.8–3.5)
LYMPHOCYTES NFR BLD: 25 % (ref 12–49)
MCH RBC QN AUTO: 29.3 PG (ref 26–34)
MCHC RBC AUTO-ENTMCNC: 30.2 G/DL (ref 30–36.5)
MCV RBC AUTO: 97.2 FL (ref 80–99)
MONOCYTES # BLD: 1.4 K/UL (ref 0–1)
MONOCYTES NFR BLD: 10 % (ref 5–13)
NEUTS SEG # BLD: 9.2 K/UL (ref 1.8–8)
NEUTS SEG NFR BLD: 63 % (ref 32–75)
NRBC # BLD: 0 K/UL (ref 0–0.01)
NRBC BLD-RTO: 0 PER 100 WBC
PCO2 BLDA: 52 MMHG (ref 35–45)
PCO2 BLDA: 52 MMHG (ref 35–45)
PERFORMED BY, TECHID: ABNORMAL
PERFORMED BY, TECHID: NORMAL
PH BLDA: 7.24 [PH] (ref 7.35–7.45)
PH BLDA: 7.26 [PH] (ref 7.35–7.45)
PLATELET # BLD AUTO: 161 K/UL (ref 150–400)
PMV BLD AUTO: 10 FL (ref 8.9–12.9)
PO2 BLDA: 126 MMHG (ref 75–100)
PO2 BLDA: 78 MMHG (ref 75–100)
POTASSIUM SERPL-SCNC: 4.7 MMOL/L (ref 3.5–5.1)
PROT SERPL-MCNC: 6.3 G/DL (ref 6.4–8.2)
RBC # BLD AUTO: 3.17 M/UL (ref 4.1–5.7)
SAO2 % BLD: 96 %
SAO2 % BLD: 99 %
SAO2% DEVICE SAO2% SENSOR NAME: ABNORMAL
SAO2% DEVICE SAO2% SENSOR NAME: ABNORMAL
SERVICE CMNT-IMP: ABNORMAL
SODIUM SERPL-SCNC: 139 MMOL/L (ref 136–145)
TROPONIN I SERPL-MCNC: <0.05 NG/ML
WBC # BLD AUTO: 14.5 K/UL (ref 4.1–11.1)

## 2021-06-25 PROCEDURE — 36600 WITHDRAWAL OF ARTERIAL BLOOD: CPT

## 2021-06-25 PROCEDURE — 5A09457 ASSISTANCE WITH RESPIRATORY VENTILATION, 24-96 CONSECUTIVE HOURS, CONTINUOUS POSITIVE AIRWAY PRESSURE: ICD-10-PCS | Performed by: FAMILY MEDICINE

## 2021-06-25 PROCEDURE — 74011000250 HC RX REV CODE- 250: Performed by: INTERNAL MEDICINE

## 2021-06-25 PROCEDURE — 36415 COLL VENOUS BLD VENIPUNCTURE: CPT

## 2021-06-25 PROCEDURE — 82962 GLUCOSE BLOOD TEST: CPT

## 2021-06-25 PROCEDURE — 77010033678 HC OXYGEN DAILY

## 2021-06-25 PROCEDURE — 74011250636 HC RX REV CODE- 250/636: Performed by: FAMILY MEDICINE

## 2021-06-25 PROCEDURE — 80053 COMPREHEN METABOLIC PANEL: CPT

## 2021-06-25 PROCEDURE — 99024 POSTOP FOLLOW-UP VISIT: CPT | Performed by: SURGERY

## 2021-06-25 PROCEDURE — 85025 COMPLETE CBC W/AUTO DIFF WBC: CPT

## 2021-06-25 PROCEDURE — 74011250637 HC RX REV CODE- 250/637: Performed by: INTERNAL MEDICINE

## 2021-06-25 PROCEDURE — 74011000250 HC RX REV CODE- 250: Performed by: FAMILY MEDICINE

## 2021-06-25 PROCEDURE — 82803 BLOOD GASES ANY COMBINATION: CPT

## 2021-06-25 PROCEDURE — 74011250636 HC RX REV CODE- 250/636: Performed by: INTERNAL MEDICINE

## 2021-06-25 PROCEDURE — 65270000032 HC RM SEMIPRIVATE

## 2021-06-25 PROCEDURE — 71045 X-RAY EXAM CHEST 1 VIEW: CPT

## 2021-06-25 PROCEDURE — 74011250637 HC RX REV CODE- 250/637: Performed by: FAMILY MEDICINE

## 2021-06-25 PROCEDURE — 84484 ASSAY OF TROPONIN QUANT: CPT

## 2021-06-25 PROCEDURE — 94760 N-INVAS EAR/PLS OXIMETRY 1: CPT

## 2021-06-25 PROCEDURE — 94660 CPAP INITIATION&MGMT: CPT

## 2021-06-25 RX ORDER — FUROSEMIDE 10 MG/ML
40 INJECTION INTRAMUSCULAR; INTRAVENOUS ONCE
Status: COMPLETED | OUTPATIENT
Start: 2021-06-25 | End: 2021-06-25

## 2021-06-25 RX ORDER — SODIUM BICARBONATE 1 MEQ/ML
50 SYRINGE (ML) INTRAVENOUS
Status: COMPLETED | OUTPATIENT
Start: 2021-06-25 | End: 2021-06-25

## 2021-06-25 RX ADMIN — MEROPENEM 1 G: 1 INJECTION, POWDER, FOR SOLUTION INTRAVENOUS at 17:36

## 2021-06-25 RX ADMIN — WHITE PETROLATUM,ZINC OXIDE: 57; 17 PASTE TOPICAL at 16:47

## 2021-06-25 RX ADMIN — FUROSEMIDE 40 MG: 10 INJECTION, SOLUTION INTRAMUSCULAR; INTRAVENOUS at 17:37

## 2021-06-25 RX ADMIN — HEPARIN SODIUM 5000 UNITS: 5000 INJECTION INTRAVENOUS; SUBCUTANEOUS at 02:46

## 2021-06-25 RX ADMIN — HEPARIN SODIUM 5000 UNITS: 5000 INJECTION INTRAVENOUS; SUBCUTANEOUS at 21:10

## 2021-06-25 RX ADMIN — SIMVASTATIN 10 MG: 10 TABLET, FILM COATED ORAL at 21:10

## 2021-06-25 RX ADMIN — LINEZOLID 600 MG: 600 TABLET, FILM COATED ORAL at 21:10

## 2021-06-25 RX ADMIN — WHITE PETROLATUM,ZINC OXIDE: 57; 17 PASTE TOPICAL at 12:28

## 2021-06-25 RX ADMIN — SODIUM BICARBONATE 50 MEQ: 84 INJECTION, SOLUTION INTRAVENOUS at 02:47

## 2021-06-25 RX ADMIN — Medication 10 ML: at 14:30

## 2021-06-25 RX ADMIN — Medication 10 ML: at 21:18

## 2021-06-25 RX ADMIN — WHITE PETROLATUM,ZINC OXIDE: 57; 17 PASTE TOPICAL at 21:18

## 2021-06-25 RX ADMIN — COLLAGENASE SANTYL: 250 OINTMENT TOPICAL at 12:27

## 2021-06-25 RX ADMIN — LATANOPROST 1 DROP: 50 SOLUTION OPHTHALMIC at 17:38

## 2021-06-25 RX ADMIN — Medication 10 ML: at 11:15

## 2021-06-25 RX ADMIN — HEPARIN SODIUM 5000 UNITS: 5000 INJECTION INTRAVENOUS; SUBCUTANEOUS at 12:25

## 2021-06-25 NOTE — PROGRESS NOTES
General Daily Progress Note          Patient Name:   Libra Penny       YOB: 1932       Age:  80 y.o. Admit Date: 6/15/2021      Subjective:        Patient is Vishnu.Good y.o. year old male with a past medical history of diabetes, hypertension, hyperlipidemia, renal insufficiency, and anemia history of liver mass who presented to the ER 6/15 due to abnormal labs drawn at the nursing home. He was found to have worsening kidney function. He was on dialysis previously, but had reportedly stopped dialysis several years ago. He denied chest pain and SOB. CXR showed no acute cardiopulmonary processes. He was admitted for further evaluation and treatment. Patient have a liver mass diagnosis and last admission as per note no further diagnosis procedure treatment was planned with the family by the attending.     Patient's discharge date canceled by vascular surgeon due to complex wounds on bilateral legs and need for CT. 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 Wound cultures showed E. Cloacae resistant to Zosyn and MRSA. Patient underwent left above knee amputation 0/97 with no complications. Last night, nurse reported change in mental status. ABG drawn showed respiratory acidosis. 1 amp bicarb given. Pulmonology consulted and BIPAP started. CXR unremarkable. Today patient is awake on BIPAP. He is still lethargic, but nodding yes or no to questions. Denies chest pain or SOB.               Objective:     Visit Vitals  BP (!) 127/59 (BP 1 Location: Right upper arm, BP Patient Position: At rest)   Pulse (!) 102   Temp 99.6 °F (37.6 °C)   Resp 20   Ht 6' 2\" (1.88 m)   Wt 127 kg (279 lb 15.8 oz)   SpO2 100%   BMI 35.95 kg/m²        Recent Results (from the past 24 hour(s))   GLUCOSE, POC    Collection Time: 06/24/21 11:14 AM   Result Value Ref Range    Glucose (POC) 104 65 - 117 mg/dL    Performed by Sunita Christian(PCT)    GLUCOSE, POC    Collection Time: 06/24/21  2:49 PM   Result Value Ref Range    Glucose (POC) 115 65 - 117 mg/dL    Performed by Lexis Christian(PCT)    GLUCOSE, POC    Collection Time: 06/24/21  7:46 PM   Result Value Ref Range    Glucose (POC) 124 (H) 65 - 117 mg/dL    Performed by Linda Rodriguez    BLOOD GAS, ARTERIAL    Collection Time: 06/25/21  1:40 AM   Result Value Ref Range    pH 7.24 (LL) 7.35 - 7.45      PCO2 52 (H) 35 - 45 mmHg    PO2 78 75 - 100 mmHg    O2 SAT 96 >95 %    BICARBONATE 20 (L) 22 - 26 mmol/L    BASE DEFICIT 5.1 (H) 0 - 2 mmol/L    O2 METHOD Nasal Cannula      O2 FLOW RATE 2.0 L/min    SITE Right Radial      LORENA'S TEST PASS     GLUCOSE, POC    Collection Time: 06/25/21  9:51 AM   Result Value Ref Range    Glucose (POC) 93 65 - 117 mg/dL    Performed by Mario Drivers      [unfilled]      Review of Systems    Constitutional: Negative for chills and fever. HENT: Negative. Eyes: Negative. Respiratory: Negative. Cardiovascular: Negative. Gastrointestinal: Negative for abdominal pain and nausea. Skin: Negative. Neurological: Negative. Physical Exam:      Constitutional: awake, lethargic   HENT:   Head: Normocephalic and atraumatic. Eyes: Pupils are equal, round, and reactive to light. EOM are normal.   Cardiovascular: Normal rate, regular rhythm and normal heart sounds. Pulmonary/Chest: Breath sounds normal. No wheezes. No rales. Exhibits no tenderness. Abdominal: Soft. Bowel sounds are normal. There is no abdominal tenderness. There is no rebound and no guarding. Musculoskeletal: Normal range of motion. Neurological: awake, lethargic, responding to commands      XR CHEST PORT   Final Result   :    1. Low lung volumes. No acute cardiopulmonary disease. CT LOW EXT BI WO CONT   Final Result   1. Somewhat suboptimal evaluation, as detailed above.    2. Within exam limitations there is a soft tissue ulcer at the posterior left   calcaneus with infection with gas-forming organism versus soft tissue devitalization. Small amount of gas within the posterior calcaneus is most   likely related to osteomyelitis. 3. Other soft tissue wounds within both legs. No additional findings of acute   osseous abnormality within exam limitations. Based on level of clinical concern,   could consider further evaluation with targeted MRI of specific regions. 4. Diffuse osseous demineralization likely contributes to the somewhat   heterogeneous appearance of the bones. However, there is an indeterminate 10 mm   lucency within the right medial femoral condyle. Please correlate with clinical   history for a known primary malignancy or plasma abnormality. Otherwise short   interval follow-up in 3 months should be considered. US RETROPERITONEUM COMP   Final Result   Borderline right-sided hydronephrosis. Ascites. XR CHEST PORT   Final Result   No evidence of an acute cardiopulmonary process.            Recent Results (from the past 24 hour(s))   GLUCOSE, POC    Collection Time: 06/24/21 11:14 AM   Result Value Ref Range    Glucose (POC) 104 65 - 117 mg/dL    Performed by Lexis Christian(PCT)    GLUCOSE, POC    Collection Time: 06/24/21  2:49 PM   Result Value Ref Range    Glucose (POC) 115 65 - 117 mg/dL    Performed by Lexis Christian(PCT)    GLUCOSE, POC    Collection Time: 06/24/21  7:46 PM   Result Value Ref Range    Glucose (POC) 124 (H) 65 - 117 mg/dL    Performed by Linad Rodriguez    BLOOD GAS, ARTERIAL    Collection Time: 06/25/21  1:40 AM   Result Value Ref Range    pH 7.24 (LL) 7.35 - 7.45      PCO2 52 (H) 35 - 45 mmHg    PO2 78 75 - 100 mmHg    O2 SAT 96 >95 %    BICARBONATE 20 (L) 22 - 26 mmol/L    BASE DEFICIT 5.1 (H) 0 - 2 mmol/L    O2 METHOD Nasal Cannula      O2 FLOW RATE 2.0 L/min    SITE Right Radial      LORENA'S TEST PASS     GLUCOSE, POC    Collection Time: 06/25/21  9:51 AM   Result Value Ref Range    Glucose (POC) 93 65 - 117 mg/dL    Performed by [employer]        Results     Procedure Component Value Units Date/Time    CULTURE, Lolly Link STAIN [[id number]]  (Susceptibility) Collected: 06/16/21 0615    Order Status: Completed Specimen: Wound Updated: 06/19/21 1018     Special Requests: No Special Requests        GRAM STAIN No wbc's seen         2+ Gram Positive Cocci         2+ Gram Positive Rods         Few Gram Negative Rods        Culture result:       Heavy Enterobacter cloacae complex                  Heavy * methicillin resistant staphylococcus aureus * REFER TO [id number] FOR SENSITIVITIES            Few Diphtheroids         CALLED MRSA REPORT TO Brigid Ogden R.N. 102Atiya 06/19/2021 BY DPW    Susceptibility      Enterobacter cloacae complex      CATINA      Amikacin ($) Susceptible      Cefazolin ($) Resistant      Cefepime ($$) Susceptible      Cefoxitin Resistant      Ceftazidime ($) Resistant      Ceftriaxone ($) Resistant      Ciprofloxacin ($) Resistant      Gentamicin ($) Susceptible      Levofloxacin ($) Resistant      Meropenem ($$) Susceptible      Piperacillin/Tazobac ($) Resistant      Tobramycin ($) Susceptible      Trimeth/Sulfa Resistant               Linear View                   CULTURE, Lolly Link STAIN [[id number]]  (Susceptibility) Collected: 06/16/21 0615    Order Status: Completed Specimen: Wound Updated: 06/19/21 0806     Special Requests: No Special Requests        GRAM STAIN 1+ WBCs seen         4+ Gram Negative Rods               4+ Gram Positive Cocci in pairs                  4+ apparent Gram Positive Rods           Culture result:       Heavy Enterobacter cloacae complex                  Moderate * methicillin resistant staphylococcus aureus *            Light Diphtheroids       Susceptibility      Enterobacter cloacae complex Staphylococcus aureus Methcillin Resistant      CATINA CATINA      Amikacin ($) Susceptible       Cefazolin ($) Resistant       Cefepime ($$) Susceptible       Cefoxitin Resistant       Ceftazidime ($) Resistant       Ceftriaxone ($) Resistant Ciprofloxacin ($) Resistant Resistant      Clindamycin ($)  Susceptible      Daptomycin ($$$$$)  Susceptible      Doxycycline ($$)  Susceptible      Erythromycin ($$$$)  Susceptible      Gentamicin ($) Susceptible Susceptible      Levofloxacin ($) Resistant Resistant      Linezolid ($$$$$)  Susceptible      Meropenem ($$) Susceptible       Oxacillin  Resistant      Piperacillin/Tazobac ($) Resistant       Rifampin ($$$$)  Susceptible  [1]       Tetracycline  Resistant      Tobramycin ($) Susceptible       Trimeth/Sulfa Resistant Susceptible      Vancomycin ($)  Susceptible                [1]  Rifampin is not to be used for mono-therapy. Linear View                   COVID-19 RAPID TEST [[id number]] Collected: 06/16/21 0216    Order Status: Completed Specimen: Nasopharyngeal Updated: 06/16/21 0408     Specimen source Nasopharyngeal        COVID-19 rapid test Not Detected        Comment: Rapid Abbott ID Now   Rapid NAAT:  The specimen is NEGATIVE for SARS-CoV-2, the novel coronavirus associated with COVID-19. Negative results should be treated as presumptive and, if inconsistent with clinical signs and symptoms or necessary for patient management, should be tested with an alternative molecular assay. Negative results do not preclude SARS-CoV-2 infection and should not be used as the sole basis for patient management decisions. This test has been authorized by the FDA under   an Emergency Use Authorization (EUA) for use by authorized laboratories.  Fact sheet for Healthcare Providers: ConventionUpdate.co.nz Fact sheet for Patients: ConventionUpdate.co.nz   Methodology: Isothermal Nucleic Acid Amplification         COVID-19 RAPID TEST [[id number]]  (Abnormal) Collected: 06/16/21 0023    Order Status: Completed Specimen: Nasopharyngeal Updated: 06/16/21 0208     Specimen source Nasopharyngeal        COVID-19 rapid test Indeterminate        Comment: Rapid Abbott ID Now The presence or absence of COVID-19 Viral RNAs cannot be determined. If clinically indicated, please collect a new specimen. This test has been authorized by the FDA under an Emergency Use Authorization (EUA) for use by authorized laboratories. Fact sheet for Healthcare Providers: ConventionUpdate.co.nz Fact sheet for Patients: ConventionUpdate.co.nz   Methodology: Isothermal Nucleic Acid Amplification SPOKE TO KEVYN MENDOZA,   SUGGESTED RECOLLECT                 Labs:     Recent Labs     06/24/21  0747 06/23/21  0758   WBC 16.5* 14.7*   HGB 10.8* 8.8*   HCT 34.9* 28.4*    195     Recent Labs     06/24/21  0747 06/23/21  0758    137   K 4.5 4.2   * 109*   CO2 22 22   BUN 77* 78*   CREA 2.54* 2.71*   GLU 68 81   CA 8.1* 8.1*     Recent Labs     06/24/21  0747 06/23/21  0758   ALT 18 18   * 573*   TBILI 2.1* 2.3*   TP 6.4 6.4   ALB 1.4* 1.2*   GLOB 5.0* 5.2*     No results for input(s): INR, PTP, APTT, INREXT in the last 72 hours. No results for input(s): FE, TIBC, PSAT, FERR in the last 72 hours. No results found for: FOL, RBCF   Recent Labs     06/25/21  0140   PH 7.24*   PCO2 52*   PO2 78     No results for input(s): CPK, CKNDX, TROIQ in the last 72 hours.     No lab exists for component: CPKMB  No results found for: CHOL, CHOLX, CHLST, CHOLV, HDL, HDLP, LDL, LDLC, DLDLP, TGLX, TRIGL, TRIGP, CHHD, CHHDX  Lab Results   Component Value Date/Time    Glucose (POC) 93 06/25/2021 09:51 AM    Glucose (POC) 124 (H) 06/24/2021 07:46 PM    Glucose (POC) 115 06/24/2021 02:49 PM    Glucose (POC) 104 06/24/2021 11:14 AM    Glucose (POC) 80 06/24/2021 08:52 AM     Lab Results   Component Value Date/Time    Color Yaritza 04/28/2021 03:00 PM    Appearance Turbid (A) 04/28/2021 03:00 PM    Specific gravity 1.014 04/28/2021 03:00 PM    pH (UA) 5.0 04/28/2021 03:00 PM    Protein 100 (A) 04/28/2021 03:00 PM    Glucose Negative 04/28/2021 03:00 PM    Ketone Negative 04/28/2021 03:00 PM    Bilirubin Negative 04/28/2021 03:00 PM    Urobilinogen 4.0 (H) 04/28/2021 03:00 PM    Nitrites Negative 04/28/2021 03:00 PM    Leukocyte Esterase Negative 04/28/2021 03:00 PM    Bacteria Negative 04/28/2021 03:00 PM    WBC 0-4 04/28/2021 03:00 PM    RBC 0-5 04/28/2021 03:00 PM         Assessment:     Acute on chronic kidney failure-creatinine trending down  Diabetes  Hypertension   Hyperlipidemia  Anemia- improving  Leukocytosis  Chronic leg wounds-E.  Cloacae resistant to Zosyn and MRSA isolated   Liver mass  Hypotension-improved   Hypokalemia  Right moderate hydronephrosis  Left heel ulcer-gas-forming organisms and osteomyelitis   Post op day #2 left AKA  Respiratory Acidosis       Plan:     Repeat ABG stat  Order cardiac enzymes   Day 5/14 on Linezolid   Day 6/21 Meropenem   Frequent turns and wound care  Keep left AKA stump elevated with 2 pillows  Repeat labs in AM          Current Facility-Administered Medications:     0.9% sodium chloride infusion 250 mL, 250 mL, IntraVENous, PRN, Eveline Kidd MD    0.9% sodium chloride infusion 250 mL, 250 mL, IntraVENous, PRN, Eveline Kidd MD    sodium chloride (NS) flush 5-40 mL, 5-40 mL, IntraVENous, Q8H, Eveline Kidd MD, 10 mL at 06/24/21 2018    sodium chloride (NS) flush 5-40 mL, 5-40 mL, IntraVENous, PRN, Eveline Kidd MD    albuterol (PROVENTIL HFA, VENTOLIN HFA, PROAIR HFA) inhaler 2 Puff, 2 Puff, Inhalation, Q6H PRN, Palak Jackson MD    0.9% sodium chloride infusion 250 mL, 250 mL, IntraVENous, PRN, Eveline Kidd MD    collagenase (SANTYL) 250 unit/gram ointment, , Topical, DAILY, Eveline Kidd MD, Given at 06/24/21 1322    linezolid (ZYVOX) tablet 600 mg, 600 mg, Oral, Q12H, Sascha Araiza MD, 600 mg at 06/24/21 2017    meropenem (MERREM) 1 g in sterile water (preservative free) 20 mL IV syringe, 1 g, IntraVENous, Q24H, Sascha Araiza MD, 1 g at 06/24/21 5416    [Held by provider] amLODIPine ([address]) tablet 5 mg, 5 mg, Oral, DAILY, Dinora Ramirez MD, 5 mg at 06/17/21 1123    gabapentin (NEURONTIN) capsule 100 mg, 100 mg, Oral, TID, Dinora Ramirez MD, 100 mg at 06/24/21 2017    [Held by provider] furosemide (LASIX) tablet 40 mg, 40 mg, Oral, DAILY, Yue Georges MD, 40 mg at 06/16/21 1107    latanoprost (XALATAN) 0.005 % ophthalmic solution 1 Drop, 1 Drop, Both Eyes, QPM, Dinora Ramirez MD, 1 Drop at 06/24/21 2000    loperamide (IMODIUM) capsule 2 mg, 2 mg, Oral, Q4H PRN, Dinora Ramirez MD    simvastatin (ZOCOR) tablet 10 mg, 10 mg, Oral, QHS, Dinora Ramirez MD, 10 mg at 06/24/21 2017    traMADoL (ULTRAM) tablet 50 mg, 50 mg, Oral, Q6H PRN, Dinora Ramirez MD, 50 mg at 06/24/21 1410    cyanocobalamin (VITAMIN B12) injection 1,000 mcg, 1,000 mcg, IntraMUSCular, EVERY 2 WEEKS, Yue Georges MD, 1,000 mcg at 06/16/21 1731    ferrous sulfate tablet 325 mg, 325 mg, Oral, ACB, Dinora Ramirez MD, 325 mg at 06/20/21 0855    pantoprazole (PROTONIX) tablet 40 mg, 40 mg, Oral, DAILY, Yue Georges MD, 40 mg at 06/24/21 7551    insulin lispro (HUMALOG) injection, , SubCUTAneous, AC&HS, Yue Georges MD, 3 Units at 06/23/21 2230    glucose chewable tablet 16 g, 4 Tablet, Oral, PRN, Dinora Ramirez MD    dextrose (D50W) injection syrg 12.5-25 g, 25-50 mL, IntraVENous, PRN, Dinora Ramirez MD    glucagon (GLUCAGEN) injection 1 mg, 1 mg, IntraMUSCular, PRN, Yue Georges MD    0.9% sodium chloride infusion, 75 mL/hr, IntraVENous, CONTINUOUS, Dinora Ramirez MD, Last Rate: 75 mL/hr at 06/24/21 1758, 75 mL/hr at 06/24/21 1758    acetaminophen (TYLENOL) tablet 650 mg, 650 mg, Oral, Q6H PRN, 650 mg at 06/24/21 2017 **OR** acetaminophen (TYLENOL) suppository 650 mg, 650 mg, Rectal, Q6H PRN, Dinora Ramirez MD    polyethylene glycol (MIRALAX) packet 17 g, 17 g, Oral, DAILY PRN, Dinora Ramirez MD    ondansetron (ZOFRAN ODT) tablet 4 mg, 4 mg, Oral, Q8H PRN **OR** ondansetron (ZOFRAN) injection 4 mg, 4 mg, IntraVENous, Q6H PRN, Dinora Ramirez MD    heparin (porcine) injection 5,000 Units, 5,000 Units, SubCUTAneous, Q8H, Dinora Ramirez MD, 5,000 Units at 06/25/21 0246    zinc oxide-white petrolatum 17-57 % topical paste, , Topical, TID, Mayte Ramirez MD, Given at 06/24/21 2019

## 2021-06-25 NOTE — PROGRESS NOTES
Patient examined this morning. Events noted from last night with respiratory acidosis requiring BiPAP therapy. This morning patient seems more awake and alert. Patient's left AKA dressing is intact and dry. I will change the dressing personally postop day #3. Continue monitor his progress. And keep the left AKA stump elevated with 2 pillows.

## 2021-06-25 NOTE — WOUND CARE
W[facility]  Evaluation. Pt has left AKA since last eval.  Right leg cleansed, dressed per flow sheet. See photo. Right lower leg seems to be improving. Continue with santyl and alginate with Ag. Buttocks with good zinc coat, only noted area of opening left is right buttock approx size of a nickel. Recommend continue with good side turning. Pt is incontinent of urine. Scrotum is enlarged. Demonstrated to JUSTUS Freeman how to elevate scrotum with quick change. Coat scrotum well with zinc, elevate scrotum with one quick change tucking in inner thighs and apply second quick change to top. Please pass on shift to shift. This should prevent left stump dressing from getting saturated with urine. WCN will continue to follow patient weekly. Media Information     Document Information    Mobile Media Capture   right lower leg   06/25/2021 15:43   Attached To:    Hospital Encounter on 6/15/21   Source Information    Asa Flores RN  [id number] [facility]

## 2021-06-25 NOTE — PROGRESS NOTES
[facility]     Renal Daily Progress Note:     Admission Date: 6/15/2021     Subjective:  post left AKA-POD#2. On Bipap after developing SOB, seems comfortable. Awake and interactive, creatinine down further. Vital signs stable      Review of Systems  Pertinent items are noted in HPI.     Objective:     Visit Vitals  BP (!) 127/59 (BP 1 Location: Right upper arm, BP Patient Position: At rest)   Pulse (!) 102   Temp 99.6 °F (37.6 °C)   Resp 20   Ht 6' 2\" (1.88 m)   Wt 127 kg (279 lb 15.8 oz)   SpO2 100%   BMI 35.95 kg/m²     Temp (24hrs), Av.6 °F (37 °C), Min:97.8 °F (36.6 °C), Max:99.6 °F (37.6 °C)      No intake or output data in the 24 hours ending 21 1513  Current Facility-Administered Medications   Medication Dose Route Frequency    0.9% sodium chloride infusion 250 mL  250 mL IntraVENous PRN    0.9% sodium chloride infusion 250 mL  250 mL IntraVENous PRN    sodium chloride (NS) flush 5-40 mL  5-40 mL IntraVENous Q8H    sodium chloride (NS) flush 5-40 mL  5-40 mL IntraVENous PRN    albuterol (PROVENTIL HFA, VENTOLIN HFA, PROAIR HFA) inhaler 2 Puff  2 Puff Inhalation Q6H PRN    0.9% sodium chloride infusion 250 mL  250 mL IntraVENous PRN    collagenase (SANTYL) 250 unit/gram ointment   Topical DAILY    linezolid (ZYVOX) tablet 600 mg  600 mg Oral Q12H    meropenem (MERREM) 1 g in sterile water (preservative free) 20 mL IV syringe  1 g IntraVENous Q24H    [Held by provider] amLODIPine (NORVASC) tablet 5 mg  5 mg Oral DAILY    [Held by provider] gabapentin (NEURONTIN) capsule 100 mg  100 mg Oral TID    [Held by provider] furosemide (LASIX) tablet 40 mg  40 mg Oral DAILY    latanoprost (XALATAN) 0.005 % ophthalmic solution 1 Drop  1 Drop Both Eyes QPM    loperamide (IMODIUM) capsule 2 mg  2 mg Oral Q4H PRN    simvastatin (ZOCOR) tablet 10 mg  10 mg Oral QHS    [Held by provider] traMADoL (ULTRAM) tablet 50 mg  50 mg Oral Q6H PRN    cyanocobalamin (VITAMIN B12) injection 1,000 mcg 1,000 mcg IntraMUSCular EVERY 2 WEEKS    ferrous sulfate tablet 325 mg  325 mg Oral ACB    pantoprazole (PROTONIX) tablet 40 mg  40 mg Oral DAILY    insulin lispro (HUMALOG) injection   SubCUTAneous AC&HS    glucose chewable tablet 16 g  4 Tablet Oral PRN    dextrose (D50W) injection syrg 12.5-25 g  25-50 mL IntraVENous PRN    glucagon (GLUCAGEN) injection 1 mg  1 mg IntraMUSCular PRN    0.9% sodium chloride infusion  75 mL/hr IntraVENous CONTINUOUS    acetaminophen (TYLENOL) tablet 650 mg  650 mg Oral Q6H PRN    Or    acetaminophen (TYLENOL) suppository 650 mg  650 mg Rectal Q6H PRN    polyethylene glycol (MIRALAX) packet 17 g  17 g Oral DAILY PRN    ondansetron (ZOFRAN ODT) tablet 4 mg  4 mg Oral Q8H PRN    Or    ondansetron (ZOFRAN) injection 4 mg  4 mg IntraVENous Q6H PRN    heparin (porcine) injection 5,000 Units  5,000 Units SubCUTAneous Q8H    zinc oxide-white petrolatum 17-57 % topical paste   Topical TID       Physical Exam:  General appearance: alert, no distress, appears stated age  Lungs: clear to auscultation bilaterally- on BiPaP  Heart: regular rate and rhythm  Abdomen: soft, non-tender. Bowel sounds normal. No masses,  no organomegaly  Extremities: edema right leg, left AKA stump wrapped, edema dependent thigh and buttocks  Neurologic: non-focal motor  Data Review:     LABS:  Recent Labs     06/25/21  0842 06/24/21  0747 06/23/21  0758    138 137   K 4.7 4.5 4.2   * 109* 109*   CO2 22 22 22   BUN 79* 77* 78*   CREA 2.45* 2.54* 2.71*   CA 7.9* 8.1* 8.1*   ALB 1.3* 1.4* 1.2*     Recent Labs     06/25/21  0842 06/24/21  0747 06/23/21  0758   WBC 14.5* 16.5* 14.7*   HGB 9.3* 10.8* 8.8*   HCT 30.8* 34.9* 28.4*    173 195     No results for input(s): TONY, KU, CLU, CREAU in the last 72 hours. No lab exists for component: PROU  CT lower legs w/o contrast 6-19-21     IMPRESSION  1. Somewhat suboptimal evaluation, as detailed above.   2. Within exam limitations there is a soft tissue ulcer at the posterior left  calcaneus with infection with gas-forming organism versus soft tissue  devitalization. Small amount of gas within the posterior calcaneus is most  likely related to osteomyelitis. 3. Other soft tissue wounds within both legs. No additional findings of acute  osseous abnormality within exam limitations. Based on level of clinical concern,  could consider further evaluation with targeted MRI of specific regions. 4. Diffuse osseous demineralization likely contributes to the somewhat  heterogeneous appearance of the bones. However, there is an indeterminate 10 mm  lucency within the right medial femoral condyle. Please correlate with clinical  history for a known primary malignancy or plasma abnormality. Otherwise short  interval follow-up in 3 months should be considered. Assessment:   Renal Specific Problems  CKd 4 at baseline  CRISTIAN likely a combination of volume depletion and acute inflammatory state--improving  Anemia from chronic disease exacerbated by leg infections  Resp Insuff  Volume overload  Hypoalbumin  ? Liver neoplasm    PVD, ?  Left heel osteo-- s/p Left AKA      Plan:     Obtain/ Order: labs/cultures/radiology/procedures:        Therapeutic:    Cont antibiotics;    d/c IVF    Post  left AKA, we'll probably see continued improvement in renal function with removal of necrotic tissue    Berenice Swenson MD    [phone]

## 2021-06-25 NOTE — PROGRESS NOTES
Family at beside. Patient is still lethargic. Nurse attempted to give patient a sip of orange juice, patient did not follow commands. Dr. Kings luu.

## 2021-06-25 NOTE — PROGRESS NOTES
[de-identified] STATUS CHANGE[de-identified]    Nurse became concerned about patient mental status change. Patient was lethargic at start of shift, but hard to arouse. Nurse noted that staff who had worked with patient most recently stated that this has been patient's norm [facility] Nurse became even more concerned when patient was more lethargic and boarderline obtunded, responding to voice was slow after sternal rubbing. Nurse called MD Ramirez and obtained orders for stat ABGs and Chest xray. ABGs showed patient to be acidotic. Ashley ordered amp of Bicarb iv push, Hi flow, and to consult pulm stat. Nurse called pulmonology consult to MD Villa. MD Villa asked to review ABG and changed order for Hi flow to BIPAP. Orders read back and verified with MD Villa for Bipap and reviewed. Patient is in stable condition. Now is responding to voice, opening eyes appropriately and able to answer more appropriately. Son, Anthony Kelley was notified of change. Nursing supervisor made aware as well.

## 2021-06-25 NOTE — CONSULTS
PULMONARY CONSULT  [facility]    Name: Chiquita Cheung MRN: [id number]   : 1932 Hospital: [address] [place] [address]   Date: 2021  Admission date: 6/15/2021 Hospital Day: 11       HPI:     Hospital Problems  Date Reviewed: 2021        Codes Class Noted POA    Acute kidney injury [facility]) ICD-10-CM: N17.9  ICD-9-CM: 584.9  6/15/2021 Unknown        CRISTIAN (acute kidney injury) [facility]) ICD-10-CM: N17.9  ICD-9-CM: 584.9  2021 Unknown                   [x] High complexity decision making was performed  [x] See my orders for details      Subjective/Initial History:     I was asked by Marcus Sebastian MD to see Chiquita Cheung  a 80 y.o.  male in consultation     Excerpts from admission 6/15/2021 or consult notes as follows:   68-year-old male came in because of abnormal lab he has past medical history of hypertension diabetes mellitus chronic kidney disease he was on dialysis previously he was at the nursing home. Also he has bilateral leg wounds seen by vascular surgeon and he was getting treatment for infection wound culture positive for Enterococcus cloacae and he underwent left above-the-knee amputation on . Last night patient condition got worse arterial blood gases was done which shows elevated PCO2 and she was put on noninvasive ventilator BiPAP machine he still lethargic unable to get any history out of the patient he is on oxygen and nebulizer at home according to her sister.        No Known Allergies     MAR reviewed and pertinent medications noted or modified as needed     Current Facility-Administered Medications   Medication    0.9% sodium chloride infusion 250 mL    0.9% sodium chloride infusion 250 mL    sodium chloride (NS) flush 5-40 mL    sodium chloride (NS) flush 5-40 mL    albuterol (PROVENTIL HFA, VENTOLIN HFA, PROAIR HFA) inhaler 2 Puff    0.9% sodium chloride infusion 250 mL    collagenase (SANTYL) 250 unit/gram ointment    linezolid (ZYVOX) tablet 600 mg    meropenem (MERREM) 1 g in sterile water (preservative free) 20 mL IV syringe    [Held by provider] amLODIPine (NORVASC) tablet 5 mg    gabapentin (NEURONTIN) capsule 100 mg    [Held by provider] furosemide (LASIX) tablet 40 mg    latanoprost (XALATAN) 0.005 % ophthalmic solution 1 Drop    loperamide (IMODIUM) capsule 2 mg    simvastatin (ZOCOR) tablet 10 mg    traMADoL (ULTRAM) tablet 50 mg    cyanocobalamin (VITAMIN B12) injection 1,000 mcg    ferrous sulfate tablet 325 mg    pantoprazole (PROTONIX) tablet 40 mg    insulin lispro (HUMALOG) injection    glucose chewable tablet 16 g    dextrose (D50W) injection syrg 12.5-25 g    glucagon (GLUCAGEN) injection 1 mg    0.9% sodium chloride infusion    acetaminophen (TYLENOL) tablet 650 mg    Or    acetaminophen (TYLENOL) suppository 650 mg    polyethylene glycol (MIRALAX) packet 17 g    ondansetron (ZOFRAN ODT) tablet 4 mg    Or    ondansetron (ZOFRAN) injection 4 mg    heparin (porcine) injection 5,000 Units    zinc oxide-white petrolatum 17-57 % topical paste      Patient PCP: Anaya Quinteros MD  PMH:  has a past medical history of Arthritis, AVM (arteriovenous malformation) of colon, B12 deficiency, CAD (coronary artery disease), Chronic anemia, Chronic kidney disease, Diabetes ([address] GERD (gastroesophageal reflux disease), Heart failure ([address] Hypertension, Ill-defined condition, and PVD (peripheral vascular disease) ([facility] [address] PSH:   has a past surgical history that includes hx hip replacement (Bilateral). FHX: family history is not on file. SHX:  reports that he has never smoked. He has never used smokeless tobacco. He reports that he does not drink alcohol.      ROS:  Unable to obtain      Objective:     Vital Signs: Telemetry:    normal sinus rhythm Intake/Output:   Visit Vitals  BP (!) 127/59 (BP 1 Location: Right upper arm, BP Patient Position: At rest)   Pulse (!) 102   Temp 99.6 °F (37.6 °C)   Resp 20   Ht 6' 2\" (1.88 m)   Wt 127 kg (279 lb 15.8 oz)   SpO2 100%   BMI 35.95 kg/m²       Temp (24hrs), Av.3 °F (36.8 °C), Min:97.2 °F (36.2 °C), Max:99.6 °F (37.6 °C)        O2 Device: BIPAP O2 Flow Rate (L/min): 2 l/min       Wt Readings from Last 4 Encounters:   21 127 kg (279 lb 15.8 oz)   21 109.7 kg (241 lb 13.5 oz)          Intake/Output Summary (Last 24 hours) at 2021 1055  Last data filed at 2021 1400  Gross per 24 hour   Intake 420 ml   Output    Net 420 ml       Last shift:      No intake/output data recorded. Last 3 shifts:  1901 -  0700  In: 892 [P.O.:892]  Out: -        Physical Exam:     Physical Exam  Constitutional:       Appearance: He is ill-appearing. Comments: Lethargic on noninvasive ventilator BiPAP machine   HENT:      Head: Normocephalic and atraumatic. Nose: Nose normal.      Mouth/Throat:      Mouth: Mucous membranes are moist.   Eyes:      Pupils: Pupils are equal, round, and reactive to light. Cardiovascular:      Rate and Rhythm: Normal rate and regular rhythm. Pulses: Normal pulses. Pulmonary:      Effort: Respiratory distress present. Breath sounds: Rhonchi present. Abdominal:      General: Abdomen is flat. Bowel sounds are normal.      Palpations: Abdomen is soft. Musculoskeletal:      Cervical back: Normal range of motion. Comments: Left above-the-knee amputation bandage in place   Skin:     Capillary Refill: Capillary refill takes less than 2 seconds.    Neurological:      Comments: Patient is on BiPAP machine unable to answer any question open eyes but does not follow command          Labs:    Recent Labs     21  0747 21  0758   WBC 16.5* 14.7*   HGB 10.8* 8.8*    195     Recent Labs     21  0747 21  0758    137   K 4.5 4.2   * 109*   CO2 22 22   GLU 68 81   BUN 77* 78*   CREA 2.54* 2.71*   CA 8.1* 8.1*   ALB 1.4* 1.2*   ALT 18 18     Recent Labs     21  0140   PH 7.24*   PCO2 52* PO2 78   HCO3 20*     No results for input(s): CPK, CKNDX, TROIQ in the last 72 hours. No lab exists for component: CPKMB  No results found for: BNPP, BNP   Lab Results   Component Value Date/Time    Culture result: Heavy Enterobacter cloacae complex 06/16/2021 06:15 AM    Culture result:  06/16/2021 06:15 AM     Heavy * methicillin resistant staphylococcus aureus * REFER TO [id number] FOR SENSITIVITIES    Culture result: Few Diphtheroids 06/16/2021 06:15 AM    Culture result:  06/16/2021 06:15 AM     [facility]/19[facility] BY DPW    Culture result: Heavy Enterobacter cloacae complex 06/16/2021 06:15 AM    Culture result:  06/16/2021 06:15 AM     Moderate * methicillin resistant staphylococcus aureus *    Culture result: Light Diphtheroids 06/16/2021 06:15 AM   No results found for: TSH, TSHEXT    Imaging:    CXR Results  (Last 48 hours)               06/25/21 0112  XR CHEST PORT Final result    Impression:  :    1. Low lung volumes. No acute cardiopulmonary disease. Narrative:  Single View Chest 6/25/2021       Comparison: [place] 15, 2021       Indication: Left. Findings:   Heart size is mildly enlarged. Aortic calcification. Low lung volumes. TAVR. No   significant airspace consolidation or pleural effusion. No feliberto edema. No   pneumothorax               Results from Hospital Encounter encounter on 06/15/21    XR CHEST PORT    Narrative  Single View Chest 6/25/2021    Comparison: [place] 15, 2021    Indication: Left. Findings:  Heart size is mildly enlarged. Aortic calcification. Low lung volumes. TAVR. No  significant airspace consolidation or pleural effusion. No feliberto edema. No  pneumothorax    Impression  :  1. Low lung volumes. No acute cardiopulmonary disease. XR CHEST PORT    Narrative  XR CHEST PORT    Comparison:  4/28/2021. Single view:  Stable right diaphragmatic elevation. Negative for focal  consolidation.  No pneumothorax or pleural effusion apparent. Transcatheter  aortic valve placement noted. The heart size is stable. There is no evidence of  acute cardiac decompensation. Impression  No evidence of an acute cardiopulmonary process. Results from [place] encounter on 04/28/21    XR CHEST SNGL V    Narrative  Indication: Weakness. AP semiupright portable chest radiograph 1621 hours 4/28/2021. No comparison. Underexpanded lungs, mildly elevated right hemidiaphragm. No pulmonary infiltrate, pleural effusion or pneumothorax. Normal heart size. TAVR. Impression  Underexpanded lungs, otherwise negative. Results from [place] encounter on 06/15/21    CT LOW EXT BI WO CONT    Narrative  Exam: CT LOW EXT BI WO CONT    TECHNIQUE: Multiple transaxial CT images of the of the legs from the knees  through the ankles were obtained without contrast. Coronal and sagittal  reformatted images were provided. Dose reduction: All CT scans at this facility are performed using dose reduction  optimization techniques as appropriate to a performed exam including the  following: Automated exposure control, adjustments of the mA and/or kV according  to patient size, or use of iterative reconstruction technique. COMPARISON: None    HISTORY: osteomyelitis    FINDINGS:    Please note that examination is suboptimal related to large field-of-view images  including both lower extremities which limits detailed evaluation of the osseous  structures. The reconstruction planes are also nonstandard, further limiting  evaluation. Below findings are within these exam limitations. Right leg: There is osseous bridging of the distal tibia/fibular syndesmosis which may be  from prior injury. Diffuse osseous demineralization which limits sensitivity of  the exam. There is mild soft tissue irregularity along the anteromedial aspect  of the tibia. No definite acute displaced fracture, dislocation, or aggressive  osseous lesion is evident. Degenerative changes within the knee, ankle, and  foot. There are patchy lucencies within the bones, could be related to diffuse  osseous demineralization, however there is also a 10 mm nonspecific lucency in  the distal medial femoral condyle, indeterminate. Diffuse soft tissue swelling. Fatty atrophy of leg musculature, most pronounced in the gastrocnemius muscles. Left leg:  Multifocal osseous bridging of the tibia/fibular syndesmosis which may be from  prior injury. There is soft tissue irregularity and soft tissue gas along the  anterior aspect of the tibia, likely related to soft tissue wound. No findings  of underlying acute osseous abnormality. There is also extensive soft tissue gas  and a soft tissue defect that is suggestive of either an infectious process with  a gas-forming organism or D vitalized soft tissues. Soft tissue gas extends down  to the bone and there is a small amount of gas within the adjacent posterior  calcaneus which is suspicious for osteomyelitis. There are likely surgical  changes from partial great toe amputation, with otherwise suboptimal evaluation  of the foot related to the above described exam limitations. Diffuse osseous  demineralization. Diffuse soft tissue swelling of the leg. Fatty atrophy of the  leg musculature, most pronounced in the gastrocnemius muscles. Degenerative  changes of the knee, ankle, and foot. Impression  1. Somewhat suboptimal evaluation, as detailed above. 2. Within exam limitations there is a soft tissue ulcer at the posterior left  calcaneus with infection with gas-forming organism versus soft tissue  devitalization. Small amount of gas within the posterior calcaneus is most  likely related to osteomyelitis. 3. Other soft tissue wounds within both legs. No additional findings of acute  osseous abnormality within exam limitations. Based on level of clinical concern,  could consider further evaluation with targeted MRI of specific regions.   4. Diffuse osseous demineralization likely contributes to the somewhat  heterogeneous appearance of the bones. However, there is an indeterminate 10 mm  lucency within the right medial femoral condyle. Please correlate with clinical  history for a known primary malignancy or plasma abnormality. Otherwise short  interval follow-up in 3 months should be considered. IMPRESSION:   1. Acute on chronic hypoxic hypercapnic respiratory failure  2. Sepsis with left heel ulcer and chronic leg wound  3. Status post left above-the-knee amputation  4. Symptomatic anemia  5. Metabolic and respiratory acidosis  6. Liver mass  7. Pt is requiring Drug therapy requiring intensive monitoring for toxicity  8. Pt is unstable, unpredictable needing inpatient monitoring; is acutely ill and at high risk of sudden decline and decompensation with severe consequenses and continued end organ dysfunction and failure  9. Prognosis guarded       RECOMMENDATIONS/PLAN:     1. BIPAP for non invasive ventilatory life support to prevent worsening respiratory acidosis  2. Chest x-ray no acute infiltrate arterial blood gases done showed PCO2 82 and pH 7.2 that was done on 2 L nasal cannula we will repeat blood gases change  BiPAP settings to 18/7 rate of 18 and 35% FiO2 received bicarb  3. Sacral decubiti ulcer left heel ulcer has been on linezolid and meropenem  4. Supplemental O2 to keep sats > 93%  5. Aspiration precautions  6. Labs to follow electrolytes, renal function and and blood counts  7. Glucose monitoring and SSI  8. Bronchial hygiene with respiratory therapy techniques, bronchodilators  9.  DVT, SUP prophylaxis         Vinayak Rios MD

## 2021-06-25 NOTE — PROGRESS NOTES
General Daily Progress Note          Patient Name:   Preet Baldwin       YOB: 1932       Age:  80 y.o. Admit Date: 6/15/2021      Subjective:        Patient is Lorman y.o. year old male with a past medical history of diabetes, hypertension, hyperlipidemia, renal insufficiency, and anemia history of liver mass who presented to the ER 6/15 due to abnormal labs drawn at the nursing home. He was found to have worsening kidney function. He was on dialysis previously, but had reportedly stopped dialysis several years ago. He denied chest pain and SOB. CXR showed no acute cardiopulmonary processes. He was admitted for further evaluation and treatment. Patient have a liver mass diagnosis and last admission as per note no further diagnosis procedure treatment was planned with the family by the attending.     Patient's discharge date canceled by vascular surgeon due to complex wounds on bilateral legs and need for CT. 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 Wound cultures showed E. Cloacae resistant to Zosyn and MRSA. 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 Last night, nurse reported change in mental status. ABG drawn showed respiratory acidosis. 1 amp bicarb given. Pulmonology consulted and BIPAP started. CXR unremarkable. Today patient is awake on BIPAP. He is still lethargic, but nodding yes or no to questions. Denies chest pain or SOB.               Objective:     Visit Vitals  BP (!) 127/59 (BP 1 Location: Right upper arm, BP Patient Position: At rest)   Pulse (!) 102   Temp 99.6 °F (37.6 °C)   Resp 20   Ht 6' 2\" (1.88 m)   Wt 127 kg (279 lb 15.8 oz)   SpO2 100%   BMI 35.95 kg/m²        Recent Results (from the past 24 hour(s))   GLUCOSE, POC    Collection Time: 06/24/21  2:49 PM   Result Value Ref Range    Glucose (POC) 115 65 - 117 mg/dL    Performed by Winston Christian(PCT)    GLUCOSE, POC    Collection Time: 06/24/21  7:46 PM   Result Value Ref Range    Glucose (POC) 124 (H) 65 - 117 mg/dL    Performed by [employer]    BLOOD GAS, ARTERIAL    Collection Time: 06/25/21  1:40 AM   Result Value Ref Range    pH 7.24 (LL) 7.35 - 7.45      PCO2 52 (H) 35 - 45 mmHg    PO2 78 75 - 100 mmHg    O2 SAT 96 >95 %    BICARBONATE 20 (L) 22 - 26 mmol/L    BASE DEFICIT 5.1 (H) 0 - 2 mmol/L    O2 METHOD Nasal Cannula      O2 FLOW RATE 2.0 L/min    SITE Right Radial      LORENA'S TEST PASS     CBC WITH AUTOMATED DIFF    Collection Time: 06/25/21  8:42 AM   Result Value Ref Range    WBC 14.5 (H) 4.1 - 11.1 K/uL    RBC 3.17 (L) 4.10 - 5.70 M/uL    HGB 9.3 (L) 12.1 - 17.0 g/dL    HCT 30.8 (L) 36.6 - 50.3 %    MCV 97.2 80.0 - 99.0 FL    MCH 29.3 26.0 - 34.0 PG    MCHC 30.2 30.0 - 36.5 g/dL    RDW 18.6 (H) 11.5 - 14.5 %    PLATELET 280 690 - 143 K/uL    MPV 10.0 8.9 - 12.9 FL    NRBC 0.0 0.0  WBC    ABSOLUTE NRBC 0.00 0.00 - 0.01 K/uL    NEUTROPHILS 63 32 - 75 %    LYMPHOCYTES 25 12 - 49 %    MONOCYTES 10 5 - 13 %    EOSINOPHILS 1 0 - 7 %    BASOPHILS 0 0 - 1 %    IMMATURE GRANULOCYTES 1 (H) 0 - 0.5 %    ABS. NEUTROPHILS 9.2 (H) 1.8 - 8.0 K/UL    ABS. LYMPHOCYTES 3.6 (H) 0.8 - 3.5 K/UL    ABS. MONOCYTES 1.4 (H) 0.0 - 1.0 K/UL    ABS. EOSINOPHILS 0.1 0.0 - 0.4 K/UL    ABS. BASOPHILS 0.1 0.0 - 0.1 K/UL    ABS. IMM.  GRANS. 0.2 (H) 0.00 - 0.04 K/UL    DF AUTOMATED     METABOLIC PANEL, COMPREHENSIVE    Collection Time: 06/25/21  8:42 AM   Result Value Ref Range    Sodium 139 136 - 145 mmol/L    Potassium 4.7 3.5 - 5.1 mmol/L    Chloride 110 (H) 97 - 108 mmol/L    CO2 22 21 - 32 mmol/L    Anion gap 7 5 - 15 mmol/L    Glucose 82 65 - 100 mg/dL    BUN 79 (H) 6 - 20 mg/dL    Creatinine 2.45 (H) 0.70 - 1.30 mg/dL    BUN/Creatinine ratio 32 (H) 12 - 20      GFR est AA 30 (L) >60 ml/min/1.73m2    GFR est non-AA 25 (L) >60 ml/min/1.73m2    Calcium 7.9 (L) 8.5 - 10.1 mg/dL    Bilirubin, total 2.0 (H) 0.2 - 1.0 mg/dL    AST (SGOT) 124 (H) 15 - 37 U/L    ALT (SGPT) 17 12 - 78 U/L    Alk. phosphatase 567 (H) 45 - 117 U/L    Protein, total 6.3 (L) 6.4 - 8.2 g/dL    Albumin 1.3 (L) 3.5 - 5.0 g/dL    Globulin 5.0 (H) 2.0 - 4.0 g/dL    A-G Ratio 0.3 (L) 1.1 - 2.2     GLUCOSE, POC    Collection Time: 06/25/21  9:51 AM   Result Value Ref Range    Glucose (POC) 93 65 - 117 mg/dL    Performed by Eileen Magana    BLOOD GAS, ARTERIAL    Collection Time: 06/25/21 10:53 AM   Result Value Ref Range    pH 7.26 (L) 7.35 - 7.45      PCO2 52 (H) 35 - 45 mmHg    PO2 126 (H) 75 - 100 mmHg    O2 SAT 99 >95 %    BICARBONATE 21 (L) 22 - 26 mmol/L    BASE DEFICIT 3.9 (H) 0 - 2 mmol/L    O2 METHOD BiPAP      FIO2 35.0 %    SET RATE 18      EPAP/CPAP/PEEP 8      SITE Right Radial      LORENA'S TEST PASS      Critical value read back CALL DR TREVINO      [unfilled]      Review of Systems    Constitutional: Negative for chills and fever. HENT: Negative. Eyes: Negative. Respiratory: Negative. Cardiovascular: Negative. Gastrointestinal: Negative for abdominal pain and nausea. Skin: Negative. Neurological: Negative. Physical Exam:      Constitutional: awake, lethargic   HENT:   Head: Normocephalic and atraumatic. Eyes: Pupils are equal, round, and reactive to light. EOM are normal.   Cardiovascular: Normal rate, regular rhythm and normal heart sounds. Pulmonary/Chest: Breath sounds normal. No wheezes. No rales. Exhibits no tenderness. Abdominal: Soft. Bowel sounds are normal. There is no abdominal tenderness. There is no rebound and no guarding. Musculoskeletal: Normal range of motion. Neurological: awake, lethargic, responding to commands      XR CHEST PORT   Final Result   :    1. Low lung volumes. No acute cardiopulmonary disease. CT LOW EXT BI WO CONT   Final Result   1. Somewhat suboptimal evaluation, as detailed above.    2. Within exam limitations there is a soft tissue ulcer at the posterior left   calcaneus with infection with gas-forming organism versus soft tissue   devitalization. Small amount of gas within the posterior calcaneus is most   likely related to osteomyelitis. 3. Other soft tissue wounds within both legs. No additional findings of acute   osseous abnormality within exam limitations. Based on level of clinical concern,   could consider further evaluation with targeted MRI of specific regions. 4. Diffuse osseous demineralization likely contributes to the somewhat   heterogeneous appearance of the bones. However, there is an indeterminate 10 mm   lucency within the right medial femoral condyle. Please correlate with clinical   history for a known primary malignancy or plasma abnormality. Otherwise short   interval follow-up in 3 months should be considered. US RETROPERITONEUM COMP   Final Result   Borderline right-sided hydronephrosis. Ascites. XR CHEST PORT   Final Result   No evidence of an acute cardiopulmonary process.            Recent Results (from the past 24 hour(s))   GLUCOSE, POC    Collection Time: 06/24/21  2:49 PM   Result Value Ref Range    Glucose (POC) 115 65 - 117 mg/dL    Performed by Percy Christian(PCT)    GLUCOSE, POC    Collection Time: 06/24/21  7:46 PM   Result Value Ref Range    Glucose (POC) 124 (H) 65 - 117 mg/dL    Performed by Didi Quant    BLOOD GAS, ARTERIAL    Collection Time: 06/25/21  1:40 AM   Result Value Ref Range    pH 7.24 (LL) 7.35 - 7.45      PCO2 52 (H) 35 - 45 mmHg    PO2 78 75 - 100 mmHg    O2 SAT 96 >95 %    BICARBONATE 20 (L) 22 - 26 mmol/L    BASE DEFICIT 5.1 (H) 0 - 2 mmol/L    O2 METHOD Nasal Cannula      O2 FLOW RATE 2.0 L/min    SITE Right Radial      LORENA'S TEST PASS     CBC WITH AUTOMATED DIFF    Collection Time: 06/25/21  8:42 AM   Result Value Ref Range    WBC 14.5 (H) 4.1 - 11.1 K/uL    RBC 3.17 (L) 4.10 - 5.70 M/uL    HGB 9.3 (L) 12.1 - 17.0 g/dL    HCT 30.8 (L) 36.6 - 50.3 %    MCV 97.2 80.0 - 99.0 FL    MCH 29.3 26.0 - 34.0 PG    MCHC 30.2 30.0 - 36.5 g/dL RDW 18.6 (H) 11.5 - 14.5 %    PLATELET 960 846 - 907 K/uL    MPV 10.0 8.9 - 12.9 FL    NRBC 0.0 0.0  WBC    ABSOLUTE NRBC 0.00 0.00 - 0.01 K/uL    NEUTROPHILS 63 32 - 75 %    LYMPHOCYTES 25 12 - 49 %    MONOCYTES 10 5 - 13 %    EOSINOPHILS 1 0 - 7 %    BASOPHILS 0 0 - 1 %    IMMATURE GRANULOCYTES 1 (H) 0 - 0.5 %    ABS. NEUTROPHILS 9.2 (H) 1.8 - 8.0 K/UL    ABS. LYMPHOCYTES 3.6 (H) 0.8 - 3.5 K/UL    ABS. MONOCYTES 1.4 (H) 0.0 - 1.0 K/UL    ABS. EOSINOPHILS 0.1 0.0 - 0.4 K/UL    ABS. BASOPHILS 0.1 0.0 - 0.1 K/UL    ABS. IMM. GRANS. 0.2 (H) 0.00 - 0.04 K/UL    DF AUTOMATED     METABOLIC PANEL, COMPREHENSIVE    Collection Time: 06/25/21  8:42 AM   Result Value Ref Range    Sodium 139 136 - 145 mmol/L    Potassium 4.7 3.5 - 5.1 mmol/L    Chloride 110 (H) 97 - 108 mmol/L    CO2 22 21 - 32 mmol/L    Anion gap 7 5 - 15 mmol/L    Glucose 82 65 - 100 mg/dL    BUN 79 (H) 6 - 20 mg/dL    Creatinine 2.45 (H) 0.70 - 1.30 mg/dL    BUN/Creatinine ratio 32 (H) 12 - 20      GFR est AA 30 (L) >60 ml/min/1.73m2    GFR est non-AA 25 (L) >60 ml/min/1.73m2    Calcium 7.9 (L) 8.5 - 10.1 mg/dL    Bilirubin, total 2.0 (H) 0.2 - 1.0 mg/dL    AST (SGOT) 124 (H) 15 - 37 U/L    ALT (SGPT) 17 12 - 78 U/L    Alk.  phosphatase 567 (H) 45 - 117 U/L    Protein, total 6.3 (L) 6.4 - 8.2 g/dL    Albumin 1.3 (L) 3.5 - 5.0 g/dL    Globulin 5.0 (H) 2.0 - 4.0 g/dL    A-G Ratio 0.3 (L) 1.1 - 2.2     GLUCOSE, POC    Collection Time: 06/25/21  9:51 AM   Result Value Ref Range    Glucose (POC) 93 65 - 117 mg/dL    Performed by [employer]    BLOOD GAS, ARTERIAL    Collection Time: 06/25/21 10:53 AM   Result Value Ref Range    pH 7.26 (L) 7.35 - 7.45      PCO2 52 (H) 35 - 45 mmHg    PO2 126 (H) 75 - 100 mmHg    O2 SAT 99 >95 %    BICARBONATE 21 (L) 22 - 26 mmol/L    BASE DEFICIT 3.9 (H) 0 - 2 mmol/L    O2 METHOD BiPAP      FIO2 35.0 %    SET RATE 18      EPAP/CPAP/PEEP 8      SITE Right Radial      LORENA'S TEST PASS      Critical value read back CALL  [facility]        Results     Procedure Component Value Units Date/Time    CULTURE, Haleigh Hookerht STAIN [[id number]]  (Susceptibility) Collected: 06/16/21 0615    Order Status: Completed Specimen: Wound Updated: 06/19/21 1018     Special Requests: No Special Requests        GRAM STAIN No wbc's seen         2+ Gram Positive Cocci         2+ Gram Positive Rods         Few Gram Negative Rods        Culture result:       Heavy Enterobacter cloacae complex                  Heavy * methicillin resistant staphylococcus aureus * REFER TO [id number] FOR SENSITIVITIES            Few Diphtheroids         CALLED MRSA REPORT TO Tino Fajardo R.N. [id number] 06/19/2021 BY DPW    Susceptibility      Enterobacter cloacae complex      CATINA      Amikacin ($) Susceptible      Cefazolin ($) Resistant      Cefepime ($$) Susceptible      Cefoxitin Resistant      Ceftazidime ($) Resistant      Ceftriaxone ($) Resistant      Ciprofloxacin ($) Resistant      Gentamicin ($) Susceptible      Levofloxacin ($) Resistant      Meropenem ($$) Susceptible      Piperacillin/Tazobac ($) Resistant      Tobramycin ($) Susceptible      Trimeth/Sulfa Resistant               Linear View                   CULTURE, Romarioyolande Cormier STAIN [[id number]]  (Susceptibility) Collected: 06/16/21 0615    Order Status: Completed Specimen: Wound Updated: 06/19/21 0806     Special Requests: No Special Requests        GRAM STAIN 1+ WBCs seen         4+ Gram Negative Rods               4+ Gram Positive Cocci in pairs                  4+ apparent Gram Positive Rods           Culture result:       Heavy Enterobacter cloacae complex                  Moderate * methicillin resistant staphylococcus aureus *            Light Diphtheroids       Susceptibility      Enterobacter cloacae complex Staphylococcus aureus Methcillin Resistant      CATINA CATINA      Amikacin ($) Susceptible       Cefazolin ($) Resistant       Cefepime ($$) Susceptible       Cefoxitin Resistant       Ceftazidime ($) Resistant       Ceftriaxone ($) Resistant       Ciprofloxacin ($) Resistant Resistant      Clindamycin ($)  Susceptible      Daptomycin ($$$$$)  Susceptible      Doxycycline ($$)  Susceptible      Erythromycin ($$$$)  Susceptible      Gentamicin ($) Susceptible Susceptible      Levofloxacin ($) Resistant Resistant      Linezolid ($$$$$)  Susceptible      Meropenem ($$) Susceptible       Oxacillin  Resistant      Piperacillin/Tazobac ($) Resistant       Rifampin ($$$$)  Susceptible  [1]       Tetracycline  Resistant      Tobramycin ($) Susceptible       Trimeth/Sulfa Resistant Susceptible      Vancomycin ($)  Susceptible                [1]  Rifampin is not to be used for mono-therapy. Linear View                   COVID-19 RAPID TEST [[id number]] Collected: 06/16/21 0216    Order Status: Completed Specimen: Nasopharyngeal Updated: 06/16/21 0408     Specimen source Nasopharyngeal        COVID-19 rapid test Not Detected        Comment: Rapid Abbott ID Now   Rapid NAAT:  The specimen is NEGATIVE for SARS-CoV-2, the novel coronavirus associated with COVID-19. Negative results should be treated as presumptive and, if inconsistent with clinical signs and symptoms or necessary for patient management, should be tested with an alternative molecular assay. Negative results do not preclude SARS-CoV-2 infection and should not be used as the sole basis for patient management decisions. This test has been authorized by the FDA under   an Emergency Use Authorization (EUA) for use by authorized laboratories.  Fact sheet for Healthcare Providers: ConventionUpdate.co.nz Fact sheet for Patients: ConventionUpdate.co.nz   Methodology: Isothermal Nucleic Acid Amplification         COVID-19 RAPID TEST [[id number]]  (Abnormal) Collected: 06/16/21 0023    Order Status: Completed Specimen: Nasopharyngeal Updated: 06/16/21 0208     Specimen source Nasopharyngeal        COVID-19 rapid test Indeterminate        Comment: Rapid Abbott ID Now   The presence or absence of COVID-19 Viral RNAs cannot be determined. If clinically indicated, please collect a new specimen. This test has been authorized by the FDA under an Emergency Use Authorization (EUA) for use by authorized laboratories. Fact sheet for Healthcare Providers: kstattoo.com Fact sheet for Patients: kstattoo.com   Methodology: Isothermal Nucleic Acid Amplification SPOKE TO KEVYN MENDOZA,   SUGGESTED RECOLLECT                 Labs:     Recent Labs     06/25/21  0842 06/24/21  0747   WBC 14.5* 16.5*   HGB 9.3* 10.8*   HCT 30.8* 34.9*    173     Recent Labs     06/25/21  0842 06/24/21  0747 06/23/21  0758    138 137   K 4.7 4.5 4.2   * 109* 109*   CO2 22 22 22   BUN 79* 77* 78*   CREA 2.45* 2.54* 2.71*   GLU 82 68 81   CA 7.9* 8.1* 8.1*     Recent Labs     06/25/21  0842 06/24/21  0747 06/23/21  0758   ALT 17 18 18   * 548* 573*   TBILI 2.0* 2.1* 2.3*   TP 6.3* 6.4 6.4   ALB 1.3* 1.4* 1.2*   GLOB 5.0* 5.0* 5.2*     No results for input(s): INR, PTP, APTT, INREXT, INREXT in the last 72 hours. No results for input(s): FE, TIBC, PSAT, FERR in the last 72 hours. No results found for: FOL, RBCF   Recent Labs     06/25/21  1053 06/25/21  0140   PH 7.26* 7.24*   PCO2 52* 52*   PO2 126* 78     No results for input(s): CPK, CKNDX, TROIQ in the last 72 hours.     No lab exists for component: CPKMB  No results found for: CHOL, CHOLX, CHLST, CHOLV, HDL, HDLP, LDL, LDLC, DLDLP, TGLX, TRIGL, TRIGP, CHHD, CHHDX  Lab Results   Component Value Date/Time    Glucose (POC) 93 06/25/2021 09:51 AM    Glucose (POC) 124 (H) 06/24/2021 07:46 PM    Glucose (POC) 115 06/24/2021 02:49 PM    Glucose (POC) 104 06/24/2021 11:14 AM    Glucose (POC) 80 06/24/2021 08:52 AM     Lab Results   Component Value Date/Time    Color Yaritza 04/28/2021 03:00 PM    Appearance Turbid (A) 04/28/2021 03:00 PM Specific gravity 1.014 04/28/2021 03:00 PM    pH (UA) 5.0 04/28/2021 03:00 PM    Protein 100 (A) 04/28/2021 03:00 PM    Glucose Negative 04/28/2021 03:00 PM    Ketone Negative 04/28/2021 03:00 PM    Bilirubin Negative 04/28/2021 03:00 PM    Urobilinogen 4.0 (H) 04/28/2021 03:00 PM    Nitrites Negative 04/28/2021 03:00 PM    Leukocyte Esterase Negative 04/28/2021 03:00 PM    Bacteria Negative 04/28/2021 03:00 PM    WBC 0-4 04/28/2021 03:00 PM    RBC 0-5 04/28/2021 03:00 PM         Assessment:     Acute on chronic kidney failure-creatinine trending down  Diabetes  Hypertension   Hyperlipidemia  Anemia- improving  Leukocytosis  Chronic leg wounds-E.  Cloacae resistant to Zosyn and MRSA isolated   Liver mass  Hypotension-improved   Hypokalemia  Right moderate hydronephrosis  Left heel ulcer-gas-forming organisms and osteomyelitis   Post op day #2 left AKA  Respiratory Acidosis       Plan:     Repeat ABG stat  Order cardiac enzymes   Day 5/14 on Linezolid   Day 6/21 Meropenem   Frequent turns and wound care  Keep left AKA stump elevated with 2 pillows  Repeat labs in AM    Hold tramadol and gabapentin at this time    We will repeat ABG in 1 hour discussed with the respiratory therapist  PT OT consult          Current Facility-Administered Medications:     0.9% sodium chloride infusion 250 mL, 250 mL, IntraVENous, PRN, Linda Kidd MD    0.9% sodium chloride infusion 250 mL, 250 mL, IntraVENous, PRN, Linda Kidd MD    sodium chloride (NS) flush 5-40 mL, 5-40 mL, IntraVENous, Q8H, Linda Kidd MD, 10 mL at 06/25/21 1115    sodium chloride (NS) flush 5-40 mL, 5-40 mL, IntraVENous, PRN, Linda Kidd MD    albuterol (PROVENTIL HFA, VENTOLIN HFA, PROAIR HFA) inhaler 2 Puff, 2 Puff, Inhalation, Q6H PRN, Ibis Bee MD    0.9% sodium chloride infusion 250 mL, 250 mL, IntraVENous, PRN, Linda Kidd MD    collagenase (SANTYL) 250 unit/gram ointment, , Topical, DAILY, Linda Kidd MD, Given at 06/24/21 1322    linezolid (ZYVOX) tablet 600 mg, 600 mg, Oral, Q12H, Sascha Araiza MD, 600 mg at 06/24/21 2017    meropenem (MERREM) 1 g in sterile water (preservative free) 20 mL IV syringe, 1 g, IntraVENous, Q24H, Sascha Araiza MD, 1 g at 06/24/21 1756    [Held by provider] amLODIPine (NORVASC) tablet 5 mg, 5 mg, Oral, DAILY, Dinora Ramirez MD, 5 mg at 06/17/21 1123    gabapentin (NEURONTIN) capsule 100 mg, 100 mg, Oral, TID, Dinora Ramirez MD, 100 mg at 06/24/21 2017    [Held by provider] furosemide (LASIX) tablet 40 mg, 40 mg, Oral, DAILY, Sammy Sims MD, 40 mg at 06/16/21 1107    latanoprost (XALATAN) 0.005 % ophthalmic solution 1 Drop, 1 Drop, Both Eyes, QPM, Dinora Ramirez MD, 1 Drop at 06/24/21 2000    loperamide (IMODIUM) capsule 2 mg, 2 mg, Oral, Q4H PRN, Dinora Rmairez MD    simvastatin (ZOCOR) tablet 10 mg, 10 mg, Oral, QHS, Dinora Ramirez MD, 10 mg at 06/24/21 2017    traMADoL (ULTRAM) tablet 50 mg, 50 mg, Oral, Q6H PRN, Dinora Ramirez MD, 50 mg at 06/24/21 1410    cyanocobalamin (VITAMIN B12) injection 1,000 mcg, 1,000 mcg, IntraMUSCular, EVERY 2 WEEKS, Dinora Ramirez MD, 1,000 mcg at 06/16/21 1731    ferrous sulfate tablet 325 mg, 325 mg, Oral, ACB, Dinora Ramirez MD, 325 mg at 06/20/21 0855    pantoprazole (PROTONIX) tablet 40 mg, 40 mg, Oral, DAILY, Dinora Ramirez MD, 40 mg at 06/24/21 0544    insulin lispro (HUMALOG) injection, , SubCUTAneous, AC&HS, Sammy Sims MD, 3 Units at 06/23/21 2230    glucose chewable tablet 16 g, 4 Tablet, Oral, PRN, Dinora Ramirez MD    dextrose (D50W) injection syrg 12.5-25 g, 25-50 mL, IntraVENous, PRN, Dinora Ramirez MD    glucagon (GLUCAGEN) injection 1 mg, 1 mg, IntraMUSCular, PRN, Sammy Sims MD    0.9% sodium chloride infusion, 75 mL/hr, IntraVENous, CONTINUOUS, Dinora Ramirez MD, Last Rate: 75 mL/hr at 06/24/21 1758, 75 mL/hr at 06/24/21 1758    acetaminophen (TYLENOL) tablet 650 mg, 650 mg, Oral, Q6H PRN, 650 mg at 06/24/21 2017 **OR** acetaminophen (TYLENOL) suppository 650 mg, 650 mg, Rectal, Q6H PRN, Chitra Ramirez MD    polyethylene glycol (MIRALAX) packet 17 g, 17 g, Oral, DAILY PRN, Chitra Ramirez MD    ondansetron (ZOFRAN ODT) tablet 4 mg, 4 mg, Oral, Q8H PRN **OR** ondansetron (ZOFRAN) injection 4 mg, 4 mg, IntraVENous, Q6H PRN, Dinora Ramirez MD    heparin (porcine) injection 5,000 Units, 5,000 Units, SubCUTAneous, Q8H, Dinora Ramirez MD, 5,000 Units at 06/25/21 0246    zinc oxide-white petrolatum 17-57 % topical paste, , Topical, TID, Chitra Ramirez MD, Given at 06/24/21 2019

## 2021-06-25 NOTE — PROGRESS NOTES
Problem: Falls - Risk of  Goal: *Absence of Falls  Description: Document Rubia Cloud Fall Risk and appropriate interventions in the flowsheet. Outcome: Progressing Towards Goal  Note: Fall Risk Interventions:  Mobility Interventions: Bed/chair exit alarm, OT consult for ADLs, PT Consult for mobility concerns    Mentation Interventions: Bed/chair exit alarm, Door open when patient unattended, Adequate sleep, hydration, pain control    Medication Interventions: Bed/chair exit alarm    Elimination Interventions: Bed/chair exit alarm, Call light in reach    History of Falls Interventions: Bed/chair exit alarm, Door open when patient unattended         Problem: Patient Education: Go to Patient Education Activity  Goal: Patient/Family Education  Outcome: Progressing Towards Goal     Problem: Pressure Injury - Risk of  Goal: *Prevention of pressure injury  Description: Document Chente Scale and appropriate interventions in the flowsheet.   Outcome: Progressing Towards Goal  Note: Pressure Injury Interventions:  Sensory Interventions: Assess need for specialty bed, Assess changes in LOC, Minimize linen layers, Maintain/enhance activity level, Keep linens dry and wrinkle-free    Moisture Interventions: Absorbent underpads, Apply protective barrier, creams and emollients, Maintain skin hydration (lotion/cream), Minimize layers    Activity Interventions: Assess need for specialty bed, PT/OT evaluation    Mobility Interventions: Assess need for specialty bed, HOB 30 degrees or less, PT/OT evaluation    Nutrition Interventions: Document food/fluid/supplement intake, Offer support with meals,snacks and hydration    Friction and Shear Interventions: Apply protective barrier, creams and emollients, HOB 30 degrees or less, Minimize layers                Problem: Patient Education: Go to Patient Education Activity  Goal: Patient/Family Education  Outcome: Progressing Towards Goal     Problem: Impaired Skin Integrity/Pressure Injury Treatment  Goal: *Improvement of Existing Pressure Injury  Outcome: Progressing Towards Goal  Goal: *Prevention of pressure injury  Description: Document Chente Scale and appropriate interventions in the flowsheet. Outcome: Progressing Towards Goal  Note: Pressure Injury Interventions:  Sensory Interventions: Assess need for specialty bed, Assess changes in LOC, Minimize linen layers, Maintain/enhance activity level, Keep linens dry and wrinkle-free    Moisture Interventions: Absorbent underpads, Apply protective barrier, creams and emollients, Maintain skin hydration (lotion/cream), Minimize layers    Activity Interventions: Assess need for specialty bed, PT/OT evaluation    Mobility Interventions: Assess need for specialty bed, HOB 30 degrees or less, PT/OT evaluation    Nutrition Interventions: Document food/fluid/supplement intake, Offer support with meals,snacks and hydration    Friction and Shear Interventions: Apply protective barrier, creams and emollients, HOB 30 degrees or less, Minimize layers                Problem: Patient Education: Go to Patient Education Activity  Goal: Patient/Family Education  Outcome: Progressing Towards Goal     Problem: Nutrition Deficit  Goal: *Optimize nutritional status  Outcome: Progressing Towards Goal     Problem: Patient Education: Go to Patient Education Activity  Goal: Patient/Family Education  Outcome: Progressing Towards Goal     Problem: Patient Education: Go to Patient Education Activity  Goal: Patient/Family Education  Outcome: Progressing Towards Goal     Problem: Risk for Spread of Infection  Goal: Prevent transmission of infectious organism to others  Description: Prevent the transmission of infectious organisms to other patients, staff members, and visitors.   Outcome: Progressing Towards Goal     Problem: Patient Education:  Go to Education Activity  Goal: Patient/Family Education  Outcome: Progressing Towards Goal     Problem: Patient Education: Go to Patient Education Activity  Goal: Patient/Family Education  Outcome: Progressing Towards Goal

## 2021-06-26 LAB
ABO + RH BLD: NORMAL
ALBUMIN SERPL-MCNC: 1.2 G/DL (ref 3.5–5)
ALBUMIN SERPL-MCNC: 1.2 G/DL (ref 3.5–5)
ALBUMIN/GLOB SERPL: 0.2 {RATIO} (ref 1.1–2.2)
ALP SERPL-CCNC: 577 U/L (ref 45–117)
ALT SERPL-CCNC: 18 U/L (ref 12–78)
ANION GAP SERPL CALC-SCNC: 11 MMOL/L (ref 5–15)
ANION GAP SERPL CALC-SCNC: 5 MMOL/L (ref 5–15)
ARTERIAL PATENCY WRIST A: POSITIVE
AST SERPL W P-5'-P-CCNC: 141 U/L (ref 15–37)
BASE DEFICIT BLDA-SCNC: 3.5 MMOL/L (ref 0–2)
BASOPHILS # BLD: 0.1 K/UL (ref 0–0.1)
BASOPHILS NFR BLD: 0 % (ref 0–1)
BDY SITE: ABNORMAL
BILIRUB SERPL-MCNC: 2.3 MG/DL (ref 0.2–1)
BLD PROD TYP BPU: NORMAL
BLOOD GROUP ANTIBODIES SERPL: NEGATIVE
BPU ID: NORMAL
BUN SERPL-MCNC: 79 MG/DL (ref 6–20)
BUN SERPL-MCNC: 80 MG/DL (ref 6–20)
BUN/CREAT SERPL: 36 (ref 12–20)
BUN/CREAT SERPL: 39 (ref 12–20)
CA-I BLD-MCNC: 8 MG/DL (ref 8.5–10.1)
CA-I BLD-MCNC: 8.2 MG/DL (ref 8.5–10.1)
CHLORIDE SERPL-SCNC: 111 MMOL/L (ref 97–108)
CHLORIDE SERPL-SCNC: 113 MMOL/L (ref 97–108)
CO2 SERPL-SCNC: 19 MMOL/L (ref 21–32)
CO2 SERPL-SCNC: 23 MMOL/L (ref 21–32)
CREAT SERPL-MCNC: 2.04 MG/DL (ref 0.7–1.3)
CREAT SERPL-MCNC: 2.21 MG/DL (ref 0.7–1.3)
CROSSMATCH RESULT,%XM: NORMAL
DIFFERENTIAL METHOD BLD: ABNORMAL
EOSINOPHIL # BLD: 0.1 K/UL (ref 0–0.4)
EOSINOPHIL NFR BLD: 1 % (ref 0–7)
EPAP/CPAP/PEEP, PAPEEP: 8
ERYTHROCYTE [DISTWIDTH] IN BLOOD BY AUTOMATED COUNT: 18.7 % (ref 11.5–14.5)
FIO2 ON VENT: 24 %
GLOBULIN SER CALC-MCNC: 5.4 G/DL (ref 2–4)
GLUCOSE BLD STRIP.AUTO-MCNC: 118 MG/DL (ref 65–117)
GLUCOSE BLD STRIP.AUTO-MCNC: 136 MG/DL (ref 65–117)
GLUCOSE BLD STRIP.AUTO-MCNC: 140 MG/DL (ref 65–117)
GLUCOSE BLD STRIP.AUTO-MCNC: 140 MG/DL (ref 65–117)
GLUCOSE SERPL-MCNC: 112 MG/DL (ref 65–100)
GLUCOSE SERPL-MCNC: 132 MG/DL (ref 65–100)
HCO3 BLDA-SCNC: 22 MMOL/L (ref 22–26)
HCT VFR BLD AUTO: 33 % (ref 36.6–50.3)
HGB BLD-MCNC: 10 G/DL (ref 12.1–17)
IMM GRANULOCYTES # BLD AUTO: 0.1 K/UL (ref 0–0.04)
IMM GRANULOCYTES NFR BLD AUTO: 1 % (ref 0–0.5)
IPAP/PIP, IPAPIP: 18
LYMPHOCYTES # BLD: 2.7 K/UL (ref 0.8–3.5)
LYMPHOCYTES NFR BLD: 20 % (ref 12–49)
MCH RBC QN AUTO: 29.2 PG (ref 26–34)
MCHC RBC AUTO-ENTMCNC: 30.3 G/DL (ref 30–36.5)
MCV RBC AUTO: 96.2 FL (ref 80–99)
MONOCYTES # BLD: 1.2 K/UL (ref 0–1)
MONOCYTES NFR BLD: 9 % (ref 5–13)
NEUTS SEG # BLD: 9.7 K/UL (ref 1.8–8)
NEUTS SEG NFR BLD: 69 % (ref 32–75)
NRBC # BLD: 0 K/UL (ref 0–0.01)
NRBC BLD-RTO: 0 PER 100 WBC
PCO2 BLDA: 38 MMHG (ref 35–45)
PERFORMED BY, TECHID: ABNORMAL
PH BLDA: 7.36 [PH] (ref 7.35–7.45)
PHOSPHATE SERPL-MCNC: 2.9 MG/DL (ref 2.6–4.7)
PLATELET # BLD AUTO: 152 K/UL (ref 150–400)
PMV BLD AUTO: 9.3 FL (ref 8.9–12.9)
PO2 BLDA: 133 MMHG (ref 75–100)
POTASSIUM SERPL-SCNC: 4.7 MMOL/L (ref 3.5–5.1)
POTASSIUM SERPL-SCNC: 4.9 MMOL/L (ref 3.5–5.1)
PROT SERPL-MCNC: 6.6 G/DL (ref 6.4–8.2)
RBC # BLD AUTO: 3.43 M/UL (ref 4.1–5.7)
SAO2 % BLD: 100 %
SODIUM SERPL-SCNC: 141 MMOL/L (ref 136–145)
SODIUM SERPL-SCNC: 141 MMOL/L (ref 136–145)
SPECIMEN EXP DATE BLD: NORMAL
STATUS OF UNIT,%ST: NORMAL
TRANSFUSION STATUS PATIENT QL: NORMAL
UNIT DIVISION, %UDIV: 0
VENTILATION MODE VENT: ABNORMAL
WBC # BLD AUTO: 13.9 K/UL (ref 4.1–11.1)

## 2021-06-26 PROCEDURE — 80053 COMPREHEN METABOLIC PANEL: CPT

## 2021-06-26 PROCEDURE — 74011250636 HC RX REV CODE- 250/636: Performed by: FAMILY MEDICINE

## 2021-06-26 PROCEDURE — 97165 OT EVAL LOW COMPLEX 30 MIN: CPT

## 2021-06-26 PROCEDURE — 97530 THERAPEUTIC ACTIVITIES: CPT | Performed by: PHYSICAL THERAPIST

## 2021-06-26 PROCEDURE — 85025 COMPLETE CBC W/AUTO DIFF WBC: CPT

## 2021-06-26 PROCEDURE — 82962 GLUCOSE BLOOD TEST: CPT

## 2021-06-26 PROCEDURE — 74011250636 HC RX REV CODE- 250/636: Performed by: INTERNAL MEDICINE

## 2021-06-26 PROCEDURE — 99024 POSTOP FOLLOW-UP VISIT: CPT | Performed by: SURGERY

## 2021-06-26 PROCEDURE — 30233N1 TRANSFUSION OF NONAUTOLOGOUS RED BLOOD CELLS INTO PERIPHERAL VEIN, PERCUTANEOUS APPROACH: ICD-10-PCS | Performed by: FAMILY MEDICINE

## 2021-06-26 PROCEDURE — 82803 BLOOD GASES ANY COMBINATION: CPT

## 2021-06-26 PROCEDURE — 94660 CPAP INITIATION&MGMT: CPT

## 2021-06-26 PROCEDURE — 77010033678 HC OXYGEN DAILY

## 2021-06-26 PROCEDURE — 94760 N-INVAS EAR/PLS OXIMETRY 1: CPT

## 2021-06-26 PROCEDURE — 74011000250 HC RX REV CODE- 250: Performed by: INTERNAL MEDICINE

## 2021-06-26 PROCEDURE — 74011250637 HC RX REV CODE- 250/637: Performed by: FAMILY MEDICINE

## 2021-06-26 PROCEDURE — 65270000032 HC RM SEMIPRIVATE

## 2021-06-26 PROCEDURE — 80069 RENAL FUNCTION PANEL: CPT

## 2021-06-26 PROCEDURE — 36415 COLL VENOUS BLD VENIPUNCTURE: CPT

## 2021-06-26 PROCEDURE — 97530 THERAPEUTIC ACTIVITIES: CPT

## 2021-06-26 RX ADMIN — HEPARIN SODIUM 5000 UNITS: 5000 INJECTION INTRAVENOUS; SUBCUTANEOUS at 21:09

## 2021-06-26 RX ADMIN — COLLAGENASE SANTYL: 250 OINTMENT TOPICAL at 08:50

## 2021-06-26 RX ADMIN — WHITE PETROLATUM,ZINC OXIDE: 57; 17 PASTE TOPICAL at 21:10

## 2021-06-26 RX ADMIN — Medication 10 ML: at 21:09

## 2021-06-26 RX ADMIN — HEPARIN SODIUM 5000 UNITS: 5000 INJECTION INTRAVENOUS; SUBCUTANEOUS at 16:40

## 2021-06-26 RX ADMIN — HEPARIN SODIUM 5000 UNITS: 5000 INJECTION INTRAVENOUS; SUBCUTANEOUS at 05:18

## 2021-06-26 RX ADMIN — ACETAMINOPHEN 650 MG: 325 TABLET ORAL at 13:09

## 2021-06-26 RX ADMIN — PANTOPRAZOLE SODIUM 40 MG: 40 TABLET, DELAYED RELEASE ORAL at 08:46

## 2021-06-26 RX ADMIN — WHITE PETROLATUM,ZINC OXIDE: 57; 17 PASTE TOPICAL at 08:50

## 2021-06-26 RX ADMIN — Medication 10 ML: at 16:42

## 2021-06-26 RX ADMIN — SIMVASTATIN 10 MG: 10 TABLET, FILM COATED ORAL at 21:09

## 2021-06-26 RX ADMIN — MEROPENEM 1 G: 1 INJECTION, POWDER, FOR SOLUTION INTRAVENOUS at 16:40

## 2021-06-26 NOTE — PROGRESS NOTES
SW inquired about patients possible discharge today. Gypsy informed SW that patient can discharge today after dressing change. JANNET spoke with Dr. Maya Salazar to confirm about possible discharge today. Dr. Maya Salazar informed that patient could dc today as long as her dressings were changed by the nurse. REMINGTON then spoke with Mo Grey [phone] at [facility] to inform her that the patient would discharge today. Mo Grey confirmed and informed SW that patients room # would still be the same. Patient will go into room 207A, report [phone].

## 2021-06-26 NOTE — PROGRESS NOTES
Patient examined this morning. He looks more awake and alert he is currently on BiPAP therapy left AKA dressings intact. I will personally change dressing tomorrow morning. Keep the stump elevated. Once dressing is changed patient can be discharged. Continue wound care on the right leg.

## 2021-06-26 NOTE — ROUTINE PROCESS
Bedside and Verbal shift change report given to Donovan Avendano (oncoming nurse) by Sharan Horton (offgoing nurse). Report included the following information SBAR and MAR.

## 2021-06-26 NOTE — PROGRESS NOTES
Progress Note    Patient: Radha Rodriguez MRN: [id number]  SSN: [id number]    YOB: 1932  Age: 80 y.o. Sex: male      Admit Date: 6/15/2021    LOS: 11 days     Subjective:     89 nonverbal with sepsis, acute respiratory failure, acute kidney injury and sepsis    Objective:     Vitals:    06/26/21 0123 06/26/21 0607 06/26/21 0847 06/26/21 1530   BP:   (!) 127/59 122/62   Pulse:   88 80   Resp:   16 18   Temp:    98.8 °F (37.1 °C)   SpO2: 100% 100% 99% 99%   Weight:       Height:            Intake and Output:  Current Shift: No intake/output data recorded. Last three shifts: No intake/output data recorded. Physical Exam:   General:  Alert, cooperative, no distress, appears stated age. Eyes:  Conjunctivae/corneas clear. PERRL, EOMs intact. Fundi benign   Ears:  Normal TMs and external ear canals both ears. Nose: Nares normal. Septum midline. Mucosa normal. No drainage or sinus tenderness. Mouth/Throat: Lips, mucosa, and tongue normal. Teeth and gums normal.   Neck: Supple, symmetrical, trachea midline, no adenopathy, thyroid: no enlargment/tenderness/nodules, no carotid bruit and no JVD. Back:   Symmetric, no curvature. ROM normal. No CVA tenderness. Lungs:   Clear to auscultation bilaterally. Heart:  Regular rate and rhythm, S1, S2 normal, no murmur, click, rub or gallop. Abdomen:   Soft, non-tender. Bowel sounds normal. No masses,  No organomegaly. Extremities: Extremities weakness traumatic, no cyanosis or edema. Pulses: 2+ and symmetric all extremities. Skin: Skin color, texture, turgor normal. No rashes or lesions   Lymph nodes: Cervical, supraclavicular, and axillary nodes normal.   Neurologic: CNII-XII intact. Weakness throughout. Lab/Data Review: All lab results for the last 24 hours reviewed.      Recent Results (from the past 24 hour(s))   GLUCOSE, POC    Collection Time: 06/25/21  7:58 PM   Result Value Ref Range    Glucose (POC) 132 (H) 65 - 117 mg/dL Performed by Tahmina Castaneda    BLOOD GAS, ARTERIAL    Collection Time: 06/26/21  5:20 AM   Result Value Ref Range    pH 7.36 7.35 - 7.45      PCO2 38 35 - 45 mmHg    PO2 133 (H) 75 - 100 mmHg    O2  >95 %    BICARBONATE 22 22 - 26 mmol/L    BASE EXCESS PENDING mmol/L    BASE DEFICIT 3.5 (H) 0 - 2 mmol/L    O2 METHOD PENDING     O2 FLOW RATE PENDING L/min    FIO2 24 %    MODE BiPAP      Tidal volume PENDING     SET RATE PENDING     SPONTANEOUS RATE PENDING     PRESSURE SUPPORT PENDING     IPAP/PIP 18      EPAP/CPAP/PEEP 8      High PEEP PENDING     Sample source PENDING     SITE Run repeated      LORENA'S TEST Positive     CBC WITH AUTOMATED DIFF    Collection Time: 06/26/21  8:23 AM   Result Value Ref Range    WBC 13.9 (H) 4.1 - 11.1 K/uL    RBC 3.43 (L) 4.10 - 5.70 M/uL    HGB 10.0 (L) 12.1 - 17.0 g/dL    HCT 33.0 (L) 36.6 - 50.3 %    MCV 96.2 80.0 - 99.0 FL    MCH 29.2 26.0 - 34.0 PG    MCHC 30.3 30.0 - 36.5 g/dL    RDW 18.7 (H) 11.5 - 14.5 %    PLATELET 131 495 - 735 K/uL    MPV 9.3 8.9 - 12.9 FL    NRBC 0.0 0.0  WBC    ABSOLUTE NRBC 0.00 0.00 - 0.01 K/uL    NEUTROPHILS 69 32 - 75 %    LYMPHOCYTES 20 12 - 49 %    MONOCYTES 9 5 - 13 %    EOSINOPHILS 1 0 - 7 %    BASOPHILS 0 0 - 1 %    IMMATURE GRANULOCYTES 1 (H) 0 - 0.5 %    ABS. NEUTROPHILS 9.7 (H) 1.8 - 8.0 K/UL    ABS. LYMPHOCYTES 2.7 0.8 - 3.5 K/UL    ABS. MONOCYTES 1.2 (H) 0.0 - 1.0 K/UL    ABS. EOSINOPHILS 0.1 0.0 - 0.4 K/UL    ABS. BASOPHILS 0.1 0.0 - 0.1 K/UL    ABS. IMM.  GRANS. 0.1 (H) 0.00 - 0.04 K/UL    DF AUTOMATED     METABOLIC PANEL, COMPREHENSIVE    Collection Time: 06/26/21  8:23 AM   Result Value Ref Range    Sodium 141 136 - 145 mmol/L    Potassium 4.7 3.5 - 5.1 mmol/L    Chloride 111 (H) 97 - 108 mmol/L    CO2 19 (L) 21 - 32 mmol/L    Anion gap 11 5 - 15 mmol/L    Glucose 112 (H) 65 - 100 mg/dL    BUN 79 (H) 6 - 20 mg/dL    Creatinine 2.21 (H) 0.70 - 1.30 mg/dL    BUN/Creatinine ratio 36 (H) 12 - 20      GFR est AA 34 (L) >60 ml/min/1.73m2    GFR est non-AA 28 (L) >60 ml/min/1.73m2    Calcium 8.2 (L) 8.5 - 10.1 mg/dL    Bilirubin, total 2.3 (H) 0.2 - 1.0 mg/dL    AST (SGOT) 141 (H) 15 - 37 U/L    ALT (SGPT) 18 12 - 78 U/L    Alk.  phosphatase 577 (H) 45 - 117 U/L    Protein, total 6.6 6.4 - 8.2 g/dL    Albumin 1.2 (L) 3.5 - 5.0 g/dL    Globulin 5.4 (H) 2.0 - 4.0 g/dL    A-G Ratio 0.2 (L) 1.1 - 2.2     GLUCOSE, POC    Collection Time: 06/26/21  9:26 AM   Result Value Ref Range    Glucose (POC) 118 (H) 65 - 117 mg/dL    Performed by Earline Christian(PCT)    GLUCOSE, POC    Collection Time: 06/26/21 11:08 AM   Result Value Ref Range    Glucose (POC) 136 (H) 65 - 117 mg/dL    Performed by Earline Christian(PCT)    GLUCOSE, POC    Collection Time: 06/26/21  3:06 PM   Result Value Ref Range    Glucose (POC) 140 (H) 65 - 117 mg/dL    Performed by Earline Christian(PCT)          Assessment:     Active Problems:    CRISTIAN (acute kidney injury) ([facility] [address] (4/28/2021)      Acute kidney injury ([facility] [address] (6/15/2021)      Sepsis, encephalopathy  Acute respiratory failure  Plan:     Continue with present treatment    Signed By: Feliberto Unger MD     June 26, 2021

## 2021-06-26 NOTE — PROGRESS NOTES
Name: Radha Rodriguez MRN: [id number]   : 1932 Hospital: [facility]   Date: 2021        IMPRESSION:   · CRISTIAN on CKD stage 4. GFR is improving daily after the left AKA. Electrolytes are acceptable  · Mild hypervolemia  · Borderline anemia  · Protein deficiency      PLAN:   · Plans are for patient to be discharged ones his stump dressing is changed   · Patient is stable from renal stand point. No new recommendations are offered at this time  · Renal panel in AM if still in the hospital.  · Consider protein supplements. Subjective/Interval History:   I have reviewed the flowsheet and previous days notes. ROS:A comprehensive review of systems was negative. Objective:   Vital Signs:    Visit Vitals  BP (!) 127/59   Pulse 88   Temp 98 °F (36.7 °C)   Resp 16   Ht 6' 2\" (1.88 m)   Wt 127 kg (279 lb 15.8 oz)   SpO2 99%   BMI 35.95 kg/m²       O2 Device: BIPAP   O2 Flow Rate (L/min): 2 l/min   Temp (24hrs), Av.1 °F (36.7 °C), Min:98 °F (36.7 °C), Max:98.2 °F (36.8 °C)       Intake/Output:   Last shift:      No intake/output data recorded. Last 3 shifts: No intake/output data recorded. No intake or output data in the 24 hours ending 21 1427     Physical Exam:  General:    Elderly man in no acute distress. Looks chronically ill. Head:   Normocephalic, without obvious abnormality, atraumatic. Eyes:   Conjunctivae/corneas clear. Nose:  Nares normal. No drainage or sinus tenderness. Throat:    Lips, mucosa, and tongue normal.  No Thrush  Neck:  Supple, symmetrical,  no adenopathy, thyroid: non tender    no carotid bruit and no JVD. Lungs:   Clear to auscultation bilaterally. No Wheezing or Rhonchi. No rales. Chest wall:  No tenderness or deformity. No Accessory muscle use. Heart:   Regular rate and rhythm,  no murmur, rub or gallop. Abdomen:   Soft, non-tender. Not distended.   Bowel sounds normal. No masses  Extremities: Extremities - left AKA, stump with dressing, No cyanosis. + edema. No clubbing  Skin:     Texture, turgor normal. No rashes or lesions. Not Jaundiced  Psych:  Fair insight. Not depressed. Not anxious or agitated. DATA:  Labs:  Recent Labs     06/26/21 0823 06/25/21  0842 06/24/21  0747    139 138   K 4.7 4.7 4.5   * 110* 109*   CO2 19* 22 22   BUN 79* 79* 77*   CREA 2.21* 2.45* 2.54*   CA 8.2* 7.9* 8.1*   ALB 1.2* 1.3* 1.4*     Recent Labs     06/26/21  0823 06/25/21  0842 06/24/21  0747   WBC 13.9* 14.5* 16.5*   HGB 10.0* 9.3* 10.8*   HCT 33.0* 30.8* 34.9*    161 173     No results for input(s): TONY, KU, CLU, CREAU in the last 72 hours.     No lab exists for component: PROU    Total time spent with patient:  35 minutes    [] Critical Care Provided    Care Plan discussed with:   Staff, Medical Team    Mary Coleman MD

## 2021-06-26 NOTE — PROGRESS NOTES
Discharge cancelled. CM spoke with family and they informed CM that they did not want patient to return to [facility]. They expressed that they were waiting to be notified during the week so that they could have stated they wanted a different facility. CM informed Ara Flores and the patients nurse. Patients nurse will inform Dr. Ailyn Gonzales. Angela Poe at the facility was notified of the cancelled discharge    CM gave family a choice list of facilities. Family chose [facility], [id number] [place] [address] and Rehab, and [facility]. Signed choice letter in file. Referrals sent. CM will continue to follow.

## 2021-06-26 NOTE — CONSULTS
PULMONARY CONSULT  [facility]    Name: Deni Lui MRN: [id number]   : 1932 Hospital: [address] [place] [address]   Date: 2021  Admission date: 6/15/2021 Hospital Day: 12       HPI:     Hospital Problems  Date Reviewed: 2021        Codes Class Noted POA    Acute kidney injury [facility]) ICD-10-CM: N17.9  ICD-9-CM: 584.9  6/15/2021 Unknown        CRISTIAN (acute kidney injury) [facility]) ICD-10-CM: N17.9  ICD-9-CM: 584.9  2021 Unknown                   [x] High complexity decision making was performed  [x] See my orders for details      Subjective/Initial History:     I was asked by Patric Sanon MD to see Deni Lui  a 80 y.o.  male in consultation     Excerpts from admission 6/15/2021 or consult notes as follows:   66-year-old male came in because of abnormal lab he has past medical history of hypertension diabetes mellitus chronic kidney disease he was on dialysis previously he was at the nursing home. Also he has bilateral leg wounds seen by vascular surgeon and he was getting treatment for infection wound culture positive for Enterococcus cloacae and he underwent left above-the-knee amputation on . Last night patient condition got worse arterial blood gases was done which shows elevated PCO2 and she was put on noninvasive ventilator BiPAP machine he still lethargic unable to get any history out of the patient he is on oxygen and nebulizer at home according to her sister.        No Known Allergies     MAR reviewed and pertinent medications noted or modified as needed     Current Facility-Administered Medications   Medication    0.9% sodium chloride infusion 250 mL    0.9% sodium chloride infusion 250 mL    sodium chloride (NS) flush 5-40 mL    sodium chloride (NS) flush 5-40 mL    albuterol (PROVENTIL HFA, VENTOLIN HFA, PROAIR HFA) inhaler 2 Puff    0.9% sodium chloride infusion 250 mL    collagenase (SANTYL) 250 unit/gram ointment    meropenem (MERREM) 1 g in sterile water (preservative free) 20 mL IV syringe    [Held by provider] gabapentin (NEURONTIN) capsule 100 mg    [Held by provider] furosemide (LASIX) tablet 40 mg    latanoprost (XALATAN) 0.005 % ophthalmic solution 1 Drop    loperamide (IMODIUM) capsule 2 mg    simvastatin (ZOCOR) tablet 10 mg    [Held by provider] traMADoL (ULTRAM) tablet 50 mg    cyanocobalamin (VITAMIN B12) injection 1,000 mcg    pantoprazole (PROTONIX) tablet 40 mg    insulin lispro (HUMALOG) injection    glucose chewable tablet 16 g    dextrose (D50W) injection syrg 12.5-25 g    glucagon (GLUCAGEN) injection 1 mg    acetaminophen (TYLENOL) tablet 650 mg    Or    acetaminophen (TYLENOL) suppository 650 mg    polyethylene glycol (MIRALAX) packet 17 g    ondansetron (ZOFRAN ODT) tablet 4 mg    Or    ondansetron (ZOFRAN) injection 4 mg    heparin (porcine) injection 5,000 Units    zinc oxide-white petrolatum 17-57 % topical paste      Patient PCP: Anu Pitt MD  PMH:  has a past medical history of Arthritis, AVM (arteriovenous malformation) of colon, B12 deficiency, CAD (coronary artery disease), Chronic anemia, Chronic kidney disease, Diabetes ([facility] [address] GERD (gastroesophageal reflux disease), Heart failure ([facility] [facility] Hypertension, Ill-defined condition, and PVD (peripheral vascular disease) ([facility] [facility] PSH:   has a past surgical history that includes hx hip replacement (Bilateral). FHX: family history is not on file. SHX:  reports that he has never smoked. He has never used smokeless tobacco. He reports that he does not drink alcohol.      ROS:  Unable to obtain      Objective:     Vital Signs: Telemetry:    normal sinus rhythm Intake/Output:   Visit Vitals  /62   Pulse 80   Temp 98.8 °F (37.1 °C)   Resp 18   Ht 6' 2\" (1.88 m)   Wt 127 kg (279 lb 15.8 oz)   SpO2 99%   BMI 35.95 kg/m²       Temp (24hrs), Av.3 °F (36.8 °C), Min:98 °F (36.7 °C), Max:98.8 °F (37.1 °C)        O2 Device: BIPAP O2 Flow Rate (L/min): 2 l/min       Wt Readings from Last 4 Encounters:   06/22/21 127 kg (279 lb 15.8 oz)   04/28/21 109.7 kg (241 lb 13.5 oz)        No intake or output data in the 24 hours ending 06/26/21 1643    Last shift:      No intake/output data recorded. Last 3 shifts: No intake/output data recorded. Physical Exam:     Physical Exam  Constitutional:       Appearance: He is ill-appearing. Comments: Eyes are open questionably looks toward voice does not follow any commands does not speak   HENT:      Head: Normocephalic and atraumatic. Nose: Nose normal.      Mouth/Throat:      Mouth: Mucous membranes are moist.   Eyes:      Extraocular Movements: Extraocular movements intact. Conjunctiva/sclera: Conjunctivae normal.      Pupils: Pupils are equal, round, and reactive to light. Cardiovascular:      Rate and Rhythm: Normal rate and regular rhythm. Pulses: Normal pulses. Pulmonary:      Comments: Shallow but equal breath sounds anteriorly and laterally no breath sounds in the bases lungs are clear no wheezes no definite rales  Abdominal:      General: Abdomen is flat. Bowel sounds are normal.      Palpations: Abdomen is soft. Musculoskeletal:      Cervical back: Normal range of motion and neck supple. Comments: Left above-the-knee amputation bandage in place does not move his extremities   Skin:     General: Skin is warm and dry. Capillary Refill: Capillary refill takes less than 2 seconds.    Neurological:      Comments: Eyes are open questionably looks toward voice does not speak or follow any commands has minimal movement of the extremities to noxious stimuli but does not move them to command          Labs:    Recent Labs     06/26/21  0823 06/25/21  0842 06/24/21  0747   WBC 13.9* 14.5* 16.5*   HGB 10.0* 9.3* 10.8*    161 173     Recent Labs     06/26/21  0823 06/25/21  0842 06/24/21  0747    139 138   K 4.7 4.7 4.5   * 110* 109*   CO2 19* 22 22   * 82 68   BUN 79* 79* 77*   CREA 2.21* 2.45* 2.54*   CA 8.2* 7.9* 8.1*   ALB 1.2* 1.3* 1.4*   ALT 18 17 18     Recent Labs     06/26/21  0520 06/25/21  1053 06/25/21  0140   PH 7.36 7.26* 7.24*   PCO2 38 52* 52*   PO2 133* 126* 78   HCO3 22 21* 20*   FIO2 24 35.0  --    6/26 BiPAP IP 18 EP 8 rate 15 with 24% FiO2  6/25 BiPAP IP 18 EP 8 rate 15 FiO2 35%  Recent Labs     06/25/21  0842   TROIQ <0.05       Lab Results   Component Value Date/Time    Culture result: Heavy Enterobacter cloacae complex 06/16/2021 06:15 AM    Culture result:  06/16/2021 06:15 AM     Heavy * methicillin resistant staphylococcus aureus * REFER TO [id number] FOR SENSITIVITIES    Culture result: Few Diphtheroids 06/16/2021 06:15 AM    Culture result:  06/16/2021 06:15 AM     CALLED MRSA REPORT TO Perla Santiago R.N. [id number] 06/19/2021 BY [facility]    Culture result: Heavy Enterobacter cloacae complex 06/16/2021 06:15 AM    Culture result:  06/16/2021 06:15 AM     Moderate * methicillin resistant staphylococcus aureus *    Culture result: Light Diphtheroids 06/16/2021 06:15 AM     Imaging:    CXR Results  (Last 48 hours)               06/25/21 0112  XR CHEST PORT Final result    Impression:  :    1. Low lung volumes. No acute cardiopulmonary disease. Narrative:  Single View Chest 6/25/2021       Comparison: [place] 15, 2021       Indication: Left. Findings:   Heart size is mildly enlarged. Aortic calcification. Low lung volumes. TAVR. No   significant airspace consolidation or pleural effusion. No feliberto edema. No   pneumothorax               Results from Hospital Encounter encounter on 06/15/21    XR CHEST PORT    Narrative  Single View Chest 6/25/2021    Comparison: [place] [facility], 2021    Indication: Left. Findings:  Heart size is mildly enlarged. Aortic calcification. Low lung volumes. TAVR. No  significant airspace consolidation or pleural effusion. No feliberto edema. No  pneumothorax    Impression  :  1. Low lung volumes.  No acute cardiopulmonary disease. XR CHEST PORT    Narrative  XR CHEST PORT    Comparison:  4/28/2021. Single view:  Stable right diaphragmatic elevation. Negative for focal  consolidation. No pneumothorax or pleural effusion apparent. Transcatheter  aortic valve placement noted. The heart size is stable. There is no evidence of  acute cardiac decompensation. Impression  No evidence of an acute cardiopulmonary process. Results from [place] encounter on 04/28/21    XR CHEST SNGL V    Narrative  Indication: Weakness. AP semiupright portable chest radiograph 1621 hours 4/28/2021. No comparison. Underexpanded lungs, mildly elevated right hemidiaphragm. No pulmonary infiltrate, pleural effusion or pneumothorax. Normal heart size. TAVR. Impression  Underexpanded lungs, otherwise negative. Results from [place] encounter on 06/15/21    CT LOW EXT BI WO CONT    Narrative  Exam: CT LOW EXT BI WO CONT    TECHNIQUE: Multiple transaxial CT images of the of the legs from the knees  through the ankles were obtained without contrast. Coronal and sagittal  reformatted images were provided. Dose reduction: All CT scans at this facility are performed using dose reduction  optimization techniques as appropriate to a performed exam including the  following: Automated exposure control, adjustments of the mA and/or kV according  to patient size, or use of iterative reconstruction technique. COMPARISON: None    HISTORY: osteomyelitis    FINDINGS:    Please note that examination is suboptimal related to large field-of-view images  including both lower extremities which limits detailed evaluation of the osseous  structures. The reconstruction planes are also nonstandard, further limiting  evaluation. Below findings are within these exam limitations. Right leg: There is osseous bridging of the distal tibia/fibular syndesmosis which may be  from prior injury.  Diffuse osseous demineralization which limits sensitivity of  the exam. There is mild soft tissue irregularity along the anteromedial aspect  of the tibia. No definite acute displaced fracture, dislocation, or aggressive  osseous lesion is evident. Degenerative changes within the knee, ankle, and  foot. There are patchy lucencies within the bones, could be related to diffuse  osseous demineralization, however there is also a 10 mm nonspecific lucency in  the distal medial femoral condyle, indeterminate. Diffuse soft tissue swelling. Fatty atrophy of leg musculature, most pronounced in the gastrocnemius muscles. Left leg:  Multifocal osseous bridging of the tibia/fibular syndesmosis which may be from  prior injury. There is soft tissue irregularity and soft tissue gas along the  anterior aspect of the tibia, likely related to soft tissue wound. No findings  of underlying acute osseous abnormality. There is also extensive soft tissue gas  and a soft tissue defect that is suggestive of either an infectious process with  a gas-forming organism or D vitalized soft tissues. Soft tissue gas extends down  to the bone and there is a small amount of gas within the adjacent posterior  calcaneus which is suspicious for osteomyelitis. There are likely surgical  changes from partial great toe amputation, with otherwise suboptimal evaluation  of the foot related to the above described exam limitations. Diffuse osseous  demineralization. Diffuse soft tissue swelling of the leg. Fatty atrophy of the  leg musculature, most pronounced in the gastrocnemius muscles. Degenerative  changes of the knee, ankle, and foot. Impression  1. Somewhat suboptimal evaluation, as detailed above. 2. Within exam limitations there is a soft tissue ulcer at the posterior left  calcaneus with infection with gas-forming organism versus soft tissue  devitalization. Small amount of gas within the posterior calcaneus is most  likely related to osteomyelitis.   3. Other soft tissue wounds within both legs. No additional findings of acute  osseous abnormality within exam limitations. Based on level of clinical concern,  could consider further evaluation with targeted MRI of specific regions. 4. Diffuse osseous demineralization likely contributes to the somewhat  heterogeneous appearance of the bones. However, there is an indeterminate 10 mm  lucency within the right medial femoral condyle. Please correlate with clinical  history for a known primary malignancy or plasma abnormality. Otherwise short  interval follow-up in 3 months should be considered. IMPRESSION:   1. Acute hypoxic respiratory failure well maintained on nasal oxygen  2. Acute hypercapnic respiratory failure corrected on BiPAP  3. Chronic hypoxic respiratory failure normally on oxygen  4. Sepsis with left heel ulcer and chronic leg wound  5. Status post left above-the-knee amputation  6. Symptomatic anemia  7. Metabolic and respiratory acidosis  8. Liver mass  9. RECOMMENDATIONS/PLAN:     1. Currently on nasal oxygen will place back on BiPAP overnight and check ABG in a.m. on nasal oxygen  2. Chest x-ray no acute infiltrate  3. Sacral decubiti ulcer left heel ulcer has been on linezolid and meropenem  4.           Marj Sanchez MD

## 2021-06-26 NOTE — PROGRESS NOTES
Problem: Mobility Impaired (Adult and Pediatric)  Goal: *Acute Goals and Plan of Care (Insert Text)  Description: Reassessed on 21:   Pt will be SBA with LE HEP in 7 days. Pt will perform rolling Sergei x2 in 7 days. Pt will be able to supine <>sit at Mod A x2 in 7 days. Pt will be able to tolerate sitting EOB at Min A x2 for 5 minutes in 7 days. Outcome: Not Met     Problem: Patient Education: Go to Patient Education Activity  Goal: Patient/Family Education  Outcome: Not Met     PHYSICAL THERAPY REEVALUATION  Patient: Carmelo Fry (75 y.o. male)  Date: 2021  Primary Diagnosis: Acute kidney injury ([facility] [N17.9]  CRISTIAN (acute kidney injury) ([facility] [N17.9]  Procedure(s) (LRB):  LEFT KNEE (AKA) AMPUTATION (URGENT) (Left) 3 Days Post-Op   Precautions: Fall      ASSESSMENT  89 yom who came to the hospital on 6/15/21 due to due to abnormal labs drawn at [facility]. He was found to have worsening kidney function. He was on dialysis previously, but had reportedly stopped dialysis several years ago. This spring, pt had fall and came to the hospital. During his hospital stay in the spring, he was found to have suspicious mass was found but a biopsy was not performed. Pt is not receiving chemo or radiation. Family was approached about hospice but family declined, stating that they would like SNF to improve his function enough so that he can walk to the bathroom before being put on hospice. 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 PMHx: diabetes, hypertension, hyperlipidemia, renal insufficiency, and anemia, history of liver mass, arthrirtis, AVM of colon, CAD, CKD, CHF, PVD. PT eval was performed on 21 (Max A x2 rolling). RA completed today. A&O x1 today, name and  only. Pt continues to be Max A x2 with rolling at this time. There has been no functional improvement since PT SOC.  Uncertain if pt has reached his max potential at this time or not. Will try one more week and if he does not progress, will remove him from acute PT caseload. At this time, PT rec DC back to SNF/LTC. Patient will benefit from skilled therapy intervention to address the above noted impairments. PLAN :  Recommendations and Planned Interventions: bed mobility training, transfer training, therapeutic exercises, neuromuscular re-education, and therapeutic activities      Frequency/Duration: Patient will be followed by physical therapy:  3 times a week to address goals. Recommendation for discharge: (in order for the patient to meet his/her long term goals)  Therapy up to 5 days/week in SNF setting    This discharge recommendation:  Has not yet been discussed the attending provider and/or case management    Equipment recommendations for successful discharge (if) home: none         SUBJECTIVE:   Patient stated I do alright.     OBJECTIVE DATA SUMMARY:   HISTORY:    Past Medical History:   Diagnosis Date    Arthritis     AVM (arteriovenous malformation) of colon     B12 deficiency     CAD (coronary artery disease)     Chronic anemia     Chronic kidney disease     Diabetes (HCC)     GERD (gastroesophageal reflux disease)     Heart failure (HCC)     Remotely in his 46s    Hypertension     Ill-defined condition     chronic pressure and statis ulcers     PVD (peripheral vascular disease) ([facility]      Past Surgical History:   Procedure Laterality Date    HX HIP REPLACEMENT Bilateral      Hospital course since last seen and reason for reevaluation: LOS    Personal factors and/or comorbidities impacting plan of care: new amputation, suspensions mass, confusion, functional decline    Home Situation  Home Environment: [address] [place] [address] Name: [facility]  One/Two Story Residence: Two story, live on 1st floor  Living Alone: Yes  Support Systems: Child(debra)  Patient Expects to be Discharged to[de-identified] Skilled nursing facility  Current DME Used/Available at Home: Walker, rolling    EXAMINATION/PRESENTATION/DECISION MAKING:   Critical Behavior:  Neurologic State: Confused, Eyes open to voice  Orientation Level: Disoriented to place, Disoriented to situation, Oriented to person, Disoriented to time  Cognition: Follows commands     Hearing: Auditory  Auditory Impairment: Hard of hearing, bilateral    Range Of Motion:  AROM: Grossly decreased, non-functional               Strength:    Strength: Grossly decreased, non-functional        Sensation:  Absent below R knee. Functional Mobility:  Bed Mobility:  Rolling: Maximum assistance;Assist x2                    Pain Ratin/10    Activity Tolerance:   Poor  Please refer to the flowsheet for vital signs taken during this treatment. After treatment patient left in no apparent distress:   Supine in bed, Call bell within reach, Bed / chair alarm activated, and Side rails x 3    COMMUNICATION/EDUCATION:   The patients plan of care was discussed with: Occupational therapist and Registered nurse. Patient understands intent and goals of therapy, but is neutral about his/her participation.     Thank you for this referral.  Norma Bui, PT, DPT   Time Calculation: 28 mins

## 2021-06-27 VITALS
RESPIRATION RATE: 18 BRPM | OXYGEN SATURATION: 99 % | HEIGHT: 74 IN | DIASTOLIC BLOOD PRESSURE: 58 MMHG | WEIGHT: 258.82 LBS | BODY MASS INDEX: 33.22 KG/M2 | SYSTOLIC BLOOD PRESSURE: 118 MMHG | TEMPERATURE: 98.2 F | HEART RATE: 90 BPM

## 2021-06-27 LAB
ARTERIAL PATENCY WRIST A: POSITIVE
BASE DEFICIT BLDA-SCNC: 0.9 MMOL/L (ref 0–2)
BDY SITE: NORMAL
GAS FLOW.O2 O2 DELIVERY SYS: 2 L/MIN
GLUCOSE BLD STRIP.AUTO-MCNC: 113 MG/DL (ref 65–117)
GLUCOSE BLD STRIP.AUTO-MCNC: 129 MG/DL (ref 65–117)
GLUCOSE BLD STRIP.AUTO-MCNC: 85 MG/DL (ref 65–117)
HCO3 BLDA-SCNC: 24 MMOL/L (ref 22–26)
PCO2 BLDA: 37 MMHG (ref 35–45)
PERFORMED BY, TECHID: ABNORMAL
PERFORMED BY, TECHID: NORMAL
PERFORMED BY, TECHID: NORMAL
PH BLDA: 7.41 [PH] (ref 7.35–7.45)
PO2 BLDA: 82 MMHG (ref 75–100)
SAO2 % BLD: 98 %
SAO2% DEVICE SAO2% SENSOR NAME: NORMAL
SPECIMEN SITE: NORMAL

## 2021-06-27 PROCEDURE — 74011250636 HC RX REV CODE- 250/636: Performed by: FAMILY MEDICINE

## 2021-06-27 PROCEDURE — 99024 POSTOP FOLLOW-UP VISIT: CPT | Performed by: SURGERY

## 2021-06-27 PROCEDURE — 94760 N-INVAS EAR/PLS OXIMETRY 1: CPT

## 2021-06-27 PROCEDURE — 74011250637 HC RX REV CODE- 250/637: Performed by: INTERNAL MEDICINE

## 2021-06-27 PROCEDURE — 82962 GLUCOSE BLOOD TEST: CPT

## 2021-06-27 PROCEDURE — 74011250636 HC RX REV CODE- 250/636: Performed by: INTERNAL MEDICINE

## 2021-06-27 PROCEDURE — 99232 SBSQ HOSP IP/OBS MODERATE 35: CPT | Performed by: INTERNAL MEDICINE

## 2021-06-27 PROCEDURE — 77010033678 HC OXYGEN DAILY

## 2021-06-27 PROCEDURE — 74011000250 HC RX REV CODE- 250: Performed by: INTERNAL MEDICINE

## 2021-06-27 PROCEDURE — 74011250637 HC RX REV CODE- 250/637: Performed by: FAMILY MEDICINE

## 2021-06-27 PROCEDURE — 82803 BLOOD GASES ANY COMBINATION: CPT

## 2021-06-27 RX ORDER — OXYCODONE AND ACETAMINOPHEN 5; 325 MG/1; MG/1
1 TABLET ORAL
Qty: 20 TABLET | Refills: 0 | Status: SHIPPED | OUTPATIENT
Start: 2021-06-27 | End: 2021-07-02

## 2021-06-27 RX ORDER — OXYCODONE AND ACETAMINOPHEN 5; 325 MG/1; MG/1
1 TABLET ORAL ONCE
Status: COMPLETED | OUTPATIENT
Start: 2021-06-27 | End: 2021-06-27

## 2021-06-27 RX ORDER — LINEZOLID 600 MG/1
600 TABLET, FILM COATED ORAL EVERY 12 HOURS
Status: DISCONTINUED | OUTPATIENT
Start: 2021-06-27 | End: 2021-06-27 | Stop reason: HOSPADM

## 2021-06-27 RX ORDER — ALBUTEROL SULFATE 90 UG/1
2 AEROSOL, METERED RESPIRATORY (INHALATION)
Qty: 1 INHALER | Refills: 0 | Status: SHIPPED | OUTPATIENT
Start: 2021-06-27

## 2021-06-27 RX ADMIN — WHITE PETROLATUM,ZINC OXIDE: 57; 17 PASTE TOPICAL at 09:28

## 2021-06-27 RX ADMIN — HEPARIN SODIUM 5000 UNITS: 5000 INJECTION INTRAVENOUS; SUBCUTANEOUS at 06:22

## 2021-06-27 RX ADMIN — OXYCODONE AND ACETAMINOPHEN 1 TABLET: 5; 325 TABLET ORAL at 13:50

## 2021-06-27 RX ADMIN — WHITE PETROLATUM,ZINC OXIDE: 57; 17 PASTE TOPICAL at 16:00

## 2021-06-27 RX ADMIN — Medication 10 ML: at 13:53

## 2021-06-27 RX ADMIN — Medication 10 ML: at 08:01

## 2021-06-27 RX ADMIN — HEPARIN SODIUM 5000 UNITS: 5000 INJECTION INTRAVENOUS; SUBCUTANEOUS at 11:20

## 2021-06-27 RX ADMIN — MEROPENEM 1 G: 1 INJECTION, POWDER, FOR SOLUTION INTRAVENOUS at 17:00

## 2021-06-27 RX ADMIN — ACETAMINOPHEN 650 MG: 325 TABLET ORAL at 11:20

## 2021-06-27 RX ADMIN — PANTOPRAZOLE SODIUM 40 MG: 40 TABLET, DELAYED RELEASE ORAL at 09:28

## 2021-06-27 RX ADMIN — COLLAGENASE SANTYL: 250 OINTMENT TOPICAL at 09:28

## 2021-06-27 NOTE — PROGRESS NOTES
While completing patient discharge instructions, discovered patient need to be on IV Merepinem. Dr Zoila Talley called for clarification, made awre patient no longer has an IV or PICC line. States to call Infectious disease Dr for clarification, if patient need antibiotics, get PICC  Line place if possible today other wise patient can go tomorrow. Transportation on Unit.   15:15 Dr Silvia Roldan on call, made aware of situation. States will review Dr Ban Cooper notes. States, as per her plan patient is to be on  IV.  15:30 PICC team called,   15:45 PICC team return call will come and place Line. Medical transport left states to call when patient is ready.

## 2021-06-27 NOTE — DISCHARGE SUMMARY
Discharge Summary     Patient: Radha Rodriguez MRN: [id number]  SSN: [id number]    YOB: 1932  Age: 80 y.o. Sex: male       Admit Date: 6/15/2021    Discharge Date: 6/27/2021      Admission Diagnoses: Acute kidney injury ([facility] [N17.9]  CRISTIAN (acute kidney injury) ([facility] [N17.9]    Discharge Diagnoses:   Problem List as of 6/27/2021 Date Reviewed: 6/23/2021        Codes Class Noted - Resolved    Acute kidney injury ([facility] ICD-10-CM: N17.9  ICD-9-CM: 584.9  6/15/2021 - Present        Weakness ICD-10-CM: R53.1  ICD-9-CM: 780.79  4/28/2021 - Present        CRISTIAN (acute kidney injury) ([facility] ICD-10-CM: N17.9  ICD-9-CM: 584.9  4/28/2021 - Present               Discharge Condition: Good    Hospital Course: Acute on chronic kidney disease  Mellitus type II  Hypertension  Hyperlipidemia  Anemia  Chronic leg wound  Liver mass  Hypokalemia  Right moderate hydronephrosis  Decubitus ulcers    Consults: Nephrology    Significant Diagnostic Studies: labs:     XR CHEST PORT   Final Result   :    1. Low lung volumes. No acute cardiopulmonary disease. CT LOW EXT BI WO CONT   Final Result   1. Somewhat suboptimal evaluation, as detailed above. 2. Within exam limitations there is a soft tissue ulcer at the posterior left   calcaneus with infection with gas-forming organism versus soft tissue   devitalization. Small amount of gas within the posterior calcaneus is most   likely related to osteomyelitis. 3. Other soft tissue wounds within both legs. No additional findings of acute   osseous abnormality within exam limitations. Based on level of clinical concern,   could consider further evaluation with targeted MRI of specific regions. 4. Diffuse osseous demineralization likely contributes to the somewhat   heterogeneous appearance of the bones. However, there is an indeterminate 10 mm   lucency within the right medial femoral condyle.  Please correlate with clinical   history for a known primary malignancy or plasma abnormality. Otherwise short   interval follow-up in 3 months should be considered. US RETROPERITONEUM COMP   Final Result   Borderline right-sided hydronephrosis. Ascites. XR CHEST PORT   Final Result   No evidence of an acute cardiopulmonary process. Disposition: SNF    Discharge Medications:   Current Discharge Medication List      START taking these medications    Details   !! albuterol (PROVENTIL HFA, VENTOLIN HFA, PROAIR HFA) 90 mcg/actuation inhaler Take 2 Puffs by inhalation every six (6) hours as needed for Wheezing or Shortness of Breath. Qty: 1 Inhaler, Refills: 0      collagenase (SANTYL) 250 unit/gram ointment Apply  to affected area daily. Indications: a skin ulcer  Qty: 15 g, Refills: 0      oxyCODONE-acetaminophen (PERCOCET) 5-325 mg per tablet Take 1 Tablet by mouth every six (6) hours as needed for Pain for up to 5 days. Max Daily Amount: 4 Tablets. Qty: 20 Tablet, Refills: 0    Associated Diagnoses: Ulcers of both lower legs (HCC)      meropenem 1 g IV syringe 1 g by IntraVENous route every twenty-four (24) hours for 21 days. Qty: 21 Dose, Refills: 0      linezolid (ZYVOX) 600 mg tablet Take 1 Tablet by mouth every twelve (12) hours for 10 days. Qty: 20 Tablet, Refills: 0      !! albuterol (PROVENTIL HFA, VENTOLIN HFA, PROAIR HFA) 90 mcg/actuation inhaler Take 2 Puffs by inhalation every six (6) hours as needed for Wheezing. Qty: 1 Inhaler, Refills: 0      gabapentin (NEURONTIN) 100 mg capsule Take 1 Capsule by mouth three (3) times daily. Max Daily Amount: 300 mg. Qty: 30 Capsule, Refills: 0    Associated Diagnoses: Ulcers of both lower legs (HCC)      simvastatin (ZOCOR) 10 mg tablet Take 1 Tablet by mouth nightly. Qty: 30 Tablet, Refills: 0      zinc oxide-white petrolatum 17-57 % topical paste Apply  to affected area three (3) times daily. Qty: 60 g, Refills: 0      0.9% sodium chloride solution 75 mL/hr by IntraVENous route continuous.   Qty: 1000 mL, Refills: 1       !! - Potential duplicate medications found. Please discuss with provider. CONTINUE these medications which have NOT CHANGED    Details   acetaminophen (TYLENOL) 325 mg tablet Take 2 Tabs by mouth every six (6) hours as needed for Pain. Qty: 30 Tab, Refills: 0      cyanocobalamin (VITAMIN B12) 1,000 mcg/mL injection 1,000 mcg by IntraMUSCular route Once every 2 weeks. ferrous sulfate 325 mg (65 mg iron) tablet Take 325 mg by mouth Daily (before breakfast). latanoprost (XALATAN) 0.005 % ophthalmic solution Administer 1 Drop to both eyes daily. pantoprazole (PROTONIX) 40 mg tablet Take 40 mg by mouth daily.              Activity: Activity as tolerated  Diet: Resume previous diet please add protein supplements  Wound Care: As directed resume dressings and wound treatment as per SRM C orders    Follow-up Appointments   Procedures    FOLLOW UP VISIT Appointment in: 3 - 5 Days     Standing Status:   Standing     Number of Occurrences:   1     Order Specific Question:   Appointment in     Answer:   3 - 5 Days     45 minutes discharge time    Signed By: Anthony Lorenzo MD     June 27, 2021

## 2021-06-27 NOTE — CONSULTS
PULMONARY CONSULT  [facility]    Name: Anabel Olivares MRN: [id number]   : 1932 Hospital: [facility]   Date: 2021  Admission date: 6/15/2021 Hospital Day: 13       HPI:     Hospital Problems  Date Reviewed: 2021        Codes Class Noted POA    Acute kidney injury [facility]) ICD-10-CM: N17.9  ICD-9-CM: 584.9  6/15/2021 Unknown        CRISTIAN (acute kidney injury) [facility]) ICD-10-CM: N17.9  ICD-9-CM: 584.9  2021 Unknown                   [x] High complexity decision making was performed  [x] See my orders for details      Subjective/Initial History:     I was asked by Mckenzie Lazar MD to see Anabel Olivares  a 80 y.o.  male in consultation     Excerpts from admission 6/15/2021 or consult notes as follows:   55-year-old male came in because of abnormal lab he has past medical history of hypertension diabetes mellitus chronic kidney disease he was on dialysis previously he was at the nursing home. Also he has bilateral leg wounds seen by vascular surgeon and he was getting treatment for infection wound culture positive for Enterococcus cloacae and he underwent left above-the-knee amputation on . Last night patient condition got worse arterial blood gases was done which shows elevated PCO2 and she was put on noninvasive ventilator BiPAP machine he still lethargic unable to get any history out of the patient he is on oxygen and nebulizer at home according to her sister.        No Known Allergies     MAR reviewed and pertinent medications noted or modified as needed     Current Facility-Administered Medications   Medication    0.9% sodium chloride infusion 250 mL    0.9% sodium chloride infusion 250 mL    sodium chloride (NS) flush 5-40 mL    sodium chloride (NS) flush 5-40 mL    albuterol (PROVENTIL HFA, VENTOLIN HFA, PROAIR HFA) inhaler 2 Puff    0.9% sodium chloride infusion 250 mL    collagenase (SANTYL) 250 unit/gram ointment    meropenem (MERREM) 1 g in sterile water (preservative free) 20 mL IV syringe    [Held by provider] gabapentin (NEURONTIN) capsule 100 mg    [Held by provider] furosemide (LASIX) tablet 40 mg    latanoprost (XALATAN) 0.005 % ophthalmic solution 1 Drop    loperamide (IMODIUM) capsule 2 mg    simvastatin (ZOCOR) tablet 10 mg    [Held by provider] traMADoL (ULTRAM) tablet 50 mg    cyanocobalamin (VITAMIN B12) injection 1,000 mcg    pantoprazole (PROTONIX) tablet 40 mg    insulin lispro (HUMALOG) injection    glucose chewable tablet 16 g    dextrose (D50W) injection syrg 12.5-25 g    glucagon (GLUCAGEN) injection 1 mg    acetaminophen (TYLENOL) tablet 650 mg    Or    acetaminophen (TYLENOL) suppository 650 mg    polyethylene glycol (MIRALAX) packet 17 g    ondansetron (ZOFRAN ODT) tablet 4 mg    Or    ondansetron (ZOFRAN) injection 4 mg    heparin (porcine) injection 5,000 Units    zinc oxide-white petrolatum 17-57 % topical paste      Patient PCP: Baldo Hunt MD  PMH:  has a past medical history of Arthritis, AVM (arteriovenous malformation) of colon, B12 deficiency, CAD (coronary artery disease), Chronic anemia, Chronic kidney disease, Diabetes ([facility] GERD (gastroesophageal reflux disease), Heart failure ([facility] Hypertension, Ill-defined condition, and PVD (peripheral vascular disease) ([facility] PSH:   has a past surgical history that includes hx hip replacement (Bilateral). FHX: family history is not on file. SHX:  reports that he has never smoked. He has never used smokeless tobacco. He reports that he does not drink alcohol.      ROS:  Unable to obtain      Objective:     Vital Signs: Telemetry:    normal sinus rhythm Intake/Output:   Visit Vitals  BP (!) 119/59 (BP 1 Location: Left upper arm, BP Patient Position: At rest)   Pulse 86   Temp 98.3 °F (36.8 °C)   Resp 20   Ht 6' 2\" (1.88 m)   Wt 117.4 kg (258 lb 13.1 oz)   SpO2 98%   BMI 33.23 kg/m²       Temp (24hrs), Av.4 °F (36.9 °C), Min:97.8 °F (36.6 °C), Max:98.8 °F (37.1 °C)        O2 Device: Nasal cannula O2 Flow Rate (L/min): 2 l/min       Wt Readings from Last 4 Encounters:   06/26/21 117.4 kg (258 lb 13.1 oz)   04/28/21 109.7 kg (241 lb 13.5 oz)        No intake or output data in the 24 hours ending 06/27/21 1404    Last shift:      No intake/output data recorded. Last 3 shifts: No intake/output data recorded. Physical Exam:     Physical Exam  Constitutional:       Appearance: He is ill-appearing. Comments: Awake alert answers yes and no questions moves his extremities weakly to command   HENT:      Head: Normocephalic and atraumatic. Nose: Nose normal.      Mouth/Throat:      Mouth: Mucous membranes are moist.   Eyes:      Extraocular Movements: Extraocular movements intact. Conjunctiva/sclera: Conjunctivae normal.      Pupils: Pupils are equal, round, and reactive to light. Cardiovascular:      Rate and Rhythm: Normal rate and regular rhythm. Pulses: Normal pulses. Pulmonary:      Comments: Shallow but equal breath sounds anteriorly and laterally no breath sounds in the bases lungs are clear no wheezes no definite rales  Abdominal:      General: Abdomen is flat. Bowel sounds are normal.      Palpations: Abdomen is soft. Musculoskeletal:      Cervical back: Normal range of motion and neck supple. Right lower leg: Edema present. Comments: Left above-the-knee amputation bandage in place does not move his extremities   Skin:     General: Skin is warm and dry. Capillary Refill: Capillary refill takes less than 2 seconds.    Neurological:      Comments: Eyes are open questionably looks toward voice does not speak or follow any commands has minimal movement of the extremities to noxious stimuli but does not move them to command          Labs:    Recent Labs     06/26/21  0823 06/25/21  0842   WBC 13.9* 14.5*   HGB 10.0* 9.3*    161     Recent Labs     06/26/21  2016 06/26/21  0823 06/25/21  0842    141 139 K 4.9 4.7 4.7   * 111* 110*   CO2 23 19* 22   * 112* 82   BUN 80* 79* 79*   CREA 2.04* 2.21* 2.45*   CA 8.0* 8.2* 7.9*   PHOS 2.9  --   --    ALB 1.2* 1.2* 1.3*   ALT  --  18 17     Recent Labs     06/27/21  0400 06/26/21  0520 06/25/21  1053   PH 7.41 7.36 7.26*   PCO2 37 38 52*   PO2 82 133* 126*   HCO3 24 22 21*   FIO2  --  24 35.0   6/27 2 L nasal oxygen PO2 82 PCO2 37 pH 7.41  6/26 BiPAP IP 18 EP 8 rate 15 with 24% FiO2  6/25 BiPAP IP 18 EP 8 rate 15 FiO2 35%  Recent Labs     06/25/21  0842   TROIQ <0.05       Lab Results   Component Value Date/Time    Culture result: Heavy Enterobacter cloacae complex 06/16/2021 06:15 AM    Culture result:  06/16/2021 06:15 AM     Heavy * methicillin resistant staphylococcus aureus * REFER TO [id number] FOR SENSITIVITIES    Culture result: Few Diphtheroids 06/16/2021 06:15 AM    Culture result:  06/16/2021 06:15 AM     CALLED MRSA REPORT TO Keith Da Silva R.N. 1020 06/19/2021 BY DPW    Culture result: Heavy Enterobacter cloacae complex 06/16/2021 06:15 AM    Culture result:  06/16/2021 06:15 AM     Moderate * methicillin resistant staphylococcus aureus *    Culture result: Light Diphtheroids 06/16/2021 06:15 AM     Imaging:    CXR Results  (Last 48 hours)    None        Results from [place] encounter on 06/15/21    XR CHEST PORT    Narrative  Single View Chest 6/25/2021    Comparison: [place] 15, 2021    Indication: Left. Findings:  Heart size is mildly enlarged. Aortic calcification. Low lung volumes. TAVR. No  significant airspace consolidation or pleural effusion. No feliberto edema. No  pneumothorax    Impression  :  1. Low lung volumes. No acute cardiopulmonary disease. XR CHEST PORT    Narrative  XR CHEST PORT    Comparison:  4/28/2021. Single view:  Stable right diaphragmatic elevation. Negative for focal  consolidation. No pneumothorax or pleural effusion apparent. Transcatheter  aortic valve placement noted. The heart size is stable. There is no evidence of  acute cardiac decompensation. Impression  No evidence of an acute cardiopulmonary process. Results from [place] encounter on 04/28/21    XR CHEST SNGL V    Narrative  Indication: Weakness. AP semiupright portable chest radiograph 1621 hours 4/28/2021. No comparison. Underexpanded lungs, mildly elevated right hemidiaphragm. No pulmonary infiltrate, pleural effusion or pneumothorax. Normal heart size. TAVR. Impression  Underexpanded lungs, otherwise negative. Results from [place] encounter on 06/15/21    CT LOW EXT BI WO CONT    Narrative  Exam: CT LOW EXT BI WO CONT    TECHNIQUE: Multiple transaxial CT images of the of the legs from the knees  through the ankles were obtained without contrast. Coronal and sagittal  reformatted images were provided. Dose reduction: All CT scans at this facility are performed using dose reduction  optimization techniques as appropriate to a performed exam including the  following: Automated exposure control, adjustments of the mA and/or kV according  to patient size, or use of iterative reconstruction technique. COMPARISON: None    HISTORY: osteomyelitis    FINDINGS:    Please note that examination is suboptimal related to large field-of-view images  including both lower extremities which limits detailed evaluation of the osseous  structures. The reconstruction planes are also nonstandard, further limiting  evaluation. Below findings are within these exam limitations. Right leg: There is osseous bridging of the distal tibia/fibular syndesmosis which may be  from prior injury. Diffuse osseous demineralization which limits sensitivity of  the exam. There is mild soft tissue irregularity along the anteromedial aspect  of the tibia. No definite acute displaced fracture, dislocation, or aggressive  osseous lesion is evident. Degenerative changes within the knee, ankle, and  foot.  There are patchy lucencies within the bones, could be related to diffuse  osseous demineralization, however there is also a 10 mm nonspecific lucency in  the distal medial femoral condyle, indeterminate. Diffuse soft tissue swelling. Fatty atrophy of leg musculature, most pronounced in the gastrocnemius muscles. Left leg:  Multifocal osseous bridging of the tibia/fibular syndesmosis which may be from  prior injury. There is soft tissue irregularity and soft tissue gas along the  anterior aspect of the tibia, likely related to soft tissue wound. No findings  of underlying acute osseous abnormality. There is also extensive soft tissue gas  and a soft tissue defect that is suggestive of either an infectious process with  a gas-forming organism or D vitalized soft tissues. Soft tissue gas extends down  to the bone and there is a small amount of gas within the adjacent posterior  calcaneus which is suspicious for osteomyelitis. There are likely surgical  changes from partial great toe amputation, with otherwise suboptimal evaluation  of the foot related to the above described exam limitations. Diffuse osseous  demineralization. Diffuse soft tissue swelling of the leg. Fatty atrophy of the  leg musculature, most pronounced in the gastrocnemius muscles. Degenerative  changes of the knee, ankle, and foot. Impression  1. Somewhat suboptimal evaluation, as detailed above. 2. Within exam limitations there is a soft tissue ulcer at the posterior left  calcaneus with infection with gas-forming organism versus soft tissue  devitalization. Small amount of gas within the posterior calcaneus is most  likely related to osteomyelitis. 3. Other soft tissue wounds within both legs. No additional findings of acute  osseous abnormality within exam limitations. Based on level of clinical concern,  could consider further evaluation with targeted MRI of specific regions.   4. Diffuse osseous demineralization likely contributes to the somewhat  heterogeneous appearance of the bones. However, there is an indeterminate 10 mm  lucency within the right medial femoral condyle. Please correlate with clinical  history for a known primary malignancy or plasma abnormality. Otherwise short  interval follow-up in 3 months should be considered. IMPRESSION:   1. Acute hypoxic respiratory failure well maintained on nasal oxygen  2. Acute hypercapnic respiratory failure resolved PCO2 normal on his oxygen  3. Chronic hypoxic respiratory failure normally on oxygen  4. Sepsis with left heel ulcer and chronic leg wound Status post left above-the-knee amputation  5. Symptomatic anemia hemoglobin stable  6. Metabolic and respiratory acidosis resolved  7. Liver mass  8. RECOMMENDATIONS/PLAN:     1. ABGs well-maintained on nasal oxygen with no CO2 retention will discontinue BiPAP  2. Chest x-ray no acute infiltrate  3. Sacral decubiti ulcer left heel ulcer has been on linezolid and meropenem  4.           Mihaela Churchill MD

## 2021-06-27 NOTE — PROGRESS NOTES
Discharge plan of care/case management plan validated with provider discharge order.   Report called to [facility] LPN to include SBAR, MAR, Medications,

## 2021-06-27 NOTE — PROGRESS NOTES
Name: Radha Rodriguez MRN: [id number]   : 1932 Hospital: [facility]   Date: 2021        IMPRESSION:   · CRISTIAN on CKD stage 4. GFR is improving daily after the left AKA. Electrolytes are acceptable  · Mild hypervolemia - improved  · Borderline anemia  · Protein deficiency      PLAN:   · Plans are for patient to be discharged today. He should be seen by PCP and us  in 2 weeks. · Patient is stable from renal stand point. No new recommendations are offered at this time  · Renal panel in AM if still in the hospital.  · Consider protein supplements. Subjective/Interval History:   I have reviewed the flowsheet and previous days notes. ROS:A comprehensive review of systems was negative. Objective:   Vital Signs:    Visit Vitals  BP (!) 119/59 (BP 1 Location: Left upper arm, BP Patient Position: At rest)   Pulse 86   Temp 98.3 °F (36.8 °C)   Resp 20   Ht 6' 2\" (1.88 m)   Wt 117.4 kg (258 lb 13.1 oz)   SpO2 98%   BMI 33.23 kg/m²       O2 Device: Nasal cannula   O2 Flow Rate (L/min): 2 l/min   Temp (24hrs), Av.4 °F (36.9 °C), Min:97.8 °F (36.6 °C), Max:98.8 °F (37.1 °C)       Intake/Output:   Last shift:      No intake/output data recorded. Last 3 shifts: No intake/output data recorded. No intake or output data in the 24 hours ending 21 1031     Physical Exam:  General:    Elderly man in no acute distress. Looks chronically ill. Head:   Normocephalic, without obvious abnormality, atraumatic. Eyes:   Conjunctivae/corneas clear. Nose:  Nares normal. No drainage or sinus tenderness. Throat:    Lips, mucosa, and tongue normal.  No Thrush  Neck:  Supple, symmetrical,  no adenopathy, thyroid: non tender    no carotid bruit and no JVD. Lungs:   Clear to auscultation bilaterally. No Wheezing or Rhonchi. No rales. Chest wall:  No tenderness or deformity. No Accessory muscle use. Heart:   Regular rate and rhythm,  no murmur, rub or gallop.   Abdomen:   Soft, non-tender. Not distended. Bowel sounds normal. No masses  Extremities: Extremities - left AKA, stump with dressing, No cyanosis. + edema. No clubbing  Skin:     Texture, turgor normal. No rashes or lesions. Not Jaundiced  Psych:  Fair insight. Not depressed. Not anxious or agitated. DATA:  Labs:  Recent Labs     06/26/21 2016 06/26/21  0823 06/25/21  0842    141 139   K 4.9 4.7 4.7   * 111* 110*   CO2 23 19* 22   BUN 80* 79* 79*   CREA 2.04* 2.21* 2.45*   CA 8.0* 8.2* 7.9*   ALB 1.2* 1.2* 1.3*   PHOS 2.9  --   --      Recent Labs     06/26/21 0823 06/25/21  0842   WBC 13.9* 14.5*   HGB 10.0* 9.3*   HCT 33.0* 30.8*    161     No results for input(s): TONY, KU, CLU, CREAU in the last 72 hours.     No lab exists for component: PROU    Total time spent with patient:  35 minutes    [] Critical Care Provided    Care Plan discussed with:   Staff, Medical Team    Vilma Santos MD

## 2021-06-27 NOTE — PROGRESS NOTES
13:00  Dr Orellana Fails on unit. Made aware patient has BIPAP states to call Dr Felecia Cornejo and clarify. Dr Felecia Cornejo called . States will see patient  Soon.family requesting patient be given something stronger. Percocet 1 x dose ordered. Family informed. . Dr Felecia Cornejo states patient doesn't need  BIPAP at the nursing. Patient  Family informed.

## 2021-06-27 NOTE — PROGRESS NOTES
Patient will be discharged today to [facility], Room 237p, call to report [phone]. Patient daughter in law [phone]. Please let her know of a time frame for .

## 2021-06-27 NOTE — PROGRESS NOTES
Patient examined this morning. Patient currently off BiPAP. He is awake and alert. I change the dressing on the left AKA. Wounds are clean and dry. I reapplied the dressings. Dressing care instructions in the right AKA:  Apply Xeroform gauze on the incision followed by applied 4 x 4 gauze as fluffs, 2 rolls of Kerlix roll and Ace wraps. And keep the stump elevated with 2 pillows. Patient can be discharged rehab today. Follow-up with Dr. Lorrie Dong in 2 weeks.

## 2021-06-27 NOTE — PROGRESS NOTES
Infectious Disease Progress Note           Subjective:   Patient followed by Dr. Nallely Beverly for bilateral leg ulcers infection with Enterobacter resistant to Zosyn and MRSA, for which he has been on Meropenem and Linezolid respectively. Recommendation was to continue Linezolid for 14 days and Meropenem for 3 weeks. Patient was scheduled for discharge today and transport showed up, but Staff concerned because there was no IV access. ID was contacted for clarification regarding antibiotics at discharge. It appears that patient has been largely afebrile and WBC has been decreasing. It appears that he had a midline but it was inadvertently removed. PICC team is no longer in the building but peripheral IV was established. In reviewing MAR, it appears that Linezolid was discontinued on . Patient resting comfortably but nonverbal.  Objective:   Physical Exam:     Visit Vitals  /76 (BP 1 Location: Left lower arm, BP Patient Position: At rest)   Pulse 76   Temp 98.1 °F (36.7 °C)   Resp 18   Ht 6' 2\" (1.88 m)   Wt 258 lb 13.1 oz (117.4 kg)   SpO2 98%   BMI 33.23 kg/m²    O2 Flow Rate (L/min): 2 l/min O2 Device: Nasal cannula    Temp (24hrs), Av.2 °F (36.8 °C), Min:97.8 °F (36.6 °C), Max:98.5 °F (36.9 °C)    No intake/output data recorded. No intake/output data recorded. General: chronically ill-appearing elderly male  HEENT: eyes open  Extremities : Right calf with bulky gauze dressing, dry and intact;  Left AKA stump with gauzed dressing also dry and intact (Staff report changes by Vascular Surgery earlier today)  : no Gutierrez  Skin: sacral and buttock wounds not visualized but Staff report improvement    Data Review:       Recent Days:  Recent Labs     21  0823 21  0842   WBC 13.9* 14.5*   HGB 10.0* 9.3*   HCT 33.0* 30.8*    161     Recent Labs     21  0823 21  0842   BUN 80* 79* 79*   CREA 2.04* 2.21* 2.45*     Microbiology     Results Procedure Component Value Units Date/Time    CULTURE, Berl Shorty STAIN [[id number]]  (Susceptibility) Collected: 06/16/21 0615    Order Status: Completed Specimen: Wound Updated: 06/19/21 1018     Special Requests: No Special Requests        GRAM STAIN No wbc's seen         2+ Gram Positive Cocci         2+ Gram Positive Rods         Few Gram Negative Rods        Culture result:       Heavy Enterobacter cloacae complex                  Heavy * methicillin resistant staphylococcus aureus * REFER TO [id number] FOR SENSITIVITIES            Few Diphtheroids         CALLED MRSA REPORT TO Jhonathan Shaver R.N. 102Atiya 06/19/2021 BY DPW    Susceptibility      Enterobacter cloacae complex      CATINA      Amikacin ($) Susceptible      Cefazolin ($) Resistant      Cefepime ($$) Susceptible      Cefoxitin Resistant      Ceftazidime ($) Resistant      Ceftriaxone ($) Resistant      Ciprofloxacin ($) Resistant      Gentamicin ($) Susceptible      Levofloxacin ($) Resistant      Meropenem ($$) Susceptible      Piperacillin/Tazobac ($) Resistant      Tobramycin ($) Susceptible      Trimeth/Sulfa Resistant               Linear View                   CULTURE, Berl [place] STAIN [[id number]]  (Susceptibility) Collected: 06/16/21 0615    Order Status: Completed Specimen: Wound Updated: 06/19/21 0806     Special Requests: No Special Requests        GRAM STAIN 1+ WBCs seen         4+ Gram Negative Rods               4+ Gram Positive Cocci in pairs                  4+ apparent Gram Positive Rods           Culture result:       Heavy Enterobacter cloacae complex                  Moderate * methicillin resistant staphylococcus aureus *            Light Diphtheroids       Susceptibility      Enterobacter cloacae complex Staphylococcus aureus Methcillin Resistant      CATINA CATINA      Amikacin ($) Susceptible       Cefazolin ($) Resistant       Cefepime ($$) Susceptible       Cefoxitin Resistant       Ceftazidime ($) Resistant       Ceftriaxone ($) Resistant       Ciprofloxacin ($) Resistant Resistant      Clindamycin ($)  Susceptible      Daptomycin ($$$$$)  Susceptible      Doxycycline ($$)  Susceptible      Erythromycin ($$$$)  Susceptible      Gentamicin ($) Susceptible Susceptible      Levofloxacin ($) Resistant Resistant      Linezolid ($$$$$)  Susceptible      Meropenem ($$) Susceptible       Oxacillin  Resistant      Piperacillin/Tazobac ($) Resistant       Rifampin ($$$$)  Susceptible<sup style='font-weight:normal'>1</sup></font>      Tetracycline  Resistant      Tobramycin ($) Susceptible       Trimeth/Sulfa Resistant Susceptible      Vancomycin ($)  Susceptible                              COVID-19 RAPID TEST [[id number]] Collected: 06/16/21 0216    Order Status: Completed Specimen: Nasopharyngeal Updated: 06/16/21 0408     Specimen source Nasopharyngeal        COVID-19 rapid test Not Detected        Comment: Rapid Abbott ID Now   Rapid NAAT:  The specimen is NEGATIVE for SARS-CoV-2, the novel coronavirus associated with COVID-19. Negative results should be treated as presumptive and, if inconsistent with clinical signs and symptoms or necessary for patient management, should be tested with an alternative molecular assay. Negative results do not preclude SARS-CoV-2 infection and should not be used as the sole basis for patient management decisions. This test has been authorized by the FDA under   an Emergency Use Authorization (EUA) for use by authorized laboratories.  Fact sheet for Healthcare Providers: ConventionUpdate.co.nz Fact sheet for Patients: ConventionUpdate.co.nz   Methodology: Isothermal Nucleic Acid Amplification         COVID-19 RAPID TEST [[id number]]  (Abnormal) Collected: 06/16/21 0023    Order Status: Completed Specimen: Nasopharyngeal Updated: 06/16/21 0208     Specimen source Nasopharyngeal        COVID-19 rapid test Indeterminate        Comment: Rapid Abbott ID Now   The presence or absence of COVID-19 Viral RNAs cannot be determined. If clinically indicated, please collect a new specimen. This test has been authorized by the FDA under an Emergency Use Authorization (EUA) for use by authorized laboratories. Fact sheet for Healthcare Providers: ConventionUpdate.co.nz Fact sheet for Patients: ConventionUpdate.co.nz   Methodology: Isothermal Nucleic Acid Amplification SPOKE TO KEVYN MENDOZA,   SUGGESTED RECOLLECT                 Diagnostics   CXR Results  (Last 48 hours)    None         Assessment/Plan     1. Wound infection, both lower extremities, secondary to MDR Enterobacter cloacae and MRSA, on Meropenem and Linezolid. 2.  Leukocytosis, secondary to #1, resolving  3. Status post left AKA  4. CKD      1. Continue IV Meropenem; discharge on Meropenem 1 gm IV daily for 3 weeks  2. Restart Linezolid 600 mg po BID; discharge on Linezolid 600 mg po BID for 2 weeks (possible alternative would be Doxycycline)  3. In am, repeat CBC  4. Order for another Midline  5.  Otherwise cleared for discharge from ID standpoint    Trey Reyes MD     6/27/2021

## 2021-06-27 NOTE — PROGRESS NOTES
CM spoke with daughter in law who states that now patient has no problem returning to [facility] today. Patient and daughter in law are still concerned about patients wounds. CM contacted the facility and [facility] to find out patients room number and call to report. CM awaiting call back from [facility] or the facility. CM will continue to follow.

## 2021-06-27 NOTE — PROGRESS NOTES
Mid line not found on patient, accidentally removed. Patient drowsy. # 22 iv lock placed in right AC, Dr Elidia Uriarte at bedside.

## 2021-08-03 PROBLEM — N17.9 AKI (ACUTE KIDNEY INJURY) (HCC): Status: RESOLVED | Noted: 2021-04-28 | Resolved: 2021-08-03

## 2022-12-20 NOTE — PROGRESS NOTES
Problem: Self Care Deficits Care Plan (Adult)  Goal: *Acute Goals and Plan of Care (Insert Text)  Outcome: Not Met  Note: Pt will be moderate assist sup<->sit in prep for EOB ADL's  Pt will be moderate assist sit EOB 10 minutes in prep for EOB ADL's   Pt will be minimal assist grooming EOB level    Pt will be minimal assist benjamin UE HEP in prep for self care tasks    OCCUPATIONAL THERAPY EVALUATION  Patient: Lori Spence (82 y.o. male)  Date: 6/26/2021  Primary Diagnosis: Acute kidney injury ([facility] [N17.9]  CRISTIAN (acute kidney injury) ([facility] [facility] [N17.9]  Procedure(s) (LRB):  LEFT KNEE (AKA) AMPUTATION (URGENT) (Left) 3 Days Post-Op   Precautions:  Fall    ASSESSMENT  Based on the objective data described below, the patient presents with  (PER EMR:)    Patient is a 80y.o. year old male with a past medical history of diabetes, hypertension, hyperlipidemia, renal insufficiency, and anemia history of liver mass who presented to the ER 6/15 due to abnormal labs drawn at the nursing home. He was found to have worsening kidney function. He was on dialysis previously, but had reportedly stopped dialysis several years ago. He denied chest pain and SOB. CXR showed no acute cardiopulmonary processes. He was admitted for further evaluation and treatment. Patient have a liver mass diagnosis and last admission as per note no further diagnosis procedure treatment was planned with the family by the attending. Patient's discharge date canceled by vascular surgeon due to complex wounds on bilateral legs and need for CT. 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 Wound cultures showed E. Cloacae resistant to Zosyn and MRSA. 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 Last night, nurse reported change in mental status. ABG drawn showed respiratory acidosis. 1 amp bicarb given. Pulmonology consulted and BIPAP started. CXR unremarkable.       Today patient is awake on no edema,  no murmurs,  regular rate and rhythm , no edema. BIPAP. He is still lethargic, but nodding yes or no to questions. Denies chest pain or SOB. Current Level of Function Impacting Discharge (ADLs/self-care): Pt currently requires max to total assist with all self care and ADL related mobility/transfers. Functional Outcome Measure: The patient scored 10/24 on the [facility] outcome measure which is indicative of 60-79% decline in ability to complete self-care tasks independently. Other factors to consider for discharge: Pt was receiving therapy services at SNF and family would like pt to return. Patient will benefit from skilled therapy intervention to address the above noted impairments. PLAN :  Recommendations and Planned Interventions: self care training, functional mobility training, balance training, therapeutic activities, endurance activities, and patient education    Frequency/Duration: Patient will be followed by occupational therapy 3 times a week to address goals. Recommendation for discharge: (in order for the patient to meet his/her long term goals)  SNF for further therapy services    This discharge recommendation:  Has not yet been discussed the attending provider and/or case management    IF patient discharges home will need the following DME: TBD at next facility       SUBJECTIVE:   Patient stated [facility] you.     OBJECTIVE DATA SUMMARY:   HISTORY:   Past Medical History:   Diagnosis Date    Arthritis     AVM (arteriovenous malformation) of colon     B12 deficiency     CAD (coronary artery disease)     Chronic anemia     Chronic kidney disease     Diabetes (HCC)     GERD (gastroesophageal reflux disease)     Heart failure (HCC)     Remotely in his 46s    Hypertension     Ill-defined condition     chronic pressure and statis ulcers     PVD (peripheral vascular disease) ([facility] [address] [facility]      Past Surgical History:   Procedure Laterality Date    HX HIP REPLACEMENT Bilateral        Expanded or extensive additional review of patient history: Home Situation  Home Environment: [address] [place] [address] Name: [facility]  One/Two Story Residence: Two story, live on 1st floor  Living Alone: Yes  Support Systems: Child(debra)  Patient Expects to be Discharged to[de-identified] Skilled nursing facility  Current DME Used/Available at Home: Walker, rolling    PLOF: Pt required min to max assist for ADLS/IADLS, mod to max assist with mobility prior to admission. Hand dominance: Right    EXAMINATION OF PERFORMANCE DEFICITS:  Cognitive/Behavioral Status:  Neurologic State: Confused; Eyes open to voice  Orientation Level: Disoriented to place; Disoriented to situation;Oriented to person;Disoriented to time  Cognition: Follows commands    Skin: All visible areas intact    Edema: LLE edema noted    Hearing: Auditory  Auditory Impairment: Hard of hearing, bilateral    Vision/Perceptual:    Appears intact. Difficult to assess as  pt kept eyes closed during most of session. Range of Motion:  BUE  AROM: Grossly decreased, non-functional    Strength:  BUE  Strength: Grossly decreased, non-functional    Coordination:  Not tested this session. Tone & Sensation:  Normal tone. Pt reporting decreased sensation RLE    Balance:   Not tested    Functional Mobility and Transfers for ADLs:  Bed Mobility:  Rolling: Maximum assistance;Assist x2    Transfers:   Not tested due to lethargy and currently unsafe    ADL Assessment:  Maximal to total assist to complete all self-care tasks. ADL Intervention and task modifications:  Feeding  Feeding Assistance: Maximum assistance  Container Management: Maximum assistance  Cutting Food: Maximum assistance    Grooming  Grooming Assistance: Maximum assistance    Upper Body Bathing  Bathing Assistance: Maximum assistance    Lower Body Bathing  Bathing Assistance: Total assistance(dependent)    Upper Body Dressing Assistance  Dressing Assistance: Maximum assistance    Lower Body Dressing Assistance  Dressing Assistance:  Total assistance(dependent)  Socks: Total assistance (dependent)    Toileting  Toileting Assistance: Total assistance(dependent)  Bladder Hygiene: Total assistance (dependent)  Bowel Hygiene: Total assistance (dependent)    [address] [place] [id number] AM-PACTM \"6 Clicks\"                                                       Daily Activity Inpatient Short Form  How much help from another person does the patient currently need. .. Total; A Lot A Little None   1. Putting on and taking off regular lower body clothing? [x]  1 []  2 []  3 []  4   2. Bathing (including washing, rinsing, drying)? []  1 [x]  2 []  3 []  4   3. Toileting, which includes using toilet, bedpan or urinal? [x] 1 []  2 []  3 []  4   4. Putting on and taking off regular upper body clothing? []  1 [x]  2 []  3 []  4   5. Taking care of personal grooming such as brushing teeth? []  1 [x]  2 []  3 []  4   6. Eating meals? []  1 [x]  2 []  3 []  4   © 2007, Trustees of [address] [place] [address], under license to [employer]. All rights reserved     Score: 10/24     Interpretation of Tool:  Represents clinically-significant functional categories (i.e. Activities of daily living). Percentage of Impairment CH    0%   CI    1-19% CJ    20-39% CK    40-59% CL    60-79% CM    80-99% CN     100%   AMPA  Score 6-24 24 23 20-22 15-19 10-14 7-9 6        Occupational Therapy Evaluation Charge Determination   History Examination Decision-Making   MEDIUM Complexity : Expanded review of history including physical, cognitive and psychosocial  history  MEDIUM Complexity : 3-5 performance deficits relating to physical, cognitive , or psychosocial skils that result in activity limitations and / or participation restrictions MEDIUM Complexity : Patient may present with comorbidities that affect occupational performnce.  Miniml to moderate modification of tasks or assistance (eg, physical or verbal ) with assesment(s) is necessary to enable patient to complete evaluation       Based on the above components, the patient evaluation is determined to be of the following complexity level: MEDIUM  Pain Ratin/10    Activity Tolerance:   Good  Please refer to the flowsheet for vital signs taken during this treatment. After treatment patient left in no apparent distress:    Supine in bed, Heels elevated for pressure relief, Call bell within reach, Bed / chair alarm activated, Caregiver / family present, and Side rails x 3    COMMUNICATION/EDUCATION:   The patients plan of care was discussed with: Physical therapist.     Patient/family have participated as able in goal setting and plan of care. This patients plan of care is appropriate for delegation to [facility].     Thank you for this referral.  Kallie Montiel  Time Calculation: 23 mins

## (undated) DEVICE — STOCKINETTE,IMPERVIOUS,12X48,STERILE: Brand: MEDLINE

## (undated) DEVICE — BASIC SINGLE BASIN-LF: Brand: MEDLINE INDUSTRIES, INC.

## (undated) DEVICE — DRESSING,GAUZE,XEROFORM,CURAD,5"X9",ST: Brand: CURAD

## (undated) DEVICE — INTENDED FOR TISSUE SEPARATION, AND OTHER PROCEDURES THAT REQUIRE A SHARP SURGICAL BLADE TO PUNCTURE OR CUT.: Brand: BARD-PARKER ® CARBON RIB-BACK BLADES

## (undated) DEVICE — BANDAGE,GAUZE,BULKEE II,4.5"X4.1YD,STRL: Brand: MEDLINE

## (undated) DEVICE — SUTURE ETHLN SZ 2-0 L18IN NONABSORBABLE BLK L26MM FS 3/8 664G

## (undated) DEVICE — SUTURE VCRL + SZ 2-0 L18IN ABSRB VLT L26MM SH 1/2 CIR VCP775D

## (undated) DEVICE — SOLUTION IRRIG 500ML 0.9% SOD CHL USP POUR PLAS BTL

## (undated) DEVICE — SUTURE ETHLN SZ 2-0 L30IN NONABSORBABLE BLK L36MM FSLX 3/8 1674H

## (undated) DEVICE — STRYKER PERFORMANCE SERIES SAGITTAL BLADE: Brand: STRYKER PERFORMANCE SERIES

## (undated) DEVICE — WET SKIN PREP TRAY: Brand: MEDLINE INDUSTRIES, INC.

## (undated) DEVICE — SUTURE ABSORBABLE BRAIDED 2-0 12X18 IN COAT VIO VICRYL + VCP105G

## (undated) DEVICE — YANKAUER,BULB TIP,W/O VENT,RIGID,STERILE: Brand: MEDLINE

## (undated) DEVICE — SPONGE GZ W4XL4IN COT 12 PLY TYP VII WVN C FLD DSGN

## (undated) DEVICE — GARMENT,MEDLINE,DVT,INT,CALF,MED, GEN2: Brand: MEDLINE

## (undated) DEVICE — Z CONVERTED USE 2271386 BANDAGE COMPR W6INXL11YD WVN COTTON/ELASTIC CLP CLSR DBL

## (undated) DEVICE — SOUTHSIDE TURNOVER: Brand: MEDLINE INDUSTRIES, INC.

## (undated) DEVICE — SPONGE LAP 18X18IN STRL -- 5/PK

## (undated) DEVICE — TOWEL SURG W17XL27IN STD BLU COT NONFENESTRATED PREWASHED

## (undated) DEVICE — T-DRAPE,EXTREMITY,STERILE: Brand: MEDLINE

## (undated) DEVICE — GLOVE SURG SZ 7.5 L11.73IN FNGR THK9.8MIL STRW LTX POLYMER

## (undated) DEVICE — SUT VCRL + 27IN MO4 VIO --

## (undated) DEVICE — SYRINGE IRRIG 60ML SFT PLIABLE BLB EZ TO GRP 1 HND USE W/

## (undated) DEVICE — WAX SURG 2.5GM HEMSTAT BNE BEESWAX PARAFFIN ISO PALMITATE

## (undated) DEVICE — SUT PROL 5-0 30IN C1 BLU --

## (undated) DEVICE — DRAPE,REIN 53X77,STERILE: Brand: MEDLINE

## (undated) DEVICE — PADDING CAST SPEC 6INX4YD COT --

## (undated) DEVICE — PREP PAD BNS: Brand: CONVERTORS

## (undated) DEVICE — MINOR GENERAL PACK: Brand: MEDLINE INDUSTRIES, INC.